# Patient Record
Sex: MALE | Race: WHITE | NOT HISPANIC OR LATINO | Employment: OTHER | URBAN - METROPOLITAN AREA
[De-identification: names, ages, dates, MRNs, and addresses within clinical notes are randomized per-mention and may not be internally consistent; named-entity substitution may affect disease eponyms.]

---

## 2017-01-04 ENCOUNTER — HOSPITAL ENCOUNTER (OUTPATIENT)
Dept: CT IMAGING | Facility: HOSPITAL | Age: 77
Discharge: HOME | End: 2017-01-04
Attending: FAMILY MEDICINE | Admitting: FAMILY MEDICINE

## 2017-05-02 ENCOUNTER — HOSPITAL ENCOUNTER (OUTPATIENT)
Dept: FAMILY MEDICINE CLINIC | Facility: CLINIC | Age: 77
Setting detail: SPECIMEN
Discharge: HOME | End: 2017-05-02
Attending: FAMILY MEDICINE | Admitting: FAMILY MEDICINE

## 2017-05-02 LAB
ALT SERPL-CCNC: 18 IU/L (ref 17–63)
ANION GAP SERPL CALC-SCNC: 13.7 MMOL/L (ref 10–20)
BUN SERPL-MCNC: 18 MG/DL (ref 8–20)
BUN/CREAT SERPL: 18 (ref 6.2–20.3)
CALCIUM SERPL-MCNC: 9.5 MG/DL (ref 8.9–10.3)
CHLORIDE SERPL-SCNC: 101 MMOL/L (ref 101–111)
CHOLEST SERPL-MCNC: 109 MG/DL
CHOLEST/HDLC SERPL: 2.3 {RATIO}
CONV CO2: 27 MMOL/L (ref 22–32)
CONV LDL CHOLESTEROL DIRECT: 52 MG/DL (ref 0–100)
CREAT UR-MCNC: 1 MG/DL (ref 0.7–1.2)
GLUCOSE SERPL-MCNC: 118 MG/DL (ref 65–99)
HDLC SERPL-MCNC: 47 MG/DL
LDLC/HDLC SERPL: 1.1 {RATIO}
LIPID INTERPRETATION: NORMAL
POTASSIUM SERPL-SCNC: 4.7 MMOL/L (ref 3.6–5.1)
PSA SERPL-MCNC: 0.67 NG/ML (ref 0–4)
SODIUM SERPL-SCNC: 137 MMOL/L (ref 136–144)
TRIGL SERPL-MCNC: 54 MG/DL
VLDLC SERPL CALC-MCNC: 10.3 MG/DL

## 2017-06-19 ENCOUNTER — HOSPITAL ENCOUNTER (OUTPATIENT)
Dept: OTHER | Facility: HOSPITAL | Age: 77
Setting detail: SPECIMEN
Discharge: HOME | End: 2017-06-19
Attending: OTOLARYNGOLOGY | Admitting: OTOLARYNGOLOGY

## 2017-08-10 ENCOUNTER — HOSPITAL ENCOUNTER (OUTPATIENT)
Dept: OTHER | Facility: HOSPITAL | Age: 77
Setting detail: SPECIMEN
Discharge: HOME | End: 2017-08-10
Attending: OTOLARYNGOLOGY | Admitting: OTOLARYNGOLOGY

## 2017-11-01 ENCOUNTER — HOSPITAL ENCOUNTER (OUTPATIENT)
Dept: FAMILY MEDICINE CLINIC | Facility: CLINIC | Age: 77
Setting detail: SPECIMEN
Discharge: HOME | End: 2017-11-01
Attending: FAMILY MEDICINE | Admitting: FAMILY MEDICINE

## 2017-11-01 LAB
ANION GAP SERPL CALC-SCNC: 12.5 MMOL/L (ref 10–20)
BUN SERPL-MCNC: 16 MG/DL (ref 8–20)
BUN/CREAT SERPL: 17.8 (ref 6.2–20.3)
CALCIUM SERPL-MCNC: 9.4 MG/DL (ref 8.9–10.3)
CHLORIDE SERPL-SCNC: 100 MMOL/L (ref 101–111)
CONV CO2: 28 MMOL/L (ref 22–32)
CREAT UR-MCNC: 0.9 MG/DL (ref 0.7–1.2)
GLUCOSE SERPL-MCNC: 121 MG/DL (ref 65–99)
POTASSIUM SERPL-SCNC: 4.5 MMOL/L (ref 3.6–5.1)
SODIUM SERPL-SCNC: 136 MMOL/L (ref 136–144)

## 2017-12-14 ENCOUNTER — HOSPITAL ENCOUNTER (OUTPATIENT)
Dept: OTHER | Facility: HOSPITAL | Age: 77
Setting detail: SPECIMEN
Discharge: HOME | End: 2017-12-14
Attending: OTOLARYNGOLOGY | Admitting: OTOLARYNGOLOGY

## 2018-05-07 ENCOUNTER — HOSPITAL ENCOUNTER (OUTPATIENT)
Dept: FAMILY MEDICINE CLINIC | Facility: CLINIC | Age: 78
Setting detail: SPECIMEN
Discharge: HOME | End: 2018-05-07
Attending: FAMILY MEDICINE | Admitting: FAMILY MEDICINE

## 2018-09-24 ENCOUNTER — HOSPITAL ENCOUNTER (OUTPATIENT)
Dept: PHYSICAL THERAPY | Facility: HOSPITAL | Age: 78
Setting detail: RECURRING SERIES
Discharge: HOME | End: 2018-10-08
Attending: FAMILY MEDICINE | Admitting: FAMILY MEDICINE

## 2018-10-15 ENCOUNTER — HOSPITAL ENCOUNTER (OUTPATIENT)
Dept: FAMILY MEDICINE CLINIC | Facility: CLINIC | Age: 78
Setting detail: SPECIMEN
Discharge: HOME | End: 2018-10-15
Attending: FAMILY MEDICINE | Admitting: FAMILY MEDICINE

## 2018-10-15 LAB
ALBUMIN SERPL-MCNC: 4.2 G/DL (ref 3.5–4.8)
ALBUMIN/GLOB SERPL: 1.6 {RATIO} (ref 1–1.7)
ALP SERPL-CCNC: 54 IU/L (ref 32–91)
ALT SERPL-CCNC: 20 IU/L (ref 17–63)
ANION GAP SERPL CALC-SCNC: 12 MMOL/L (ref 10–20)
AST SERPL-CCNC: 29 IU/L (ref 15–41)
BASOPHILS # BLD AUTO: 0 10*3/UL (ref 0–0.2)
BASOPHILS NFR BLD AUTO: 1 % (ref 0–2)
BILIRUB SERPL-MCNC: 0.8 MG/DL (ref 0.3–1.2)
BUN SERPL-MCNC: 14 MG/DL (ref 8–20)
BUN/CREAT SERPL: 14 (ref 6.2–20.3)
CALCIUM SERPL-MCNC: 9.2 MG/DL (ref 8.9–10.3)
CHLORIDE SERPL-SCNC: 100 MMOL/L (ref 101–111)
CHOLEST SERPL-MCNC: 121 MG/DL
CHOLEST/HDLC SERPL: 2.2 {RATIO}
CONV CO2: 28 MMOL/L (ref 22–32)
CONV LDL CHOLESTEROL DIRECT: 52 MG/DL (ref 0–100)
CONV TOTAL PROTEIN: 6.8 G/DL (ref 6.1–7.9)
CREAT UR-MCNC: 1 MG/DL (ref 0.7–1.2)
DIFFERENTIAL METHOD BLD: (no result)
EOSINOPHIL # BLD AUTO: 0.3 10*3/UL (ref 0–0.3)
EOSINOPHIL # BLD AUTO: 5 % (ref 0–3)
ERYTHROCYTE [DISTWIDTH] IN BLOOD BY AUTOMATED COUNT: 12.9 % (ref 11.5–14.5)
GLOBULIN UR ELPH-MCNC: 2.6 G/DL (ref 2.5–3.8)
GLUCOSE SERPL-MCNC: 99 MG/DL (ref 65–99)
HBA1C MFR BLD: 6.1 % (ref 0–5.6)
HCT VFR BLD AUTO: 42.3 % (ref 40–54)
HDLC SERPL-MCNC: 56 MG/DL
HGB BLD-MCNC: 14.3 G/DL (ref 14–18)
LDLC/HDLC SERPL: 0.9 {RATIO}
LIPID INTERPRETATION: NORMAL
LYMPHOCYTES # BLD AUTO: 1.7 10*3/UL (ref 0.8–4.8)
LYMPHOCYTES NFR BLD AUTO: 30 % (ref 18–42)
MCH RBC QN AUTO: 30.4 PG (ref 26–32)
MCHC RBC AUTO-ENTMCNC: 33.8 G/DL (ref 32–36)
MCV RBC AUTO: 90.1 FL (ref 80–94)
MONOCYTES # BLD AUTO: 0.7 10*3/UL (ref 0.1–1.3)
MONOCYTES NFR BLD AUTO: 12 % (ref 2–11)
NEUTROPHILS # BLD AUTO: 3.1 10*3/UL (ref 2.3–8.6)
NEUTROPHILS NFR BLD AUTO: 52 % (ref 50–75)
NRBC BLD AUTO-RTO: 0 /100{WBCS}
NRBC/RBC NFR BLD MANUAL: 0 10*3/UL
PLATELET # BLD AUTO: 184 10*3/UL (ref 150–450)
PMV BLD AUTO: 9.1 FL (ref 7.4–10.4)
POTASSIUM SERPL-SCNC: 4 MMOL/L (ref 3.6–5.1)
PSA SERPL-MCNC: 1.07 NG/ML (ref 0–4)
RBC # BLD AUTO: 4.69 10*6/UL (ref 4.6–6)
SODIUM SERPL-SCNC: 136 MMOL/L (ref 136–144)
TRIGL SERPL-MCNC: 84 MG/DL
VLDLC SERPL CALC-MCNC: 13.2 MG/DL
WBC # BLD AUTO: 5.8 10*3/UL (ref 4.5–11.5)

## 2019-01-14 ENCOUNTER — HOSPITAL ENCOUNTER (OUTPATIENT)
Dept: FAMILY MEDICINE CLINIC | Facility: CLINIC | Age: 79
Setting detail: SPECIMEN
Discharge: HOME | End: 2019-01-14
Attending: FAMILY MEDICINE | Admitting: FAMILY MEDICINE

## 2019-01-14 LAB
ANION GAP SERPL CALC-SCNC: 13.7 MMOL/L (ref 10–20)
BUN SERPL-MCNC: 17 MG/DL (ref 8–20)
BUN/CREAT SERPL: 18.9 (ref 6.2–20.3)
CALCIUM SERPL-MCNC: 9.2 MG/DL (ref 8.9–10.3)
CHLORIDE SERPL-SCNC: 96 MMOL/L (ref 101–111)
CONV CO2: 26 MMOL/L (ref 22–32)
CREAT UR-MCNC: 0.9 MG/DL (ref 0.7–1.2)
GLUCOSE SERPL-MCNC: 119 MG/DL (ref 65–99)
POTASSIUM SERPL-SCNC: 3.7 MMOL/L (ref 3.6–5.1)
SODIUM SERPL-SCNC: 132 MMOL/L (ref 136–144)

## 2019-05-30 ENCOUNTER — CONVERSION ENCOUNTER (OUTPATIENT)
Dept: FAMILY MEDICINE CLINIC | Facility: CLINIC | Age: 79
End: 2019-05-30

## 2019-06-04 VITALS
WEIGHT: 222.5 LBS | OXYGEN SATURATION: 99 % | DIASTOLIC BLOOD PRESSURE: 72 MMHG | HEART RATE: 66 BPM | SYSTOLIC BLOOD PRESSURE: 144 MMHG | BODY MASS INDEX: 28.18 KG/M2

## 2019-06-06 NOTE — PROGRESS NOTES
Chief Complaint Followup stent placement hypertension dyslipidemia    Since I have last seen, the patient has been without any chest discomfort ,shortness of breath, palpitations, dizziness or syncope.  Denies having any headache ,abdominal pain ,nausea, vomiting , diarrhea constipation, loss of weight or loss of appetite.    Denies having any excessive bruising ,hematuria or blood in the stool.    Review of all systems negative except as indicated  HPI  //////////////////////  Impression  ============   -status post right coronary artery stent placement (2005 ).     Cardiac catheterization 03/10/2017 revealed normal left ventricular function.  Normal left main coronary artery. 50% lad distal to diagonal branch.  Ostial diagonal branch has 80-90% disease (moderate size vessel) RCA has 30-40% plaquing.  RCA stent was   patent.  One of the PDA branches had 70% disease at its ostium.    Stress Cardiolite test is negative for myocardial ischemia 08/01/2016.    -palpitations improved.  Holter monitor 01/18/2016 showed occasional premature atrial and ventricular contractions.      -hypertension dyslipidemia      -benign prostatic hypertrophy      -psoriasis      -status post left ankle surgery  rotator cuff surgery umbilical hernia repair and surgery for basal cell carcinoma of the nose.      -allergy to darvon codeine Relafen and IVP dye  ===============   Plan  =============  Labs were reviewed.  sodium 132 blood sugar 119 potassium 3.7 BUN 17 creatinine 0.9  Patient is not having any angina pectoris or congestive heart failure on medical treatment.  EKG showed sinus bradycardia sinus arrhythmia   medications were reviewed and updated.     Continue atenolol 25 mg a day   followup in the office in 6 months .  /////////////////////////////            Vital Signs:    Patient Profile:    79 Years Old Male  CC:         CAD  Height:     74.5 inches  Weight:     222.50 pounds  (Measured Weight:  222.50 lbs.  oz.)  BMI:         28.18  Pulse rate: 66 / minute  O2 Sat:     99 %  Room Air:   room air without exertion    BP sittin / 72  (left arm)  Cuff size:  regular   Vitals Entered By: Sharlene Segura CMA (May 30, 2019 9:09 AM)    Medications:  Medications were reviewed with the patient during this visit.    Allergies:   DARVON (Critical)  CODEINE (Critical)  RELAFEN (Critical)  IODINE (Critical)    Allergies were reviewed with the patient during this visit.    Current Allergies (reviewed today):  DARVON (Critical)  CODEINE (Critical)  RELAFEN (Critical)  IODINE (Critical)    Current Medications (including medications started today):   NITROGLYCERIN 0.4 MG SUBLINGUAL TABLET SUBLINGUAL (NITROGLYCERIN) take 1 tablket under your tongue as needed for chest pain; repeat every 5 minutes for 2 more doses if needed. Go to the ER with the third dose.  ISOSORBIDE MN ER 30 MG TABLET (ISOSORBIDE MONONITRATE) TAKE ONE TABLET BY MOUTH DAILY  ADULT ASPIRIN EC LOW STRENGTH 81 MG ORAL TABLET DELAYED RELEASE (ASPIRIN) Take 1 tablet by mouth daily  FINASTERIDE 5 MG ORAL TABLET (FINASTERIDE) Take 1 tablet by mouth daily  ATORVASTATIN CALCIUM 20 MG ORAL TABLET (ATORVASTATIN CALCIUM) Take 1 tablet by mouth daily  CLONIDINE HCL 0.2MG TABLET (CLONIDINE HCL) TAKE ONE TABLET BY MOUTH TWICE A DAY  LOSARTAN POTASSIUM 100 MG ORAL TABLET (LOSARTAN POTASSIUM) Take 1 tablet by mouth daily  ATENOLOL 25 MG TABLET (ATENOLOL) TAKE ONE TABLET BY MOUTH EVERY MORNING  HYDROCHLOROTHIAZIDE 25 MG TABLET (HYDROCHLOROTHIAZIDE) TAKE ONE TABLET BY MOUTH DAILY      Past Medical History:     Reviewed history from 10/03/2017 and no changes required:        Sympathetic syndrome        Coronary Artery Disease        Hypertension        BPH        Hyperlipidemia        Psoriasis        Impaired fasting glucose        Osteopenia        Osteoarthritis    Past Surgical History:     Reviewed history from 2018 and no changes required:        Paratid mass removal        Stenting of  right coronary artery        Rotator cuff         Umbilical herniorrhaphy 2011        Left ankle surgery        Nose surgery removal of a basal cell        Left Great Toe had a broken tendon- uses an AFO when going on long walks        eye surgery     Family History Summary:      Reviewed history Last on 2019 and no changes required:2019  First Degree Blood Relative - Has No Known Family History - Entered On: 2015    General Comments - FH:  Father  at age 89  Mother  at age 92  Four healthy brothers    Social History:     Reviewed history from 2019 and no changes required:        Patient has never smoked.        Passive Smoke: N        Alcohol Use: Y        Drug Use: N        HIV/High Risk: N        Regular Exercise: Y                Risk Factors:     Smoked Tobacco Use:  Never smoker  Smokeless Tobacco Use:  Never  Passive smoke exposure:  no  Drug use:  no  HIV high-risk behavior:  no  Caffeine use:  3 drinks per day  Alcohol use:  yes     Type:  beer/whiskey sour/wine twice monthly     Drinks per day:  social  Exercise:  yes     Times per week:  3     Type of Exercise:  walks 2 miles  Seatbelt use:  100 %  Sun Exposure:  occasionally    Family History Risk Factors:     Family History of MI in females < 65 years old:  no     Family History of MI in males < 55 years old:  no    Previous Tobacco Use: Signed On - 2019  Smoked Tobacco Use:  Never smoker  Smokeless Tobacco Use:  Never  Passive smoke exposure:  no  Drug use:  no  HIV high-risk behavior:  no  Caffeine use:  2 drinks per day    Previous Alcohol Use: Signed On - 2019  Alcohol use:  yes     Type:  beer/whiskey sour/wine twice monthly  Exercise:  yes     Times per week:  5     Type of Exercise:  walks 2 miles  Seatbelt use:  100 %  Sun Exposure:  occasionally    Family History Risk Factors:     Family History of MI in females < 65 years old:  no     Family History of MI in males < 55 years old:   no    Colonoscopy History:     Date of Last Colonoscopy:  07/25/2012        Review of Systems   General: + fatigue or tiredness, No change in weight   Eyes: No redness  Cardiovascular: No chest pain, no palpitations   Respiratory: No shortness of breath   Gastrointestinal: No nausea or vomiting, bleeding   Genitourinary: no hematuria or dysuria  Musculoskeletal: No arthralgia or myalgia   Skin: No rash  Neurologic: No numbness, tingling, syncope   Hematologic/Lymphatic: No abnormal bleeding      Physical Exam    General:      The patient is alert, oriented and in no distress.    Vital signs as noted above.    Head and neck revealed no carotid bruits or jugular venous distension.  No thyromegaly or lymphadenopathy is present.    Lungs clear.  No wheezing.  Breath sounds are normal bilaterally.    Heart normal first and second heart sounds.  No murmur or pericardial rub is present.  No gallop is present.    Abdomen soft and nontender.  No organomegaly is present.    Extremities revealed good peripheral pulses without any pedal edema.    Skin warm and dry.    Musculoskeletal system is grossly normal.    CNS grossly normal.      Blood Pressure:  Today's BP: 144/72 mm Hg    Labwork:   Most Recent Lab Results:   LDL: 52 mg/dL 10/15/2018  HbA1c: : 6.1 % 10/15/2018      EKG Interpretation   Comments: Sinus bradycardia sinus arrhythmia 54 per minute normal axis normal intervals no ectopy      Impression & Recommendations:    Problem # 1:  Status post cardiac stent placement (ICD-V45.82) (VZP19-T84.5)  Assessment: Improved    His updated medication list for this problem includes:     Nitroglycerin 0.4 Mg Sublingual Tablet Sublingual (Nitroglycerin) ..... Take 1 tablket under your tongue as needed for chest pain; repeat every 5 minutes for 2 more doses if needed. go to the er with the third dose.     Isosorbide Mn Er 30 Mg Tablet (Isosorbide mononitrate) ..... Take one tablet by mouth daily     Adult Aspirin Ec Low Strength 81  Mg Oral Tablet Delayed Release (Aspirin) ..... Take 1 tablet by mouth daily     Clonidine Hcl 0.2mg Tablet (Clonidine hcl) ..... Take one tablet by mouth twice a day     Losartan Potassium 100 Mg Oral Tablet (Losartan potassium) ..... Take 1 tablet by mouth daily     Atenolol 25 Mg Tablet (Atenolol) ..... Take one tablet by mouth every morning     Hydrochlorothiazide 25 Mg Tablet (Hydrochlorothiazide) ..... Take one tablet by mouth daily    Orders:  27432-Axm Vst-Est Level III (CPT-23399)      Problem # 2:  HYPERTENSION (ICD-401.9) (RSJ93-D74)  Assessment: Improved    His updated medication list for this problem includes:     Clonidine Hcl 0.2mg Tablet (Clonidine hcl) ..... Take one tablet by mouth twice a day     Losartan Potassium 100 Mg Oral Tablet (Losartan potassium) ..... Take 1 tablet by mouth daily     Atenolol 25 Mg Tablet (Atenolol) ..... Take one tablet by mouth every morning     Hydrochlorothiazide 25 Mg Tablet (Hydrochlorothiazide) ..... Take one tablet by mouth daily    Orders:  EKG (In House) (18711)  98637-Yin Vst-Est Level III (CPT-72970)      Problem # 3:  HYPERLIPIDEMIA (ICD-272.4) (CYU69-L68.5)  Assessment: Improved    His updated medication list for this problem includes:     Atorvastatin Calcium 20 Mg Oral Tablet (Atorvastatin calcium) ..... Take 1 tablet by mouth daily    Orders:  EKG (In House) (90022)  22270-Gga Vst-Est Level III (CPT-72962)        Patient Instructions:  1)   followup in 6 months                Medication Administration    Orders Added:  1)  EKG (In House) [55084]  2)  90692-Kkb Vst-Est Level III [CPT-97895]  ]      Electronically signed by Vilma Staples MD on 05/30/2019 at 9:45 AM  ________________________________________________________________________       Disclaimer: Converted Note message may not contain all data elements that existed in the legacy source system. Please see [employer] for the original note details.

## 2019-07-15 ENCOUNTER — TELEPHONE (OUTPATIENT)
Dept: FAMILY MEDICINE CLINIC | Facility: CLINIC | Age: 79
End: 2019-07-15

## 2019-07-15 DIAGNOSIS — I10 ESSENTIAL HYPERTENSION: Primary | ICD-10-CM

## 2019-07-15 DIAGNOSIS — R73.01 IMPAIRED FASTING GLUCOSE: ICD-10-CM

## 2019-07-15 PROBLEM — I25.10 CORONARY ARTERY DISEASE: Status: ACTIVE | Noted: 2019-07-15

## 2019-07-15 RX ORDER — ISOSORBIDE MONONITRATE 30 MG/1
TABLET, EXTENDED RELEASE ORAL EVERY 24 HOURS
COMMUNITY
Start: 2017-11-02 | End: 2020-04-20

## 2019-07-15 RX ORDER — HYDROCHLOROTHIAZIDE 25 MG/1
TABLET ORAL EVERY 24 HOURS
COMMUNITY
Start: 2019-03-10 | End: 2019-09-15 | Stop reason: SDUPTHER

## 2019-07-15 RX ORDER — LOSARTAN POTASSIUM 100 MG/1
100 TABLET ORAL DAILY
COMMUNITY
Start: 2011-10-19

## 2019-07-15 RX ORDER — ATENOLOL 25 MG/1
TABLET ORAL EVERY 24 HOURS
COMMUNITY
Start: 2017-09-25 | End: 2019-12-15 | Stop reason: SDUPTHER

## 2019-07-15 RX ORDER — FINASTERIDE 5 MG/1
5 TABLET, FILM COATED ORAL EVERY EVENING
COMMUNITY
Start: 2014-02-06

## 2019-07-15 RX ORDER — NITROGLYCERIN 0.4 MG/1
TABLET SUBLINGUAL
COMMUNITY
Start: 2017-03-13 | End: 2019-12-19 | Stop reason: SDUPTHER

## 2019-07-15 RX ORDER — CLONIDINE HYDROCHLORIDE 0.2 MG/1
TABLET ORAL EVERY 12 HOURS
COMMUNITY
Start: 2018-12-09 | End: 2019-10-06 | Stop reason: SDUPTHER

## 2019-07-15 RX ORDER — ASPIRIN 81 MG/1
81 TABLET ORAL DAILY
COMMUNITY
Start: 2011-10-19

## 2019-07-15 RX ORDER — ATORVASTATIN CALCIUM 20 MG/1
20 TABLET, FILM COATED ORAL NIGHTLY
COMMUNITY
Start: 2014-02-06

## 2019-07-16 ENCOUNTER — LAB (OUTPATIENT)
Dept: LAB | Facility: HOSPITAL | Age: 79
End: 2019-07-16

## 2019-07-16 DIAGNOSIS — I10 ESSENTIAL HYPERTENSION: ICD-10-CM

## 2019-07-16 DIAGNOSIS — R73.01 IMPAIRED FASTING GLUCOSE: ICD-10-CM

## 2019-07-16 LAB
ANION GAP SERPL CALCULATED.3IONS-SCNC: 14.1 MMOL/L (ref 5–15)
BUN BLD-MCNC: 15 MG/DL (ref 8–20)
BUN/CREAT SERPL: 15 (ref 6.2–20.3)
CALCIUM SPEC-SCNC: 9.1 MG/DL (ref 8.9–10.3)
CHLORIDE SERPL-SCNC: 95 MMOL/L (ref 101–111)
CO2 SERPL-SCNC: 24 MMOL/L (ref 22–32)
CREAT BLD-MCNC: 1 MG/DL (ref 0.7–1.2)
GFR SERPL CREATININE-BSD FRML MDRD: 72 ML/MIN/1.73
GLUCOSE BLD-MCNC: 120 MG/DL (ref 65–99)
HBA1C MFR BLD: 6.2 % (ref 3.5–5.6)
POTASSIUM BLD-SCNC: 4.1 MMOL/L (ref 3.6–5.1)
SODIUM BLD-SCNC: 129 MMOL/L (ref 136–144)

## 2019-07-16 PROCEDURE — 36415 COLL VENOUS BLD VENIPUNCTURE: CPT

## 2019-07-16 PROCEDURE — 80048 BASIC METABOLIC PNL TOTAL CA: CPT

## 2019-07-16 PROCEDURE — 83036 HEMOGLOBIN GLYCOSYLATED A1C: CPT

## 2019-07-17 ENCOUNTER — OFFICE VISIT (OUTPATIENT)
Dept: FAMILY MEDICINE CLINIC | Facility: CLINIC | Age: 79
End: 2019-07-17

## 2019-07-17 VITALS
SYSTOLIC BLOOD PRESSURE: 176 MMHG | TEMPERATURE: 97.7 F | WEIGHT: 224.4 LBS | DIASTOLIC BLOOD PRESSURE: 91 MMHG | HEIGHT: 75 IN | OXYGEN SATURATION: 98 % | BODY MASS INDEX: 27.9 KG/M2 | RESPIRATION RATE: 14 BRPM | HEART RATE: 57 BPM

## 2019-07-17 DIAGNOSIS — R73.01 IMPAIRED FASTING GLUCOSE: ICD-10-CM

## 2019-07-17 DIAGNOSIS — N40.0 ENLARGED PROSTATE WITHOUT LOWER URINARY TRACT SYMPTOMS (LUTS): ICD-10-CM

## 2019-07-17 DIAGNOSIS — E78.5 HYPERLIPIDEMIA, UNSPECIFIED HYPERLIPIDEMIA TYPE: ICD-10-CM

## 2019-07-17 DIAGNOSIS — I25.10 CORONARY ARTERY DISEASE INVOLVING NATIVE CORONARY ARTERY OF NATIVE HEART WITHOUT ANGINA PECTORIS: ICD-10-CM

## 2019-07-17 DIAGNOSIS — M85.80 OSTEOPENIA, UNSPECIFIED LOCATION: ICD-10-CM

## 2019-07-17 DIAGNOSIS — M19.90 OSTEOARTHRITIS, UNSPECIFIED OSTEOARTHRITIS TYPE, UNSPECIFIED SITE: ICD-10-CM

## 2019-07-17 DIAGNOSIS — I10 ESSENTIAL HYPERTENSION: Primary | ICD-10-CM

## 2019-07-17 PROCEDURE — 99214 OFFICE O/P EST MOD 30 MIN: CPT | Performed by: FAMILY MEDICINE

## 2019-07-17 NOTE — PATIENT INSTRUCTIONS
Continue your current medications and treatment.    Follow up in the office in 6 months.    Laboratory testing at that time.

## 2019-07-17 NOTE — PROGRESS NOTES
"Subjective   Campbell Alex is a 79 y.o. male.     Chief Complaint   Patient presents with   • Hypertension     6 MTH F/U    • Hyperlipidemia   • Coronary Artery Disease       HPI chief complaint: Hypertension hyper lipidemia coronary disease arthritis osteopenia    The patient is an 79-year-old white male comes in for follow-up and maintenance of his current problems which include    #1 hypertension-stable-patient on Cozaar 100 mg daily clonidine 20 mg twice a day atenolol 25 mg daily and hydrochlorothiazide 25 mg daily.  He denies any lightheaded dizziness or chest pain.  Patient does have low-grade hyponatremia.  He is encouraged to increase his sodium intake and decrease his water intake.    #2 hyperlipidemia-stable-patient is on Lipitor 20 g daily.  No myalgias arthralgias.  No nausea or anorexia    #3 coronary artery disease-stable-patient on aspirin long-acting nitro glycerin and atenolol.  Denies chest pain shortness orthopnea PND.    #4 fasting glucose-stable-patient on a diet.  He is asymptomatic.  His A1c's are at goal    #5 prostatism-stable-patient is on Proscar 5 mg daily.  No urinary frequency intermittency hesitancy or incontinence    #6 arthritis-stable        The following portions of the patient's history were reviewed and updated as appropriate: allergies, current medications, past family history, past medical history, past social history, past surgical history and problem list.    Review of Systems    Objective     /91 (BP Location: Left arm, Patient Position: Sitting, Cuff Size: Adult)   Pulse 57   Temp 97.7 °F (36.5 °C) (Oral)   Resp 14   Ht 189.2 cm (74.5\")   Wt 102 kg (224 lb 6.4 oz)   SpO2 98%   BMI 28.43 kg/m²     Physical Exam   Constitutional: He is oriented to person, place, and time. He appears well-developed and well-nourished.   HENT:   Head: Normocephalic and atraumatic.   Right Ear: Decreased hearing is noted.   Left Ear: Decreased hearing is noted.   Eyes: Conjunctivae " and EOM are normal. Pupils are equal, round, and reactive to light.   Neck: Normal range of motion. Neck supple.   Cardiovascular: Normal rate, regular rhythm, normal heart sounds and intact distal pulses.   Pulmonary/Chest: Effort normal and breath sounds normal.   Abdominal: Soft. Bowel sounds are normal.   Musculoskeletal: Normal range of motion.   Neurological: He is alert and oriented to person, place, and time.   Skin: Skin is warm and dry.   Psychiatric: He has a normal mood and affect. His behavior is normal.   Nursing note and vitals reviewed.        Assessment/Plan   Campbell was seen today for hypertension, hyperlipidemia and coronary artery disease.    Diagnoses and all orders for this visit:    Essential hypertension    Hyperlipidemia, unspecified hyperlipidemia type    Coronary artery disease involving native coronary artery of native heart without angina pectoris    Impaired fasting glucose    Osteoarthritis, unspecified osteoarthritis type, unspecified site    Osteopenia, unspecified location    Enlarged prostate without lower urinary tract symptoms (luts)      Patient Instructions   Continue your current medications and treatment.    Follow up in the office in 6 months.    Laboratory testing at that time.      Amarjit Jurado Jr., MD    07/17/19

## 2019-09-15 RX ORDER — HYDROCHLOROTHIAZIDE 25 MG/1
TABLET ORAL
Qty: 30 TABLET | Refills: 3 | Status: SHIPPED | OUTPATIENT
Start: 2019-09-15 | End: 2020-01-06

## 2019-10-06 RX ORDER — CLONIDINE HYDROCHLORIDE 0.2 MG/1
TABLET ORAL
Qty: 60 TABLET | Refills: 0 | Status: SHIPPED | OUTPATIENT
Start: 2019-10-06 | End: 2019-11-06 | Stop reason: SDUPTHER

## 2019-11-06 RX ORDER — CLONIDINE HYDROCHLORIDE 0.2 MG/1
TABLET ORAL
Qty: 60 TABLET | Refills: 0 | Status: SHIPPED | OUTPATIENT
Start: 2019-11-06 | End: 2019-12-08 | Stop reason: SDUPTHER

## 2019-12-08 RX ORDER — CLONIDINE HYDROCHLORIDE 0.2 MG/1
TABLET ORAL
Qty: 60 TABLET | Refills: 0 | Status: SHIPPED | OUTPATIENT
Start: 2019-12-08 | End: 2020-01-06

## 2019-12-16 RX ORDER — ATENOLOL 25 MG/1
TABLET ORAL
Qty: 90 TABLET | Refills: 3 | Status: SHIPPED | OUTPATIENT
Start: 2019-12-16 | End: 2020-12-07

## 2019-12-19 ENCOUNTER — OFFICE VISIT (OUTPATIENT)
Dept: CARDIOLOGY | Facility: CLINIC | Age: 79
End: 2019-12-19

## 2019-12-19 VITALS
BODY MASS INDEX: 28.07 KG/M2 | HEIGHT: 74 IN | OXYGEN SATURATION: 99 % | DIASTOLIC BLOOD PRESSURE: 80 MMHG | HEART RATE: 66 BPM | WEIGHT: 218.75 LBS | SYSTOLIC BLOOD PRESSURE: 149 MMHG

## 2019-12-19 DIAGNOSIS — I25.10 CORONARY ARTERY DISEASE INVOLVING NATIVE CORONARY ARTERY OF NATIVE HEART WITHOUT ANGINA PECTORIS: Primary | ICD-10-CM

## 2019-12-19 DIAGNOSIS — I10 ESSENTIAL HYPERTENSION: ICD-10-CM

## 2019-12-19 DIAGNOSIS — Z95.5 STATUS POST CORONARY ARTERY STENT PLACEMENT: ICD-10-CM

## 2019-12-19 DIAGNOSIS — E78.2 MIXED HYPERLIPIDEMIA: ICD-10-CM

## 2019-12-19 PROCEDURE — 93000 ELECTROCARDIOGRAM COMPLETE: CPT | Performed by: INTERNAL MEDICINE

## 2019-12-19 PROCEDURE — 99213 OFFICE O/P EST LOW 20 MIN: CPT | Performed by: INTERNAL MEDICINE

## 2019-12-19 RX ORDER — NITROGLYCERIN 0.4 MG/1
0.4 TABLET SUBLINGUAL
Qty: 25 TABLET | Refills: 0 | Status: SHIPPED | OUTPATIENT
Start: 2019-12-19 | End: 2022-10-26

## 2019-12-19 NOTE — PROGRESS NOTES
Encounter Date:12/19/2019  Last seen 5/30/2019      Patient ID: Campbell Alex is a 79 y.o. male.    Chief Complaint:  Status post stent  Hypertension  Dyslipidemia      History of Present Illness  Since I have last seen, the patient has been without any chest discomfort ,shortness of breath, palpitations, dizziness or syncope.  Denies having any headache ,abdominal pain ,nausea, vomiting , diarrhea constipation, loss of weight or loss of appetite.  Denies having any excessive bruising ,hematuria or blood in the stool.    Review of all systems negative except as indicated    Assessment and Plan       //////////////////////  Impression  ============   -status post right coronary artery stent placement (2005 ).      Cardiac catheterization 03/10/2017 revealed normal left ventricular function.  Normal left main coronary artery. 50% lad distal to diagonal branch.  Ostial diagonal branch has 80-90% disease (moderate size vessel) RCA has 30-40% plaquing.  RCA stent was   patent.  One of the PDA branches had 70% disease at its ostium.     Stress Cardiolite test is negative for myocardial ischemia 08/01/2016.     -palpitations improved.  Holter monitor 01/18/2016 showed occasional premature atrial and ventricular contractions.       -hypertension dyslipidemia       -benign prostatic hypertrophy       -psoriasis       -status post left ankle surgery  rotator cuff surgery umbilical hernia repair and surgery for basal cell carcinoma of the nose.       -allergy to darvon codeine Relafen and IVP dye  ===============   Plan  =============  EKG showed sinus rhythm without any ischemic changes.  Patient is not having any angina pectoris or congestive heart failure on medical treatment.  EKG showed sinus bradycardia sinus arrhythmia  medications were reviewed and updated.  Continue atenolol 25 mg a day  followup in the office in 6 months .  /////////////////////////////           Diagnosis Plan   1. Coronary artery disease involving  native coronary artery of native heart without angina pectoris  ECG 12 Lead   2. Essential hypertension  ECG 12 Lead   3. Mixed hyperlipidemia     4. Status post coronary artery stent placement     LAB RESULTS (LAST 7 DAYS)    CBC        BMP        CMP         BNP        TROPONIN        CoAg        Creatinine Clearance  CrCl cannot be calculated (Patient's most recent lab result is older than the maximum 30 days allowed.).    ABG        Radiology  No radiology results for the last day                The following portions of the patient's history were reviewed and updated as appropriate: allergies, current medications, past family history, past medical history, past social history, past surgical history and problem list.    Review of Systems   Constitution: Negative for malaise/fatigue.   Cardiovascular: Negative for chest pain, leg swelling, palpitations and syncope.   Respiratory: Negative for shortness of breath.    Skin: Negative for rash.   Gastrointestinal: Negative for nausea and vomiting.   Neurological: Positive for light-headedness (at times). Negative for dizziness and numbness.         Current Outpatient Medications:   •  aspirin (ADULT ASPIRIN EC LOW STRENGTH) 81 MG EC tablet, ADULT ASPIRIN EC LOW STRENGTH 81 MG ORAL TABLET DELAYED RELEASE, Disp: , Rfl:   •  atenolol (TENORMIN) 25 MG tablet, TAKE ONE TABLET BY MOUTH EVERY MORNING, Disp: 90 tablet, Rfl: 3  •  atorvastatin (LIPITOR) 20 MG tablet, ATORVASTATIN CALCIUM 20 MG TABS, Disp: , Rfl:   •  cloNIDine (CATAPRES) 0.2 MG tablet, TAKE ONE TABLET BY MOUTH TWICE A DAY, Disp: 60 tablet, Rfl: 0  •  finasteride (PROSCAR) 5 MG tablet, FINASTERIDE 5 MG TABS, Disp: , Rfl:   •  hydrochlorothiazide (HYDRODIURIL) 25 MG tablet, TAKE ONE TABLET BY MOUTH DAILY, Disp: 30 tablet, Rfl: 3  •  isosorbide mononitrate (IMDUR) 30 MG 24 hr tablet, Daily., Disp: , Rfl:   •  losartan (COZAAR) 100 MG tablet, LOSARTAN POTASSIUM 100 MG TABS, Disp: , Rfl:   •  nitroglycerin  "(NITROSTAT) 0.4 MG SL tablet, NITROGLYCERIN 0.4 MG SUBL, Disp: , Rfl:     Allergies   Allergen Reactions   • Codeine GI Intolerance   • Iodine Hives   • Nabumetone Unknown (See Comments)   • Propoxyphene Hives       Family History   Problem Relation Age of Onset   • Hypertension Brother    • Hyperlipidemia Brother        Past Surgical History:   Procedure Laterality Date   • ANKLE SURGERY     • NOSE SURGERY     • ROTATOR CUFF REPAIR     • UMBILICAL HERNIA REPAIR         Past Medical History:   Diagnosis Date   • Arthritis    • Coronary artery disease    • Hyperlipidemia    • Hypertension    • Prostatism        Family History   Problem Relation Age of Onset   • Hypertension Brother    • Hyperlipidemia Brother        Social History     Socioeconomic History   • Marital status:      Spouse name: Not on file   • Number of children: Not on file   • Years of education: Not on file   • Highest education level: Not on file   Tobacco Use   • Smoking status: Former Smoker   • Smokeless tobacco: Never Used   Substance and Sexual Activity   • Alcohol use: Yes     Frequency: Never           ECG 12 Lead  Date/Time: 12/19/2019 10:06 AM  Performed by: Vilma Staples MD  Authorized by: Vilma Staples MD   Comparison: compared with previous ECG   Similar to previous ECG  Comments: Normal sinus rhythm nonspecific ST-T wave changes normal axis normal intervals 62/min no ectopy no significant change from 3/10/2017              Objective:       Physical Exam    /80   Pulse 66   Ht 188 cm (74\")   Wt 99.2 kg (218 lb 12 oz)   SpO2 99%   BMI 28.09 kg/m²   The patient is alert, oriented and in no distress.    Vital signs as noted above.    Head and neck revealed no carotid bruits or jugular venous distension.  No thyromegaly or lymphadenopathy is present.    Lungs clear.  No wheezing.  Breath sounds are normal bilaterally.    Heart normal first and second heart sounds.  No murmur..  No pericardial rub is present.  No " gallop is present.    Abdomen soft and nontender.  No organomegaly is present.    Extremities revealed good peripheral pulses without any pedal edema.    Skin warm and dry.    Musculoskeletal system is grossly normal.    CNS grossly normal.

## 2020-01-06 RX ORDER — HYDROCHLOROTHIAZIDE 25 MG/1
TABLET ORAL
Qty: 30 TABLET | Refills: 2 | Status: SHIPPED | OUTPATIENT
Start: 2020-01-06 | End: 2020-04-05

## 2020-01-06 RX ORDER — CLONIDINE HYDROCHLORIDE 0.2 MG/1
TABLET ORAL
Qty: 60 TABLET | Refills: 0 | Status: SHIPPED | OUTPATIENT
Start: 2020-01-06 | End: 2020-02-04

## 2020-01-17 ENCOUNTER — TELEPHONE (OUTPATIENT)
Dept: FAMILY MEDICINE CLINIC | Facility: CLINIC | Age: 80
End: 2020-01-17

## 2020-01-17 DIAGNOSIS — E78.2 MIXED HYPERLIPIDEMIA: ICD-10-CM

## 2020-01-17 DIAGNOSIS — N40.0 ENLARGED PROSTATE WITHOUT LOWER URINARY TRACT SYMPTOMS (LUTS): ICD-10-CM

## 2020-01-17 DIAGNOSIS — I10 ESSENTIAL HYPERTENSION: Primary | ICD-10-CM

## 2020-01-20 ENCOUNTER — LAB (OUTPATIENT)
Dept: LAB | Facility: HOSPITAL | Age: 80
End: 2020-01-20

## 2020-01-20 DIAGNOSIS — E78.2 MIXED HYPERLIPIDEMIA: ICD-10-CM

## 2020-01-20 DIAGNOSIS — N40.0 ENLARGED PROSTATE WITHOUT LOWER URINARY TRACT SYMPTOMS (LUTS): ICD-10-CM

## 2020-01-20 DIAGNOSIS — I10 ESSENTIAL HYPERTENSION: ICD-10-CM

## 2020-01-20 LAB
ALBUMIN SERPL-MCNC: 4.4 G/DL (ref 3.5–5.2)
ALBUMIN/GLOB SERPL: 1.6 G/DL
ALP SERPL-CCNC: 51 U/L (ref 39–117)
ALT SERPL W P-5'-P-CCNC: 16 U/L (ref 1–41)
ANION GAP SERPL CALCULATED.3IONS-SCNC: 13.2 MMOL/L (ref 5–15)
AST SERPL-CCNC: 23 U/L (ref 1–40)
BASOPHILS # BLD AUTO: 0.06 10*3/MM3 (ref 0–0.2)
BASOPHILS NFR BLD AUTO: 0.8 % (ref 0–1.5)
BILIRUB SERPL-MCNC: 0.7 MG/DL (ref 0.2–1.2)
BUN BLD-MCNC: 18 MG/DL (ref 8–23)
BUN/CREAT SERPL: 18.2 (ref 7–25)
CALCIUM SPEC-SCNC: 9.5 MG/DL (ref 8.6–10.5)
CHLORIDE SERPL-SCNC: 95 MMOL/L (ref 98–107)
CHOLEST SERPL-MCNC: 110 MG/DL (ref 0–200)
CO2 SERPL-SCNC: 25.8 MMOL/L (ref 22–29)
CREAT BLD-MCNC: 0.99 MG/DL (ref 0.76–1.27)
DEPRECATED RDW RBC AUTO: 41.3 FL (ref 37–54)
EOSINOPHIL # BLD AUTO: 0.24 10*3/MM3 (ref 0–0.4)
EOSINOPHIL NFR BLD AUTO: 3.3 % (ref 0.3–6.2)
ERYTHROCYTE [DISTWIDTH] IN BLOOD BY AUTOMATED COUNT: 12.2 % (ref 12.3–15.4)
GFR SERPL CREATININE-BSD FRML MDRD: 72 ML/MIN/1.73
GLOBULIN UR ELPH-MCNC: 2.7 GM/DL
GLUCOSE BLD-MCNC: 122 MG/DL (ref 65–99)
HCT VFR BLD AUTO: 39.9 % (ref 37.5–51)
HDLC SERPL-MCNC: 52 MG/DL (ref 40–60)
HGB BLD-MCNC: 14 G/DL (ref 13–17.7)
IMM GRANULOCYTES # BLD AUTO: 0.02 10*3/MM3 (ref 0–0.05)
IMM GRANULOCYTES NFR BLD AUTO: 0.3 % (ref 0–0.5)
LDLC SERPL CALC-MCNC: 46 MG/DL (ref 0–100)
LDLC/HDLC SERPL: 0.88 {RATIO}
LYMPHOCYTES # BLD AUTO: 2.01 10*3/MM3 (ref 0.7–3.1)
LYMPHOCYTES NFR BLD AUTO: 27.8 % (ref 19.6–45.3)
MCH RBC QN AUTO: 32.3 PG (ref 26.6–33)
MCHC RBC AUTO-ENTMCNC: 35.1 G/DL (ref 31.5–35.7)
MCV RBC AUTO: 92.1 FL (ref 79–97)
MONOCYTES # BLD AUTO: 0.66 10*3/MM3 (ref 0.1–0.9)
MONOCYTES NFR BLD AUTO: 9.1 % (ref 5–12)
NEUTROPHILS # BLD AUTO: 4.25 10*3/MM3 (ref 1.7–7)
NEUTROPHILS NFR BLD AUTO: 58.7 % (ref 42.7–76)
NRBC BLD AUTO-RTO: 0 /100 WBC (ref 0–0.2)
PLATELET # BLD AUTO: 203 10*3/MM3 (ref 140–450)
PMV BLD AUTO: 10.7 FL (ref 6–12)
POTASSIUM BLD-SCNC: 4.1 MMOL/L (ref 3.5–5.2)
PROT SERPL-MCNC: 7.1 G/DL (ref 6–8.5)
PSA SERPL-MCNC: 1.18 NG/ML (ref 0–4)
RBC # BLD AUTO: 4.33 10*6/MM3 (ref 4.14–5.8)
SODIUM BLD-SCNC: 134 MMOL/L (ref 136–145)
TRIGL SERPL-MCNC: 62 MG/DL (ref 0–150)
VLDLC SERPL-MCNC: 12.4 MG/DL (ref 5–40)
WBC NRBC COR # BLD: 7.24 10*3/MM3 (ref 3.4–10.8)

## 2020-01-20 PROCEDURE — G0103 PSA SCREENING: HCPCS

## 2020-01-20 PROCEDURE — 85025 COMPLETE CBC W/AUTO DIFF WBC: CPT

## 2020-01-20 PROCEDURE — 80053 COMPREHEN METABOLIC PANEL: CPT

## 2020-01-20 PROCEDURE — 36415 COLL VENOUS BLD VENIPUNCTURE: CPT

## 2020-01-20 PROCEDURE — 80061 LIPID PANEL: CPT

## 2020-02-04 RX ORDER — CLONIDINE HYDROCHLORIDE 0.2 MG/1
TABLET ORAL
Qty: 60 TABLET | Refills: 0 | Status: SHIPPED | OUTPATIENT
Start: 2020-02-04 | End: 2020-03-10

## 2020-02-24 ENCOUNTER — HOSPITAL ENCOUNTER (OUTPATIENT)
Dept: GENERAL RADIOLOGY | Facility: HOSPITAL | Age: 80
Discharge: HOME | End: 2020-02-24
Admitting: FAMILY MEDICINE

## 2020-02-24 ENCOUNTER — OFFICE VISIT (OUTPATIENT)
Dept: FAMILY MEDICINE CLINIC | Facility: CLINIC | Age: 80
End: 2020-02-24

## 2020-02-24 VITALS
BODY MASS INDEX: 27.77 KG/M2 | TEMPERATURE: 98.2 F | HEIGHT: 74 IN | OXYGEN SATURATION: 98 % | RESPIRATION RATE: 16 BRPM | DIASTOLIC BLOOD PRESSURE: 78 MMHG | WEIGHT: 216.4 LBS | SYSTOLIC BLOOD PRESSURE: 129 MMHG | HEART RATE: 75 BPM

## 2020-02-24 DIAGNOSIS — R22.0 MASS OF LEFT SUBMANDIBULAR REGION: ICD-10-CM

## 2020-02-24 DIAGNOSIS — E78.2 MIXED HYPERLIPIDEMIA: ICD-10-CM

## 2020-02-24 DIAGNOSIS — R73.01 IMPAIRED FASTING GLUCOSE: ICD-10-CM

## 2020-02-24 DIAGNOSIS — R07.89 CHEST WALL PAIN: ICD-10-CM

## 2020-02-24 DIAGNOSIS — N40.0 ENLARGED PROSTATE WITHOUT LOWER URINARY TRACT SYMPTOMS (LUTS): ICD-10-CM

## 2020-02-24 DIAGNOSIS — Z00.00 ENCOUNTER FOR MEDICARE ANNUAL WELLNESS EXAM: Primary | ICD-10-CM

## 2020-02-24 DIAGNOSIS — I25.10 CORONARY ARTERY DISEASE INVOLVING NATIVE CORONARY ARTERY OF NATIVE HEART WITHOUT ANGINA PECTORIS: ICD-10-CM

## 2020-02-24 DIAGNOSIS — I10 ESSENTIAL HYPERTENSION: ICD-10-CM

## 2020-02-24 PROCEDURE — 99214 OFFICE O/P EST MOD 30 MIN: CPT | Performed by: FAMILY MEDICINE

## 2020-02-24 PROCEDURE — 71101 X-RAY EXAM UNILAT RIBS/CHEST: CPT

## 2020-02-24 PROCEDURE — G0438 PPPS, INITIAL VISIT: HCPCS | Performed by: FAMILY MEDICINE

## 2020-02-24 NOTE — PROGRESS NOTES
The ABCs of the Annual Wellness Visit  Initial Medicare Wellness Visit    Chief Complaint   Patient presents with   • Medicare Wellness-Initial Visit       Subjective   History of Present Illness:  Campbell Alex is a 80 y.o. male who presents for an Initial Medicare Wellness Visit.    HEALTH RISK ASSESSMENT    Recent Hospitalizations:  No hospitalization(s) within the last year.    Current Medical Providers:  Patient Care Team:  Amarjit Jurado Jr., MD as PCP - General  Amarjit Jurado Jr., MD as PCP - Claims Attributed  Vilma Staples MD as Consulting Physician (Cardiology)    Smoking Status:  Social History     Tobacco Use   Smoking Status Former Smoker   Smokeless Tobacco Never Used       Alcohol Consumption:  Social History     Substance and Sexual Activity   Alcohol Use Yes   • Frequency: Never       Depression Screen:   PHQ-2/PHQ-9 Depression Screening 2/24/2020   Little interest or pleasure in doing things 0   Feeling down, depressed, or hopeless 0   Total Score 0       Fall Risk Screen:  GLEN Fall Risk Assessment was completed, and patient is at LOW risk for falls.Assessment completed on:2/24/2020    Health Habits and Functional and Cognitive Screening:  Functional & Cognitive Status 2/24/2020   Do you have difficulty preparing food and eating? No   Do you have difficulty bathing yourself, getting dressed or grooming yourself? No   Do you have difficulty using the toilet? No   Do you have difficulty moving around from place to place? No   Do you have trouble with steps or getting out of a bed or a chair? No   Current Diet Well Balanced Diet   Dental Exam Up to date   Eye Exam Up to date   Exercise (times per week) 3 times per week   Current Exercise Activities Include Walking   Do you need help using the phone?  No   Are you deaf or do you have serious difficulty hearing?  Yes   Do you need help with transportation? No   Do you need help shopping? No   Do you need help preparing meals?  No   Do you  need help with housework?  No   Do you need help with laundry? No   Do you need help taking your medications? No   Do you need help managing money? No   Do you ever drive or ride in a car without wearing a seat belt? No   Have you felt unusual stress, anger or loneliness in the last month? No   Who do you live with? Spouse   If you need help, do you have trouble finding someone available to you? No   Have you been bothered in the last four weeks by sexual problems? No   Do you have difficulty concentrating, remembering or making decisions? Yes         Does the patient have evidence of cognitive impairment? No    Asprin use counseling:Taking ASA appropriately as indicated    Age-appropriate Screening Schedule:  Refer to the list below for future screening recommendations based on patient's age, sex and/or medical conditions. Orders for these recommended tests are listed in the plan section. The patient has been provided with a written plan.    Health Maintenance   Topic Date Due   • DXA SCAN  02/24/2020 (Originally 7/15/2019)   • TDAP/TD VACCINES (1 - Tdap) 02/24/2020 (Originally 1/12/1951)   • LIPID PANEL  01/20/2021   • INFLUENZA VACCINE  Completed   • ZOSTER VACCINE  Discontinued          The following portions of the patient's history were reviewed and updated as appropriate: allergies, current medications, past family history, past medical history, past social history, past surgical history and problem list.    Outpatient Medications Prior to Visit   Medication Sig Dispense Refill   • aspirin (ADULT ASPIRIN EC LOW STRENGTH) 81 MG EC tablet ADULT ASPIRIN EC LOW STRENGTH 81 MG ORAL TABLET DELAYED RELEASE     • atenolol (TENORMIN) 25 MG tablet TAKE ONE TABLET BY MOUTH EVERY MORNING 90 tablet 3   • atorvastatin (LIPITOR) 20 MG tablet ATORVASTATIN CALCIUM 20 MG TABS     • cloNIDine (CATAPRES) 0.2 MG tablet TAKE ONE TABLET BY MOUTH TWICE A DAY 60 tablet 0   • finasteride (PROSCAR) 5 MG tablet FINASTERIDE 5 MG TABS    "  • hydroCHLOROthiazide (HYDRODIURIL) 25 MG tablet TAKE ONE TABLET BY MOUTH DAILY 30 tablet 2   • isosorbide mononitrate (IMDUR) 30 MG 24 hr tablet Daily.     • losartan (COZAAR) 100 MG tablet LOSARTAN POTASSIUM 100 MG TABS     • nitroglycerin (NITROSTAT) 0.4 MG SL tablet Place 1 tablet under the tongue Every 5 (Five) Minutes As Needed for Chest Pain. Take no more than 3 doses in 15 minutes. 25 tablet 0     No facility-administered medications prior to visit.        Patient Active Problem List   Diagnosis   • Coronary artery disease   • Encounter for immunization   • Enlarged prostate without lower urinary tract symptoms (luts)   • Hyperlipidemia   • Hypertension   • Impaired fasting glucose   • Osteoarthritis   • Osteopenia   • Psoriasis   • Status post coronary artery stent placement   • Mass of left submandibular region       Advanced Care Planning:  ACP discussion was held with the patient during this visit. Patient has an advance directive, copy requested.    Review of Systems    Compared to one year ago, the patient feels his physical health is the same.  Compared to one year ago, the patient feels his mental health is the same.    Reviewed chart for potential of high risk medication in the elderly: yes  Reviewed chart for potential of harmful drug interactions in the elderly:yes    Objective         Vitals:    02/24/20 1037   BP: 129/78   BP Location: Left arm   Patient Position: Sitting   Cuff Size: Large Adult   Pulse: 75   Resp: 16   Temp: 98.2 °F (36.8 °C)   TempSrc: Oral   SpO2: 98%   Weight: 98.2 kg (216 lb 6.4 oz)   Height: 188 cm (74\")       Body mass index is 27.78 kg/m².  Discussed the patient's BMI with him. The BMI is in the acceptable range.    Physical Exam   Constitutional: He is oriented to person, place, and time. He appears well-developed and well-nourished.   HENT:   Head: Normocephalic and atraumatic.   Eyes: Pupils are equal, round, and reactive to light. EOM are normal.   Neck: Neck " supple.   Freely moveable left submandibular mass; 2.0 cm diameter   Cardiovascular: Normal rate, regular rhythm, normal heart sounds and intact distal pulses.   Pulmonary/Chest: Effort normal and breath sounds normal.   Abdominal: Soft. Bowel sounds are normal.   Musculoskeletal: Normal range of motion.   Neurological: He is oriented to person, place, and time.   Skin: Skin is warm and dry.   Psychiatric: He has a normal mood and affect. His behavior is normal. Judgment and thought content normal.   Nursing note and vitals reviewed.      Lab Results   Component Value Date    TRIG 62 01/20/2020    HDL 52 01/20/2020    LDL 46 01/20/2020    VLDL 12.4 01/20/2020        Assessment/Plan   Medicare Risks and Personalized Health Plan  CMS Preventative Services Quick Reference  Advance Directive Discussion  Immunizations Discussed/Encouraged (specific immunizations; Td )    The above risks/problems have been discussed with the patient.  Pertinent information has been shared with the patient in the After Visit Summary.  Follow up plans and orders are seen below in the Assessment/Plan Section.    Diagnoses and all orders for this visit:    1. Encounter for Medicare annual wellness exam (Primary)    2. Essential hypertension    3. Mixed hyperlipidemia    4. Coronary artery disease involving native coronary artery of native heart without angina pectoris    5. Impaired fasting glucose    6. Enlarged prostate without lower urinary tract symptoms (luts)    7. Chest wall pain  -     DEXA Bone Density Axial  -     XR Ribs Right With PA Chest; Future      Follow Up:  Return in about 6 months (around 8/24/2020) for Recheck.     An After Visit Summary and PPPS were given to the patient.

## 2020-02-24 NOTE — PROGRESS NOTES
Subjective   Campbell Alex is a 80 y.o. male.     Chief Complaint   Patient presents with   • Medicare Wellness-Initial Visit       HPI  Chief complaint: Hypertension hyperlipidemia coronary artery disease impaired fasting glucose prostatism neck mass    The patient is an 80-year-old white male comes in for follow-up and maintenance of his current problems which include    1.  Hypertension-stable-patient on hydrochlorothiazide 25 mg daily atenolol 25 mg daily losartan 100 mg daily and Catapres 0.2 mg twice a day.  He denied headache lightheadedness dizziness or chest pain.    2.  Hyperlipidemia-stable-patient on Lipitor 20 mg daily.  He denies myalgias no arthralgias.  Denied nausea or anorexia.    3.  Coronary artery disease-stable-patient is currently on aspirin 81 mg daily metoprolol 25 mg daily long-acting nitroglycerin.  He denied chest pain shortness of breath orthopnea or PND.    4.  Prostatism-stable-patient on finasteride 5 mg daily.  He denies urinary frequency intermittency hesitancy or incontinence.    6.  Neck mass-new-patient states that he was found to have a neck mass [facility] about a year ago.  He was recommended at that time to have it looked at.  Patient states is not gotten larger.  He is asymptomatic.  He does not want to go through further surgery at this time.  He was advised the only way to know for sure what this is would be to remove it.  He was advised that it could potentially be malignant.    7.  Chest wall pain-new-patient complains of pain in the right posterior chest wall just medial to the scapula.  He states that he developed pain several weeks ago while he was moving a piano.  He denied fever or chills.        The following portions of the patient's history were reviewed and updated as appropriate: allergies, current medications, past family history, past medical history, past social history, past surgical history and problem list.    Review of Systems    Objective     BP  "129/78 (BP Location: Left arm, Patient Position: Sitting, Cuff Size: Large Adult)   Pulse 75   Temp 98.2 °F (36.8 °C) (Oral)   Resp 16   Ht 188 cm (74\")   Wt 98.2 kg (216 lb 6.4 oz)   SpO2 98%   BMI 27.78 kg/m²     Physical Exam   Constitutional: He is oriented to person, place, and time. He appears well-developed and well-nourished.   HENT:   Head: Normocephalic and atraumatic.   Eyes: Pupils are equal, round, and reactive to light. Conjunctivae and EOM are normal.   Neck: Normal range of motion. Neck supple.   [place] size mass left submandibular area; freely moveable   Cardiovascular: Normal rate, regular rhythm, normal heart sounds and intact distal pulses.   Pulmonary/Chest: Effort normal and breath sounds normal.   Abdominal: Soft. Bowel sounds are normal.   Musculoskeletal: Normal range of motion.   Neurological: He is alert and oriented to person, place, and time.   Skin: Skin is warm and dry.   Psychiatric: He has a normal mood and affect. His behavior is normal.   Nursing note and vitals reviewed.        Assessment/Plan   Campbell was seen today for medicare wellness-initial visit.    Diagnoses and all orders for this visit:    Encounter for Medicare annual wellness exam    Essential hypertension    Mixed hyperlipidemia    Coronary artery disease involving native coronary artery of native heart without angina pectoris    Impaired fasting glucose    Enlarged prostate without lower urinary tract symptoms (luts)    Chest wall pain  -     DEXA Bone Density Axial  -     XR Ribs Right With PA Chest; Future    Mass of left submandibular region      Patient Instructions   Continue your current medications and treatment.    Have the DEXA scan done.    Get a tetanus shot.    Have the xray's done and call for results.    Consider having the neck mass removed.    Have the skin lesion removed.      Amarjit Jurado Jr., MD    02/24/20  "

## 2020-02-24 NOTE — PATIENT INSTRUCTIONS
Continue your current medications and treatment.    Have the DEXA scan done.    Get a tetanus shot.    Have the xray's done and call for results.    Consider having the neck mass removed.    Have the skin lesion removed.

## 2020-02-26 ENCOUNTER — TELEPHONE (OUTPATIENT)
Dept: FAMILY MEDICINE CLINIC | Facility: CLINIC | Age: 80
End: 2020-02-26

## 2020-02-26 NOTE — TELEPHONE ENCOUNTER
I spok e with the patient.  His xrays do not reveal a fracture.  I offered a chest CT to further evaluate the pain; he wants to wait.

## 2020-02-27 DIAGNOSIS — M85.88 OTHER SPECIFIED DISORDERS OF BONE DENSITY AND STRUCTURE, OTHER SITE: Primary | ICD-10-CM

## 2020-03-02 ENCOUNTER — HOSPITAL ENCOUNTER (OUTPATIENT)
Dept: BONE DENSITY | Facility: HOSPITAL | Age: 80
Discharge: HOME | End: 2020-03-02
Admitting: FAMILY MEDICINE

## 2020-03-02 PROCEDURE — 77080 DXA BONE DENSITY AXIAL: CPT

## 2020-03-10 RX ORDER — CLONIDINE HYDROCHLORIDE 0.2 MG/1
TABLET ORAL
Qty: 60 TABLET | Refills: 0 | Status: SHIPPED | OUTPATIENT
Start: 2020-03-10 | End: 2020-04-05

## 2020-03-12 ENCOUNTER — TELEPHONE (OUTPATIENT)
Dept: FAMILY MEDICINE CLINIC | Facility: CLINIC | Age: 80
End: 2020-03-12

## 2020-04-05 RX ORDER — CLONIDINE HYDROCHLORIDE 0.2 MG/1
TABLET ORAL
Qty: 60 TABLET | Refills: 0 | Status: SHIPPED | OUTPATIENT
Start: 2020-04-05 | End: 2020-05-06

## 2020-04-05 RX ORDER — HYDROCHLOROTHIAZIDE 25 MG/1
TABLET ORAL
Qty: 30 TABLET | Refills: 1 | Status: SHIPPED | OUTPATIENT
Start: 2020-04-05 | End: 2020-06-08

## 2020-04-20 RX ORDER — ISOSORBIDE MONONITRATE 30 MG/1
TABLET, EXTENDED RELEASE ORAL
Qty: 90 TABLET | Refills: 2 | Status: SHIPPED | OUTPATIENT
Start: 2020-04-20 | End: 2021-01-18

## 2020-05-06 RX ORDER — CLONIDINE HYDROCHLORIDE 0.2 MG/1
TABLET ORAL
Qty: 60 TABLET | Refills: 0 | Status: SHIPPED | OUTPATIENT
Start: 2020-05-06 | End: 2020-06-08

## 2020-06-08 RX ORDER — HYDROCHLOROTHIAZIDE 25 MG/1
TABLET ORAL
Qty: 30 TABLET | Refills: 2 | Status: SHIPPED | OUTPATIENT
Start: 2020-06-08 | End: 2020-08-31

## 2020-06-08 RX ORDER — CLONIDINE HYDROCHLORIDE 0.2 MG/1
TABLET ORAL
Qty: 60 TABLET | Refills: 2 | Status: SHIPPED | OUTPATIENT
Start: 2020-06-08 | End: 2020-08-31

## 2020-07-20 ENCOUNTER — OFFICE VISIT (OUTPATIENT)
Dept: CARDIOLOGY | Facility: CLINIC | Age: 80
End: 2020-07-20

## 2020-07-20 VITALS
DIASTOLIC BLOOD PRESSURE: 72 MMHG | BODY MASS INDEX: 27.59 KG/M2 | WEIGHT: 215 LBS | SYSTOLIC BLOOD PRESSURE: 122 MMHG | OXYGEN SATURATION: 96 % | HEART RATE: 55 BPM | HEIGHT: 74 IN

## 2020-07-20 DIAGNOSIS — E78.2 MIXED HYPERLIPIDEMIA: ICD-10-CM

## 2020-07-20 DIAGNOSIS — Z95.5 STATUS POST CORONARY ARTERY STENT PLACEMENT: ICD-10-CM

## 2020-07-20 DIAGNOSIS — I10 ESSENTIAL HYPERTENSION: ICD-10-CM

## 2020-07-20 DIAGNOSIS — I25.10 CORONARY ARTERY DISEASE INVOLVING NATIVE CORONARY ARTERY OF NATIVE HEART WITHOUT ANGINA PECTORIS: Primary | ICD-10-CM

## 2020-07-20 PROCEDURE — 99213 OFFICE O/P EST LOW 20 MIN: CPT | Performed by: INTERNAL MEDICINE

## 2020-07-20 PROCEDURE — 93000 ELECTROCARDIOGRAM COMPLETE: CPT | Performed by: INTERNAL MEDICINE

## 2020-07-20 NOTE — PROGRESS NOTES
Encounter Date:07/20/2020  Last seen 12/19/2019      Patient ID: Campbell Alex is a 80 y.o. male.    Chief Complaint:  Status post stent  Hypertension  Dyslipidemia        History of Present Illness    Since I have last seen, the patient has been without any chest discomfort ,shortness of breath, palpitations, dizziness or syncope.  Denies having any headache ,abdominal pain ,nausea, vomiting , diarrhea constipation, loss of weight or loss of appetite.  Denies having any excessive bruising ,hematuria or blood in the stool.     Review of all systems negative except as indicated     Assessment and Plan         //////////////////////  Impression  ============   -status post right coronary artery stent placement (2005 ).      Cardiac catheterization 03/10/2017 revealed normal left ventricular function.  Normal left main coronary artery. 50% lad distal to diagonal branch.  Ostial diagonal branch has 80-90% disease (moderate size vessel) RCA has 30-40% plaquing.  RCA stent was   patent.  One of the PDA branches had 70% disease at its ostium.     Stress Cardiolite test is negative for myocardial ischemia 08/01/2016.     -palpitations improved.  Holter monitor 01/18/2016 showed occasional premature atrial and ventricular contractions.       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       -allergy to darvon codeine Relafen and IVP dye  ===============   Plan  =============  EKG showed sinus rhythm without any ischemic changes.  Patient is not having any angina pectoris or congestive heart failure on medical treatment.  medications were reviewed and updated.  Continue atenolol 25 mg a day  followup in the office in 6 months .  Further plan will depend on patient's progress.  /////////////////////////////                  Diagnosis Plan   1. Coronary artery  disease involving native coronary artery of native heart without angina pectoris     2. Essential hypertension     3. Mixed hyperlipidemia     4. Status post coronary artery stent placement     LAB RESULTS (LAST 7 DAYS)    CBC        BMP        CMP         BNP        TROPONIN        CoAg        Creatinine Clearance  CrCl cannot be calculated (Patient's most recent lab result is older than the maximum 30 days allowed.).    ABG        Radiology  No radiology results for the last day                The following portions of the patient's history were reviewed and updated as appropriate: allergies, current medications, past family history, past medical history, past social history, past surgical history and problem list.    Review of Systems   Constitution: Negative for malaise/fatigue.   Cardiovascular: Negative for chest pain, leg swelling, palpitations and syncope.   Respiratory: Negative for shortness of breath.    Skin: Negative for rash.   Gastrointestinal: Negative for nausea and vomiting.   Neurological: Negative for dizziness, light-headedness and numbness.         Current Outpatient Medications:   •  aspirin (ADULT ASPIRIN EC LOW STRENGTH) 81 MG EC tablet, ADULT ASPIRIN EC LOW STRENGTH 81 MG ORAL TABLET DELAYED RELEASE, Disp: , Rfl:   •  atenolol (TENORMIN) 25 MG tablet, TAKE ONE TABLET BY MOUTH EVERY MORNING, Disp: 90 tablet, Rfl: 3  •  atorvastatin (LIPITOR) 20 MG tablet, ATORVASTATIN CALCIUM 20 MG TABS, Disp: , Rfl:   •  cloNIDine (CATAPRES) 0.2 MG tablet, TAKE ONE TABLET BY MOUTH TWICE A DAY, Disp: 60 tablet, Rfl: 2  •  finasteride (PROSCAR) 5 MG tablet, FINASTERIDE 5 MG TABS, Disp: , Rfl:   •  hydroCHLOROthiazide (HYDRODIURIL) 25 MG tablet, TAKE ONE TABLET BY MOUTH DAILY, Disp: 30 tablet, Rfl: 2  •  isosorbide mononitrate (IMDUR) 30 MG 24 hr tablet, TAKE ONE TABLET BY MOUTH DAILY, Disp: 90 tablet, Rfl: 2  •  losartan (COZAAR) 100 MG tablet, LOSARTAN POTASSIUM 100 MG TABS, Disp: , Rfl:   •  nitroglycerin  "(NITROSTAT) 0.4 MG SL tablet, Place 1 tablet under the tongue Every 5 (Five) Minutes As Needed for Chest Pain. Take no more than 3 doses in 15 minutes., Disp: 25 tablet, Rfl: 0    Allergies   Allergen Reactions   • Codeine GI Intolerance   • Iodine Hives   • Nabumetone Unknown - High Severity   • Propoxyphene Hives       Family History   Problem Relation Age of Onset   • Hypertension Brother    • Hyperlipidemia Brother        Past Surgical History:   Procedure Laterality Date   • ANKLE SURGERY     • NOSE SURGERY     • ROTATOR CUFF REPAIR     • SKIN CANCER EXCISION      L shoulder   • UMBILICAL HERNIA REPAIR         Past Medical History:   Diagnosis Date   • Arthritis    • Coronary artery disease    • Hyperlipidemia    • Hypertension    • Prostatism        Family History   Problem Relation Age of Onset   • Hypertension Brother    • Hyperlipidemia Brother        Social History     Socioeconomic History   • Marital status:      Spouse name: Not on file   • Number of children: Not on file   • Years of education: Not on file   • Highest education level: Not on file   Tobacco Use   • Smoking status: Former Smoker   • Smokeless tobacco: Never Used   Substance and Sexual Activity   • Alcohol use: Yes     Frequency: Never   • Drug use: Never   • Sexual activity: Defer           ECG 12 Lead  Date/Time: 7/20/2020 11:10 AM  Performed by: Vilma Staples MD  Authorized by: Vilma Staples MD   Comparison: compared with previous ECG   Similar to previous ECG  Comparison to previous ECG: Sinus bradycardia 62/min nonspecific ST-T wave changes premature ventricular contraction nonspecific ST-T wave changes no other ectopy no change from 12/19/2019                Objective:       Physical Exam    /72   Pulse 55   Ht 188 cm (74\")   Wt 97.5 kg (215 lb)   SpO2 96%   BMI 27.60 kg/m²   The patient is alert, oriented and in no distress.    Vital signs as noted above.    Head and neck revealed no carotid bruits or " jugular venous distension.  No thyromegaly or lymphadenopathy is present.    Lungs clear.  No wheezing.  Breath sounds are normal bilaterally.    Heart normal first and second heart sounds.  No murmur..  No pericardial rub is present.  No gallop is present.    Abdomen soft and nontender.  No organomegaly is present.    Extremities revealed good peripheral pulses without any pedal edema.    Skin warm and dry.    Musculoskeletal system is grossly normal.    CNS grossly normal.

## 2020-08-26 ENCOUNTER — OFFICE VISIT (OUTPATIENT)
Dept: FAMILY MEDICINE CLINIC | Facility: CLINIC | Age: 80
End: 2020-08-26

## 2020-08-26 ENCOUNTER — LAB (OUTPATIENT)
Dept: LAB | Facility: HOSPITAL | Age: 80
End: 2020-08-26

## 2020-08-26 VITALS
RESPIRATION RATE: 16 BRPM | HEART RATE: 84 BPM | SYSTOLIC BLOOD PRESSURE: 137 MMHG | TEMPERATURE: 97.3 F | BODY MASS INDEX: 27.59 KG/M2 | OXYGEN SATURATION: 97 % | HEIGHT: 74 IN | DIASTOLIC BLOOD PRESSURE: 73 MMHG | WEIGHT: 215 LBS

## 2020-08-26 DIAGNOSIS — I10 ESSENTIAL HYPERTENSION: Primary | ICD-10-CM

## 2020-08-26 DIAGNOSIS — R42 VERTIGO: ICD-10-CM

## 2020-08-26 DIAGNOSIS — I25.10 CORONARY ARTERY DISEASE INVOLVING NATIVE CORONARY ARTERY OF NATIVE HEART WITHOUT ANGINA PECTORIS: ICD-10-CM

## 2020-08-26 DIAGNOSIS — E11.9 TYPE 2 DIABETES MELLITUS WITHOUT COMPLICATION, WITHOUT LONG-TERM CURRENT USE OF INSULIN (HCC): ICD-10-CM

## 2020-08-26 DIAGNOSIS — I10 ESSENTIAL HYPERTENSION: ICD-10-CM

## 2020-08-26 DIAGNOSIS — E78.2 MIXED HYPERLIPIDEMIA: ICD-10-CM

## 2020-08-26 DIAGNOSIS — N40.0 ENLARGED PROSTATE WITHOUT LOWER URINARY TRACT SYMPTOMS (LUTS): ICD-10-CM

## 2020-08-26 PROBLEM — R22.0 MASS OF LEFT SUBMANDIBULAR REGION: Status: RESOLVED | Noted: 2020-02-24 | Resolved: 2020-08-26

## 2020-08-26 LAB
ANION GAP SERPL CALCULATED.3IONS-SCNC: 7.9 MMOL/L (ref 5–15)
BUN SERPL-MCNC: 17 MG/DL (ref 8–23)
BUN/CREAT SERPL: 17.3 (ref 7–25)
CALCIUM SPEC-SCNC: 9.4 MG/DL (ref 8.6–10.5)
CHLORIDE SERPL-SCNC: 95 MMOL/L (ref 98–107)
CO2 SERPL-SCNC: 27.1 MMOL/L (ref 22–29)
CREAT SERPL-MCNC: 0.98 MG/DL (ref 0.76–1.27)
GFR SERPL CREATININE-BSD FRML MDRD: 73 ML/MIN/1.73
GLUCOSE SERPL-MCNC: 104 MG/DL (ref 65–99)
HBA1C MFR BLD: 6.3 % (ref 3.5–5.6)
POTASSIUM SERPL-SCNC: 4.2 MMOL/L (ref 3.5–5.2)
SODIUM SERPL-SCNC: 130 MMOL/L (ref 136–145)

## 2020-08-26 PROCEDURE — 99214 OFFICE O/P EST MOD 30 MIN: CPT | Performed by: FAMILY MEDICINE

## 2020-08-26 PROCEDURE — 83036 HEMOGLOBIN GLYCOSYLATED A1C: CPT

## 2020-08-26 PROCEDURE — 80048 BASIC METABOLIC PNL TOTAL CA: CPT

## 2020-08-26 PROCEDURE — 36415 COLL VENOUS BLD VENIPUNCTURE: CPT

## 2020-08-26 NOTE — PATIENT INSTRUCTIONS
Continue your current medications and treatment.    Have the follow up labs done and call for results.    Consider seeing an ENT regarding the Vertigo.    Follow up in the office in 6 months.

## 2020-08-26 NOTE — PROGRESS NOTES
"Subjective   Campbell Alex is a 80 y.o. male.     Chief Complaint   Patient presents with   • Hypertension     6 month followup   • Hyperlipidemia       HPI  Chief complaint: Hypertension hyperlipidemia coronary artery disease type 2 diabetes mellitus prostatism vertigo    The patient is an 80-year-old white male comes in for follow-up and maintenance of his current problems which include    1.  Hypertension-stable-patient is currently on atenolol 25 mg daily Cozaar 100 mg daily clonidine 0.2 mg twice a day and hydrochlorothiazide 25 mg daily.  He denied headache lightheadedness dizziness or chest pain.    2.  Hyperlipidemia-stable-patient on Lipitor 20 mg daily.  He denies myalgias and arthralgias.  Denies nausea or anorexia.    3.  Coronary artery disease-stable-patient on aspirin atenolol.  He denied chest pain shortness of breath orthopnea or PND.    4.  Prostatism-stable-patient on Proscar 5 mg daily.  He denied urinary frequency intermittency hesitancy or incontinence.    5. vertigo-new-patient has intermittent episodes of vertigo.  He states that they usually occur when he first awakens in the morning.  He states the last several hours at a time.  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  Denies nausea or vomiting.  He states they occur 3-4 times a year.  I recommended he be evaluated by ENT.    Patient is an 80-year-old white male comes in for follow-up and maintenance of his current problems which include          The following portions of the patient's history were reviewed and updated as appropriate: allergies, current medications, past family history, past medical history, past social history, past surgical history and problem list.    Review of Systems    Objective     /85 (BP Location: Right arm, Patient Position: Sitting, Cuff Size: Large Adult)   Pulse 65   Temp 97.3 °F (36.3 °C) (Temporal)   Resp 16   Ht 188 cm (74\")   Wt 97.5 kg (215 lb)   SpO2 97%   BMI 27.60 kg/m²     Physical Exam   Constitutional: " He is oriented to person, place, and time.   HENT:   Head: Normocephalic and atraumatic.   Right Ear: Decreased hearing is noted.   Left Ear: Decreased hearing is noted.   Eyes: Pupils are equal, round, and reactive to light. Conjunctivae and EOM are normal.   Neck: Normal range of motion. Neck supple.   Cardiovascular: Normal rate, regular rhythm, normal heart sounds and intact distal pulses.   Pulmonary/Chest: Effort normal and breath sounds normal.   Abdominal: Soft. Bowel sounds are normal.   Musculoskeletal: Normal range of motion.   Neurological: He is alert and oriented to person, place, and time.   Skin: Skin is warm and dry.   Psychiatric: He has a normal mood and affect. His behavior is normal.   Nursing note and vitals reviewed.        Assessment/Plan   Campbell was seen today for hypertension and hyperlipidemia.    Diagnoses and all orders for this visit:    Essential hypertension  -     Basic Metabolic Panel; Future    Mixed hyperlipidemia    Coronary artery disease involving native coronary artery of native heart without angina pectoris    Enlarged prostate without lower urinary tract symptoms (luts)    Type 2 diabetes mellitus without complication, without long-term current use of insulin (CMS/[facility])  -     Hemoglobin A1c; Future    Vertigo      Patient Instructions   Continue your current medications and treatment.    Have the follow up labs done and call for results.    Consider seeing an ENT regarding the Vertigo.    Follow up in the office in 6 months.      Amarjit Jurado Jr., MD    08/26/20

## 2020-08-31 RX ORDER — CLONIDINE HYDROCHLORIDE 0.2 MG/1
TABLET ORAL
Qty: 60 TABLET | Refills: 1 | Status: SHIPPED | OUTPATIENT
Start: 2020-08-31 | End: 2020-10-31

## 2020-08-31 RX ORDER — HYDROCHLOROTHIAZIDE 25 MG/1
TABLET ORAL
Qty: 30 TABLET | Refills: 1 | Status: SHIPPED | OUTPATIENT
Start: 2020-08-31 | End: 2020-10-31

## 2020-10-31 RX ORDER — CLONIDINE HYDROCHLORIDE 0.2 MG/1
TABLET ORAL
Qty: 60 TABLET | Refills: 0 | Status: SHIPPED | OUTPATIENT
Start: 2020-10-31 | End: 2020-12-07

## 2020-10-31 RX ORDER — HYDROCHLOROTHIAZIDE 25 MG/1
TABLET ORAL
Qty: 90 TABLET | Refills: 1 | Status: SHIPPED | OUTPATIENT
Start: 2020-10-31 | End: 2021-05-02

## 2020-12-07 RX ORDER — ATENOLOL 25 MG/1
TABLET ORAL
Qty: 90 TABLET | Refills: 2 | Status: SHIPPED | OUTPATIENT
Start: 2020-12-07 | End: 2021-09-07

## 2020-12-07 RX ORDER — CLONIDINE HYDROCHLORIDE 0.2 MG/1
TABLET ORAL
Qty: 60 TABLET | Refills: 0 | Status: SHIPPED | OUTPATIENT
Start: 2020-12-07 | End: 2021-01-04

## 2021-01-04 RX ORDER — CLONIDINE HYDROCHLORIDE 0.2 MG/1
TABLET ORAL
Qty: 60 TABLET | Refills: 0 | Status: SHIPPED | OUTPATIENT
Start: 2021-01-04 | End: 2021-02-01

## 2021-01-18 RX ORDER — ISOSORBIDE MONONITRATE 30 MG/1
TABLET, EXTENDED RELEASE ORAL
Qty: 90 TABLET | Refills: 0 | Status: SHIPPED | OUTPATIENT
Start: 2021-01-18 | End: 2021-04-13

## 2021-02-01 RX ORDER — CLONIDINE HYDROCHLORIDE 0.2 MG/1
TABLET ORAL
Qty: 60 TABLET | Refills: 0 | Status: SHIPPED | OUTPATIENT
Start: 2021-02-01 | End: 2021-03-08

## 2021-02-09 ENCOUNTER — OFFICE VISIT (OUTPATIENT)
Dept: CARDIOLOGY | Facility: CLINIC | Age: 81
End: 2021-02-09

## 2021-02-09 VITALS
SYSTOLIC BLOOD PRESSURE: 169 MMHG | DIASTOLIC BLOOD PRESSURE: 94 MMHG | BODY MASS INDEX: 27.72 KG/M2 | WEIGHT: 216 LBS | HEIGHT: 74 IN | OXYGEN SATURATION: 98 % | HEART RATE: 63 BPM | TEMPERATURE: 96.9 F

## 2021-02-09 DIAGNOSIS — I25.10 CORONARY ARTERY DISEASE INVOLVING NATIVE CORONARY ARTERY OF NATIVE HEART WITHOUT ANGINA PECTORIS: ICD-10-CM

## 2021-02-09 DIAGNOSIS — I10 ESSENTIAL HYPERTENSION: ICD-10-CM

## 2021-02-09 DIAGNOSIS — E78.2 MIXED HYPERLIPIDEMIA: ICD-10-CM

## 2021-02-09 DIAGNOSIS — Z95.5 STATUS POST CORONARY ARTERY STENT PLACEMENT: Primary | ICD-10-CM

## 2021-02-09 PROCEDURE — 93000 ELECTROCARDIOGRAM COMPLETE: CPT | Performed by: INTERNAL MEDICINE

## 2021-02-09 PROCEDURE — 99214 OFFICE O/P EST MOD 30 MIN: CPT | Performed by: INTERNAL MEDICINE

## 2021-02-09 NOTE — PROGRESS NOTES
Encounter Date:02/09/2021  Last seen 7/20/2020      Patient ID: Campbell Alex is a 81 y.o. male.    Chief Complaint:    Status post stent  Hypertension  Dyslipidemia        History of Present Illness     Since I have last seen, the patient has been without any chest discomfort ,shortness of breath, palpitations, dizziness or syncope.  Denies having any headache ,abdominal pain ,nausea, vomiting , diarrhea constipation, loss of weight or loss of appetite.  Denies having any excessive bruising ,hematuria or blood in the stool.    Review of all systems negative except as indicated.    Reviewed ROS.     Assessment and Plan         //////////////////////  Impression  ============   -status post right coronary artery stent placement (2005 ).      Cardiac catheterization 03/10/2017 revealed normal left ventricular function.  Normal left main coronary artery. 50% lad distal to diagonal branch.  Ostial diagonal branch has 80-90% disease (moderate size vessel) RCA has 30-40% plaquing.  RCA stent was   patent.  One of the PDA branches had 70% disease at its ostium.     Stress Cardiolite test is negative for myocardial ischemia 08/01/2016.     -palpitations improved.  Holter monitor 01/18/2016 showed occasional premature atrial and ventricular contractions.       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       -allergy to darvon codeine Relafen and IVP dye  ===============   Plan  =============  Status post stent  EKG showed sinus rhythm without any ischemic changes.  Patient is not having any angina pectoris or congestive heart failure on medical treatment.    Hypertension-160s/90s.  Continue atenolol losartan and hydrochlorothiazide.    Dyslipidemia-continue atorvastatin    medications were reviewed and updated.  followup in the office in 6 months  .  Further plan will depend on patient's progress.  /////////////////////////////                Diagnosis Plan   1. Status post coronary artery stent placement  ECG 12 Lead   2. Coronary artery disease involving native coronary artery of native heart without angina pectoris  ECG 12 Lead   3. Essential hypertension  ECG 12 Lead   4. Mixed hyperlipidemia  ECG 12 Lead   LAB RESULTS (LAST 7 DAYS)    CBC        BMP        CMP         BNP        TROPONIN        CoAg        Creatinine Clearance  CrCl cannot be calculated (Patient's most recent lab result is older than the maximum 30 days allowed.).    ABG        Radiology  No radiology results for the last day                The following portions of the patient's history were reviewed and updated as appropriate: allergies, current medications, past family history, past medical history, past social history, past surgical history and problem list.    Review of Systems   Constitution: Negative for malaise/fatigue.   Cardiovascular: Negative for chest pain, leg swelling, palpitations and syncope.   Respiratory: Negative for shortness of breath.    Skin: Negative for rash.   Gastrointestinal: Negative for nausea and vomiting.   Neurological: Negative for dizziness, light-headedness and numbness.         Current Outpatient Medications:   •  aspirin (ADULT ASPIRIN EC LOW STRENGTH) 81 MG EC tablet, ADULT ASPIRIN EC LOW STRENGTH 81 MG ORAL TABLET DELAYED RELEASE, Disp: , Rfl:   •  atenolol (TENORMIN) 25 MG tablet, TAKE ONE TABLET BY MOUTH EVERY MORNING, Disp: 90 tablet, Rfl: 2  •  atorvastatin (LIPITOR) 20 MG tablet, ATORVASTATIN CALCIUM 20 MG TABS, Disp: , Rfl:   •  cloNIDine (CATAPRES) 0.2 MG tablet, TAKE ONE TABLET BY MOUTH TWICE A DAY, Disp: 60 tablet, Rfl: 0  •  finasteride (PROSCAR) 5 MG tablet, FINASTERIDE 5 MG TABS, Disp: , Rfl:   •  hydroCHLOROthiazide (HYDRODIURIL) 25 MG tablet, TAKE ONE TABLET BY MOUTH DAILY, Disp: 90 tablet, Rfl: 1  •  isosorbide mononitrate (IMDUR)  30 MG 24 hr tablet, TAKE ONE TABLET BY MOUTH DAILY, Disp: 90 tablet, Rfl: 0  •  losartan (COZAAR) 100 MG tablet, LOSARTAN POTASSIUM 100 MG TABS, Disp: , Rfl:   •  nitroglycerin (NITROSTAT) 0.4 MG SL tablet, Place 1 tablet under the tongue Every 5 (Five) Minutes As Needed for Chest Pain. Take no more than 3 doses in 15 minutes., Disp: 25 tablet, Rfl: 0    Allergies   Allergen Reactions   • Codeine GI Intolerance   • Iodine Hives   • Nabumetone Unknown - High Severity   • Propoxyphene Hives       Family History   Problem Relation Age of Onset   • Hypertension Brother    • Hyperlipidemia Brother        Past Surgical History:   Procedure Laterality Date   • ANKLE SURGERY     • NOSE SURGERY     • ROTATOR CUFF REPAIR     • SKIN CANCER EXCISION      L shoulder   • UMBILICAL HERNIA REPAIR         Past Medical History:   Diagnosis Date   • Arthritis    • Coronary artery disease    • Hyperlipidemia    • Hypertension    • Prostatism        Family History   Problem Relation Age of Onset   • Hypertension Brother    • Hyperlipidemia Brother        Social History     Socioeconomic History   • Marital status:      Spouse name: Not on file   • Number of children: Not on file   • Years of education: Not on file   • Highest education level: Not on file   Tobacco Use   • Smoking status: Former Smoker   • Smokeless tobacco: Never Used   Substance and Sexual Activity   • Alcohol use: Yes     Frequency: Never   • Drug use: Never   • Sexual activity: Defer           ECG 12 Lead    Date/Time: 2/9/2021 9:41 AM  Performed by: Vilma Staples MD  Authorized by: Vilma Staples MD   Comparison: compared with previous ECG   Similar to previous ECG  Comparison to previous ECG: Sinus rhythm nonspecific ST-T wave changes 60/min normal axis normal intervals no ectopy no significant change from 7/20/2020                Objective:       Physical Exam    /94 (BP Location: Left arm, Patient Position: Sitting, Cuff Size: Large Adult)    "Pulse 63   Temp 96.9 °F (36.1 °C)   Ht 188 cm (74\")   Wt 98 kg (216 lb)   SpO2 98%   BMI 27.73 kg/m²   The patient is alert, oriented and in no distress.    Vital signs as noted above.    Head and neck revealed no carotid bruits or jugular venous distension.  No thyromegaly or lymphadenopathy is present.    Lungs clear.  No wheezing.  Breath sounds are normal bilaterally.    Heart normal first and second heart sounds.  No murmur..  No pericardial rub is present.  No gallop is present.    Abdomen soft and nontender.  No organomegaly is present.    Extremities revealed good peripheral pulses without any pedal edema.    Skin warm and dry.    Musculoskeletal system is grossly normal.    CNS grossly normal.    Reviewed and unchanged from last visit.        "

## 2021-02-26 ENCOUNTER — OFFICE VISIT (OUTPATIENT)
Dept: FAMILY MEDICINE CLINIC | Facility: CLINIC | Age: 81
End: 2021-02-26

## 2021-02-26 VITALS
OXYGEN SATURATION: 99 % | HEIGHT: 74 IN | RESPIRATION RATE: 16 BRPM | HEART RATE: 58 BPM | SYSTOLIC BLOOD PRESSURE: 127 MMHG | WEIGHT: 212 LBS | DIASTOLIC BLOOD PRESSURE: 79 MMHG | TEMPERATURE: 96.9 F | BODY MASS INDEX: 27.21 KG/M2

## 2021-02-26 DIAGNOSIS — M19.90 OSTEOARTHRITIS, UNSPECIFIED OSTEOARTHRITIS TYPE, UNSPECIFIED SITE: Chronic | ICD-10-CM

## 2021-02-26 DIAGNOSIS — E78.2 MIXED HYPERLIPIDEMIA: Chronic | ICD-10-CM

## 2021-02-26 DIAGNOSIS — I10 ESSENTIAL HYPERTENSION: Primary | Chronic | ICD-10-CM

## 2021-02-26 DIAGNOSIS — E11.9 TYPE 2 DIABETES MELLITUS WITHOUT COMPLICATION, WITHOUT LONG-TERM CURRENT USE OF INSULIN (HCC): Chronic | ICD-10-CM

## 2021-02-26 DIAGNOSIS — I25.10 CORONARY ARTERY DISEASE INVOLVING NATIVE CORONARY ARTERY OF NATIVE HEART WITHOUT ANGINA PECTORIS: Chronic | ICD-10-CM

## 2021-02-26 DIAGNOSIS — N40.0 ENLARGED PROSTATE WITHOUT LOWER URINARY TRACT SYMPTOMS (LUTS): Chronic | ICD-10-CM

## 2021-02-26 PROBLEM — R42 VERTIGO: Chronic | Status: ACTIVE | Noted: 2020-08-26

## 2021-02-26 PROCEDURE — 99214 OFFICE O/P EST MOD 30 MIN: CPT | Performed by: FAMILY MEDICINE

## 2021-02-26 RX ORDER — TAMSULOSIN HYDROCHLORIDE 0.4 MG/1
1 CAPSULE ORAL DAILY
Qty: 14 CAPSULE | Refills: 0 | Status: SHIPPED | OUTPATIENT
Start: 2021-02-26 | End: 2021-03-18

## 2021-02-26 NOTE — PROGRESS NOTES
"Subjective   Campbell Alex is a 81 y.o. male.     Chief Complaint   Patient presents with   • Hypertension     6 month followup   • Hyperlipidemia   • Coronary Artery Disease       HPI  Chief complaint: Hypertension hyperlipidemia coronary artery disease prostatism arthritis    Patient is an 81-year-old white male comes in for follow-up and maintenance of his current problems which include    1.  Hypertension-stable-patient is currently on atenolol 25 mg daily Cozaar 100 mg daily hydrochlorothiazide 25 mg daily and Catapres 0.2 mg twice a day.  Denied headache lightheadedness dizziness or chest pain.    2.  Hyperlipidemia-stable-patient on Lipitor 20 mg daily.  He denies myalgias and arthralgias.  Denies nausea or anorexia.    3.  Coronary artery disease-stable-on aspirin 81 mg daily long-acting nitroglycerin 30 mg daily and atenolol 25 mg daily.  Denies chest pain shortness of breath orthopnea or PND.    4.  Prostatism-deteriorated-the patient is currently on Proscar 5 mg daily.  Patient complains of urinary frequency and urgency.  Patient states that not uncommonly he is unable to control his bladder.  I recommended a trial of Flomax.    5.  Arthritis-deteriorated-patient has arthritis involving all his joints.  He currently is using Tylenol.  He states that this helps with the pain.  He states that as long as he is active it seems to help.        The following portions of the patient's history were reviewed and updated as appropriate: allergies, current medications, past family history, past medical history, past social history, past surgical history and problem list.    Review of Systems    Objective     /79 (BP Location: Right arm, Patient Position: Sitting, Cuff Size: Large Adult)   Pulse 58   Temp 96.9 °F (36.1 °C) (Infrared)   Resp 16   Ht 188 cm (74\")   Wt 96.2 kg (212 lb)   SpO2 99%   BMI 27.22 kg/m²     Physical Exam  Vitals signs and nursing note reviewed.   Constitutional:       Appearance: " He is well-developed and normal weight.   HENT:      Head: Normocephalic and atraumatic.      Nose: Nose normal.      Mouth/Throat:      Mouth: Mucous membranes are moist.      Pharynx: Oropharynx is clear.   Eyes:      Conjunctiva/sclera: Conjunctivae normal.      Pupils: Pupils are equal, round, and reactive to light.   Neck:      Musculoskeletal: Neck supple.   Cardiovascular:      Rate and Rhythm: Normal rate and regular rhythm.      Pulses: Normal pulses.      Heart sounds: Normal heart sounds.   Pulmonary:      Effort: Pulmonary effort is normal.      Breath sounds: Normal breath sounds.   Abdominal:      General: Abdomen is flat. Bowel sounds are normal.      Palpations: Abdomen is soft.   Musculoskeletal: Normal range of motion.   Skin:     General: Skin is warm and dry.   Neurological:      Mental Status: He is alert and oriented to person, place, and time.   Psychiatric:         Behavior: Behavior normal.         Thought Content: Thought content normal.         Judgment: Judgment normal.           Assessment/Plan   Diagnoses and all orders for this visit:    1. Essential hypertension (Primary)    2. Mixed hyperlipidemia    3. Coronary artery disease involving native coronary artery of native heart without angina pectoris    4. Type 2 diabetes mellitus without complication, without long-term current use of insulin (CMS/[facility])    5. Enlarged prostate without lower urinary tract symptoms (luts)    6. Osteoarthritis, unspecified osteoarthritis type, unspecified site      Patient Instructions   Continue your current medications and treatment.    Have the follow up labs done and call for results.    Follow up in the office in 6 months.      Amarjit Jurado Jr., MD    02/26/21

## 2021-03-01 ENCOUNTER — LAB (OUTPATIENT)
Dept: LAB | Facility: HOSPITAL | Age: 81
End: 2021-03-01

## 2021-03-01 DIAGNOSIS — E11.9 TYPE 2 DIABETES MELLITUS WITHOUT COMPLICATION, WITHOUT LONG-TERM CURRENT USE OF INSULIN (HCC): Chronic | ICD-10-CM

## 2021-03-01 LAB
ANION GAP SERPL CALCULATED.3IONS-SCNC: 10.5 MMOL/L (ref 5–15)
BUN SERPL-MCNC: 15 MG/DL (ref 8–23)
BUN/CREAT SERPL: 17.4 (ref 7–25)
CALCIUM SPEC-SCNC: 9 MG/DL (ref 8.6–10.5)
CHLORIDE SERPL-SCNC: 94 MMOL/L (ref 98–107)
CO2 SERPL-SCNC: 26.5 MMOL/L (ref 22–29)
CREAT SERPL-MCNC: 0.86 MG/DL (ref 0.76–1.27)
GFR SERPL CREATININE-BSD FRML MDRD: 85 ML/MIN/1.73
GLUCOSE SERPL-MCNC: 107 MG/DL (ref 65–99)
HBA1C MFR BLD: 6.4 % (ref 3.5–5.6)
POTASSIUM SERPL-SCNC: 3.9 MMOL/L (ref 3.5–5.2)
SODIUM SERPL-SCNC: 131 MMOL/L (ref 136–145)

## 2021-03-01 PROCEDURE — 36415 COLL VENOUS BLD VENIPUNCTURE: CPT

## 2021-03-01 PROCEDURE — 80048 BASIC METABOLIC PNL TOTAL CA: CPT

## 2021-03-01 PROCEDURE — 83036 HEMOGLOBIN GLYCOSYLATED A1C: CPT

## 2021-03-08 RX ORDER — CLONIDINE HYDROCHLORIDE 0.2 MG/1
TABLET ORAL
Qty: 60 TABLET | Refills: 0 | Status: SHIPPED | OUTPATIENT
Start: 2021-03-08 | End: 2021-04-05

## 2021-03-12 ENCOUNTER — DOCUMENTATION (OUTPATIENT)
Dept: FAMILY MEDICINE CLINIC | Facility: CLINIC | Age: 81
End: 2021-03-12

## 2021-03-12 NOTE — PROGRESS NOTES
I spoke to the patient.  He is having lightheadedness as dizziness since he started the Flomax.  He is going to stop taking Flomax.  He is to call if his prostatism gets significantly worse or follow-up with his urologist.

## 2021-03-17 ENCOUNTER — DOCUMENTATION (OUTPATIENT)
Dept: FAMILY MEDICINE CLINIC | Facility: CLINIC | Age: 81
End: 2021-03-17

## 2021-03-17 NOTE — PROGRESS NOTES
I spoke with the patient today.  He called stating that he has had not had a regular bowel movement for several days.  He denied fever or chills.  He denied nausea or vomiting.  He does states he has some cramps.  I recommended that he be evaluated in the office within the next 1 to 2 days.  He was advised he may need a CT scan and other laboratory testing.  He states he will call for an appointment.

## 2021-03-18 ENCOUNTER — OFFICE VISIT (OUTPATIENT)
Dept: FAMILY MEDICINE CLINIC | Facility: CLINIC | Age: 81
End: 2021-03-18

## 2021-03-18 VITALS
DIASTOLIC BLOOD PRESSURE: 75 MMHG | BODY MASS INDEX: 27.44 KG/M2 | HEIGHT: 74 IN | OXYGEN SATURATION: 98 % | HEART RATE: 56 BPM | WEIGHT: 213.8 LBS | SYSTOLIC BLOOD PRESSURE: 158 MMHG | TEMPERATURE: 96.8 F

## 2021-03-18 DIAGNOSIS — K59.00 CONSTIPATION, UNSPECIFIED CONSTIPATION TYPE: Primary | ICD-10-CM

## 2021-03-18 PROCEDURE — 99213 OFFICE O/P EST LOW 20 MIN: CPT | Performed by: NURSE PRACTITIONER

## 2021-03-18 RX ORDER — IMIQUIMOD 12.5 MG/.25G
CREAM TOPICAL
COMMUNITY
Start: 2021-03-02 | End: 2021-09-07

## 2021-03-18 NOTE — PROGRESS NOTES
Subjective        Campbell Alex is a 81 y.o. male.     Chief Complaint   Patient presents with   • Constipation     pt has not had normal BMs for several days, no relief with OTC laxatives.        History of Present Illness  Patient is here for constipation that he has had for week. He said took MOM last pm this am he took clearlax and had small bowel movement.   Has gas. Denies any nausea or vomiting. He took flomax for 2 weeks but made him nervous and dizzy so he stopped it. That is is newest medicaiton.   No belly pain he reports no fever.   He has recently been treating a melanoma in   The following portions of the patient's history were reviewed and updated as appropriate: allergies, current medications, past family history, past medical history, past social history, past surgical history and problem list.      Current Outpatient Medications:   •  aspirin (ADULT ASPIRIN EC LOW STRENGTH) 81 MG EC tablet, ADULT ASPIRIN EC LOW STRENGTH 81 MG ORAL TABLET DELAYED RELEASE, Disp: , Rfl:   •  atenolol (TENORMIN) 25 MG tablet, TAKE ONE TABLET BY MOUTH EVERY MORNING, Disp: 90 tablet, Rfl: 2  •  atorvastatin (LIPITOR) 20 MG tablet, ATORVASTATIN CALCIUM 20 MG TABS, Disp: , Rfl:   •  cloNIDine (CATAPRES) 0.2 MG tablet, TAKE ONE TABLET BY MOUTH TWICE A DAY, Disp: 60 tablet, Rfl: 0  •  finasteride (PROSCAR) 5 MG tablet, FINASTERIDE 5 MG TABS, Disp: , Rfl:   •  hydroCHLOROthiazide (HYDRODIURIL) 25 MG tablet, TAKE ONE TABLET BY MOUTH DAILY, Disp: 90 tablet, Rfl: 1  •  imiquimod (ALDARA) 5 % cream, , Disp: , Rfl:   •  losartan (COZAAR) 100 MG tablet, LOSARTAN POTASSIUM 100 MG TABS, Disp: , Rfl:   •  nitroglycerin (NITROSTAT) 0.4 MG SL tablet, Place 1 tablet under the tongue Every 5 (Five) Minutes As Needed for Chest Pain. Take no more than 3 doses in 15 minutes., Disp: 25 tablet, Rfl: 0  •  isosorbide mononitrate (IMDUR) 30 MG 24 hr tablet, TAKE ONE TABLET BY MOUTH DAILY, Disp: 90 tablet, Rfl: 0    Recent Results (from the past  "4032 hour(s))   Basic Metabolic Panel    Collection Time: 03/01/21  7:43 AM    Specimen: Blood   Result Value Ref Range    Glucose 107 (H) 65 - 99 mg/dL    BUN 15 8 - 23 mg/dL    Creatinine 0.86 0.76 - 1.27 mg/dL    Sodium 131 (L) 136 - 145 mmol/L    Potassium 3.9 3.5 - 5.2 mmol/L    Chloride 94 (L) 98 - 107 mmol/L    CO2 26.5 22.0 - 29.0 mmol/L    Calcium 9.0 8.6 - 10.5 mg/dL    eGFR Non African Amer 85 >60 mL/min/1.73    BUN/Creatinine Ratio 17.4 7.0 - 25.0    Anion Gap 10.5 5.0 - 15.0 mmol/L   Hemoglobin A1c    Collection Time: 03/01/21  7:43 AM    Specimen: Blood   Result Value Ref Range    Hemoglobin A1C 6.4 (H) 3.5 - 5.6 %         Review of Systems    Objective     /75 (BP Location: Left arm, Patient Position: Sitting, Cuff Size: Adult)   Pulse 56   Temp 96.8 °F (36 °C) (Infrared)   Ht 188 cm (74\")   Wt 97 kg (213 lb 12.8 oz)   SpO2 98%   BMI 27.45 kg/m²     Physical Exam  Vitals and nursing note reviewed.   Constitutional:       Appearance: Normal appearance.   HENT:      Head: Normocephalic.      Nose: Nose normal.   Eyes:      Pupils: Pupils are equal, round, and reactive to light.   Cardiovascular:      Rate and Rhythm: Normal rate and regular rhythm.      Pulses: Normal pulses.      Heart sounds: Normal heart sounds.   Pulmonary:      Effort: Pulmonary effort is normal.      Breath sounds: Normal breath sounds.   Abdominal:      General: Bowel sounds are normal.      Palpations: Abdomen is soft. There is no mass.      Tenderness: There is no guarding.   Skin:     General: Skin is warm.   Neurological:      Mental Status: He is alert and oriented to person, place, and time.   Psychiatric:         Mood and Affect: Mood normal.         Behavior: Behavior normal.         Thought Content: Thought content normal.         Judgment: Judgment normal.         Result Review :                Assessment/Plan    Diagnoses and all orders for this visit:    1. Constipation, unspecified constipation type " (Primary)  Comments:  KUB ordered. discussed mag citrate.   Orders:  -     XR Abdomen KUB; Future      Patient Instructions   Follow up onKUB.  Mag citrate is green bottle.   Drink 1/2 see if have bowel movement then drink the other 1/2.  Colace for stool softener.  Warm up milk of mag 2 tbls with prune juice warm and drink  Or   Warm prune juice when you drink it.   Further treatment pending results.       Follow Up   No follow-ups on file.    Patient was given instructions and counseling regarding his condition or for health maintenance advice. Please see specific information pulled into the AVS if appropriate.     Alisa Salazar, APRN    03/18/21

## 2021-03-18 NOTE — PATIENT INSTRUCTIONS
Follow up onKUB.  Mag citrate is green bottle.   Drink 1/2 see if have bowel movement then drink the other 1/2.  Colace for stool softener.  Warm up milk of mag 2 tbls with prune juice warm and drink  Or   Warm prune juice when you drink it.   Further treatment pending results.

## 2021-03-19 ENCOUNTER — TELEPHONE (OUTPATIENT)
Dept: FAMILY MEDICINE CLINIC | Facility: CLINIC | Age: 81
End: 2021-03-19

## 2021-03-19 DIAGNOSIS — K59.00 CONSTIPATION, UNSPECIFIED CONSTIPATION TYPE: Primary | ICD-10-CM

## 2021-03-19 DIAGNOSIS — N40.0 ENLARGED PROSTATE WITHOUT LOWER URINARY TRACT SYMPTOMS (LUTS): ICD-10-CM

## 2021-03-19 NOTE — TELEPHONE ENCOUNTER
I contacted the patient at home states that he is on his second bottle of mag citrate he has had small bowel movement yesterday next with the water none today still concerned because he feels like he needs to have a large bowel movement, his KUB did not show large stool he did have stool throughout the colon.  We had talked if he was continue to have the problems after he had taken the medications and was not getting any relief, I would order the CT scan he is highly allergic to the dye so we will do a CT of abdomen and pelvis without.  CBC and CMP ordered stressed to him that if he feels worse over the weekend has nausea vomiting fever abdominal pain go to the emergency room.  Told him he should hear from registration as to when he can have the CT scan.

## 2021-03-19 NOTE — TELEPHONE ENCOUNTER
Caller: Campbell Alex    Relationship to patient: Self    Best call back number: [phone]    Patient is needing:     PATIENT CALLED IN STATING HE WAS IN OFFICE YESTERDAY AND SAW ALISON RUDD      PATIENT FOLLOWED ALISON RUDD INSTRUCTIONS, AND NOTHING HAS HAPPENED OTHER THAN PASSING WATER. HE WAS GIVEN MAG CITRATE AND TRIED THE MILKAMAGNESIA AND NOTHING IS REALLY HAPPENING.     PATIENT DRANK BOTH AND IS ONLY PASSING WATER AND STILL CANNOT HAVE A REGULAR STOOL-   PATIENT STATED ALISON WAS CONSIDERING ORDERING ANOTHER TEST, AND PATIENT THINKS WE SHOULD DO THIS.  PATIENT WOULD LIKE TO SPEAK WITH ALISON RUDD BEFORE ANY FURTHER ACTION.       PLEASE CALL AND ADVISE PATIENT

## 2021-03-22 ENCOUNTER — LAB (OUTPATIENT)
Dept: LAB | Facility: HOSPITAL | Age: 81
End: 2021-03-22

## 2021-03-22 ENCOUNTER — HOSPITAL ENCOUNTER (OUTPATIENT)
Dept: CT IMAGING | Facility: HOSPITAL | Age: 81
Discharge: HOME | End: 2021-03-22

## 2021-03-22 DIAGNOSIS — N40.0 ENLARGED PROSTATE WITHOUT LOWER URINARY TRACT SYMPTOMS (LUTS): ICD-10-CM

## 2021-03-22 DIAGNOSIS — K59.00 CONSTIPATION, UNSPECIFIED CONSTIPATION TYPE: ICD-10-CM

## 2021-03-22 LAB
ALBUMIN SERPL-MCNC: 4.1 G/DL (ref 3.5–5.2)
ALBUMIN/GLOB SERPL: 1.6 G/DL
ALP SERPL-CCNC: 57 U/L (ref 39–117)
ALT SERPL W P-5'-P-CCNC: 25 U/L (ref 1–41)
ANION GAP SERPL CALCULATED.3IONS-SCNC: 7.4 MMOL/L (ref 5–15)
AST SERPL-CCNC: 34 U/L (ref 1–40)
BASOPHILS # BLD AUTO: 0.03 10*3/MM3 (ref 0–0.2)
BASOPHILS NFR BLD AUTO: 0.5 % (ref 0–1.5)
BILIRUB SERPL-MCNC: 0.5 MG/DL (ref 0–1.2)
BUN SERPL-MCNC: 16 MG/DL (ref 8–23)
BUN/CREAT SERPL: 17.4 (ref 7–25)
CALCIUM SPEC-SCNC: 9 MG/DL (ref 8.6–10.5)
CHLORIDE SERPL-SCNC: 94 MMOL/L (ref 98–107)
CO2 SERPL-SCNC: 28.6 MMOL/L (ref 22–29)
CREAT SERPL-MCNC: 0.92 MG/DL (ref 0.76–1.27)
DEPRECATED RDW RBC AUTO: 41.7 FL (ref 37–54)
EOSINOPHIL # BLD AUTO: 0.16 10*3/MM3 (ref 0–0.4)
EOSINOPHIL NFR BLD AUTO: 2.6 % (ref 0.3–6.2)
ERYTHROCYTE [DISTWIDTH] IN BLOOD BY AUTOMATED COUNT: 12.3 % (ref 12.3–15.4)
GFR SERPL CREATININE-BSD FRML MDRD: 79 ML/MIN/1.73
GLOBULIN UR ELPH-MCNC: 2.6 GM/DL
GLUCOSE SERPL-MCNC: 111 MG/DL (ref 65–99)
HCT VFR BLD AUTO: 39.8 % (ref 37.5–51)
HGB BLD-MCNC: 13.6 G/DL (ref 13–17.7)
IMM GRANULOCYTES # BLD AUTO: 0.02 10*3/MM3 (ref 0–0.05)
IMM GRANULOCYTES NFR BLD AUTO: 0.3 % (ref 0–0.5)
LYMPHOCYTES # BLD AUTO: 1.61 10*3/MM3 (ref 0.7–3.1)
LYMPHOCYTES NFR BLD AUTO: 26.4 % (ref 19.6–45.3)
MCH RBC QN AUTO: 31.7 PG (ref 26.6–33)
MCHC RBC AUTO-ENTMCNC: 34.2 G/DL (ref 31.5–35.7)
MCV RBC AUTO: 92.8 FL (ref 79–97)
MONOCYTES # BLD AUTO: 0.79 10*3/MM3 (ref 0.1–0.9)
MONOCYTES NFR BLD AUTO: 13 % (ref 5–12)
NEUTROPHILS NFR BLD AUTO: 3.49 10*3/MM3 (ref 1.7–7)
NEUTROPHILS NFR BLD AUTO: 57.2 % (ref 42.7–76)
NRBC BLD AUTO-RTO: 0 /100 WBC (ref 0–0.2)
PLATELET # BLD AUTO: 202 10*3/MM3 (ref 140–450)
PMV BLD AUTO: 11 FL (ref 6–12)
POTASSIUM SERPL-SCNC: 4.3 MMOL/L (ref 3.5–5.2)
PROT SERPL-MCNC: 6.7 G/DL (ref 6–8.5)
RBC # BLD AUTO: 4.29 10*6/MM3 (ref 4.14–5.8)
SODIUM SERPL-SCNC: 130 MMOL/L (ref 136–145)
WBC # BLD AUTO: 6.1 10*3/MM3 (ref 3.4–10.8)

## 2021-03-22 PROCEDURE — 85025 COMPLETE CBC W/AUTO DIFF WBC: CPT

## 2021-03-22 PROCEDURE — 74176 CT ABD & PELVIS W/O CONTRAST: CPT

## 2021-03-22 PROCEDURE — 80053 COMPREHEN METABOLIC PANEL: CPT

## 2021-03-22 PROCEDURE — 36415 COLL VENOUS BLD VENIPUNCTURE: CPT

## 2021-04-05 RX ORDER — CLONIDINE HYDROCHLORIDE 0.2 MG/1
TABLET ORAL
Qty: 60 TABLET | Refills: 0 | Status: SHIPPED | OUTPATIENT
Start: 2021-04-05 | End: 2021-05-02

## 2021-04-13 RX ORDER — ISOSORBIDE MONONITRATE 30 MG/1
TABLET, EXTENDED RELEASE ORAL
Qty: 90 TABLET | Refills: 0 | Status: SHIPPED | OUTPATIENT
Start: 2021-04-13 | End: 2021-07-12

## 2021-05-02 RX ORDER — CLONIDINE HYDROCHLORIDE 0.2 MG/1
TABLET ORAL
Qty: 60 TABLET | Refills: 0 | Status: SHIPPED | OUTPATIENT
Start: 2021-05-02 | End: 2021-06-03

## 2021-05-02 RX ORDER — HYDROCHLOROTHIAZIDE 25 MG/1
TABLET ORAL
Qty: 30 TABLET | Refills: 0 | Status: SHIPPED | OUTPATIENT
Start: 2021-05-02 | End: 2021-06-03

## 2021-06-03 RX ORDER — HYDROCHLOROTHIAZIDE 25 MG/1
TABLET ORAL
Qty: 30 TABLET | Refills: 0 | Status: SHIPPED | OUTPATIENT
Start: 2021-06-03 | End: 2021-07-06

## 2021-06-03 RX ORDER — CLONIDINE HYDROCHLORIDE 0.2 MG/1
TABLET ORAL
Qty: 60 TABLET | Refills: 0 | Status: SHIPPED | OUTPATIENT
Start: 2021-06-03 | End: 2021-07-06

## 2021-06-21 ENCOUNTER — TELEPHONE (OUTPATIENT)
Dept: FAMILY MEDICINE CLINIC | Facility: CLINIC | Age: 81
End: 2021-06-21

## 2021-06-21 DIAGNOSIS — N40.0 ENLARGED PROSTATE WITHOUT LOWER URINARY TRACT SYMPTOMS (LUTS): Primary | ICD-10-CM

## 2021-06-21 NOTE — TELEPHONE ENCOUNTER
I wrote the referral.  I called and left a message on the patient's voicemail if the number to call to schedule.

## 2021-07-06 RX ORDER — CLONIDINE HYDROCHLORIDE 0.2 MG/1
TABLET ORAL
Qty: 60 TABLET | Refills: 0 | Status: SHIPPED | OUTPATIENT
Start: 2021-07-06 | End: 2021-08-03

## 2021-07-06 RX ORDER — HYDROCHLOROTHIAZIDE 25 MG/1
TABLET ORAL
Qty: 30 TABLET | Refills: 0 | Status: SHIPPED | OUTPATIENT
Start: 2021-07-06 | End: 2021-08-03

## 2021-07-12 RX ORDER — ISOSORBIDE MONONITRATE 30 MG/1
TABLET, EXTENDED RELEASE ORAL
Qty: 90 TABLET | Refills: 0 | Status: SHIPPED | OUTPATIENT
Start: 2021-07-12 | End: 2021-10-10

## 2021-08-03 RX ORDER — CLONIDINE HYDROCHLORIDE 0.2 MG/1
TABLET ORAL
Qty: 60 TABLET | Refills: 0 | Status: SHIPPED | OUTPATIENT
Start: 2021-08-03 | End: 2021-09-01 | Stop reason: SDUPTHER

## 2021-08-03 RX ORDER — HYDROCHLOROTHIAZIDE 25 MG/1
TABLET ORAL
Qty: 30 TABLET | Refills: 0 | Status: SHIPPED | OUTPATIENT
Start: 2021-08-03 | End: 2021-09-01 | Stop reason: SDUPTHER

## 2021-08-17 ENCOUNTER — LAB (OUTPATIENT)
Dept: LAB | Facility: HOSPITAL | Age: 81
End: 2021-08-17

## 2021-08-17 ENCOUNTER — OFFICE VISIT (OUTPATIENT)
Dept: FAMILY MEDICINE CLINIC | Facility: CLINIC | Age: 81
End: 2021-08-17

## 2021-08-17 VITALS
BODY MASS INDEX: 27.08 KG/M2 | SYSTOLIC BLOOD PRESSURE: 124 MMHG | OXYGEN SATURATION: 98 % | RESPIRATION RATE: 16 BRPM | HEART RATE: 70 BPM | TEMPERATURE: 96.8 F | HEIGHT: 74 IN | WEIGHT: 211 LBS | DIASTOLIC BLOOD PRESSURE: 76 MMHG

## 2021-08-17 DIAGNOSIS — E11.9 TYPE 2 DIABETES MELLITUS WITHOUT COMPLICATION, WITHOUT LONG-TERM CURRENT USE OF INSULIN (HCC): ICD-10-CM

## 2021-08-17 DIAGNOSIS — I10 ESSENTIAL HYPERTENSION: ICD-10-CM

## 2021-08-17 DIAGNOSIS — E78.2 MIXED HYPERLIPIDEMIA: ICD-10-CM

## 2021-08-17 DIAGNOSIS — N40.0 ENLARGED PROSTATE WITHOUT LOWER URINARY TRACT SYMPTOMS (LUTS): Chronic | ICD-10-CM

## 2021-08-17 DIAGNOSIS — I25.10 CORONARY ARTERY DISEASE INVOLVING NATIVE CORONARY ARTERY OF NATIVE HEART WITHOUT ANGINA PECTORIS: Chronic | ICD-10-CM

## 2021-08-17 DIAGNOSIS — Z00.00 ENCOUNTER FOR ANNUAL WELLNESS EXAM IN MEDICARE PATIENT: ICD-10-CM

## 2021-08-17 DIAGNOSIS — I10 ESSENTIAL HYPERTENSION: Primary | ICD-10-CM

## 2021-08-17 LAB
ALT SERPL W P-5'-P-CCNC: 15 U/L (ref 1–41)
CHOLEST SERPL-MCNC: 110 MG/DL (ref 0–200)
HDLC SERPL-MCNC: 58 MG/DL (ref 40–60)
LDLC SERPL CALC-MCNC: 37 MG/DL (ref 0–100)
LDLC/HDLC SERPL: 0.64 {RATIO}
TRIGL SERPL-MCNC: 73 MG/DL (ref 0–150)
VLDLC SERPL-MCNC: 15 MG/DL (ref 5–40)

## 2021-08-17 PROCEDURE — 84460 ALANINE AMINO (ALT) (SGPT): CPT

## 2021-08-17 PROCEDURE — G0439 PPPS, SUBSEQ VISIT: HCPCS | Performed by: FAMILY MEDICINE

## 2021-08-17 PROCEDURE — 83036 HEMOGLOBIN GLYCOSYLATED A1C: CPT

## 2021-08-17 PROCEDURE — 99214 OFFICE O/P EST MOD 30 MIN: CPT | Performed by: FAMILY MEDICINE

## 2021-08-17 PROCEDURE — 36415 COLL VENOUS BLD VENIPUNCTURE: CPT

## 2021-08-17 PROCEDURE — 80048 BASIC METABOLIC PNL TOTAL CA: CPT

## 2021-08-17 PROCEDURE — 80061 LIPID PANEL: CPT

## 2021-08-17 RX ORDER — TAMSULOSIN HYDROCHLORIDE 0.4 MG/1
1 CAPSULE ORAL 2 TIMES DAILY
COMMUNITY
Start: 2021-08-02

## 2021-08-17 NOTE — PROGRESS NOTES
Subjective   Campbell Alex is a 81 y.o. male.     Chief Complaint   Patient presents with   • Medicare Wellness-subsequent   • Coronary Artery Disease     6 month followup   • Hypertension       HPI  Chief complaint: Hypertension hyperlipidemia coronary artery disease type 2 diabetes mellitus prostatism    Patient is 81-year-old white male comes in for follow-up and maintenance of his current problems which include    1.  Hypertension-stable-patient is currently on Cozaar 100 mg daily atenolol 25 mg daily Catapres 0.2 mg twice a day hydrochlorothiazide 25 mg daily.  He denied headache lightheadedness dizziness or chest pain.    2.  Hyperlipidemia-stable-patient is currently on Lipitor 20 mg daily.  Denies myalgias and arthralgias.  Denies nausea or anorexia.    3.  Coronary artery disease-stable-patient is on aspirin 81 mg daily long-acting nitroglycerin 30 mg daily atenolol 25 mg daily.  Denies chest pain shortness of breath orthopnea or PND.    4.  Type 2 diabetes mellitus-stable out of is on a diet.    5.  Prostatism-deteriorated-patient is now on Flomax 0.4 mg at night and Proscar 5 mg daily.  He recently saw his urologist because of increased urinary frequency.  Urology started Flomax.  He has a follow-up appoint with the states has not noted much improvement.    6.  Skin cancer-new-patient states he has a skin cancer involving the left nares.  Patient states he is being followed by facial plastic surgery.  They are treating this nonoperatively for now.  Patient is concerned he may need more aggressive treatment.        The following portions of the patient's history were reviewed and updated as appropriate: allergies, current medications, past family history, past medical history, past social history, past surgical history and problem list.    Review of Systems    Objective     /76 (BP Location: Left arm, Patient Position: Sitting, Cuff Size: Adult)   Pulse 70   Temp 96.8 °F (36 °C) (Infrared)   Resp  "16   Ht 188 cm (74\")   Wt 95.7 kg (211 lb)   SpO2 98%   BMI 27.09 kg/m²     Physical Exam  Vitals and nursing note reviewed.   Constitutional:       Appearance: He is well-developed and normal weight.   HENT:      Head: Normocephalic and atraumatic.      Nose: Nose normal.   Eyes:      Extraocular Movements: Extraocular movements intact.      Conjunctiva/sclera: Conjunctivae normal.      Pupils: Pupils are equal, round, and reactive to light.   Cardiovascular:      Rate and Rhythm: Normal rate and regular rhythm.      Heart sounds: Normal heart sounds.   Pulmonary:      Effort: Pulmonary effort is normal.      Breath sounds: Normal breath sounds.   Abdominal:      General: Abdomen is flat. Bowel sounds are normal.      Palpations: Abdomen is soft.   Musculoskeletal:         General: Normal range of motion.      Cervical back: Neck supple.   Skin:     General: Skin is warm and dry.   Neurological:      Mental Status: He is alert and oriented to person, place, and time.   Psychiatric:         Behavior: Behavior normal.         Thought Content: Thought content normal.         Judgment: Judgment normal.           Assessment/Plan   Diagnoses and all orders for this visit:    1. Essential hypertension (Primary)  -     Basic Metabolic Panel; Future    2. Encounter for annual wellness exam in Medicare patient    3. Mixed hyperlipidemia  -     ALT; Future  -     Lipid Panel; Future    4. Type 2 diabetes mellitus without complication, without long-term current use of insulin (CMS/[facility])  -     Hemoglobin A1c; Future  -     Protein / Creatinine Ratio, Urine - Urine, Clean Catch; Future    5. Enlarged prostate without lower urinary tract symptoms (luts)    6. Coronary artery disease involving native coronary artery of native heart without angina pectoris    7.  Skin cancer-new-the patient does have what appears to be a basal cell carcinoma involving the left nares.  Patient has a 2 to 3 mm ulcer from treatment.  Patient was " advised that this is a surgical site.  I recommended nonoperative management if possible.    Patient Instructions   Continue your current medications and treat,ent.    Follow up in the offcie in 6 months.    Have the follow up labs done and call for results.      Amarjit Jurado Jr., MD    08/17/21

## 2021-08-17 NOTE — PATIENT INSTRUCTIONS
Continue your current medications and treat,ent.    Follow up in the offcie in 6 months.    Have the follow up labs done and call for results.

## 2021-08-17 NOTE — PROGRESS NOTES
The ABCs of the Annual Wellness Visit  Subsequent Medicare Wellness Visit    Chief Complaint   Patient presents with   • Medicare Wellness-subsequent   • Coronary Artery Disease     6 month followup   • Hypertension       Subjective   History of Present Illness:  Campbell Alex is a 81 y.o. male who presents for a Subsequent Medicare Wellness Visit.    HEALTH RISK ASSESSMENT    Recent Hospitalizations:  No hospitalization(s) within the last year.    Current Medical Providers:  Patient Care Team:  Amarjit Jurado Jr., MD as PCP - General  Vilma Staples MD as Consulting Physician (Cardiology)    Smoking Status:  Social History     Tobacco Use   Smoking Status Former Smoker   • Packs/day: 0.00   • Years: 2.00   • Pack years: 0.00   • Types: Cigarettes, Pipe, Cigars   • Quit date: 1960   • Years since quittin.6   Smokeless Tobacco Never Used   Tobacco Comment    1-2/day       Alcohol Consumption:  Social History     Substance and Sexual Activity   Alcohol Use Never    Comment: socially       Depression Screen:   PHQ-2/PHQ-9 Depression Screening 2021   Little interest or pleasure in doing things 0   Feeling down, depressed, or hopeless 0   Total Score 0       Fall Risk Screen:  GLEN Fall Risk Assessment was completed, and patient is at LOW risk for falls.Assessment completed on:2021    Health Habits and Functional and Cognitive Screening:  Functional & Cognitive Status 2021   Do you have difficulty preparing food and eating? No   Do you have difficulty bathing yourself, getting dressed or grooming yourself? No   Do you have difficulty using the toilet? No   Do you have difficulty moving around from place to place? No   Do you have trouble with steps or getting out of a bed or a chair? No   Current Diet Well Balanced Diet   Dental Exam Up to date   Eye Exam Up to date   Exercise (times per week) 0 times per week   Current Exercises Include No Regular Exercise   Current Exercise Activities  Include -   Do you need help using the phone?  No   Are you deaf or do you have serious difficulty hearing?  Yes   Do you need help with transportation? No   Do you need help shopping? No   Do you need help preparing meals?  No   Do you need help with housework?  No   Do you need help with laundry? No   Do you need help taking your medications? No   Do you need help managing money? No   Do you ever drive or ride in a car without wearing a seat belt? No   Have you felt unusual stress, anger or loneliness in the last month? No   Who do you live with? Spouse   If you need help, do you have trouble finding someone available to you? No   Have you been bothered in the last four weeks by sexual problems? No   Do you have difficulty concentrating, remembering or making decisions? Yes         Does the patient have evidence of cognitive impairment? No    Asprin use counseling:Taking ASA appropriately as indicated    Age-appropriate Screening Schedule:  Refer to the list below for future screening recommendations based on patient's age, sex and/or medical conditions. Orders for these recommended tests are listed in the plan section. The patient has been provided with a written plan.    Health Maintenance   Topic Date Due   • LIPID PANEL  08/17/2021 (Originally 5/19/2021)   • TDAP/TD VACCINES (1 - Tdap) 08/17/2021 (Originally 1/12/1959)   • URINE MICROALBUMIN  09/04/2021 (Originally 1940)   • DIABETIC EYE EXAM  10/05/2021 (Originally 7/15/2019)   • HEMOGLOBIN A1C  09/01/2021   • INFLUENZA VACCINE  10/01/2021   • DXA SCAN  03/02/2022   • ZOSTER VACCINE  Discontinued          The following portions of the patient's history were reviewed and updated as appropriate: allergies, current medications, past family history, past medical history, past social history, past surgical history and problem list.    Outpatient Medications Prior to Visit   Medication Sig Dispense Refill   • aspirin (ADULT ASPIRIN EC LOW STRENGTH) 81 MG EC  tablet ADULT ASPIRIN EC LOW STRENGTH 81 MG ORAL TABLET DELAYED RELEASE     • atenolol (TENORMIN) 25 MG tablet TAKE ONE TABLET BY MOUTH EVERY MORNING 90 tablet 2   • atorvastatin (LIPITOR) 20 MG tablet ATORVASTATIN CALCIUM 20 MG TABS     • cloNIDine (CATAPRES) 0.2 MG tablet TAKE ONE TABLET BY MOUTH TWICE A DAY 60 tablet 0   • finasteride (PROSCAR) 5 MG tablet FINASTERIDE 5 MG TABS     • hydroCHLOROthiazide (HYDRODIURIL) 25 MG tablet TAKE ONE TABLET BY MOUTH DAILY 30 tablet 0   • imiquimod (ALDARA) 5 % cream      • isosorbide mononitrate (IMDUR) 30 MG 24 hr tablet TAKE ONE TABLET BY MOUTH DAILY 90 tablet 0   • losartan (COZAAR) 100 MG tablet LOSARTAN POTASSIUM 100 MG TABS     • nitroglycerin (NITROSTAT) 0.4 MG SL tablet Place 1 tablet under the tongue Every 5 (Five) Minutes As Needed for Chest Pain. Take no more than 3 doses in 15 minutes. 25 tablet 0   • tamsulosin (FLOMAX) 0.4 MG capsule 24 hr capsule Take 1 capsule by mouth Every Night.       No facility-administered medications prior to visit.       Patient Active Problem List   Diagnosis   • Coronary artery disease   • Enlarged prostate without lower urinary tract symptoms (luts)   • Hyperlipidemia   • Hypertension   • Type 2 diabetes mellitus without complication, without long-term current use of insulin (CMS/[facility])   • Osteoarthritis   • Osteopenia   • Psoriasis   • Status post coronary artery stent placement   • Vertigo   • Constipation       Advanced Care Planning:  ACP discussion was held with the patient during this visit. Patient has an advance directive in EMR which is still valid.     Review of Systems    Compared to one year ago, the patient feels his physical health is the same.  Compared to one year ago, the patient feels his mental health is the same.    Reviewed chart for potential of high risk medication in the elderly: yes  Reviewed chart for potential of harmful drug interactions in the elderly:yes    Objective         Vitals:    08/17/21 1155  "  BP: 124/76   BP Location: Left arm   Patient Position: Sitting   Cuff Size: Adult   Pulse: 70   Resp: 16   Temp: 96.8 °F (36 °C)   TempSrc: Infrared   SpO2: 98%   Weight: 95.7 kg (211 lb)   Height: 188 cm (74\")   PainSc: 0-No pain       Body mass index is 27.09 kg/m².  Discussed the patient's BMI with him. The BMI is in the acceptable range.    Physical Exam  Vitals and nursing note reviewed.   Constitutional:       Appearance: He is well-developed and normal weight.   HENT:      Head: Normocephalic and atraumatic.   Eyes:      Conjunctiva/sclera: Conjunctivae normal.      Pupils: Pupils are equal, round, and reactive to light.   Cardiovascular:      Rate and Rhythm: Normal rate and regular rhythm.      Pulses: Normal pulses.      Heart sounds: Normal heart sounds.   Pulmonary:      Effort: Pulmonary effort is normal.      Breath sounds: Normal breath sounds.   Abdominal:      General: Abdomen is flat. Bowel sounds are normal.      Palpations: Abdomen is soft.   Musculoskeletal:         General: Normal range of motion.      Cervical back: Neck supple.   Skin:     General: Skin is warm and dry.   Neurological:      Mental Status: He is alert and oriented to person, place, and time.   Psychiatric:         Behavior: Behavior normal.         Thought Content: Thought content normal.         Judgment: Judgment normal.               Assessment/Plan   Medicare Risks and Personalized Health Plan  CMS Preventative Services Quick Reference  Advance Directive Discussion  Immunizations Discussed/Encouraged (specific immunizations; Td, Influenza, Pneumococcal 23, Shingrix and COVID19 )    The above risks/problems have been discussed with the patient.  Pertinent information has been shared with the patient in the After Visit Summary.  Follow up plans and orders are seen below in the Assessment/Plan Section.    Diagnoses and all orders for this visit:    1. Encounter for annual wellness exam in Medicare patient " (Primary)      Follow Up:  Return in about 6 months (around 2/17/2022).     An After Visit Summary and PPPS were given to the patient.

## 2021-08-18 LAB
ANION GAP SERPL CALCULATED.3IONS-SCNC: 9 MMOL/L (ref 5–15)
BUN SERPL-MCNC: 16 MG/DL (ref 8–23)
BUN/CREAT SERPL: 17 (ref 7–25)
CALCIUM SPEC-SCNC: 9.1 MG/DL (ref 8.6–10.5)
CHLORIDE SERPL-SCNC: 92 MMOL/L (ref 98–107)
CO2 SERPL-SCNC: 27 MMOL/L (ref 22–29)
CREAT SERPL-MCNC: 0.94 MG/DL (ref 0.76–1.27)
GFR SERPL CREATININE-BSD FRML MDRD: 77 ML/MIN/1.73
GLUCOSE SERPL-MCNC: 135 MG/DL (ref 65–99)
HBA1C MFR BLD: 6.5 % (ref 3.5–5.6)
POTASSIUM SERPL-SCNC: 4.6 MMOL/L (ref 3.5–5.2)
SODIUM SERPL-SCNC: 128 MMOL/L (ref 136–145)

## 2021-08-31 ENCOUNTER — OFFICE VISIT (OUTPATIENT)
Dept: CARDIOLOGY | Facility: CLINIC | Age: 81
End: 2021-08-31

## 2021-08-31 VITALS
HEART RATE: 85 BPM | RESPIRATION RATE: 20 BRPM | HEIGHT: 74 IN | SYSTOLIC BLOOD PRESSURE: 161 MMHG | OXYGEN SATURATION: 98 % | BODY MASS INDEX: 26.89 KG/M2 | WEIGHT: 209.5 LBS | TEMPERATURE: 97 F | DIASTOLIC BLOOD PRESSURE: 85 MMHG

## 2021-08-31 DIAGNOSIS — I25.10 CORONARY ARTERY DISEASE INVOLVING NATIVE CORONARY ARTERY OF NATIVE HEART WITHOUT ANGINA PECTORIS: ICD-10-CM

## 2021-08-31 DIAGNOSIS — I10 ESSENTIAL HYPERTENSION: ICD-10-CM

## 2021-08-31 DIAGNOSIS — E78.2 MIXED HYPERLIPIDEMIA: ICD-10-CM

## 2021-08-31 DIAGNOSIS — Z95.5 STATUS POST CORONARY ARTERY STENT PLACEMENT: Primary | ICD-10-CM

## 2021-08-31 PROCEDURE — 99214 OFFICE O/P EST MOD 30 MIN: CPT | Performed by: INTERNAL MEDICINE

## 2021-08-31 PROCEDURE — 93000 ELECTROCARDIOGRAM COMPLETE: CPT | Performed by: INTERNAL MEDICINE

## 2021-08-31 NOTE — PROGRESS NOTES
Encounter Date:08/31/2021  Last seen 2/9/2021      Patient ID: Campbell Alex is a 81 y.o. male.    Chief Complaint: 6 month fup cad    Status post stent  Hypertension  Dyslipidemia        History of Present Illness     Since I have last seen, the patient has been without any chest discomfort ,shortness of breath, palpitations, dizziness or syncope.  Denies having any headache ,abdominal pain ,nausea, vomiting , diarrhea constipation, loss of weight or loss of appetite.  Denies having any excessive bruising ,hematuria or blood in the stool.    Review of all systems negative except as indicated.    Reviewed ROS.  Assessment and Plan         //////////////////////  Impression  ============   -status post right coronary artery stent placement (2005 ).      Cardiac catheterization 03/10/2017 revealed normal left ventricular function.  Normal left main coronary artery. 50% lad distal to diagonal branch.  Ostial diagonal branch has 80-90% disease (moderate size vessel) RCA has 30-40% plaquing.  RCA stent was   patent.  One of the PDA branches had 70% disease at its ostium.     Stress Cardiolite test is negative for myocardial ischemia 08/01/2016.     -palpitations improved.  Holter monitor 01/18/2016 showed occasional premature atrial and ventricular contractions.       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       -allergy to darvon codeine Relafen and IVP dye  ===============   Plan  =============  Status post stent  EKG showed sinus rhythm without any ischemic changes.  Premature atrial contractions.  Patient is not having any angina pectoris or congestive heart failure on medical treatment.     Hypertension-160/85  Continue atenolol losartan and hydrochlorothiazide.     Dyslipidemia-continue atorvastatin     medications were reviewed and  updated.  followup in the office in 6 months .  Further plan will depend on patient's progress.  /////////////////////////////                       Diagnosis Plan   1. Status post coronary artery stent placement     2. Coronary artery disease involving native coronary artery of native heart without angina pectoris     3. Essential hypertension     4. Mixed hyperlipidemia     LAB RESULTS (LAST 7 DAYS)    CBC        BMP        CMP         BNP        TROPONIN        CoAg        Creatinine Clearance  Estimated Creatinine Clearance: 80 mL/min (by C-G formula based on SCr of 0.94 mg/dL).    ABG        Radiology  No radiology results for the last day                The following portions of the patient's history were reviewed and updated as appropriate: allergies, current medications, past family history, past medical history, past social history, past surgical history and problem list.    Review of Systems   Constitutional: Positive for malaise/fatigue.   HENT: Negative.    Eyes: Negative.    Cardiovascular: Negative.    Respiratory: Negative.    Endocrine: Negative.    Hematologic/Lymphatic: Negative.    Skin: Negative.    Musculoskeletal: Positive for arthritis and joint pain.   Gastrointestinal: Negative.    Genitourinary: Negative.    Neurological: Positive for dizziness.   Psychiatric/Behavioral: Negative.    Allergic/Immunologic: Negative.          Current Outpatient Medications:   •  aspirin (ADULT ASPIRIN EC LOW STRENGTH) 81 MG EC tablet, ADULT ASPIRIN EC LOW STRENGTH 81 MG ORAL TABLET DELAYED RELEASE, Disp: , Rfl:   •  atenolol (TENORMIN) 25 MG tablet, TAKE ONE TABLET BY MOUTH EVERY MORNING, Disp: 90 tablet, Rfl: 2  •  atorvastatin (LIPITOR) 20 MG tablet, ATORVASTATIN CALCIUM 20 MG TABS, Disp: , Rfl:   •  cloNIDine (CATAPRES) 0.2 MG tablet, TAKE ONE TABLET BY MOUTH TWICE A DAY, Disp: 60 tablet, Rfl: 0  •  finasteride (PROSCAR) 5 MG tablet, FINASTERIDE 5 MG TABS, Disp: , Rfl:   •  hydroCHLOROthiazide  (HYDRODIURIL) 25 MG tablet, TAKE ONE TABLET BY MOUTH DAILY, Disp: 30 tablet, Rfl: 0  •  imiquimod (ALDARA) 5 % cream, , Disp: , Rfl:   •  isosorbide mononitrate (IMDUR) 30 MG 24 hr tablet, TAKE ONE TABLET BY MOUTH DAILY, Disp: 90 tablet, Rfl: 0  •  losartan (COZAAR) 100 MG tablet, LOSARTAN POTASSIUM 100 MG TABS, Disp: , Rfl:   •  nitroglycerin (NITROSTAT) 0.4 MG SL tablet, Place 1 tablet under the tongue Every 5 (Five) Minutes As Needed for Chest Pain. Take no more than 3 doses in 15 minutes., Disp: 25 tablet, Rfl: 0  •  tamsulosin (FLOMAX) 0.4 MG capsule 24 hr capsule, Take 1 capsule by mouth Every Night., Disp: , Rfl:     Allergies   Allergen Reactions   • Codeine GI Intolerance   • Iodine Hives   • Nabumetone Unknown - High Severity   • Propoxyphene Hives       Family History   Problem Relation Age of Onset   • Hypertension Brother    • Hyperlipidemia Brother        Past Surgical History:   Procedure Laterality Date   • ANKLE SURGERY     • CARDIAC CATHETERIZATION  2020   • EYE SURGERY  1960   • NOSE SURGERY     • ROTATOR CUFF REPAIR     • SKIN CANCER EXCISION      L shoulder   • UMBILICAL HERNIA REPAIR         Past Medical History:   Diagnosis Date   • Allergic 1970   • Arthritis    • Benign prostatic hyperplasia 1980   • Cancer (CMS/HCC)    • Cataract 1990   • Cholelithiasis 1958   • Coronary artery disease    • Erectile dysfunction 2020   • HL (hearing loss) 1995   • Hyperlipidemia    • Hypertension    • Osteopenia 1980   • Prostatism    • Renal insufficiency 1940   • Visual impairment 1948       Family History   Problem Relation Age of Onset   • Hypertension Brother    • Hyperlipidemia Brother        Social History     Socioeconomic History   • Marital status:      Spouse name: Not on file   • Number of children: Not on file   • Years of education: Not on file   • Highest education level: Not on file   Tobacco Use   • Smoking status: Former Smoker     Packs/day: 0.00     Years: 2.00     Pack years: 0.00  "    Types: Cigarettes, Pipe, Cigars     Quit date: 1960     Years since quittin.7   • Smokeless tobacco: Never Used   • Tobacco comment: 1-2/day   Vaping Use   • Vaping Use: Never used   Substance and Sexual Activity   • Alcohol use: Never     Comment: socially   • Drug use: Never   • Sexual activity: Not Currently     Partners: Female     Birth control/protection: None           ECG 12 Lead    Date/Time: 2021 9:55 AM  Performed by: Vilma Staples MD  Authorized by: Vilma Staples MD   Comparison: compared with previous ECG   Similar to previous ECG  Comparison to previous ECG: Normal sinus rhythm nonspecific ST-T wave changes premature atrial contractions 65/min no change from previous EKG.                Objective:       Physical Exam    /85 (BP Location: Left arm, Patient Position: Sitting, Cuff Size: Large Adult)   Pulse 85   Temp 97 °F (36.1 °C) (Temporal)   Resp 20   Ht 188 cm (74\")   Wt 95 kg (209 lb 8 oz)   SpO2 98%   BMI 26.90 kg/m²   The patient is alert, oriented and in no distress.    Vital signs as noted above.    Head and neck revealed no carotid bruits or jugular venous distension.  No thyromegaly or lymphadenopathy is present.    Lungs clear.  No wheezing.  Breath sounds are normal bilaterally.    Heart normal first and second heart sounds.  No murmur..  No pericardial rub is present.  No gallop is present.    Abdomen soft and nontender.  No organomegaly is present.    Extremities revealed good peripheral pulses without any pedal edema.    Skin warm and dry.    Musculoskeletal system is grossly normal.    CNS grossly normal.    Reviewed and unchanged from last visit.          "

## 2021-09-01 RX ORDER — CLONIDINE HYDROCHLORIDE 0.2 MG/1
0.2 TABLET ORAL 2 TIMES DAILY
Qty: 60 TABLET | Refills: 5 | Status: SHIPPED | OUTPATIENT
Start: 2021-09-01 | End: 2022-03-06

## 2021-09-01 RX ORDER — HYDROCHLOROTHIAZIDE 25 MG/1
25 TABLET ORAL DAILY
Qty: 30 TABLET | Refills: 5 | Status: SHIPPED | OUTPATIENT
Start: 2021-09-01 | End: 2022-02-28

## 2021-09-07 ENCOUNTER — TELEPHONE (OUTPATIENT)
Dept: FAMILY MEDICINE CLINIC | Facility: CLINIC | Age: 81
End: 2021-09-07

## 2021-09-07 PROBLEM — Z20.822 SUSPECTED COVID-19 VIRUS INFECTION: Status: ACTIVE | Noted: 2021-09-07

## 2021-09-07 PROCEDURE — U0004 COV-19 TEST NON-CDC HGH THRU: HCPCS | Performed by: FAMILY MEDICINE

## 2021-09-07 PROCEDURE — U0005 INFEC AGEN DETEC AMPLI PROBE: HCPCS | Performed by: FAMILY MEDICINE

## 2021-09-07 RX ORDER — ATENOLOL 25 MG/1
TABLET ORAL
Qty: 90 TABLET | Refills: 3 | Status: SHIPPED | OUTPATIENT
Start: 2021-09-07 | End: 2022-08-23

## 2021-09-07 NOTE — TELEPHONE ENCOUNTER
Caller: Campbell Alex    Relationship: Self    Best call back number: [phone]     What medication are you requesting: SOMETHING TO HELP WITH SINUSES     What are your current symptoms: LOW GRADE TEMPERATIRE, SINUS HEADACHE, RUNNY NOSE,     How long have you been experiencing symptoms: 3 DAYS    Have you had these symptoms before:    [x] Yes  [] No    Have you been treated for these symptoms before:   [] Yes  [x] No    If a prescription is needed, what is your preferred pharmacy and phone number: [facility] [place] - [address] - [phone] [facility] [phone] FX     Additional notes: PATIENT STATES HE HAS THESE SYMPTOMS REGULARLY AND HE THINKS IT IS JUST ALLERGIES OR A SINUS INFECTION. PATIENT UNABLE TO GET RAPID COVID TEST UNTIL Thursday 09/09 AND WANTS TO STAY OUT OF OFFICE UNTIL TEST RESULTS ARE BACK

## 2021-09-08 RX ORDER — TRIAMCINOLONE ACETONIDE 55 UG/1
2 SPRAY, METERED NASAL DAILY
Qty: 16.5 G | Refills: 11 | Status: SHIPPED | OUTPATIENT
Start: 2021-09-08 | End: 2022-01-06

## 2021-09-08 NOTE — TELEPHONE ENCOUNTER
I spoke with the patient.  He has a URI; negative for CoVid.  I recommended saline nasal spray and flonase or nasacort.  He is to rest and get plenty of fluids.

## 2021-10-04 ENCOUNTER — OFFICE VISIT (OUTPATIENT)
Dept: FAMILY MEDICINE CLINIC | Facility: CLINIC | Age: 81
End: 2021-10-04

## 2021-10-04 VITALS
OXYGEN SATURATION: 97 % | WEIGHT: 207 LBS | HEIGHT: 74 IN | TEMPERATURE: 97.1 F | DIASTOLIC BLOOD PRESSURE: 70 MMHG | HEART RATE: 76 BPM | RESPIRATION RATE: 16 BRPM | BODY MASS INDEX: 26.56 KG/M2 | SYSTOLIC BLOOD PRESSURE: 129 MMHG

## 2021-10-04 DIAGNOSIS — J22 ACUTE RESPIRATORY INFECTION: Primary | ICD-10-CM

## 2021-10-04 DIAGNOSIS — B35.1 ONYCHOMYCOSIS: ICD-10-CM

## 2021-10-04 PROCEDURE — 99213 OFFICE O/P EST LOW 20 MIN: CPT | Performed by: FAMILY MEDICINE

## 2021-10-04 RX ORDER — TERBINAFINE HYDROCHLORIDE 250 MG/1
250 TABLET ORAL DAILY
Qty: 30 TABLET | Refills: 2 | Status: SHIPPED | OUTPATIENT
Start: 2021-10-04 | End: 2022-10-26

## 2021-10-04 NOTE — PATIENT INSTRUCTIONS
Continue your current medications and treatment.    Gradually resume your regular activities.    Take the terbinafine once a day.    Follow up in the office at your next appointment or sooner.

## 2021-10-04 NOTE — PROGRESS NOTES
"Subjective   Campbell Alex is a 81 y.o. male.     Chief Complaint   Patient presents with   • Exposure To Known Illness     urgent care followup   • Cough       HPI  Chief complaint: Respiratory infection, toenail fungus.  Patient is an 81-year-old white male comes in for follow-up and maintenance of his current problems which include    1. Respiratory infection-new-patient states he has had symptoms of respiratory infection for the past 4 weeks.  He states that he has had sinus congestion drainage and cough.  Patient denied fever or chills.  He did go to urgent care center and was tested for Covid which was negative.  Patient states his symptoms have gradually improved but they are not totally gone.    2.  Toenail infection-new-patient states he developed thickening of the toenails on the right foot.  He states his great toe second toe and third toe have thickening and changes consistent with toenail fungus.        The following portions of the patient's history were reviewed and updated as appropriate: allergies, current medications, past family history, past medical history, past social history, past surgical history and problem list.    Review of Systems    Objective     /70 (BP Location: Left arm, Patient Position: Sitting, Cuff Size: Adult)   Pulse 76   Temp 97.1 °F (36.2 °C) (Infrared)   Resp 16   Ht 188 cm (74\")   Wt 93.9 kg (207 lb)   SpO2 97%   BMI 26.58 kg/m²     Physical Exam  Vitals and nursing note reviewed.   Constitutional:       Appearance: He is well-developed.   HENT:      Head: Normocephalic and atraumatic.      Nose: Nose normal.      Mouth/Throat:      Mouth: Mucous membranes are moist.      Pharynx: Oropharynx is clear.   Eyes:      Extraocular Movements: Extraocular movements intact.      Conjunctiva/sclera: Conjunctivae normal.      Pupils: Pupils are equal, round, and reactive to light.   Cardiovascular:      Rate and Rhythm: Normal rate and regular rhythm.      Heart sounds: " Normal heart sounds.   Pulmonary:      Effort: Pulmonary effort is normal.      Breath sounds: Normal breath sounds.   Abdominal:      General: Abdomen is flat. Bowel sounds are normal.      Palpations: Abdomen is soft.   Musculoskeletal:         General: Normal range of motion.      Cervical back: Neck supple.   Skin:     General: Skin is warm and dry.   Neurological:      Mental Status: He is alert and oriented to person, place, and time.   Psychiatric:         Behavior: Behavior normal.         Thought Content: Thought content normal.         Judgment: Judgment normal.           Assessment/Plan   Diagnoses and all orders for this visit:    1. Acute respiratory infection (Primary)-I gave the patient the option of having further testing including chest x-ray and further labs versus antibiotic therapy versus continued symptomatic patient.  Patient opted for symptomatic care.    2. Onychomycosis-patient wants to start Lamisil 250 mg once a day.  He was advised he needs to take this for 3 months.      Patient Instructions   Continue your current medications and treatment.    Gradually resume your regular activities.    Take the terbinafine once a day.    Follow up in the office at your next appointment or sooner.      Amarjit Jurado Jr., MD    10/04/21

## 2021-10-07 PROCEDURE — 88305 TISSUE EXAM BY PATHOLOGIST: CPT | Performed by: OTOLARYNGOLOGY

## 2021-10-08 ENCOUNTER — LAB REQUISITION (OUTPATIENT)
Dept: LAB | Facility: HOSPITAL | Age: 81
End: 2021-10-08

## 2021-10-08 DIAGNOSIS — C44.311 BASAL CELL CARCINOMA OF SKIN OF NOSE: ICD-10-CM

## 2021-10-10 RX ORDER — ISOSORBIDE MONONITRATE 30 MG/1
TABLET, EXTENDED RELEASE ORAL
Qty: 90 TABLET | Refills: 0 | Status: SHIPPED | OUTPATIENT
Start: 2021-10-10 | End: 2022-01-12 | Stop reason: SDUPTHER

## 2021-10-11 LAB
LAB AP CASE REPORT: NORMAL
PATH REPORT.FINAL DX SPEC: NORMAL
PATH REPORT.GROSS SPEC: NORMAL

## 2021-11-29 ENCOUNTER — TELEPHONE (OUTPATIENT)
Dept: FAMILY MEDICINE CLINIC | Facility: CLINIC | Age: 81
End: 2021-11-29

## 2021-11-29 NOTE — TELEPHONE ENCOUNTER
Caller: Campbell Alex    Relationship: Self    Best call back number: [phone]    What is the best time to reach you: ANYTIME    Who are you requesting to speak with (clinical staff, provider,  specific staff member): CLINICAL    What was the call regarding: PATIENT STATES HIS TOENAIL ON HIS TOE NEXT TO HIS BIG TOE ON HIS RIGHT FOOT IS BARELY HANGING ON.     PATIENT IS UNSURE ON WHAT HE SHOULD DO. PATIENT WAS SEEN IN OFFICE IN October 2021 AND DR. GARSIA PUT PATIENT ON MEDICATION TO HELP WITH HIS TOENAIL AND STATES IT HAS NOT GOTTEN ANY BETTER.    PATIENT IS WANTING TO KNOW IF HE SHOULD BE SEEN BY DR. GARSIA OR IF DR GARSIA WOULD WANT TO REFER HIM TO A PODIATRIST.     PLEASE CALL AND ADVISE.

## 2021-12-17 ENCOUNTER — OFFICE VISIT (OUTPATIENT)
Dept: FAMILY MEDICINE CLINIC | Facility: CLINIC | Age: 81
End: 2021-12-17

## 2021-12-17 ENCOUNTER — LAB (OUTPATIENT)
Dept: LAB | Facility: HOSPITAL | Age: 81
End: 2021-12-17

## 2021-12-17 ENCOUNTER — HOSPITAL ENCOUNTER (OUTPATIENT)
Dept: GENERAL RADIOLOGY | Facility: HOSPITAL | Age: 81
Discharge: HOME | End: 2021-12-17

## 2021-12-17 VITALS
OXYGEN SATURATION: 97 % | HEART RATE: 77 BPM | HEIGHT: 74 IN | WEIGHT: 204 LBS | TEMPERATURE: 96.9 F | BODY MASS INDEX: 26.18 KG/M2

## 2021-12-17 DIAGNOSIS — M54.16 LUMBAR RADICULOPATHY: Primary | ICD-10-CM

## 2021-12-17 DIAGNOSIS — M54.16 LUMBAR RADICULOPATHY: ICD-10-CM

## 2021-12-17 DIAGNOSIS — M19.90 OSTEOARTHRITIS, UNSPECIFIED OSTEOARTHRITIS TYPE, UNSPECIFIED SITE: Chronic | ICD-10-CM

## 2021-12-17 PROBLEM — Z20.822 SUSPECTED COVID-19 VIRUS INFECTION: Status: RESOLVED | Noted: 2021-09-07 | Resolved: 2021-12-17

## 2021-12-17 LAB
ALBUMIN SERPL-MCNC: 4.1 G/DL (ref 3.5–5.2)
ALBUMIN/GLOB SERPL: 1.3 G/DL
ALP SERPL-CCNC: 60 U/L (ref 39–117)
ALT SERPL W P-5'-P-CCNC: 12 U/L (ref 1–41)
ANION GAP SERPL CALCULATED.3IONS-SCNC: 9.5 MMOL/L (ref 5–15)
AST SERPL-CCNC: 20 U/L (ref 1–40)
BASOPHILS # BLD AUTO: 0.07 10*3/MM3 (ref 0–0.2)
BASOPHILS NFR BLD AUTO: 1.1 % (ref 0–1.5)
BILIRUB SERPL-MCNC: 0.2 MG/DL (ref 0–1.2)
BUN SERPL-MCNC: 24 MG/DL (ref 8–23)
BUN/CREAT SERPL: 24.5 (ref 7–25)
CALCIUM SPEC-SCNC: 9.3 MG/DL (ref 8.6–10.5)
CHLORIDE SERPL-SCNC: 95 MMOL/L (ref 98–107)
CO2 SERPL-SCNC: 27.5 MMOL/L (ref 22–29)
CREAT SERPL-MCNC: 0.98 MG/DL (ref 0.76–1.27)
DEPRECATED RDW RBC AUTO: 39.5 FL (ref 37–54)
EOSINOPHIL # BLD AUTO: 0.27 10*3/MM3 (ref 0–0.4)
EOSINOPHIL NFR BLD AUTO: 4.1 % (ref 0.3–6.2)
ERYTHROCYTE [DISTWIDTH] IN BLOOD BY AUTOMATED COUNT: 12 % (ref 12.3–15.4)
ERYTHROCYTE [SEDIMENTATION RATE] IN BLOOD: 9 MM/HR (ref 0–20)
GFR SERPL CREATININE-BSD FRML MDRD: 73 ML/MIN/1.73
GLOBULIN UR ELPH-MCNC: 3.2 GM/DL
GLUCOSE SERPL-MCNC: 94 MG/DL (ref 65–99)
HCT VFR BLD AUTO: 37.3 % (ref 37.5–51)
HGB BLD-MCNC: 12.6 G/DL (ref 13–17.7)
IMM GRANULOCYTES # BLD AUTO: 0.01 10*3/MM3 (ref 0–0.05)
IMM GRANULOCYTES NFR BLD AUTO: 0.2 % (ref 0–0.5)
LYMPHOCYTES # BLD AUTO: 1.51 10*3/MM3 (ref 0.7–3.1)
LYMPHOCYTES NFR BLD AUTO: 22.7 % (ref 19.6–45.3)
MCH RBC QN AUTO: 30.8 PG (ref 26.6–33)
MCHC RBC AUTO-ENTMCNC: 33.8 G/DL (ref 31.5–35.7)
MCV RBC AUTO: 91.2 FL (ref 79–97)
MONOCYTES # BLD AUTO: 0.79 10*3/MM3 (ref 0.1–0.9)
MONOCYTES NFR BLD AUTO: 11.9 % (ref 5–12)
NEUTROPHILS NFR BLD AUTO: 4 10*3/MM3 (ref 1.7–7)
NEUTROPHILS NFR BLD AUTO: 60 % (ref 42.7–76)
NRBC BLD AUTO-RTO: 0.2 /100 WBC (ref 0–0.2)
PLATELET # BLD AUTO: 234 10*3/MM3 (ref 140–450)
PMV BLD AUTO: 10.1 FL (ref 6–12)
POTASSIUM SERPL-SCNC: 4.3 MMOL/L (ref 3.5–5.2)
PROT SERPL-MCNC: 7.3 G/DL (ref 6–8.5)
RBC # BLD AUTO: 4.09 10*6/MM3 (ref 4.14–5.8)
SODIUM SERPL-SCNC: 132 MMOL/L (ref 136–145)
WBC NRBC COR # BLD: 6.65 10*3/MM3 (ref 3.4–10.8)

## 2021-12-17 PROCEDURE — 80053 COMPREHEN METABOLIC PANEL: CPT

## 2021-12-17 PROCEDURE — 85025 COMPLETE CBC W/AUTO DIFF WBC: CPT

## 2021-12-17 PROCEDURE — 36415 COLL VENOUS BLD VENIPUNCTURE: CPT

## 2021-12-17 PROCEDURE — 72110 X-RAY EXAM L-2 SPINE 4/>VWS: CPT

## 2021-12-17 PROCEDURE — 85652 RBC SED RATE AUTOMATED: CPT

## 2021-12-17 PROCEDURE — 99213 OFFICE O/P EST LOW 20 MIN: CPT | Performed by: FAMILY MEDICINE

## 2021-12-17 NOTE — PROGRESS NOTES
"Subjective   Campbell Alex is a 81 y.o. male.     Chief Complaint   Patient presents with   • Back Pain     low back pain x2 weeks       HPI  Chief complaint: Back pain    Patient is an 81-year-old white male comes in stating that he has had low back pain for the past several months.  He states however it is gradually getting worse.  He states that recently was on a trip with his wife that involved a lot of walking at the hotel.  He states that he developed severe pain in his lower back that radiated into his right lower extremity.  He denied fever.  Denied bowel or bladder problems.  He denied injury.  Patient states however he was not able to walk because of the severe pain.  Patient has a history of psoriasis and osteoarthritis.        The following portions of the patient's history were reviewed and updated as appropriate: allergies, current medications, past family history, past medical history, past social history, past surgical history and problem list.    Review of Systems    Objective     Pulse 77   Temp 96.9 °F (36.1 °C) (Infrared)   Ht 188 cm (74\")   Wt 92.5 kg (204 lb)   SpO2 97%   BMI 26.19 kg/m²     Physical Exam  Vitals and nursing note reviewed.   Constitutional:       Appearance: He is well-developed and normal weight.   HENT:      Head: Normocephalic and atraumatic.   Eyes:      Pupils: Pupils are equal, round, and reactive to light.   Cardiovascular:      Rate and Rhythm: Normal rate and regular rhythm.      Pulses: Normal pulses.      Heart sounds: Normal heart sounds. No murmur heard.  No friction rub. No gallop.    Pulmonary:      Effort: Pulmonary effort is normal.      Breath sounds: Normal breath sounds.   Abdominal:      General: Bowel sounds are normal.      Palpations: Abdomen is soft.   Musculoskeletal:         General: Normal range of motion.      Cervical back: Normal range of motion and neck supple.      Comments: -SLR test  Reflexes symmetrical  Tenderness of the right SI " joint    Good range of motion of the hips without pain   Skin:     General: Skin is warm and dry.   Neurological:      Mental Status: He is alert and oriented to person, place, and time.   Psychiatric:         Behavior: Behavior normal.         Thought Content: Thought content normal.         Judgment: Judgment normal.           Assessment/Plan   Diagnoses and all orders for this visit:    1. Lumbar radiculopathy (Primary)-patient was advised that he likely has arthritis in his lower back with spinal stenosis or neuroforaminal stenosis.  He was counseled on proper care of his back.  He is to have x-rays labs in for PT patient with me in 1 month to 6 weeks.  -     CBC & Differential; Future  -     Sedimentation Rate; Future  -     Comprehensive Metabolic Panel; Future  -     XR Spine Lumbar 4+ View; Future  -     Ambulatory Referral to Physical Therapy Evaluate and treat    2. Osteoarthritis, unspecified osteoarthritis type, unspecified site  -     Ambulatory Referral to Physical Therapy Evaluate and treat      Patient Instructions   Have the xrays and the labs done and call for results.    Take proper care of your back.    Go to PT.    Further care will depend on the results of the test an how you progress.    Follow up in the office in 4-6 weeks.          Amarjit Jurado Jr., MD    12/17/21

## 2021-12-17 NOTE — PATIENT INSTRUCTIONS
Have the xrays and the labs done and call for results.    Take proper care of your back.    Go to PT.    Further care will depend on the results of the test an how you progress.    Follow up in the office in 4-6 weeks.

## 2021-12-22 ENCOUNTER — TELEPHONE (OUTPATIENT)
Dept: FAMILY MEDICINE CLINIC | Facility: CLINIC | Age: 81
End: 2021-12-22

## 2021-12-22 NOTE — TELEPHONE ENCOUNTER
Caller: Campbell Alex    Relationship to patient: Self    Best call back number: [phone]    Patient is needing: PATIENT STATES HE HAS TEST RESULTS BACK FROM HOSPITAL.   ASKS DR GARSIA TO CALL TO DISCUSS              Spoke with Donna. Discussed that there was a cancellation on 2/8 and we would be able to move her surgery up to 2/8 from 2/15. Patient agreeable to change of surgery date. Patient states that she has had a chronic cough and needing to clear her throat for a couple of hours in the morning. She has been taking Mucinex as per her PCP, but unfortunately this has not helped her cough. She is concerned that this will postpone her surgery. Discussed that writer spoke with Janette BYRNE and that her symptoms will not be a reason to cancel her surgery. Patient has a call out to her PCP to discuss other possible remedies. Patient will receive a call with time of arrival on day of surgery either this afternoon of tomorrow morning.

## 2021-12-22 NOTE — TELEPHONE ENCOUNTER
I spoke with the patient.  His x-rays reveal osteoarthritis the L5-S1.  He wants to try physical therapy.  He did not want pain meds or corticosteroids at this time.  He will follow up with me after physical therapy.

## 2022-01-06 ENCOUNTER — OFFICE VISIT (OUTPATIENT)
Dept: PODIATRY | Facility: CLINIC | Age: 82
End: 2022-01-06

## 2022-01-06 VITALS
DIASTOLIC BLOOD PRESSURE: 68 MMHG | HEART RATE: 85 BPM | HEIGHT: 74 IN | SYSTOLIC BLOOD PRESSURE: 142 MMHG | BODY MASS INDEX: 26.18 KG/M2 | WEIGHT: 204 LBS

## 2022-01-06 DIAGNOSIS — L60.3 ONYCHODYSTROPHY: Primary | ICD-10-CM

## 2022-01-06 PROCEDURE — 99203 OFFICE O/P NEW LOW 30 MIN: CPT | Performed by: PODIATRIST

## 2022-01-10 ENCOUNTER — TREATMENT (OUTPATIENT)
Dept: PHYSICAL THERAPY | Facility: CLINIC | Age: 82
End: 2022-01-10

## 2022-01-10 DIAGNOSIS — R26.89 BALANCE PROBLEMS: ICD-10-CM

## 2022-01-10 DIAGNOSIS — M19.90 OSTEOARTHRITIS, UNSPECIFIED OSTEOARTHRITIS TYPE, UNSPECIFIED SITE: ICD-10-CM

## 2022-01-10 DIAGNOSIS — M54.16 RADICULOPATHY, LUMBAR REGION: Primary | ICD-10-CM

## 2022-01-10 PROCEDURE — 97161 PT EVAL LOW COMPLEX 20 MIN: CPT | Performed by: PHYSICAL THERAPIST

## 2022-01-10 PROCEDURE — 97110 THERAPEUTIC EXERCISES: CPT | Performed by: PHYSICAL THERAPIST

## 2022-01-10 PROCEDURE — 97140 MANUAL THERAPY 1/> REGIONS: CPT | Performed by: PHYSICAL THERAPIST

## 2022-01-10 NOTE — PROGRESS NOTES
"Physical Therapy Initial Evaluation and Plan of Care    Patient: Campbell Alex   : 1940  Diagnosis/ICD-10 Code:  Radiculopathy, lumbar region [M54.16]  Referring practitioner: Amarjit Jurado Jr., MD  Date of Initial Visit: 1/10/2022  Today's Date: 1/10/2022  Patient seen for 1 sessions           Subjective Questionnaire: ZAK 26%      Subjective 80 yo male with c/o LBP. Pt with a long history of LBP and gradual worsening. Pt notes episodes of falling as well. Pt notes episodes of dizziness but does not describe the type. Has episodes of his LEs \"giving out\" over the last year. Primary pain is along the central belt line. Denies LE symptoms. 3/10 pain now and 8/10 at worst. Symptoms worsen with standing, walking, lifting. Symptoms improve with massage, Tylenol. Further exacerbating and alleviating factors are unknown. Denies numbness and tingling.  Pt has difficulty standing from seated position. This is worse after prolonged sitting. Pt having difficulty walking > 1/2 mile. Primary complaint is limited balance. Pt reports missing L hallux extensor tendon causing foot drop.  MD follow up: 22  Social hx: Retired education administration/teaching, lives with wife    Objective          Active Range of Motion     Additional Active Range of Motion Details  Mildly limited in all directions. Central pain provocation with extension and L SB.    Strength/Myotome Testing     Left Hip   Planes of Motion   Flexion: 4+    Right Hip   Planes of Motion   Flexion: 3+    Left Knee   Flexion: 4+  Extension: 4+    Right Knee   Flexion: 4+  Extension: 4+    Left Ankle/Foot   Dorsiflexion: 3+  Plantar flexion: 4+    Right Ankle/Foot   Dorsiflexion: 4+  Plantar flexion: 4+    Tests     Lumbar     Left   Negative crossed SLR and passive SLR.     Right   Negative crossed SLR and passive SLR.     Additional Tests Details  Further provocative testing is negative.   Hip extension and IR PROM is 0 deg. Pain provocation with PROM IR " at 0 deg hip extension.  Flexed and guarded posture.      Assessment & Plan     Assessment  Impairments: abnormal coordination, abnormal gait, abnormal muscle tone, abnormal or restricted ROM, activity intolerance, impaired balance, impaired physical strength, lacks appropriate home exercise program and pain with function  Functional Limitations: carrying objects, lifting, sleeping, walking, pulling, pushing, uncomfortable because of pain, moving in bed, sitting, standing, stooping and unable to perform repetitive tasks  Assessment details: 80 yo male with c/o LBP and limited balance. Pt is with a good response to treatment this date and would benefit from further evaluation and treatment to address the above impairments. Recommend including evaluation and treatment of pt's balance deficits in plan of care.    Prognosis: good    Goals  Plan Goals: Short term goals, 1 week: Tolerate HEP progression.  Voice compliance with activity modification.  Report improvement in symptoms.    Long term goals, 6 weeks: Improve ZAK score to 12%.  AROM to be wfl and non provocative.  4+/5 strength or better throughout.  Show good hip mobility.  Symptoms to be centralized.  Show significant improvement in balance performance measures vs baseline.  Tolerate 20 min or more of standing activity.  Independent with HEP.    Plan  Therapy options: will be seen for skilled therapy services  Other planned modality interventions: modalities prn  Planned therapy interventions: abdominal trunk stabilization, body mechanics training, flexibility, functional ROM exercises, home exercise program, manual therapy, neuromuscular re-education, postural training, strengthening, stretching, therapeutic activities, balance/weight-bearing training and gait training  Frequency: 2x week  Duration in weeks: 6  Treatment plan discussed with: patient      Timed:         Manual Therapy:    15     mins  12797;     Therapeutic Exercise:    15     mins  52251;      Neuromuscular Agustina:        mins  57433;    Therapeutic Activity:          mins  97714;     Gait Training:           mins  40733;     Ultrasound:          mins  72625;    Ionto                                   mins   99380  Self Care                            mins   74849    Un-Timed:  Electrical Stimulation:         mins  92828 ( );  Traction          mins 23525  Low Eval     15     Mins  14540  Mod Eval          Mins  51177  High Eval                            Mins  93979  Re-Eval                               mins  20007        Timed Treatment:   30   mins   Total Treatment:     45   mins      PT SIGNATURE: Daniel Joyner PT, DPT, OCS  [place] license: [id number]  DATE TREATMENT INITIATED: 1/10/2022    Initial Certification  Certification Period: 4/10/2022  I certify that the therapy services are furnished while this patient is under my care.  The services outlined above are required for this patient and will be reviewed every 90 days.     PHYSICIAN: _____________________________    Amarjit Jurado Jr., MD      DATE:     Please sign and return via fax to [phone]. Thank you, [facility].

## 2022-01-11 ENCOUNTER — OFFICE (OUTPATIENT)
Dept: URBAN - METROPOLITAN AREA CLINIC 64 | Facility: CLINIC | Age: 82
End: 2022-01-11
Payer: COMMERCIAL

## 2022-01-11 VITALS
HEIGHT: 72 IN | HEART RATE: 63 BPM | WEIGHT: 210 LBS | DIASTOLIC BLOOD PRESSURE: 77 MMHG | SYSTOLIC BLOOD PRESSURE: 131 MMHG

## 2022-01-11 DIAGNOSIS — R19.7 DIARRHEA, UNSPECIFIED: ICD-10-CM

## 2022-01-11 PROCEDURE — 99203 OFFICE O/P NEW LOW 30 MIN: CPT | Performed by: INTERNAL MEDICINE

## 2022-01-11 RX ORDER — LUBIPROSTONE 24 UG/1
48 CAPSULE, GELATIN COATED ORAL
Qty: 60 | Refills: 11 | Status: COMPLETED
Start: 2022-01-11 | End: 2022-04-04

## 2022-01-12 RX ORDER — ISOSORBIDE MONONITRATE 30 MG/1
30 TABLET, EXTENDED RELEASE ORAL DAILY
Qty: 90 TABLET | Refills: 3 | Status: SHIPPED | OUTPATIENT
Start: 2022-01-12 | End: 2022-12-06

## 2022-01-18 ENCOUNTER — TREATMENT (OUTPATIENT)
Dept: PHYSICAL THERAPY | Facility: CLINIC | Age: 82
End: 2022-01-18

## 2022-01-18 DIAGNOSIS — M54.16 RADICULOPATHY, LUMBAR REGION: Primary | ICD-10-CM

## 2022-01-18 DIAGNOSIS — M19.90 OSTEOARTHRITIS, UNSPECIFIED OSTEOARTHRITIS TYPE, UNSPECIFIED SITE: ICD-10-CM

## 2022-01-18 DIAGNOSIS — R26.89 BALANCE PROBLEMS: ICD-10-CM

## 2022-01-18 PROCEDURE — 97110 THERAPEUTIC EXERCISES: CPT | Performed by: PHYSICAL THERAPIST

## 2022-01-18 PROCEDURE — 97112 NEUROMUSCULAR REEDUCATION: CPT | Performed by: PHYSICAL THERAPIST

## 2022-01-18 PROCEDURE — 97116 GAIT TRAINING THERAPY: CPT | Performed by: PHYSICAL THERAPIST

## 2022-01-18 NOTE — PROGRESS NOTES
Physical Therapy Daily Treatment Note  Patient: Campbell Alex   : 1940   Referring practitioner: Amarjit Jurado Jr., MD  Date of initial visit: Type: THERAPY  Noted: 1/10/2022   Today's date: 2022   Patient seen for 2 visits    Visit Diagnoses:    ICD-10-CM ICD-9-CM   1. Radiculopathy, lumbar region  M54.16 724.4   2. Osteoarthritis, unspecified osteoarthritis type, unspecified site  M19.90 715.90   3. Balance problems  R26.89 781.99       Subjective   Pt reports: Pt with episodes of falling. Using cane prn. LBP improving.      Objective   Tinetti 18  TUG 12.7 seconds  Five times sit to stand 10 seconds  Four square step 18 seconds    Unable to maintain heel to toe standing, eyes closed with narrow MASSIMO, and narrow MASSIMO on foam  See Exercise, Manual, and Modality Logs for complete treatment.     Patient Education: HEP    Assessment/Plan Pt with risk of falling per performance testing. Difficulty with direction changes and negotiating obstacles, as well as balancing on unstable surface.      Progress per Plan of Care            Timed:         Manual Therapy:         mins  17322;     Therapeutic Exercise:    10     mins  16334;     Neuromuscular Agustina:    15    mins  67949;    Therapeutic Activity:          mins  89699;     Gait Training:      15     mins  48329;     Ultrasound:          mins  74472;    Ionto                                   mins   61515  Self Care                            mins   42588    Un-Timed:  Electrical Stimulation:         mins  79290 ( );  Traction          mins 77981  Low Eval          Mins  89112  Mod Eval          Mins  73572  High Eval                            Mins  88562  Re-Eval                               mins  97912    Timed Treatment:   40   mins   Total Treatment:     40   mins      Daniel Joyner, PT, DPT, OCS  [place] license: [id number]  Physical Therapist

## 2022-01-21 ENCOUNTER — TREATMENT (OUTPATIENT)
Dept: PHYSICAL THERAPY | Facility: CLINIC | Age: 82
End: 2022-01-21

## 2022-01-21 DIAGNOSIS — M54.16 RADICULOPATHY, LUMBAR REGION: Primary | ICD-10-CM

## 2022-01-21 DIAGNOSIS — R26.89 BALANCE PROBLEMS: ICD-10-CM

## 2022-01-21 DIAGNOSIS — M19.90 OSTEOARTHRITIS, UNSPECIFIED OSTEOARTHRITIS TYPE, UNSPECIFIED SITE: ICD-10-CM

## 2022-01-21 PROCEDURE — 97110 THERAPEUTIC EXERCISES: CPT | Performed by: PHYSICAL THERAPIST

## 2022-01-21 PROCEDURE — 97112 NEUROMUSCULAR REEDUCATION: CPT | Performed by: PHYSICAL THERAPIST

## 2022-01-21 PROCEDURE — 97530 THERAPEUTIC ACTIVITIES: CPT | Performed by: PHYSICAL THERAPIST

## 2022-01-21 NOTE — PROGRESS NOTES
Physical Therapy Daily Progress Note    VISIT#: 3    Subjective   Campbell Alex reports that he has some soreness from doing the mini squats.   Pain Ratin    Objective     See Exercise, Manual, and Modality Logs for complete treatment.     Patient Education: HEP    Assessment/Plan   Progressed balance and strengthening exercises today. Pt tolerated well and completed all with SBA. Pt had mild difficulty with perturbations on foam but was able to correct with CGA and UE support on counter.     Progress per Plan of Care and Progress strengthening /stabilization /functional activity          Timed:         Manual Therapy:         mins  58807;     Therapeutic Exercise:    12     mins  45033;     Neuromuscular Agustina:    15    mins  65329;    Therapeutic Activity:     15     mins  59098;     Gait Training:           mins  63505;     Ultrasound:          mins  06950;    Ionto                                   mins   33900  Self Care                            mins   48477  Canalith Repos                   mins  90083    Un-Timed:  Electrical Stimulation:         mins  34573 ( );  Dry Needling          mins self-pay  Traction          mins 62825  Low Eval          Mins  13126  Mod Eval          Mins  75440  High Eval                            Mins  04253  Re-Eval                               mins  79948    Timed Treatment:   42   mins   Total Treatment:     42   mins          Kay Sandoval  Physical Therapist Assistant  [place] License # [id number]

## 2022-01-25 ENCOUNTER — TREATMENT (OUTPATIENT)
Dept: PHYSICAL THERAPY | Facility: CLINIC | Age: 82
End: 2022-01-25

## 2022-01-25 DIAGNOSIS — R26.89 BALANCE PROBLEMS: ICD-10-CM

## 2022-01-25 DIAGNOSIS — M54.16 RADICULOPATHY, LUMBAR REGION: Primary | ICD-10-CM

## 2022-01-25 DIAGNOSIS — M19.90 OSTEOARTHRITIS, UNSPECIFIED OSTEOARTHRITIS TYPE, UNSPECIFIED SITE: ICD-10-CM

## 2022-01-25 PROCEDURE — 97110 THERAPEUTIC EXERCISES: CPT | Performed by: PHYSICAL THERAPIST

## 2022-01-25 PROCEDURE — 97140 MANUAL THERAPY 1/> REGIONS: CPT | Performed by: PHYSICAL THERAPIST

## 2022-01-25 PROCEDURE — 97112 NEUROMUSCULAR REEDUCATION: CPT | Performed by: PHYSICAL THERAPIST

## 2022-01-25 NOTE — PROGRESS NOTES
Physical Therapy Daily Progress Note    VISIT#: 4    Subjective   Campbell Alex reports that his low back is bothering him more today.   Pain Ratin    Objective     See Exercise, Manual, and Modality Logs for complete treatment.     Patient Education: self trigger point release at home    Assessment/Plan   Progressed strengthening and balance activities today, pt tolerated well. Pt presented with a slight increase in LBP, pt had improvement of symptoms with STM. Educated pt on self trigger point release at home with tennis ball.     Progress per Plan of Care and Progress strengthening /stabilization /functional activity          Timed:         Manual Therapy:    8     mins  17175;     Therapeutic Exercise:    16     mins  80146;     Neuromuscular Agustina:    15    mins  03964;    Therapeutic Activity:          mins  60578;     Gait Training:           mins  64937;     Ultrasound:          mins  74629;    Ionto                                   mins   74851  Self Care                            mins   36011  Canalith Repos                   mins  48476    Un-Timed:  Electrical Stimulation:         mins  62010 ( );  Dry Needling          mins self-pay  Traction          mins 47098  Low Eval          Mins  22768  Mod Eval          Mins  11950  High Eval                            Mins  15011  Re-Eval                               mins  51012    Timed Treatment:   39   mins   Total Treatment:     39   mins          Kay Sandoval  Physical Therapist Assistant  [place] License # [id number]

## 2022-01-27 ENCOUNTER — TREATMENT (OUTPATIENT)
Dept: PHYSICAL THERAPY | Facility: CLINIC | Age: 82
End: 2022-01-27

## 2022-01-27 DIAGNOSIS — M54.16 RADICULOPATHY, LUMBAR REGION: Primary | ICD-10-CM

## 2022-01-27 DIAGNOSIS — R26.89 BALANCE PROBLEMS: ICD-10-CM

## 2022-01-27 DIAGNOSIS — M19.90 OSTEOARTHRITIS, UNSPECIFIED OSTEOARTHRITIS TYPE, UNSPECIFIED SITE: ICD-10-CM

## 2022-01-27 PROCEDURE — 97140 MANUAL THERAPY 1/> REGIONS: CPT | Performed by: PHYSICAL THERAPIST

## 2022-01-27 PROCEDURE — 97112 NEUROMUSCULAR REEDUCATION: CPT | Performed by: PHYSICAL THERAPIST

## 2022-01-27 PROCEDURE — 97110 THERAPEUTIC EXERCISES: CPT | Performed by: PHYSICAL THERAPIST

## 2022-01-27 NOTE — PROGRESS NOTES
Physical Therapy Daily Progress Note    VISIT#: 5    Subjective   Campbell Alex reports that his LBP is doing better. He tried to self trigger point release with a tennis ball at home, it went well but he has not needed it lately due to decreased pain.   Pain Ratin    Objective     See Exercise, Manual, and Modality Logs for complete treatment.     Patient Education: HEP    Assessment/Plan  Pt exhibits poor foot clearance with sidestepping, initiated cone step overs for improved safety. Pt presented with decreased LBP since STM last visit, continued manual therapy today. Plan to progress activities next visit to pt tolerance.     Progress per Plan of Care and Progress strengthening /stabilization /functional activity          Timed:         Manual Therapy:    10     mins  55068;     Therapeutic Exercise:    15     mins  61644;     Neuromuscular Agustina:    12    mins  39108;    Therapeutic Activity:     5     mins  22193;     Gait Training:           mins  38735;     Ultrasound:          mins  41908;    Ionto                                   mins   58520  Self Care                            mins   09563  Canalith Repos                   mins  15277    Un-Timed:  Electrical Stimulation:         mins  97191 ( );  Dry Needling          mins self-pay  Traction          mins 28523  Low Eval          Mins  44748  Mod Eval          Mins  89302  High Eval                            Mins  59016  Re-Eval                               mins  81766    Timed Treatment:   42   mins   Total Treatment:     42   mins          Kay Sandoval  Physical Therapist Assistant  [place] License # [id number]

## 2022-02-01 ENCOUNTER — OFFICE VISIT (OUTPATIENT)
Dept: FAMILY MEDICINE CLINIC | Facility: CLINIC | Age: 82
End: 2022-02-01

## 2022-02-01 VITALS
SYSTOLIC BLOOD PRESSURE: 178 MMHG | OXYGEN SATURATION: 98 % | TEMPERATURE: 96.9 F | RESPIRATION RATE: 16 BRPM | HEART RATE: 70 BPM | BODY MASS INDEX: 26.44 KG/M2 | DIASTOLIC BLOOD PRESSURE: 83 MMHG | HEIGHT: 74 IN | WEIGHT: 206 LBS

## 2022-02-01 DIAGNOSIS — M54.16 LUMBAR RADICULOPATHY: Primary | Chronic | ICD-10-CM

## 2022-02-01 DIAGNOSIS — M19.90 OSTEOARTHRITIS, UNSPECIFIED OSTEOARTHRITIS TYPE, UNSPECIFIED SITE: Chronic | ICD-10-CM

## 2022-02-01 PROCEDURE — 99213 OFFICE O/P EST LOW 20 MIN: CPT | Performed by: FAMILY MEDICINE

## 2022-02-01 NOTE — PROGRESS NOTES
"Subjective   Campbell Alex is a 82 y.o. male.     Chief Complaint   Patient presents with   • Back Pain     low back, 6 week followup   • Diabetes     6 month followup   • Coronary Artery Disease   • Hyperlipidemia   • Hypertension       HPI  Chief complaint: Low back pain with radiculopathy    Patient is an 82-year-old white male comes in for follow-up and maintenance of his current problems which include    1.  Low back pain with radiculopathy-deteriorated-patient was seen by me a few weeks ago because of pain in the lower back with radiation in lower extremities.  Patient that time denied fever.  Patient denied bowel or bladder problems.  Patient had x-rays of the labs done.  X-rays and the labs revealed arthritis.  Patient states he continues to have pain in the lower back with episodes of severe pain with radiation in the lower extremities.  Patient also states he has had more difficult time emptying his bladder and more problems with constipation since he has been dealing with this.  Patient has been physical therapy.  He has not noticed improvement with physical therapy.        The following portions of the patient's history were reviewed and updated as appropriate: allergies, current medications, past family history, past medical history, past social history, past surgical history and problem list.    Review of Systems    Objective     /83 (BP Location: Left arm, Patient Position: Sitting, Cuff Size: Adult)   Pulse 70   Temp 96.9 °F (36.1 °C) (Infrared)   Resp 16   Ht 188 cm (74\")   Wt 93.4 kg (206 lb)   SpO2 98%   BMI 26.45 kg/m²     Physical Exam  Vitals and nursing note reviewed.   Constitutional:       Appearance: He is well-developed.   HENT:      Head: Normocephalic and atraumatic.   Eyes:      Pupils: Pupils are equal, round, and reactive to light.   Cardiovascular:      Rate and Rhythm: Normal rate and regular rhythm.      Heart sounds: Normal heart sounds.   Pulmonary:      Effort: " Pulmonary effort is normal.      Breath sounds: Normal breath sounds.   Abdominal:      General: Bowel sounds are normal.      Palpations: Abdomen is soft.   Musculoskeletal:         General: Normal range of motion.      Cervical back: Neck supple.   Skin:     General: Skin is warm and dry.   Neurological:      Mental Status: He is oriented to person, place, and time.      Cranial Nerves: No cranial nerve deficit.      Sensory: No sensory deficit.      Motor: No weakness.      Coordination: Coordination normal.      Gait: Gait abnormal.      Deep Tendon Reflexes: Reflexes normal.      Comments: Patient is very unsteady with walking.   Psychiatric:         Behavior: Behavior normal.         Thought Content: Thought content normal.         Judgment: Judgment normal.       XR Spine Lumbar Complete 4+VW (12/17/2021 12:18)  Comprehensive Metabolic Panel (12/17/2021 11:32)  Sedimentation Rate (12/17/2021 11:32)  CBC & Differential (12/17/2021 11:32)    Assessment/Plan   Diagnoses and all orders for this visit:    1. Lumbar radiculopathy (Primary)-I recommended MRI of the lumbosacral spine as the next step.  -     MRI Lumbar Spine Without Contrast; Future    2. Osteoarthritis, unspecified osteoarthritis type, unspecified site  -     MRI Lumbar Spine Without Contrast; Future      Patient Instructions   Continue your current medications and treatment.    Have an MRI of the LS spine.    Follow up in the office in 2 weeks.      Amarjit Jurado Jr., MD    02/01/22

## 2022-02-01 NOTE — PATIENT INSTRUCTIONS
Continue your current medications and treatment.    Have an MRI of the LS spine.    Follow up in the office in 2 weeks.

## 2022-02-07 DIAGNOSIS — M54.16 LUMBAR RADICULOPATHY: Primary | ICD-10-CM

## 2022-02-08 ENCOUNTER — TELEPHONE (OUTPATIENT)
Dept: FAMILY MEDICINE CLINIC | Facility: CLINIC | Age: 82
End: 2022-02-08

## 2022-02-08 NOTE — TELEPHONE ENCOUNTER
Caller: Campbell Alex    Relationship: Self    Best call back number: [phone] (M)    PATIENT NEEDS TO KNOW IF HE NEEDS TO CONTINUE THE EXERCISES THAT HE DOES FOR HIS BACK PAIN(AT [facility] IS WHERE HE GETS HIS THERAPY SESSIONS FOR THE PAIN)- THESE EXERCISES ARE PAINFUL AND HE NEEDS TO KNOW WHETHER ITS NECESSARY TO CONTINUE WITH THOSE EXERCISES OR CAN HE WAIT TIL HE SEES THE SURGEON.    PATIENT COULDN'T ATTEND LAST WEEKS SESSIONS DUE TO HIS PAIN LEVEL AND NEEDS DR GARSIA'S RECOMMENDATION. ANY OTHER WORDS OF ADVISE DR GARSIA HAS HE APPRECIATES.     PLEASE CALL AND ADVISE

## 2022-02-10 ENCOUNTER — TREATMENT (OUTPATIENT)
Dept: PHYSICAL THERAPY | Facility: CLINIC | Age: 82
End: 2022-02-10

## 2022-02-10 DIAGNOSIS — M19.90 OSTEOARTHRITIS, UNSPECIFIED OSTEOARTHRITIS TYPE, UNSPECIFIED SITE: ICD-10-CM

## 2022-02-10 DIAGNOSIS — R26.89 BALANCE PROBLEMS: ICD-10-CM

## 2022-02-10 DIAGNOSIS — M54.16 RADICULOPATHY, LUMBAR REGION: Primary | ICD-10-CM

## 2022-02-10 PROCEDURE — 97112 NEUROMUSCULAR REEDUCATION: CPT | Performed by: PHYSICAL THERAPIST

## 2022-02-10 PROCEDURE — 97110 THERAPEUTIC EXERCISES: CPT | Performed by: PHYSICAL THERAPIST

## 2022-02-10 PROCEDURE — 97530 THERAPEUTIC ACTIVITIES: CPT | Performed by: PHYSICAL THERAPIST

## 2022-02-10 NOTE — PROGRESS NOTES
Physical Therapy Daily Progress Note      Patient: Campbell Alex   : 1940  Diagnosis/ICD-10 Code:  Radiculopathy, lumbar region [M54.16]  Referring practitioner: Amarjit Jurado Jr., MD  Date of Initial Visit: Type: THERAPY  Noted: 1/10/2022  Today's Date: 2/10/2022  Patient seen for 6 sessions             Subjective Pt reports DKTC causes increased nerve pain, but SKTC seems to be tolerated better.    Objective   See Exercise, Manual, and Modality Logs for complete treatment.       Assessment/Plan  Pt seems to have difficulty standing erect, having a slight flexed position.  Balance deficits noted.  He has tendency to get in a hurry with his standing exercises which causes balance issues.    Progress per Plan of Care           Timed:             Therapeutic Exercise:    15     mins  39311;     Neuromuscular Agustina:    12    mins  77192;    Therapeutic Activity:     11     mins  00032;         Timed Treatment:   38   mins   Total Treatment:     38   mins        Sergey Lakhani PTA  Physical Therapist Assistant

## 2022-02-14 ENCOUNTER — TREATMENT (OUTPATIENT)
Dept: PHYSICAL THERAPY | Facility: CLINIC | Age: 82
End: 2022-02-14

## 2022-02-14 DIAGNOSIS — R26.89 BALANCE PROBLEMS: ICD-10-CM

## 2022-02-14 DIAGNOSIS — M54.16 RADICULOPATHY, LUMBAR REGION: Primary | ICD-10-CM

## 2022-02-14 DIAGNOSIS — M19.90 OSTEOARTHRITIS, UNSPECIFIED OSTEOARTHRITIS TYPE, UNSPECIFIED SITE: ICD-10-CM

## 2022-02-14 PROCEDURE — 97530 THERAPEUTIC ACTIVITIES: CPT | Performed by: PHYSICAL THERAPIST

## 2022-02-14 PROCEDURE — 97112 NEUROMUSCULAR REEDUCATION: CPT | Performed by: PHYSICAL THERAPIST

## 2022-02-14 PROCEDURE — 97110 THERAPEUTIC EXERCISES: CPT | Performed by: PHYSICAL THERAPIST

## 2022-02-14 NOTE — PROGRESS NOTES
Physical Therapy Daily Progress Note    VISIT#: 7    Subjective   Campbell Alex reports that he was having severe lumbar pain with DKTC and lateral cone step overs at home. He has an MRI and is schedule to see Dr. Gu on Thursday for a surgical consult.  Pain Rating: 3    Objective     See Exercise, Manual, and Modality Logs for complete treatment.     Patient Education: Neurosurgery consult    Assessment/Plan  Pt exhibits unsteadiness with standing activities and requires SBA/CGA for safety. Pt also exhibits forward flex posture, but can correct with cueing. Pt has surgical consult later this week is waiting to schedule further PT until after MD appt.     Progress per Plan of Care and Progress strengthening /stabilization /functional activity          Timed:         Manual Therapy:         mins  32121;     Therapeutic Exercise:    16     mins  66634;     Neuromuscular Agustina:    14    mins  08292;    Therapeutic Activity:     12     mins  34435;     Gait Training:           mins  78014;     Ultrasound:          mins  68849;    Ionto                                   mins   47221  Self Care                            mins   78324  Canalith Repos                   mins  40386    Un-Timed:  Electrical Stimulation:         mins  00963 ( );  Dry Needling          mins self-pay  Traction          mins 40166  Low Eval          Mins  52381  Mod Eval          Mins  73483  High Eval                            Mins  51586  Re-Eval                               mins  57164    Timed Treatment:   42   mins   Total Treatment:     42   mins          Kay Sandoval  Physical Therapist Assistant  [place] License # [id number]

## 2022-02-23 ENCOUNTER — TELEPHONE (OUTPATIENT)
Dept: FAMILY MEDICINE CLINIC | Facility: CLINIC | Age: 82
End: 2022-02-23

## 2022-02-23 ENCOUNTER — TREATMENT (OUTPATIENT)
Dept: PHYSICAL THERAPY | Facility: CLINIC | Age: 82
End: 2022-02-23

## 2022-02-23 DIAGNOSIS — M19.90 OSTEOARTHRITIS, UNSPECIFIED OSTEOARTHRITIS TYPE, UNSPECIFIED SITE: ICD-10-CM

## 2022-02-23 DIAGNOSIS — R26.89 BALANCE PROBLEMS: ICD-10-CM

## 2022-02-23 DIAGNOSIS — M54.16 RADICULOPATHY, LUMBAR REGION: Primary | ICD-10-CM

## 2022-02-23 PROCEDURE — 97110 THERAPEUTIC EXERCISES: CPT | Performed by: PHYSICAL THERAPIST

## 2022-02-23 PROCEDURE — 97112 NEUROMUSCULAR REEDUCATION: CPT | Performed by: PHYSICAL THERAPIST

## 2022-02-23 PROCEDURE — 97116 GAIT TRAINING THERAPY: CPT | Performed by: PHYSICAL THERAPIST

## 2022-02-23 NOTE — PROGRESS NOTES
Re-Assessment / Re-Certification        Patient: Campbell Alex   : 1940  Diagnosis/ICD-10 Code:  Radiculopathy, lumbar region [M54.16]  Referring practitioner: Amarjit Jurado Jr., MD  Date of Initial Visit: Type: THERAPY  Noted: 1/10/2022  Today's Date: 2022  Patient seen for 8 sessions      Subjective:     Subjective Questionnaire: Oswestry: 26%  Clinical Progress: improved  Home Program Compliance: Yes  Treatment has included: therapeutic exercise, neuromuscular re-education, manual therapy and gait training    Subjective 5/10 pain at worst per pt. Reports no episodes of falling since starting therapy but his LEs still feel weak.  Objective   Strength/Myotome Testing      Left Hip   Planes of Motion   Flexion: 4+     Right Hip   Planes of Motion   Flexion: 4-     Left Knee   Flexion: 4+  Extension: 4+     Right Knee   Flexion: 4+  Extension: 4+     Left Ankle/Foot   Dorsiflexion: 4-  Plantar flexion: 4+     Right Ankle/Foot   Dorsiflexion: 4+  Plantar flexion: 4+    Tinetti 18  TUG 12.5 seconds  Five times sit to stand 10 seconds  Four square step 17 seconds  Assessment/Plan   Pt has made progress re: LE strength, pain symptoms, and performance measures. However, he is still limited in these areas.    Short term goals, 1 week: Tolerate HEP progression. met  Voice compliance with activity modification. met  Report improvement in symptoms. met    Long term goals, 6 weeks: Improve ZAK score to 12%. Not met  AROM to be wfl and non provocative. Not met  4+/5 strength or better throughout. progressing  Show good hip mobility. Not met  Symptoms to be centralized. notmet  Show significant improvement in balance performance measures vs baseline. progressing  Tolerate 20 min or more of standing activity. progressing  Independent with HEP. progressing  Recommendations: Continue as planned  Continue BIW for 5 weeks    Daniel Joyner, PT, DPT, OCS  [place] license: [id number]  Physical Therapist      Based upon review  of the patient's progress and continued therapy plan, it is my medical opinion that Campbell Alex should continue physical therapy treatment at [facility]  [address] [place] [address] [address] [place] [address] [phone].    Provider: _____________________________    Amarjit Jurado Jr., MD     Timed:         Manual Therapy:         mins  67228;     Therapeutic Exercise:    15     mins  05426;     Neuromuscular Ag[place]:    15    mins  81190;    Therapeutic Activity:          mins  98758;     Gait Training:      15     mins  11991;     Ultrasound:          mins  76659;    Ionto                                   mins   73655  Self Care                            mins   11408    Un-Timed:  Electrical Stimulation:         mins  41640 ( );  Traction          mins 61696  Low Eval          Mins  50832  Mod Eval          Mins  30425  High Eval                            Mins  46855  Re-Eval                               mins  81559      Timed Treatment:   45   mins   Total Treatment:     45   mins

## 2022-02-23 NOTE — TELEPHONE ENCOUNTER
Caller: Campbell Alex    Relationship: Self    Best call back number: [phone]    What is the best time to reach you: ANY     Who are you requesting to speak with (clinical staff, provider,  specific staff member): CLINICAL STAFF     Do you know the name of the person who called: PATIENT     What was the call regarding: PATIENT IS GETTING A SHOT AT [place] [facility]  10:40 AM FOR HIS  LOWER BACK  ON 2/24/2022     Do you require a callback: NO

## 2022-02-28 RX ORDER — HYDROCHLOROTHIAZIDE 25 MG/1
TABLET ORAL
Qty: 30 TABLET | Refills: 5 | Status: SHIPPED | OUTPATIENT
Start: 2022-02-28 | End: 2022-08-23

## 2022-03-02 ENCOUNTER — TREATMENT (OUTPATIENT)
Dept: PHYSICAL THERAPY | Facility: CLINIC | Age: 82
End: 2022-03-02

## 2022-03-02 DIAGNOSIS — R26.89 BALANCE PROBLEMS: ICD-10-CM

## 2022-03-02 DIAGNOSIS — M54.16 RADICULOPATHY, LUMBAR REGION: Primary | ICD-10-CM

## 2022-03-02 DIAGNOSIS — M19.90 OSTEOARTHRITIS, UNSPECIFIED OSTEOARTHRITIS TYPE, UNSPECIFIED SITE: ICD-10-CM

## 2022-03-02 PROCEDURE — 97112 NEUROMUSCULAR REEDUCATION: CPT | Performed by: PHYSICAL THERAPIST

## 2022-03-02 PROCEDURE — 97110 THERAPEUTIC EXERCISES: CPT | Performed by: PHYSICAL THERAPIST

## 2022-03-02 NOTE — PROGRESS NOTES
Physical Therapy Daily Treatment Note  Patient: Campbell Alex   : 1940   Referring practitioner: Amarjit Jurado Jr., MD  Date of initial visit: Type: THERAPY  Noted: 1/10/2022   Today's date: 3/2/2022   Patient seen for 9 visits    Visit Diagnoses:    ICD-10-CM ICD-9-CM   1. Radiculopathy, lumbar region  M54.16 724.4   2. Osteoarthritis, unspecified osteoarthritis type, unspecified site  M19.90 715.90   3. Balance problems  R26.89 781.99       Subjective   Pt reports: Less LBP after epidural this week. HEP going well.      Objective     See Exercise, Manual, and Modality Logs for complete treatment.     Patient Education: HEP    Assessment/Plan Stability is still limited.       Progress per Plan of Care            Timed:         Manual Therapy:         mins  33893;     Therapeutic Exercise:    30     mins  72132;     Neuromuscular Agustina:    15    mins  08719;    Therapeutic Activity:          mins  18749;     Gait Training:           mins  33064;     Ultrasound:          mins  58868;    Ionto                                   mins   15019  Self Care                            mins   70607    Un-Timed:  Electrical Stimulation:         mins  14514 ( );  Traction          mins 39064  Low Eval          Mins  28681  Mod Eval          Mins  56660  High Eval                            Mins  67870  Re-Eval                               mins  20212    Timed Treatment:   45   mins   Total Treatment:     45   mins      Daniel Joyner PT, DPT, OCS  [place] license: [id number]  Physical Therapist

## 2022-03-04 ENCOUNTER — TREATMENT (OUTPATIENT)
Dept: PHYSICAL THERAPY | Facility: CLINIC | Age: 82
End: 2022-03-04

## 2022-03-04 DIAGNOSIS — R26.89 BALANCE PROBLEMS: ICD-10-CM

## 2022-03-04 DIAGNOSIS — M19.90 OSTEOARTHRITIS, UNSPECIFIED OSTEOARTHRITIS TYPE, UNSPECIFIED SITE: ICD-10-CM

## 2022-03-04 DIAGNOSIS — M54.16 RADICULOPATHY, LUMBAR REGION: Primary | ICD-10-CM

## 2022-03-04 PROCEDURE — 97530 THERAPEUTIC ACTIVITIES: CPT | Performed by: PHYSICAL THERAPIST

## 2022-03-04 PROCEDURE — 97110 THERAPEUTIC EXERCISES: CPT | Performed by: PHYSICAL THERAPIST

## 2022-03-04 PROCEDURE — 97112 NEUROMUSCULAR REEDUCATION: CPT | Performed by: PHYSICAL THERAPIST

## 2022-03-04 NOTE — PROGRESS NOTES
Physical Therapy Daily Progress Note    VISIT#: 10    Subjective   Campbell Alex reports that he was sore after doing the hip stretches last session. He also reports his stomach is feeling uneasy today.     Objective     See Exercise, Manual, and Modality Logs for complete treatment.     Patient Education: soreness after last visit    Assessment/Plan  Pt still exhibits unsteadiness on his feet and requires SBA/CGA for standing activities. Pt has had improvement in back pain since the epidural. Treatment focused on improved LE strength and balance activities for improved safety with ADLs and community settings.     Progress per Plan of Care and Progress strengthening /stabilization /functional activity          Timed:         Manual Therapy:         mins  67245;     Therapeutic Exercise:    10     mins  44832;     Neuromuscular Agustina:    15    mins  45609;    Therapeutic Activity:     15     mins  11202;     Gait Training:           mins  05581;     Ultrasound:          mins  73149;    Ionto                                   mins   04236  Self Care                            mins   53353  Canalith Repos                   mins  69766    Un-Timed:  Electrical Stimulation:         mins  51001 ( );  Dry Needling          mins self-pay  Traction          mins 44552  Low Eval          Mins  95771  Mod Eval          Mins  69687  High Eval                            Mins  55804  Re-Eval                               mins  82330    Timed Treatment:   40   mins   Total Treatment:     40   mins          Kay Sandoval  Physical Therapist Assistant  [place] License # [id number]

## 2022-03-06 RX ORDER — CLONIDINE HYDROCHLORIDE 0.2 MG/1
TABLET ORAL
Qty: 60 TABLET | Refills: 5 | Status: SHIPPED | OUTPATIENT
Start: 2022-03-06 | End: 2022-09-06 | Stop reason: SDUPTHER

## 2022-03-14 ENCOUNTER — TREATMENT (OUTPATIENT)
Dept: PHYSICAL THERAPY | Facility: CLINIC | Age: 82
End: 2022-03-14

## 2022-03-14 DIAGNOSIS — R26.89 BALANCE PROBLEMS: ICD-10-CM

## 2022-03-14 DIAGNOSIS — M19.90 OSTEOARTHRITIS, UNSPECIFIED OSTEOARTHRITIS TYPE, UNSPECIFIED SITE: ICD-10-CM

## 2022-03-14 DIAGNOSIS — M54.16 RADICULOPATHY, LUMBAR REGION: Primary | ICD-10-CM

## 2022-03-14 PROCEDURE — 97112 NEUROMUSCULAR REEDUCATION: CPT | Performed by: PHYSICAL THERAPIST

## 2022-03-14 PROCEDURE — 97116 GAIT TRAINING THERAPY: CPT | Performed by: PHYSICAL THERAPIST

## 2022-03-14 PROCEDURE — 97110 THERAPEUTIC EXERCISES: CPT | Performed by: PHYSICAL THERAPIST

## 2022-03-14 NOTE — PROGRESS NOTES
Physical Therapy Daily Treatment Note  Patient: Campbell Alex   : 1940   Referring practitioner: Amarjit Jurado Jr., MD  Date of initial visit: Type: THERAPY  Noted: 1/10/2022   Today's date: 3/14/2022   Patient seen for 11 visits    Visit Diagnoses:    ICD-10-CM ICD-9-CM   1. Radiculopathy, lumbar region  M54.16 724.4   2. Balance problems  R26.89 781.99   3. Osteoarthritis, unspecified osteoarthritis type, unspecified site  M19.90 715.90       Subjective   Pt reports: A little fatigued today pre rx due to running several errands. HEP going well.      Objective     See Exercise, Manual, and Modality Logs for complete treatment.     Patient Education: HEP    Assessment/Plan Fatigued post visit.      Progress per Plan of Care            Timed:         Manual Therapy:         mins  82875;     Therapeutic Exercise:    15     mins  32412;     Neuromuscular Agustina:    15    mins  31480;    Therapeutic Activity:          mins  79783;     Gait Trainin     mins  47369;     Ultrasound:          mins  16917;    Ionto                                   mins   36711  Self Care                            mins   50400    Un-Timed:  Electrical Stimulation:         mins  19854 ( );  Traction          mins 76425  Low Eval          Mins  88797  Mod Eval          Mins  37380  High Eval                            Mins  81979  Re-Eval                               mins  62454    Timed Treatment:   38   mins   Total Treatment:     38   mins      Daniel Joyner, PT, DPT, OCS  [place] license: [id number]  Physical Therapist

## 2022-03-15 ENCOUNTER — OFFICE VISIT (OUTPATIENT)
Dept: CARDIOLOGY | Facility: CLINIC | Age: 82
End: 2022-03-15

## 2022-03-15 VITALS
DIASTOLIC BLOOD PRESSURE: 78 MMHG | WEIGHT: 204 LBS | HEART RATE: 64 BPM | SYSTOLIC BLOOD PRESSURE: 147 MMHG | HEIGHT: 74 IN | OXYGEN SATURATION: 100 % | BODY MASS INDEX: 26.18 KG/M2

## 2022-03-15 DIAGNOSIS — I10 ESSENTIAL HYPERTENSION: ICD-10-CM

## 2022-03-15 DIAGNOSIS — E78.2 MIXED HYPERLIPIDEMIA: ICD-10-CM

## 2022-03-15 DIAGNOSIS — Z95.5 STATUS POST CORONARY ARTERY STENT PLACEMENT: Primary | ICD-10-CM

## 2022-03-15 DIAGNOSIS — I25.10 CORONARY ARTERY DISEASE INVOLVING NATIVE CORONARY ARTERY OF NATIVE HEART WITHOUT ANGINA PECTORIS: ICD-10-CM

## 2022-03-15 PROCEDURE — 99214 OFFICE O/P EST MOD 30 MIN: CPT | Performed by: INTERNAL MEDICINE

## 2022-03-15 PROCEDURE — 93000 ELECTROCARDIOGRAM COMPLETE: CPT | Performed by: INTERNAL MEDICINE

## 2022-03-15 NOTE — PROGRESS NOTES
Encounter Date:03/15/2022      Patient ID: Campbell Alex is a 82 y.o. male.    Chief Complaint:  Status post stent  Hypertension  Dyslipidemia        History of Present Illness  Patient had back discomfort and received epidural injections.  Since I have last seen, the patient has been without any chest discomfort ,shortness of breath, palpitations, dizziness or syncope.  Denies having any headache ,abdominal pain ,nausea, vomiting , diarrhea constipation, loss of weight or loss of appetite.  Denies having any excessive bruising ,hematuria or blood in the stool.     Review of all systems negative except as indicated.     Reviewed ROS.  Assessment and Plan         //////////////////////  Impression  ============   -status post right coronary artery stent placement (2005 ).      Cardiac catheterization 03/10/2017 revealed normal left ventricular function.  Normal left main coronary artery. 50% lad distal to diagonal branch.  Ostial diagonal branch has 80-90% disease (moderate size vessel) RCA has 30-40% plaquing.  RCA stent was   patent.  One of the PDA branches had 70% disease at its ostium.     Stress Cardiolite test is negative for myocardial ischemia 08/01/2016.     -palpitations improved.  Holter monitor 01/18/2016 showed occasional premature atrial and ventricular contractions.       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       -allergy to darvon codeine Relafen and IVP dye  ===============   Plan  =============  Status post stent  EKG showed sinus rhythm without any ischemic changes.  Premature atrial contractions.-3/15/2022   Patient is not having any angina pectoris or congestive heart failure on medical treatment.     Hypertension-147/78  Continue atenolol losartan and hydrochlorothiazide.     Dyslipidemia-continue  atorvastatin     medications were reviewed and updated.  followup in the office in 6 months .  Further plan will depend on patient's progress.  /////////////////////////////                        Diagnosis Plan   1. Status post coronary artery stent placement  ECG 12 Lead   2. Essential hypertension     3. Mixed hyperlipidemia     4. Coronary artery disease involving native coronary artery of native heart without angina pectoris     LAB RESULTS (LAST 7 DAYS)    CBC        BMP        CMP         BNP        TROPONIN        CoAg        Creatinine Clearance  CrCl cannot be calculated (Patient's most recent lab result is older than the maximum 30 days allowed.).    ABG        Radiology  No radiology results for the last day                The following portions of the patient's history were reviewed and updated as appropriate: allergies, current medications, past family history, past medical history, past social history, past surgical history and problem list.    Review of Systems   Constitutional: Negative for malaise/fatigue.   Cardiovascular: Negative for chest pain, leg swelling, palpitations and syncope.   Respiratory: Negative for shortness of breath.    Skin: Negative for rash.   Gastrointestinal: Negative for nausea and vomiting.   Neurological: Negative for dizziness, light-headedness and numbness.   All other systems reviewed and are negative.        Current Outpatient Medications:   •  aspirin (aspirin) 81 MG EC tablet, ADULT ASPIRIN EC LOW STRENGTH 81 MG ORAL TABLET DELAYED RELEASE, Disp: , Rfl:   •  atenolol (TENORMIN) 25 MG tablet, TAKE ONE TABLET BY MOUTH EVERY MORNING, Disp: 90 tablet, Rfl: 3  •  atorvastatin (LIPITOR) 20 MG tablet, ATORVASTATIN CALCIUM 20 MG TABS, Disp: , Rfl:   •  cloNIDine (CATAPRES) 0.2 MG tablet, TAKE ONE TABLET BY MOUTH TWICE A DAY, Disp: 60 tablet, Rfl: 5  •  finasteride (PROSCAR) 5 MG tablet, FINASTERIDE 5 MG TABS, Disp: , Rfl:   •  hydroCHLOROthiazide (HYDRODIURIL) 25 MG tablet,  TAKE ONE TABLET BY MOUTH DAILY, Disp: 30 tablet, Rfl: 5  •  isosorbide mononitrate (IMDUR) 30 MG 24 hr tablet, Take 1 tablet by mouth Daily., Disp: 90 tablet, Rfl: 3  •  losartan (COZAAR) 100 MG tablet, LOSARTAN POTASSIUM 100 MG TABS, Disp: , Rfl:   •  nitroglycerin (NITROSTAT) 0.4 MG SL tablet, Place 1 tablet under the tongue Every 5 (Five) Minutes As Needed for Chest Pain. Take no more than 3 doses in 15 minutes., Disp: 25 tablet, Rfl: 0  •  tamsulosin (FLOMAX) 0.4 MG capsule 24 hr capsule, Take 1 capsule by mouth Every Night., Disp: , Rfl:   •  terbinafine (LamISIL) 250 MG tablet, Take 1 tablet by mouth Daily., Disp: 30 tablet, Rfl: 2    Allergies   Allergen Reactions   • Codeine GI Intolerance   • Iodine Hives   • Nabumetone Unknown - High Severity   • Propoxyphene Hives       Family History   Problem Relation Age of Onset   • Hypertension Brother    • Hyperlipidemia Brother        Past Surgical History:   Procedure Laterality Date   • ANKLE SURGERY     • CARDIAC CATHETERIZATION  2020   • EYE SURGERY  1960   • NOSE SURGERY     • ROTATOR CUFF REPAIR     • SKIN CANCER EXCISION      L shoulder   • UMBILICAL HERNIA REPAIR         Past Medical History:   Diagnosis Date   • Allergic 1970   • Arthritis    • Benign prostatic hyperplasia 1980   • Cancer (HCC)    • Cataract 1990   • Cholelithiasis 1958   • Coronary artery disease    • Erectile dysfunction 2020   • HL (hearing loss) 1995   • Hyperlipidemia    • Hypertension    • Osteopenia 1980   • Prostatism    • Renal insufficiency 1940   • Suspected COVID-19 virus infection 09/07/2021   • Visual impairment 1948       Family History   Problem Relation Age of Onset   • Hypertension Brother    • Hyperlipidemia Brother        Social History     Socioeconomic History   • Marital status:    Tobacco Use   • Smoking status: Former Smoker     Packs/day: 0.00     Years: 2.00     Pack years: 0.00     Types: Cigarettes, Pipe, Cigars     Quit date: 1/1/1960     Years since  "quittin.2   • Smokeless tobacco: Never Used   • Tobacco comment: 1-2/day   Vaping Use   • Vaping Use: Never used   Substance and Sexual Activity   • Alcohol use: Never     Comment: socially   • Drug use: Never   • Sexual activity: Not Currently     Partners: Female     Birth control/protection: None           ECG 12 Lead    Date/Time: 3/15/2022 9:39 AM  Performed by: Vilma Staples MD  Authorized by: Vilma Staples MD   Comparison: compared with previous ECG   Similar to previous ECG  Comparison to previous ECG: Sinus bradycardia sinus arrhythmia 58/min nonspecific ST-T wave changes normal axis no significant change from previous EKG 2021                Objective:       Physical Exam    /78 (BP Location: Left arm, Patient Position: Sitting, Cuff Size: Large Adult)   Pulse 64   Ht 188 cm (74\")   Wt 92.5 kg (204 lb)   SpO2 100%   BMI 26.19 kg/m²   The patient is alert, oriented and in no distress.    Vital signs as noted above.    Head and neck revealed no carotid bruits or jugular venous distension.  No thyromegaly or lymphadenopathy is present.    Lungs clear.  No wheezing.  Breath sounds are normal bilaterally.    Heart normal first and second heart sounds.  No murmur..  No pericardial rub is present.  No gallop is present.    Abdomen soft and nontender.  No organomegaly is present.    Extremities revealed good peripheral pulses without any pedal edema.    Skin warm and dry.    Musculoskeletal system is grossly normal.    CNS grossly normal.    Reviewed and unchanged from last visit.        "

## 2022-03-17 ENCOUNTER — TREATMENT (OUTPATIENT)
Dept: PHYSICAL THERAPY | Facility: CLINIC | Age: 82
End: 2022-03-17

## 2022-03-17 DIAGNOSIS — R26.89 BALANCE PROBLEMS: ICD-10-CM

## 2022-03-17 DIAGNOSIS — M54.16 RADICULOPATHY, LUMBAR REGION: Primary | ICD-10-CM

## 2022-03-17 PROCEDURE — 97112 NEUROMUSCULAR REEDUCATION: CPT | Performed by: PHYSICAL THERAPIST

## 2022-03-17 PROCEDURE — 97110 THERAPEUTIC EXERCISES: CPT | Performed by: PHYSICAL THERAPIST

## 2022-03-17 PROCEDURE — 97530 THERAPEUTIC ACTIVITIES: CPT | Performed by: PHYSICAL THERAPIST

## 2022-03-17 NOTE — PROGRESS NOTES
Physical Therapy Daily Progress Note    VISIT#: 12    Subjective   Campbell Alex reports that he is doing well overall. He has had some pain in his toes today.   Pain Ratin    Objective     See Exercise, Manual, and Modality Logs for complete treatment.     Patient Education: foot pain    Assessment/Plan   Pt still exhibits balance deficits and requires CGA for balance activities. Pt was able to complete forward and lateral destini step overs with minimal UE support. Plan to progress next visit to tolerance.     Progress per Plan of Care and Progress strengthening /stabilization /functional activity          Timed:         Manual Therapy:         mins  46169;     Therapeutic Exercise:    10     mins  00459;     Neuromuscular Agustina:    15    mins  89329;    Therapeutic Activity:     15     mins  86971;     Gait Training:           mins  78053;     Ultrasound:          mins  70182;    Ionto                                   mins   43726  Self Care                            mins   99837  Canalith Repos                   mins  35871    Un-Timed:  Electrical Stimulation:         mins  89160 ( );  Dry Needling          mins self-pay  Traction          mins 60082  Low Eval          Mins  37902  Mod Eval          Mins  88540  High Eval                            Mins  88833  Re-Eval                               mins  11566    Timed Treatment:   40   mins   Total Treatment:     40   mins          Kay Sandoval  Physical Therapist Assistant  [place] License # [id number]

## 2022-03-21 ENCOUNTER — TREATMENT (OUTPATIENT)
Dept: PHYSICAL THERAPY | Facility: CLINIC | Age: 82
End: 2022-03-21

## 2022-03-21 DIAGNOSIS — R26.89 BALANCE PROBLEMS: ICD-10-CM

## 2022-03-21 DIAGNOSIS — M54.16 RADICULOPATHY, LUMBAR REGION: Primary | ICD-10-CM

## 2022-03-21 PROCEDURE — 97112 NEUROMUSCULAR REEDUCATION: CPT | Performed by: PHYSICAL THERAPIST

## 2022-03-21 PROCEDURE — 97116 GAIT TRAINING THERAPY: CPT | Performed by: PHYSICAL THERAPIST

## 2022-03-21 PROCEDURE — 97110 THERAPEUTIC EXERCISES: CPT | Performed by: PHYSICAL THERAPIST

## 2022-03-21 NOTE — PROGRESS NOTES
Physical Therapy Daily Treatment Note  Patient: Campbell Alex   : 1940   Referring practitioner: Amarjit Jurado Jr., MD  Date of initial visit: Type: THERAPY  Noted: 1/10/2022   Today's date: 3/21/2022   Patient seen for 13 visits    Visit Diagnoses:    ICD-10-CM ICD-9-CM   1. Radiculopathy, lumbar region  M54.16 724.4   2. Balance problems  R26.89 781.99       Subjective   Pt reports: Still having episodes of significant LE fatigue. Using cane prn. HEP going well.      Objective     See Exercise, Manual, and Modality Logs for complete treatment.     Patient Education: HEP    Assessment/Plan Needs rest breaks. Unsteady with dynamic activity.      Progress per Plan of Care            Timed:         Manual Therapy:         mins  48866;     Therapeutic Exercise:    15     mins  82395;     Neuromuscular Agustina:    15    mins  97001;    Therapeutic Activity:          mins  41569;     Gait Training:      15     mins  61346;     Ultrasound:          mins  41403;    Ionto                                   mins   34472  Self Care                            mins   02444    Un-Timed:  Electrical Stimulation:         mins  70404 ( );  Traction          mins 67050  Low Eval          Mins  66736  Mod Eval          Mins  27040  High Eval                            Mins  77269  Re-Eval                               mins  17023    Timed Treatment:   45   mins   Total Treatment:     45   mins      Daniel Joyner PT, DPT, OCS  [place] license: [id number]  Physical Therapist

## 2022-03-25 ENCOUNTER — TREATMENT (OUTPATIENT)
Dept: PHYSICAL THERAPY | Facility: CLINIC | Age: 82
End: 2022-03-25

## 2022-03-25 DIAGNOSIS — R26.89 BALANCE PROBLEMS: ICD-10-CM

## 2022-03-25 DIAGNOSIS — M54.16 RADICULOPATHY, LUMBAR REGION: Primary | ICD-10-CM

## 2022-03-25 DIAGNOSIS — M19.90 OSTEOARTHRITIS, UNSPECIFIED OSTEOARTHRITIS TYPE, UNSPECIFIED SITE: ICD-10-CM

## 2022-03-25 PROCEDURE — 97112 NEUROMUSCULAR REEDUCATION: CPT | Performed by: PHYSICAL THERAPIST

## 2022-03-25 PROCEDURE — 97110 THERAPEUTIC EXERCISES: CPT | Performed by: PHYSICAL THERAPIST

## 2022-03-25 PROCEDURE — 97116 GAIT TRAINING THERAPY: CPT | Performed by: PHYSICAL THERAPIST

## 2022-03-25 NOTE — PROGRESS NOTES
Physical Therapy Daily Treatment Note  Patient: Campbell Alex   : 1940   Referring practitioner: Amarjit Jurado Jr., MD  Date of initial visit: Type: THERAPY  Noted: 1/10/2022   Today's date: 3/25/2022   Patient seen for 14 visits    Visit Diagnoses:    ICD-10-CM ICD-9-CM   1. Radiculopathy, lumbar region  M54.16 724.4   2. Balance problems  R26.89 781.99   3. Osteoarthritis, unspecified osteoarthritis type, unspecified site  M19.90 715.90       Subjective   Pt reports: Somewhat fatigued this AM due to waking up very early. HEP going well.      Objective     See Exercise, Manual, and Modality Logs for complete treatment.     Patient Education: HEP    Assessment/Plan Needs breaks with rx. Wide MASSIMO with ambulation.      Progress per Plan of Care            Timed:         Manual Therapy:         mins  15481;     Therapeutic Exercise:    15     mins  95693;     Neuromuscular Agustina:    15    mins  67520;    Therapeutic Activity:          mins  12749;     Gait Training:      15     mins  35335;     Ultrasound:          mins  39007;    Ionto                                   mins   15969  Self Care                            mins   87520    Un-Timed:  Electrical Stimulation:         mins  07374 ( );  Traction          mins 77398  Low Eval          Mins  72164  Mod Eval          Mins  36809  High Eval                            Mins  22077  Re-Eval                               mins  19112    Timed Treatment:   45   mins   Total Treatment:     45   mins      Daniel Joyner, PT, DPT, OCS  [place] license: [id number]  Physical Therapist

## 2022-04-04 ENCOUNTER — OFFICE (OUTPATIENT)
Dept: URBAN - METROPOLITAN AREA CLINIC 64 | Facility: CLINIC | Age: 82
End: 2022-04-04
Payer: COMMERCIAL

## 2022-04-04 VITALS
HEART RATE: 58 BPM | HEIGHT: 72 IN | WEIGHT: 208 LBS | DIASTOLIC BLOOD PRESSURE: 88 MMHG | SYSTOLIC BLOOD PRESSURE: 145 MMHG

## 2022-04-04 DIAGNOSIS — K59.00 CONSTIPATION, UNSPECIFIED: ICD-10-CM

## 2022-04-04 PROCEDURE — 99212 OFFICE O/P EST SF 10 MIN: CPT | Performed by: NURSE PRACTITIONER

## 2022-04-12 ENCOUNTER — TREATMENT (OUTPATIENT)
Dept: PHYSICAL THERAPY | Facility: CLINIC | Age: 82
End: 2022-04-12

## 2022-04-12 DIAGNOSIS — M54.16 RADICULOPATHY, LUMBAR REGION: Primary | ICD-10-CM

## 2022-04-12 DIAGNOSIS — R26.89 BALANCE PROBLEMS: ICD-10-CM

## 2022-04-12 PROCEDURE — 97140 MANUAL THERAPY 1/> REGIONS: CPT | Performed by: PHYSICAL THERAPIST

## 2022-04-12 PROCEDURE — 97112 NEUROMUSCULAR REEDUCATION: CPT | Performed by: PHYSICAL THERAPIST

## 2022-04-12 PROCEDURE — 97110 THERAPEUTIC EXERCISES: CPT | Performed by: PHYSICAL THERAPIST

## 2022-04-12 NOTE — PROGRESS NOTES
Physical Therapy Daily Treatment Note  Patient: Campbell Alex   : 1940   Referring practitioner: Amarjit Jurado Jr., MD  Date of initial visit: Type: THERAPY  Noted: 1/10/2022   Today's date: 2022   Patient seen for 15 visits    Visit Diagnoses:    ICD-10-CM ICD-9-CM   1. Radiculopathy, lumbar region  M54.16 724.4   2. Balance problems  R26.89 781.99       Subjective   Pt reports: Feeling stronger. Was able to go on a walk earlier today. HEP going well.      Objective     See Exercise, Manual, and Modality Logs for complete treatment.     Patient Education: HEP    Assessment/Plan Fatigued post visit. No increase in LBP.      Progress per Plan of Care            Timed:         Manual Therapy:    15     mins  56044;     Therapeutic Exercise:    15     mins  05095;     Neuromuscular Agustina:    15    mins  68996;    Therapeutic Activity:          mins  75365;     Gait Training:           mins  16334;     Ultrasound:          mins  42226;    Ionto                                   mins   76869  Self Care                            mins   26293    Un-Timed:  Electrical Stimulation:         mins  22414 ( );  Traction          mins 28477  Low Eval          Mins  55338  Mod Eval          Mins  06530  High Eval                            Mins  07937  Re-Eval                               mins  18799    Timed Treatment:   45   mins   Total Treatment:     45   mins      Daniel Joyner PT, DPT, OCS  [place] license: [id number]  Physical Therapist

## 2022-04-14 ENCOUNTER — TELEHEALTH PROVIDED IN PATIENT'S HOME (OUTPATIENT)
Dept: URBAN - METROPOLITAN AREA TELEHEALTH 4 | Facility: TELEHEALTH | Age: 82
End: 2022-04-14
Payer: COMMERCIAL

## 2022-04-14 ENCOUNTER — TREATMENT (OUTPATIENT)
Dept: PHYSICAL THERAPY | Facility: CLINIC | Age: 82
End: 2022-04-14

## 2022-04-14 VITALS — HEIGHT: 72 IN | WEIGHT: 208 LBS

## 2022-04-14 DIAGNOSIS — R19.7 DIARRHEA, UNSPECIFIED: ICD-10-CM

## 2022-04-14 DIAGNOSIS — R26.89 BALANCE PROBLEMS: ICD-10-CM

## 2022-04-14 DIAGNOSIS — M54.16 RADICULOPATHY, LUMBAR REGION: Primary | ICD-10-CM

## 2022-04-14 PROCEDURE — 99212 OFFICE O/P EST SF 10 MIN: CPT | Mod: 95 | Performed by: NURSE PRACTITIONER

## 2022-04-14 PROCEDURE — 97110 THERAPEUTIC EXERCISES: CPT | Performed by: PHYSICAL THERAPIST

## 2022-04-14 PROCEDURE — 97530 THERAPEUTIC ACTIVITIES: CPT | Performed by: PHYSICAL THERAPIST

## 2022-04-14 PROCEDURE — 97112 NEUROMUSCULAR REEDUCATION: CPT | Performed by: PHYSICAL THERAPIST

## 2022-04-14 RX ORDER — SIMETHICONE 180 MG
720 CAPSULE ORAL
Qty: 120 | Refills: 5 | Status: COMPLETED
Start: 2022-04-14 | End: 2022-10-05

## 2022-04-14 NOTE — PROGRESS NOTES
Physical Therapy Daily Progress Note    VISIT#: 16    Subjective   Campbell Alex reports that he is doing well overall and feels like he is getting stronger.   Pain Ratin    Objective     See Exercise, Manual, and Modality Logs for complete treatment.     Patient Education: endurance improvement, progressions    Assessment/Plan   Pt exhibits improved activity tolerance, requiring less frequent rest breaks. Pt was able to progress balance and strengthening activities without complaint. All exercises were completed with SBA/CGA with no LOB this session.    Progress per Plan of Care and Progress strengthening /stabilization /functional activity          Timed:         Manual Therapy:         mins  55947;     Therapeutic Exercise:    10     mins  45340;     Neuromuscular Agustina:    16    mins  83838;    Therapeutic Activity:     14     mins  94896;     Gait Training:           mins  85090;     Ultrasound:          mins  11896;    Ionto                                   mins   03401  Self Care                            mins   84647  Canalith Repos                   mins  52959    Un-Timed:  Electrical Stimulation:         mins  06397 ( );  Dry Needling          mins self-pay  Traction          mins 86403  Low Eval          Mins  37000  Mod Eval          Mins  52358  High Eval                            Mins  85033  Re-Eval                               mins  33187    Timed Treatment:   40   mins   Total Treatment:     40   mins          aKy Sandoval  Physical Therapist Assistant  [place] License # [id number]

## 2022-04-18 PROCEDURE — 88305 TISSUE EXAM BY PATHOLOGIST: CPT | Performed by: OTOLARYNGOLOGY

## 2022-04-19 ENCOUNTER — LAB REQUISITION (OUTPATIENT)
Dept: LAB | Facility: HOSPITAL | Age: 82
End: 2022-04-19

## 2022-04-19 ENCOUNTER — TREATMENT (OUTPATIENT)
Dept: PHYSICAL THERAPY | Facility: CLINIC | Age: 82
End: 2022-04-19

## 2022-04-19 DIAGNOSIS — R26.89 BALANCE PROBLEMS: ICD-10-CM

## 2022-04-19 DIAGNOSIS — M54.16 RADICULOPATHY, LUMBAR REGION: Primary | ICD-10-CM

## 2022-04-19 DIAGNOSIS — Z00.00 ENCOUNTER FOR GENERAL ADULT MEDICAL EXAMINATION WITHOUT ABNORMAL FINDINGS: ICD-10-CM

## 2022-04-19 PROCEDURE — 97110 THERAPEUTIC EXERCISES: CPT | Performed by: PHYSICAL THERAPIST

## 2022-04-19 PROCEDURE — 97530 THERAPEUTIC ACTIVITIES: CPT | Performed by: PHYSICAL THERAPIST

## 2022-04-19 PROCEDURE — 97112 NEUROMUSCULAR REEDUCATION: CPT | Performed by: PHYSICAL THERAPIST

## 2022-04-19 NOTE — PROGRESS NOTES
Physical Therapy Daily Progress Note    VISIT#: [id number]    Subjective   Campbell Alex reports that he is doing well.   Pain Ratin    Objective     See Exercise, Manual, and Modality Logs for complete treatment.     Patient Education: activity tolerance, progressions    Assessment/Plan  Progressed pt's exercises today, adding ankle weights and reps to several exercises. Pt showed good tolerance but did require occasional rest breaks between activities.     Progress per Plan of Care and Progress strengthening /stabilization /functional activity          Timed:         Manual Therapy:         mins  28760;     Therapeutic Exercise:    15     mins  40289;     Neuromuscular Agustina:    10    mins  85518;    Therapeutic Activity:     15     mins  20710;     Gait Training:           mins  31890;     Ultrasound:          mins  33722;    Ionto                                   mins   45059  Self Care                            mins   86093  Canalith Repos                   mins  44624    Un-Timed:  Electrical Stimulation:         mins  47847 ( );  Dry Needling          mins self-pay  Traction          mins 46545  Low Eval          Mins  06328  Mod Eval          Mins  64364  High Eval                            Mins  69086  Re-Eval                               mins  06206    Timed Treatment:   40   mins   Total Treatment:     40   mins          Kay Sandoval  Physical Therapist Assistant  [place] License # [id number]

## 2022-04-20 LAB
LAB AP CASE REPORT: NORMAL
LAB AP DIAGNOSIS COMMENT: NORMAL
PATH REPORT.FINAL DX SPEC: NORMAL
PATH REPORT.GROSS SPEC: NORMAL

## 2022-04-25 ENCOUNTER — TREATMENT (OUTPATIENT)
Dept: PHYSICAL THERAPY | Facility: CLINIC | Age: 82
End: 2022-04-25

## 2022-04-25 DIAGNOSIS — M54.16 RADICULOPATHY, LUMBAR REGION: Primary | ICD-10-CM

## 2022-04-25 DIAGNOSIS — M19.90 OSTEOARTHRITIS, UNSPECIFIED OSTEOARTHRITIS TYPE, UNSPECIFIED SITE: ICD-10-CM

## 2022-04-25 DIAGNOSIS — R26.89 BALANCE PROBLEMS: ICD-10-CM

## 2022-04-25 PROCEDURE — 97110 THERAPEUTIC EXERCISES: CPT | Performed by: PHYSICAL THERAPIST

## 2022-04-25 PROCEDURE — 97112 NEUROMUSCULAR REEDUCATION: CPT | Performed by: PHYSICAL THERAPIST

## 2022-04-25 NOTE — PROGRESS NOTES
Discharge Report    Patient Name: Campbell Alex       Patient MRN: [id number]  : 1940  Physician: Amarjit Jurado Jr., MD  Date: 2022    Pt is feeling near his prior level of function and independent with his HEP. Pt would like to DC therapy and continue with independent HEP.    ZAK 28%  Strength/Myotome Testing      Left Hip   Planes of Motion   Flexion: 4+     Right Hip   Planes of Motion   Flexion: 4-     Left Knee   Flexion: 4+  Extension: 4+     Right Knee   Flexion: 4+  Extension: 4+     Left Ankle/Foot   Dorsiflexion: 4-  Plantar flexion: 4+     Right Ankle/Foot   Dorsiflexion: 4+  Plantar flexion: 4+     Tinetti 18  TUG 12.5 seconds  Five times sit to stand 10 seconds  Four square step 17 seconds    Assessment:   Progress towards goals: Partially Met    Discharge Reason: Plateau in patient's progress      Plan of Care: Continue with current home exercise program as instructed    Prognosis: good    Understanding at Discharge: good     Timed:                                                                           Manual Therapy:                 mins  85550;                      Therapeutic Exercise:    25     mins  05192;     Neuromuscular Agustina:    15    mins  24022;    Therapeutic Activity:           mins  45832;     Gait Training:                     mins  55086;     Ultrasound:                          mins  83920;    Ionto                                   mins   37161  Self Care                            mins   48812     Un-Timed:  Electrical Stimulation:         mins  40237 ( );  Traction                               mins 26067  Low Eval                             Mins  99848  Mod Eval                             Mins  05823  High Eval                            Mins  87814  Re-Eval                               mins  67544        Timed Treatment:   40   mins   Total Treatment:     40   mins    Daniel Joyner PT, DPT, OCS  [place] license: [id number]  Physical Therapist

## 2022-07-06 ENCOUNTER — OFFICE (OUTPATIENT)
Dept: URBAN - METROPOLITAN AREA CLINIC 64 | Facility: CLINIC | Age: 82
End: 2022-07-06
Payer: COMMERCIAL

## 2022-07-06 VITALS
HEART RATE: 62 BPM | SYSTOLIC BLOOD PRESSURE: 142 MMHG | HEIGHT: 72 IN | DIASTOLIC BLOOD PRESSURE: 96 MMHG | WEIGHT: 208 LBS

## 2022-07-06 DIAGNOSIS — K59.00 CONSTIPATION, UNSPECIFIED: ICD-10-CM

## 2022-07-06 PROCEDURE — 99213 OFFICE O/P EST LOW 20 MIN: CPT | Performed by: NURSE PRACTITIONER

## 2022-07-06 RX ORDER — SACCHAROMYCES BOULARDII 50 MG
CAPSULE ORAL
Qty: 60 | Refills: 11 | Status: COMPLETED
End: 2024-04-04

## 2022-08-23 RX ORDER — HYDROCHLOROTHIAZIDE 25 MG/1
TABLET ORAL
Qty: 30 TABLET | Refills: 5 | Status: SHIPPED | OUTPATIENT
Start: 2022-08-23 | End: 2022-10-26

## 2022-08-23 RX ORDER — ATENOLOL 25 MG/1
TABLET ORAL
Qty: 90 TABLET | Refills: 3 | Status: SHIPPED | OUTPATIENT
Start: 2022-08-23

## 2022-08-23 NOTE — TELEPHONE ENCOUNTER
Rx Refill Note  Requested Prescriptions     Pending Prescriptions Disp Refills   • atenolol (TENORMIN) 25 MG tablet [Pharmacy Med Name: ATENOLOL 25 MG TABLET] 90 tablet 3     Sig: TAKE ONE TABLET BY MOUTH EVERY MORNING      Last office visit with prescribing clinician: 3/15/2022      Next office visit with prescribing clinician: 9/26/2022            MANPREET COELHO MA  08/23/22, 14:40 EDT

## 2022-09-06 ENCOUNTER — TELEPHONE (OUTPATIENT)
Dept: FAMILY MEDICINE CLINIC | Facility: CLINIC | Age: 82
End: 2022-09-06

## 2022-09-06 ENCOUNTER — OFFICE VISIT (OUTPATIENT)
Dept: FAMILY MEDICINE CLINIC | Facility: CLINIC | Age: 82
End: 2022-09-06

## 2022-09-06 ENCOUNTER — LAB (OUTPATIENT)
Dept: LAB | Facility: HOSPITAL | Age: 82
End: 2022-09-06

## 2022-09-06 VITALS
HEIGHT: 74 IN | OXYGEN SATURATION: 98 % | RESPIRATION RATE: 16 BRPM | TEMPERATURE: 96.9 F | WEIGHT: 207 LBS | HEART RATE: 61 BPM | BODY MASS INDEX: 26.56 KG/M2 | SYSTOLIC BLOOD PRESSURE: 145 MMHG | DIASTOLIC BLOOD PRESSURE: 87 MMHG

## 2022-09-06 DIAGNOSIS — Z00.00 ENCOUNTER FOR ANNUAL WELLNESS EXAM IN MEDICARE PATIENT: Primary | ICD-10-CM

## 2022-09-06 DIAGNOSIS — E78.2 MIXED HYPERLIPIDEMIA: Chronic | ICD-10-CM

## 2022-09-06 DIAGNOSIS — E11.9 TYPE 2 DIABETES MELLITUS WITHOUT COMPLICATION, WITHOUT LONG-TERM CURRENT USE OF INSULIN: Chronic | ICD-10-CM

## 2022-09-06 DIAGNOSIS — I25.10 CORONARY ARTERY DISEASE INVOLVING NATIVE CORONARY ARTERY OF NATIVE HEART WITHOUT ANGINA PECTORIS: Chronic | ICD-10-CM

## 2022-09-06 DIAGNOSIS — M19.90 OSTEOARTHRITIS, UNSPECIFIED OSTEOARTHRITIS TYPE, UNSPECIFIED SITE: Chronic | ICD-10-CM

## 2022-09-06 DIAGNOSIS — N40.0 ENLARGED PROSTATE WITHOUT LOWER URINARY TRACT SYMPTOMS (LUTS): Chronic | ICD-10-CM

## 2022-09-06 DIAGNOSIS — I10 PRIMARY HYPERTENSION: Chronic | ICD-10-CM

## 2022-09-06 PROBLEM — J22 ACUTE RESPIRATORY INFECTION: Status: RESOLVED | Noted: 2021-10-04 | Resolved: 2022-09-06

## 2022-09-06 LAB
ALT SERPL W P-5'-P-CCNC: 12 U/L (ref 1–41)
ANION GAP SERPL CALCULATED.3IONS-SCNC: 7.5 MMOL/L (ref 5–15)
BUN SERPL-MCNC: 17 MG/DL (ref 8–23)
BUN/CREAT SERPL: 17 (ref 7–25)
CALCIUM SPEC-SCNC: 9.3 MG/DL (ref 8.6–10.5)
CHLORIDE SERPL-SCNC: 95 MMOL/L (ref 98–107)
CHOLEST SERPL-MCNC: 112 MG/DL (ref 0–200)
CO2 SERPL-SCNC: 27.5 MMOL/L (ref 22–29)
CREAT SERPL-MCNC: 1 MG/DL (ref 0.76–1.27)
CREAT UR-MCNC: 64.1 MG/DL
EGFRCR SERPLBLD CKD-EPI 2021: 74.2 ML/MIN/1.73
GLUCOSE SERPL-MCNC: 113 MG/DL (ref 65–99)
HBA1C MFR BLD: 6.3 % (ref 3.5–5.6)
HDLC SERPL-MCNC: 59 MG/DL (ref 40–60)
LDLC SERPL CALC-MCNC: 37 MG/DL (ref 0–100)
LDLC/HDLC SERPL: 0.63 {RATIO}
POTASSIUM SERPL-SCNC: 4.3 MMOL/L (ref 3.5–5.2)
PROT ?TM UR-MCNC: 5.8 MG/DL
PROT/CREAT UR: 90.5 MG/G CREA (ref 0–200)
SODIUM SERPL-SCNC: 130 MMOL/L (ref 136–145)
TRIGL SERPL-MCNC: 80 MG/DL (ref 0–150)
VLDLC SERPL-MCNC: 16 MG/DL (ref 5–40)

## 2022-09-06 PROCEDURE — 1170F FXNL STATUS ASSESSED: CPT | Performed by: FAMILY MEDICINE

## 2022-09-06 PROCEDURE — 80048 BASIC METABOLIC PNL TOTAL CA: CPT

## 2022-09-06 PROCEDURE — 83036 HEMOGLOBIN GLYCOSYLATED A1C: CPT

## 2022-09-06 PROCEDURE — 84156 ASSAY OF PROTEIN URINE: CPT

## 2022-09-06 PROCEDURE — 1160F RVW MEDS BY RX/DR IN RCRD: CPT | Performed by: FAMILY MEDICINE

## 2022-09-06 PROCEDURE — G0439 PPPS, SUBSEQ VISIT: HCPCS | Performed by: FAMILY MEDICINE

## 2022-09-06 PROCEDURE — 36415 COLL VENOUS BLD VENIPUNCTURE: CPT

## 2022-09-06 PROCEDURE — 1126F AMNT PAIN NOTED NONE PRSNT: CPT | Performed by: FAMILY MEDICINE

## 2022-09-06 PROCEDURE — 84460 ALANINE AMINO (ALT) (SGPT): CPT

## 2022-09-06 PROCEDURE — 82570 ASSAY OF URINE CREATININE: CPT

## 2022-09-06 PROCEDURE — 80061 LIPID PANEL: CPT

## 2022-09-06 RX ORDER — CLONIDINE HYDROCHLORIDE 0.2 MG/1
0.2 TABLET ORAL 2 TIMES DAILY
Qty: 60 TABLET | Refills: 5 | Status: SHIPPED | OUTPATIENT
Start: 2022-09-06 | End: 2023-02-06 | Stop reason: SDUPTHER

## 2022-09-06 NOTE — PROGRESS NOTES
The ABCs of the Annual Wellness Visit  Subsequent Medicare Wellness Visit    Chief Complaint   Patient presents with   • Medicare Wellness-subsequent      Subjective    History of Present Illness:  Campbell Alex is a 82 y.o. male who presents for a Subsequent Medicare Wellness Visit.    He has history of hypertension hyperlipidemia coronary artery disease type 2 diabetes mellitus prostatism arthritis.  Patient has lumbar radiculopathy and recently had an ablation to take care of the radicular type pain.  He states that help with the pain but he still left with pain in his back from his arthritis.    The following portions of the patient's history were reviewed and   updated as appropriate: allergies, current medications, past family history, past medical history, past social history, past surgical history and problem list.    Compared to one year ago, the patient feels his physical   health is the same.    Compared to one year ago, the patient feels his mental   health is the same.    Recent Hospitalizations:  He was not admitted to the hospital during the last year.       Current Medical Providers:  Patient Care Team:  Amarjit Jurado Jr., MD as PCP - Vilma Hernandez MD as Consulting Physician (Cardiology)    Outpatient Medications Prior to Visit   Medication Sig Dispense Refill   • aspirin (aspirin) 81 MG EC tablet ADULT ASPIRIN EC LOW STRENGTH 81 MG ORAL TABLET DELAYED RELEASE     • atenolol (TENORMIN) 25 MG tablet TAKE ONE TABLET BY MOUTH EVERY MORNING 90 tablet 3   • atorvastatin (LIPITOR) 20 MG tablet ATORVASTATIN CALCIUM 20 MG TABS     • cloNIDine (CATAPRES) 0.2 MG tablet TAKE ONE TABLET BY MOUTH TWICE A DAY 60 tablet 5   • finasteride (PROSCAR) 5 MG tablet FINASTERIDE 5 MG TABS     • hydroCHLOROthiazide (HYDRODIURIL) 25 MG tablet TAKE ONE TABLET BY MOUTH DAILY 30 tablet 5   • isosorbide mononitrate (IMDUR) 30 MG 24 hr tablet Take 1 tablet by mouth Daily. 90 tablet 3   • losartan (COZAAR) 100 MG  "tablet LOSARTAN POTASSIUM 100 MG TABS     • nitroglycerin (NITROSTAT) 0.4 MG SL tablet Place 1 tablet under the tongue Every 5 (Five) Minutes As Needed for Chest Pain. Take no more than 3 doses in 15 minutes. 25 tablet 0   • tamsulosin (FLOMAX) 0.4 MG capsule 24 hr capsule Take 1 capsule by mouth Every Night.     • terbinafine (LamISIL) 250 MG tablet Take 1 tablet by mouth Daily. 30 tablet 2     No facility-administered medications prior to visit.       No opioid medication identified on active medication list. I have reviewed chart for other potential  high risk medication/s and harmful drug interactions in the elderly.          Aspirin is on active medication list. Aspirin use is indicated based on review of current medical condition/s. Pros and cons of this therapy have been discussed today. Benefits of this medication outweigh potential harm.  Patient has been encouraged to continue taking this medication.  .      Patient Active Problem List   Diagnosis   • Coronary artery disease   • Enlarged prostate without lower urinary tract symptoms (luts)   • Hyperlipidemia   • Hypertension   • Type 2 diabetes mellitus without complication, without long-term current use of insulin (HCC)   • Osteoarthritis   • Osteopenia   • Psoriasis   • Status post coronary artery stent placement   • Vertigo   • Constipation   • Onychomycosis   • Lumbar radiculopathy     Advance Care Planning  Advance Directive is on file.  ACP discussion was held with the patient during this visit. Patient has an advance directive in EMR which is still valid.           Objective    Vitals:    09/06/22 0954   BP: 145/87   BP Location: Right arm   Patient Position: Sitting   Cuff Size: Adult   Pulse: 61   Resp: 16   Temp: 96.9 °F (36.1 °C)   TempSrc: Infrared   SpO2: 98%   Weight: 93.9 kg (207 lb)   Height: 188 cm (74\")   PainSc: 0-No pain     Estimated body mass index is 26.58 kg/m² as calculated from the following:    Height as of this encounter: 188 cm " "(74\").    Weight as of this encounter: 93.9 kg (207 lb).    BMI is >= 25 and <30. (Overweight) The following options were offered after discussion;: exercise counseling/recommendations      Does the patient have evidence of cognitive impairment? No    Physical Exam  Vitals and nursing note reviewed.   Constitutional:       Appearance: He is well-developed.   HENT:      Head: Normocephalic and atraumatic.   Eyes:      Pupils: Pupils are equal, round, and reactive to light.   Cardiovascular:      Rate and Rhythm: Normal rate and regular rhythm.      Pulses: Normal pulses.      Heart sounds: Normal heart sounds. No murmur heard.    No friction rub. No gallop.   Pulmonary:      Effort: Pulmonary effort is normal.      Breath sounds: Normal breath sounds.   Abdominal:      General: Bowel sounds are normal.      Palpations: Abdomen is soft.   Musculoskeletal:         General: Normal range of motion.      Cervical back: Neck supple.   Skin:     General: Skin is warm and dry.   Neurological:      Mental Status: He is alert and oriented to person, place, and time.   Psychiatric:         Behavior: Behavior normal.         Thought Content: Thought content normal.         Judgment: Judgment normal.                 HEALTH RISK ASSESSMENT    Smoking Status:  Social History     Tobacco Use   Smoking Status Former Smoker   • Packs/day: 0.50   • Years: 2.00   • Pack years: 1.00   • Types: Cigarettes, Pipe, Cigars   • Quit date: 1960   • Years since quittin.7   Smokeless Tobacco Never Used   Tobacco Comment    1-2/day     Alcohol Consumption:  Social History     Substance and Sexual Activity   Alcohol Use Never    Comment: socially     Fall Risk Screen:    STEADI Fall Risk Assessment was completed, and patient is at LOW risk for falls.Assessment completed on:2022    Depression Screening:  PHQ-2/PHQ-9 Depression Screening 2022   Retired PHQ-9 Total Score -   Retired Total Score -   Little Interest or Pleasure in Doing " Things 0-->not at all   Feeling Down, Depressed or Hopeless 0-->not at all   PHQ-9: Brief Depression Severity Measure Score 0       Health Habits and Functional and Cognitive Screening:  Functional & Cognitive Status 9/6/2022   Do you have difficulty preparing food and eating? No   Do you have difficulty bathing yourself, getting dressed or grooming yourself? No   Do you have difficulty using the toilet? No   Do you have difficulty moving around from place to place? No   Do you have trouble with steps or getting out of a bed or a chair? No   Current Diet Well Balanced Diet   Dental Exam Up to date   Eye Exam Up to date   Exercise (times per week) 2 times per week   Current Exercises Include Walking   Current Exercise Activities Include -   Do you need help using the phone?  No   Are you deaf or do you have serious difficulty hearing?  Yes   Do you need help with transportation? No   Do you need help shopping? No   Do you need help preparing meals?  No   Do you need help with housework?  No   Do you need help with laundry? No   Do you need help taking your medications? No   Do you need help managing money? No   Do you ever drive or ride in a car without wearing a seat belt? No   Have you felt unusual stress, anger or loneliness in the last month? No   Who do you live with? Spouse   If you need help, do you have trouble finding someone available to you? No   Have you been bothered in the last four weeks by sexual problems? No   Do you have difficulty concentrating, remembering or making decisions? No       Age-appropriate Screening Schedule:  Refer to the list below for future screening recommendations based on patient's age, sex and/or medical conditions. Orders for these recommended tests are listed in the plan section. The patient has been provided with a written plan.    Health Maintenance   Topic Date Due   • DXA SCAN  03/02/2022   • LIPID PANEL  09/06/2022 (Originally 8/17/2022)   • URINE MICROALBUMIN   09/24/2022 (Originally 1940)   • HEMOGLOBIN A1C  09/28/2022 (Originally 2/17/2022)   • TDAP/TD VACCINES (1 - Tdap) 02/01/2023 (Originally 1/12/1959)   • INFLUENZA VACCINE  10/01/2022   • DIABETIC EYE EXAM  12/14/2022   • ZOSTER VACCINE  Discontinued              Assessment & Plan   CMS Preventative Services Quick Reference  Risk Factors Identified During Encounter  Immunizations Discussed/Encouraged (specific Immunizations; Td, Influenza, Pneumococcal 23, Prevnar 20 (Pneumococcal 20-valent conjugate), Vaxneuvance (Pneumococcal 15-valent conjugate), Shingrix and COVID19  The above risks/problems have been discussed with the patient.  Follow up actions/plans if indicated are seen below in the Assessment/Plan Section.  Pertinent information has been shared with the patient in the After Visit Summary.    Diagnoses and all orders for this visit:    1. Encounter for annual wellness exam in Medicare patient (Primary)    2. Primary hypertension  -     Basic Metabolic Panel; Future    3. Mixed hyperlipidemia  -     ALT; Future  -     Lipid Panel; Future    4. Coronary artery disease involving native coronary artery of native heart without angina pectoris    5. Type 2 diabetes mellitus without complication, without long-term current use of insulin (HCC)  -     Hemoglobin A1c; Future  -     Protein / Creatinine Ratio, Urine - Urine, Clean Catch; Future    6. Enlarged prostate without lower urinary tract symptoms (luts)    7. Osteoarthritis, unspecified osteoarthritis type, unspecified site        Follow Up:   Return in about 6 months (around 3/6/2023).     An After Visit Summary and PPPS were made available to the patient.

## 2022-09-06 NOTE — PATIENT INSTRUCTIONS
Continue your current medications and treatment.    Have the follow up labs done and call for results.    Follow up in 6 months..

## 2022-09-13 ENCOUNTER — TELEPHONE (OUTPATIENT)
Dept: FAMILY MEDICINE CLINIC | Facility: CLINIC | Age: 82
End: 2022-09-13

## 2022-09-13 NOTE — TELEPHONE ENCOUNTER
Caller: Megan Alex     Relationship: WIFE     Best call back number: [phone]     What is the best time to reach you: ANY     Who are you requesting to speak with (clinical staff, provider,  specific staff member): CLINICAL      What was the call regarding: LABS     Do you require a callback: YES

## 2022-09-20 NOTE — PROGRESS NOTES
Encounter Date:09/26/2022    Last seen-3/15/2022      Patient ID: Campbell Alex is a 82 y.o. male.    Chief Complaint:  Status post stent  Hypertension  Dyslipidemia        History of Present Illness  Since I have last seen, the patient has been without any chest discomfort ,shortness of breath, palpitations, dizziness or syncope.  Denies having any headache ,abdominal pain ,nausea, vomiting , diarrhea constipation, loss of weight or loss of appetite.  Denies having any excessive bruising ,hematuria or blood in the stool.    Review of all systems negative except as indicated.    Reviewed ROS.  Assessment and Plan         //////////////////////  Impression  ============   -status post right coronary artery stent placement (2005 ).      Cardiac catheterization 03/10/2017 revealed normal left ventricular function.  Normal left main coronary artery. 50% lad distal to diagonal branch.  Ostial diagonal branch has 80-90% disease (moderate size vessel) RCA has 30-40% plaquing.  RCA stent was   patent.  One of the PDA branches had 70% disease at its ostium.     Stress Cardiolite test is negative for myocardial ischemia 08/01/2016.     -palpitations improved.  Holter monitor 01/18/2016 showed occasional premature atrial and ventricular contractions.       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       -allergy to darvon codeine Relafen and IVP dye  ===============   Plan  =============  Status post stent  EKG showed sinus rhythm without ischemic changes.  Patient is not having any angina pectoris or congestive heart failure on medical treatment.     Hypertension- 150/80  Continue atenolol losartan and hydrochlorothiazide.     Dyslipidemia-continue atorvastatin     medications were reviewed and updated.    followup in the office in 6 months .  Further plan  will depend on patient's progress.  /////////////////////////////                     Diagnosis Plan   1. Status post coronary artery stent placement  ECG 12 Lead   2. Essential hypertension  ECG 12 Lead   3. Mixed hyperlipidemia  ECG 12 Lead   4. Coronary artery disease involving native coronary artery of native heart without angina pectoris  ECG 12 Lead   LAB RESULTS (LAST 7 DAYS)    CBC        BMP        CMP         BNP        TROPONIN        CoAg        Creatinine Clearance  Estimated Creatinine Clearance: 74.1 mL/min (by C-G formula based on SCr of 1 mg/dL).    ABG        Radiology  No radiology results for the last day                The following portions of the patient's history were reviewed and updated as appropriate: allergies, current medications, past family history, past medical history, past social history, past surgical history and problem list.    Review of Systems   Constitutional: Negative for fever and malaise/fatigue.   Cardiovascular: Negative for chest pain, dyspnea on exertion and palpitations.   Respiratory: Negative for cough and shortness of breath.    Skin: Negative for rash.   Gastrointestinal: Negative for abdominal pain, nausea and vomiting.   Neurological: Negative for focal weakness and headaches.   All other systems reviewed and are negative.        Current Outpatient Medications:   •  aspirin (aspirin) 81 MG EC tablet, ADULT ASPIRIN EC LOW STRENGTH 81 MG ORAL TABLET DELAYED RELEASE, Disp: , Rfl:   •  atenolol (TENORMIN) 25 MG tablet, TAKE ONE TABLET BY MOUTH EVERY MORNING, Disp: 90 tablet, Rfl: 3  •  atorvastatin (LIPITOR) 20 MG tablet, ATORVASTATIN CALCIUM 20 MG TABS, Disp: , Rfl:   •  cloNIDine (CATAPRES) 0.2 MG tablet, Take 1 tablet by mouth 2 (Two) Times a Day., Disp: 60 tablet, Rfl: 5  •  finasteride (PROSCAR) 5 MG tablet, FINASTERIDE 5 MG TABS, Disp: , Rfl:   •  hydroCHLOROthiazide (HYDRODIURIL) 25 MG tablet, TAKE ONE TABLET BY MOUTH DAILY, Disp: 30 tablet, Rfl: 5  •   isosorbide mononitrate (IMDUR) 30 MG 24 hr tablet, Take 1 tablet by mouth Daily., Disp: 90 tablet, Rfl: 3  •  losartan (COZAAR) 100 MG tablet, LOSARTAN POTASSIUM 100 MG TABS, Disp: , Rfl:   •  nitroglycerin (NITROSTAT) 0.4 MG SL tablet, Place 1 tablet under the tongue Every 5 (Five) Minutes As Needed for Chest Pain. Take no more than 3 doses in 15 minutes., Disp: 25 tablet, Rfl: 0  •  tamsulosin (FLOMAX) 0.4 MG capsule 24 hr capsule, Take 1 capsule by mouth Every Night., Disp: , Rfl:   •  terbinafine (LamISIL) 250 MG tablet, Take 1 tablet by mouth Daily., Disp: 30 tablet, Rfl: 2    Allergies   Allergen Reactions   • Codeine GI Intolerance   • Iodine Hives   • Nabumetone Unknown - High Severity   • Propoxyphene Hives       Family History   Problem Relation Age of Onset   • Hypertension Brother    • Hyperlipidemia Brother        Past Surgical History:   Procedure Laterality Date   • ANKLE SURGERY     • CARDIAC CATHETERIZATION     • EYE SURGERY     • NOSE SURGERY     • ROTATOR CUFF REPAIR     • SKIN CANCER EXCISION      L shoulder   • UMBILICAL HERNIA REPAIR         Past Medical History:   Diagnosis Date   • Allergic    • Arthritis    • Benign prostatic hyperplasia    • Cancer (HCC)    • Cataract    • Cholelithiasis    • Coronary artery disease    • Erectile dysfunction    • HL (hearing loss)    • Hyperlipidemia    • Hypertension    • Osteopenia    • Prostatism    • Renal insufficiency    • Suspected COVID-19 virus infection 2021   • Visual impairment        Family History   Problem Relation Age of Onset   • Hypertension Brother    • Hyperlipidemia Brother        Social History     Socioeconomic History   • Marital status:    Tobacco Use   • Smoking status: Former Smoker     Packs/day: 0.50     Years: 2.00     Pack years: 1.00     Types: Cigarettes, Pipe, Cigars     Quit date: 1960     Years since quittin.7   • Smokeless tobacco: Never Used   • Tobacco  "comment: 1-2/day   Vaping Use   • Vaping Use: Never used   Substance and Sexual Activity   • Alcohol use: Never     Comment: socially   • Drug use: Never   • Sexual activity: Not Currently     Partners: Female     Birth control/protection: None           ECG 12 Lead    Date/Time: 9/26/2022 10:23 AM  Performed by: Vilma Staples MD  Authorized by: Vilma Staples MD   Comparison: compared with previous ECG   Similar to previous ECG  Comparison to previous ECG: Sinus bradycardia sinus arrhythmia 57/min normal axis normal intervals no ectopy no significant change from 3/15/2022                Objective:       Physical Exam    /82 (BP Location: Left arm, Patient Position: Sitting, Cuff Size: Adult)   Pulse 54   Ht 188 cm (74\")   Wt 95.3 kg (210 lb 3.2 oz)   SpO2 98%   BMI 26.99 kg/m²   The patient is alert, oriented and in no distress.    Vital signs as noted above.    Head and neck revealed no carotid bruits or jugular venous distension.  No thyromegaly or lymphadenopathy is present.    Lungs clear.  No wheezing.  Breath sounds are normal bilaterally.    Heart normal first and second heart sounds.  No murmur..  No pericardial rub is present.  No gallop is present.    Abdomen soft and nontender.  No organomegaly is present.    Extremities revealed good peripheral pulses without any pedal edema.    Skin warm and dry.    Musculoskeletal system is grossly normal.    CNS grossly normal.    Reviewed and updated.        "

## 2022-09-26 ENCOUNTER — OFFICE VISIT (OUTPATIENT)
Dept: CARDIOLOGY | Facility: CLINIC | Age: 82
End: 2022-09-26

## 2022-09-26 VITALS
WEIGHT: 210.2 LBS | DIASTOLIC BLOOD PRESSURE: 82 MMHG | HEART RATE: 54 BPM | SYSTOLIC BLOOD PRESSURE: 152 MMHG | HEIGHT: 74 IN | OXYGEN SATURATION: 98 % | BODY MASS INDEX: 26.98 KG/M2

## 2022-09-26 DIAGNOSIS — I25.10 CORONARY ARTERY DISEASE INVOLVING NATIVE CORONARY ARTERY OF NATIVE HEART WITHOUT ANGINA PECTORIS: ICD-10-CM

## 2022-09-26 DIAGNOSIS — E78.2 MIXED HYPERLIPIDEMIA: ICD-10-CM

## 2022-09-26 DIAGNOSIS — I10 ESSENTIAL HYPERTENSION: ICD-10-CM

## 2022-09-26 DIAGNOSIS — Z95.5 STATUS POST CORONARY ARTERY STENT PLACEMENT: Primary | ICD-10-CM

## 2022-09-26 PROCEDURE — 99214 OFFICE O/P EST MOD 30 MIN: CPT | Performed by: INTERNAL MEDICINE

## 2022-09-26 PROCEDURE — 93000 ELECTROCARDIOGRAM COMPLETE: CPT | Performed by: INTERNAL MEDICINE

## 2022-10-05 ENCOUNTER — OFFICE (OUTPATIENT)
Dept: URBAN - METROPOLITAN AREA CLINIC 64 | Facility: CLINIC | Age: 82
End: 2022-10-05
Payer: COMMERCIAL

## 2022-10-05 VITALS
HEIGHT: 72 IN | SYSTOLIC BLOOD PRESSURE: 116 MMHG | HEART RATE: 70 BPM | DIASTOLIC BLOOD PRESSURE: 63 MMHG | WEIGHT: 213 LBS

## 2022-10-05 DIAGNOSIS — R19.4 CHANGE IN BOWEL HABIT: ICD-10-CM

## 2022-10-05 DIAGNOSIS — R15.9 FULL INCONTINENCE OF FECES: ICD-10-CM

## 2022-10-05 PROCEDURE — 99213 OFFICE O/P EST LOW 20 MIN: CPT

## 2022-10-05 RX ORDER — DOCUSATE SODIUM 100 MG/1
200 CAPSULE ORAL
Qty: 60 | Refills: 3 | Status: COMPLETED
Start: 2022-10-05 | End: 2024-04-04

## 2022-10-26 ENCOUNTER — APPOINTMENT (OUTPATIENT)
Dept: GENERAL RADIOLOGY | Facility: HOSPITAL | Age: 82
End: 2022-10-26

## 2022-10-26 ENCOUNTER — TELEPHONE (OUTPATIENT)
Dept: FAMILY MEDICINE CLINIC | Facility: CLINIC | Age: 82
End: 2022-10-26

## 2022-10-26 ENCOUNTER — HOSPITAL ENCOUNTER (INPATIENT)
Facility: HOSPITAL | Age: 82
LOS: 1 days | Discharge: HOME | End: 2022-10-27
Attending: EMERGENCY MEDICINE | Admitting: INTERNAL MEDICINE

## 2022-10-26 DIAGNOSIS — U07.1 PNEUMONIA DUE TO COVID-19 VIRUS: Primary | ICD-10-CM

## 2022-10-26 DIAGNOSIS — R09.02 HYPOXEMIA: ICD-10-CM

## 2022-10-26 DIAGNOSIS — R50.9 FEVER, UNSPECIFIED FEVER CAUSE: ICD-10-CM

## 2022-10-26 DIAGNOSIS — Z86.79 HISTORY OF CORONARY ARTERY DISEASE: ICD-10-CM

## 2022-10-26 DIAGNOSIS — U07.1 COVID-19: ICD-10-CM

## 2022-10-26 DIAGNOSIS — J12.82 PNEUMONIA DUE TO COVID-19 VIRUS: Primary | ICD-10-CM

## 2022-10-26 DIAGNOSIS — R05.9 COUGH, UNSPECIFIED TYPE: ICD-10-CM

## 2022-10-26 DIAGNOSIS — R53.83 FATIGUE, UNSPECIFIED TYPE: Primary | ICD-10-CM

## 2022-10-26 DIAGNOSIS — M54.16 LUMBAR RADICULOPATHY: Chronic | ICD-10-CM

## 2022-10-26 LAB
ALBUMIN SERPL-MCNC: 4 G/DL (ref 3.5–5.2)
ALBUMIN SERPL-MCNC: 4.3 G/DL (ref 3.5–5.2)
ALBUMIN/GLOB SERPL: 1.8 G/DL
ALP SERPL-CCNC: 55 U/L (ref 39–117)
ALP SERPL-CCNC: 59 U/L (ref 39–117)
ALT SERPL W P-5'-P-CCNC: 11 U/L (ref 1–41)
ALT SERPL W P-5'-P-CCNC: 12 U/L (ref 1–41)
ANION GAP SERPL CALCULATED.3IONS-SCNC: 11 MMOL/L (ref 5–15)
AST SERPL-CCNC: 25 U/L (ref 1–40)
AST SERPL-CCNC: 38 U/L (ref 1–40)
B PARAPERT DNA SPEC QL NAA+PROBE: NOT DETECTED
B PERT DNA SPEC QL NAA+PROBE: NOT DETECTED
BACTERIA UR QL AUTO: NORMAL /HPF
BASOPHILS # BLD AUTO: 0 10*3/MM3 (ref 0–0.2)
BASOPHILS NFR BLD AUTO: 0.5 % (ref 0–1.5)
BILIRUB CONJ SERPL-MCNC: <0.2 MG/DL (ref 0–0.3)
BILIRUB INDIRECT SERPL-MCNC: NORMAL MG/DL
BILIRUB SERPL-MCNC: 0.4 MG/DL (ref 0–1.2)
BILIRUB SERPL-MCNC: 0.4 MG/DL (ref 0–1.2)
BILIRUB UR QL STRIP: NEGATIVE
BUN SERPL-MCNC: 16 MG/DL (ref 8–23)
BUN/CREAT SERPL: 17.2 (ref 7–25)
C PNEUM DNA NPH QL NAA+NON-PROBE: NOT DETECTED
CALCIUM SPEC-SCNC: 8.6 MG/DL (ref 8.6–10.5)
CHLORIDE SERPL-SCNC: 91 MMOL/L (ref 98–107)
CLARITY UR: CLEAR
CO2 SERPL-SCNC: 25 MMOL/L (ref 22–29)
COLOR UR: YELLOW
CREAT SERPL-MCNC: 0.88 MG/DL (ref 0.76–1.27)
CREAT SERPL-MCNC: 0.93 MG/DL (ref 0.76–1.27)
D-LACTATE SERPL-SCNC: 0.6 MMOL/L (ref 0.5–2)
DEPRECATED RDW RBC AUTO: 42.4 FL (ref 37–54)
EGFRCR SERPLBLD CKD-EPI 2021: 81 ML/MIN/1.73
EGFRCR SERPLBLD CKD-EPI 2021: 84.8 ML/MIN/1.73
EOSINOPHIL # BLD AUTO: 0 10*3/MM3 (ref 0–0.4)
EOSINOPHIL NFR BLD AUTO: 0.2 % (ref 0.3–6.2)
ERYTHROCYTE [DISTWIDTH] IN BLOOD BY AUTOMATED COUNT: 13.5 % (ref 12.3–15.4)
FLUAV SUBTYP SPEC NAA+PROBE: NOT DETECTED
FLUBV RNA ISLT QL NAA+PROBE: NOT DETECTED
GLOBULIN UR ELPH-MCNC: 2.4 GM/DL
GLUCOSE SERPL-MCNC: 126 MG/DL (ref 65–99)
GLUCOSE UR STRIP-MCNC: NEGATIVE MG/DL
HADV DNA SPEC NAA+PROBE: NOT DETECTED
HCOV 229E RNA SPEC QL NAA+PROBE: NOT DETECTED
HCOV HKU1 RNA SPEC QL NAA+PROBE: NOT DETECTED
HCOV NL63 RNA SPEC QL NAA+PROBE: NOT DETECTED
HCOV OC43 RNA SPEC QL NAA+PROBE: NOT DETECTED
HCT VFR BLD AUTO: 37.4 % (ref 37.5–51)
HGB BLD-MCNC: 12.9 G/DL (ref 13–17.7)
HGB UR QL STRIP.AUTO: ABNORMAL
HMPV RNA NPH QL NAA+NON-PROBE: NOT DETECTED
HOLD SPECIMEN: NORMAL
HPIV1 RNA ISLT QL NAA+PROBE: NOT DETECTED
HPIV2 RNA SPEC QL NAA+PROBE: NOT DETECTED
HPIV3 RNA NPH QL NAA+PROBE: NOT DETECTED
HPIV4 P GENE NPH QL NAA+PROBE: NOT DETECTED
HYALINE CASTS UR QL AUTO: NORMAL /LPF
KETONES UR QL STRIP: ABNORMAL
LEUKOCYTE ESTERASE UR QL STRIP.AUTO: NEGATIVE
LYMPHOCYTES # BLD AUTO: 0.3 10*3/MM3 (ref 0.7–3.1)
LYMPHOCYTES NFR BLD AUTO: 6.2 % (ref 19.6–45.3)
M PNEUMO IGG SER IA-ACNC: NOT DETECTED
MCH RBC QN AUTO: 31.8 PG (ref 26.6–33)
MCHC RBC AUTO-ENTMCNC: 34.5 G/DL (ref 31.5–35.7)
MCV RBC AUTO: 92.2 FL (ref 79–97)
MONOCYTES # BLD AUTO: 1.1 10*3/MM3 (ref 0.1–0.9)
MONOCYTES NFR BLD AUTO: 21.8 % (ref 5–12)
NEUTROPHILS NFR BLD AUTO: 3.5 10*3/MM3 (ref 1.7–7)
NEUTROPHILS NFR BLD AUTO: 71.3 % (ref 42.7–76)
NITRITE UR QL STRIP: NEGATIVE
NRBC BLD AUTO-RTO: 0 /100 WBC (ref 0–0.2)
NT-PROBNP SERPL-MCNC: 1298 PG/ML (ref 0–1800)
PH UR STRIP.AUTO: 5.5 [PH] (ref 5–8)
PLATELET # BLD AUTO: 174 10*3/MM3 (ref 140–450)
PMV BLD AUTO: 7.4 FL (ref 6–12)
POTASSIUM SERPL-SCNC: 3.5 MMOL/L (ref 3.5–5.2)
PROT SERPL-MCNC: 6.7 G/DL (ref 6–8.5)
PROT SERPL-MCNC: 6.7 G/DL (ref 6–8.5)
PROT UR QL STRIP: ABNORMAL
RBC # BLD AUTO: 4.05 10*6/MM3 (ref 4.14–5.8)
RBC # UR STRIP: NORMAL /HPF
REF LAB TEST METHOD: NORMAL
RHINOVIRUS RNA SPEC NAA+PROBE: NOT DETECTED
RSV RNA NPH QL NAA+NON-PROBE: NOT DETECTED
SARS-COV-2 RNA NPH QL NAA+NON-PROBE: DETECTED
SODIUM SERPL-SCNC: 127 MMOL/L (ref 136–145)
SP GR UR STRIP: 1.02 (ref 1–1.03)
SQUAMOUS #/AREA URNS HPF: NORMAL /HPF
TROPONIN T SERPL-MCNC: <0.01 NG/ML (ref 0–0.03)
UROBILINOGEN UR QL STRIP: ABNORMAL
WBC # UR STRIP: NORMAL /HPF
WBC NRBC COR # BLD: 5 10*3/MM3 (ref 3.4–10.8)
WHOLE BLOOD HOLD COAG: NORMAL

## 2022-10-26 PROCEDURE — 0202U NFCT DS 22 TRGT SARS-COV-2: CPT | Performed by: EMERGENCY MEDICINE

## 2022-10-26 PROCEDURE — 25010000002 DEXAMETHASONE PER 1 MG: Performed by: EMERGENCY MEDICINE

## 2022-10-26 PROCEDURE — 81001 URINALYSIS AUTO W/SCOPE: CPT | Performed by: EMERGENCY MEDICINE

## 2022-10-26 PROCEDURE — 71045 X-RAY EXAM CHEST 1 VIEW: CPT

## 2022-10-26 PROCEDURE — 94799 UNLISTED PULMONARY SVC/PX: CPT

## 2022-10-26 PROCEDURE — 94640 AIRWAY INHALATION TREATMENT: CPT

## 2022-10-26 PROCEDURE — P9612 CATHETERIZE FOR URINE SPEC: HCPCS

## 2022-10-26 PROCEDURE — 84484 ASSAY OF TROPONIN QUANT: CPT | Performed by: EMERGENCY MEDICINE

## 2022-10-26 PROCEDURE — 85025 COMPLETE CBC W/AUTO DIFF WBC: CPT | Performed by: EMERGENCY MEDICINE

## 2022-10-26 PROCEDURE — 82248 BILIRUBIN DIRECT: CPT | Performed by: EMERGENCY MEDICINE

## 2022-10-26 PROCEDURE — 80053 COMPREHEN METABOLIC PANEL: CPT | Performed by: EMERGENCY MEDICINE

## 2022-10-26 PROCEDURE — 83605 ASSAY OF LACTIC ACID: CPT

## 2022-10-26 PROCEDURE — 25010000002 HEPARIN (PORCINE) PER 1000 UNITS: Performed by: INTERNAL MEDICINE

## 2022-10-26 PROCEDURE — 36415 COLL VENOUS BLD VENIPUNCTURE: CPT | Performed by: EMERGENCY MEDICINE

## 2022-10-26 PROCEDURE — 82565 ASSAY OF CREATININE: CPT | Performed by: INTERNAL MEDICINE

## 2022-10-26 PROCEDURE — 99285 EMERGENCY DEPT VISIT HI MDM: CPT

## 2022-10-26 PROCEDURE — 87040 BLOOD CULTURE FOR BACTERIA: CPT | Performed by: EMERGENCY MEDICINE

## 2022-10-26 PROCEDURE — 83880 ASSAY OF NATRIURETIC PEPTIDE: CPT | Performed by: EMERGENCY MEDICINE

## 2022-10-26 RX ORDER — HYDRALAZINE HYDROCHLORIDE 20 MG/ML
10 INJECTION INTRAMUSCULAR; INTRAVENOUS EVERY 6 HOURS PRN
Status: DISCONTINUED | OUTPATIENT
Start: 2022-10-26 | End: 2022-10-27 | Stop reason: HOSPADM

## 2022-10-26 RX ORDER — ACETAMINOPHEN 500 MG
1000 TABLET ORAL ONCE
Status: COMPLETED | OUTPATIENT
Start: 2022-10-26 | End: 2022-10-26

## 2022-10-26 RX ORDER — ASPIRIN 81 MG/1
81 TABLET ORAL DAILY
Status: DISCONTINUED | OUTPATIENT
Start: 2022-10-27 | End: 2022-10-27 | Stop reason: HOSPADM

## 2022-10-26 RX ORDER — ISOSORBIDE MONONITRATE 30 MG/1
30 TABLET, EXTENDED RELEASE ORAL DAILY
Status: DISCONTINUED | OUTPATIENT
Start: 2022-10-27 | End: 2022-10-27 | Stop reason: HOSPADM

## 2022-10-26 RX ORDER — ONDANSETRON 4 MG/1
4 TABLET, FILM COATED ORAL EVERY 6 HOURS PRN
Status: DISCONTINUED | OUTPATIENT
Start: 2022-10-26 | End: 2022-10-27 | Stop reason: HOSPADM

## 2022-10-26 RX ORDER — SODIUM CHLORIDE 0.9 % (FLUSH) 0.9 %
10 SYRINGE (ML) INJECTION EVERY 12 HOURS SCHEDULED
Status: DISCONTINUED | OUTPATIENT
Start: 2022-10-26 | End: 2022-10-27 | Stop reason: HOSPADM

## 2022-10-26 RX ORDER — ALBUTEROL SULFATE 90 UG/1
2 AEROSOL, METERED RESPIRATORY (INHALATION) ONCE
Status: COMPLETED | OUTPATIENT
Start: 2022-10-26 | End: 2022-10-26

## 2022-10-26 RX ORDER — HEPARIN SODIUM 5000 [USP'U]/ML
5000 INJECTION, SOLUTION INTRAVENOUS; SUBCUTANEOUS EVERY 8 HOURS SCHEDULED
Status: DISCONTINUED | OUTPATIENT
Start: 2022-10-26 | End: 2022-10-27 | Stop reason: HOSPADM

## 2022-10-26 RX ORDER — FINASTERIDE 5 MG/1
5 TABLET, FILM COATED ORAL DAILY
Status: DISCONTINUED | OUTPATIENT
Start: 2022-10-27 | End: 2022-10-27 | Stop reason: HOSPADM

## 2022-10-26 RX ORDER — DOCUSATE SODIUM 100 MG/1
100 CAPSULE, LIQUID FILLED ORAL 2 TIMES DAILY
COMMUNITY

## 2022-10-26 RX ORDER — DEXAMETHASONE SODIUM PHOSPHATE 4 MG/ML
6 INJECTION, SOLUTION INTRA-ARTICULAR; INTRALESIONAL; INTRAMUSCULAR; INTRAVENOUS; SOFT TISSUE DAILY
Status: DISCONTINUED | OUTPATIENT
Start: 2022-10-27 | End: 2022-10-27 | Stop reason: HOSPADM

## 2022-10-26 RX ORDER — ACETAMINOPHEN 325 MG/1
650 TABLET ORAL EVERY 4 HOURS PRN
Status: DISCONTINUED | OUTPATIENT
Start: 2022-10-26 | End: 2022-10-27 | Stop reason: HOSPADM

## 2022-10-26 RX ORDER — ACETAMINOPHEN 160 MG/5ML
650 SOLUTION ORAL EVERY 4 HOURS PRN
Status: DISCONTINUED | OUTPATIENT
Start: 2022-10-26 | End: 2022-10-27 | Stop reason: HOSPADM

## 2022-10-26 RX ORDER — ATENOLOL 25 MG/1
25 TABLET ORAL EVERY MORNING
Status: DISCONTINUED | OUTPATIENT
Start: 2022-10-27 | End: 2022-10-27 | Stop reason: HOSPADM

## 2022-10-26 RX ORDER — TAMSULOSIN HYDROCHLORIDE 0.4 MG/1
0.4 CAPSULE ORAL NIGHTLY
Status: DISCONTINUED | OUTPATIENT
Start: 2022-10-26 | End: 2022-10-27 | Stop reason: HOSPADM

## 2022-10-26 RX ORDER — ONDANSETRON 2 MG/ML
4 INJECTION INTRAMUSCULAR; INTRAVENOUS EVERY 6 HOURS PRN
Status: DISCONTINUED | OUTPATIENT
Start: 2022-10-26 | End: 2022-10-27 | Stop reason: HOSPADM

## 2022-10-26 RX ORDER — DEXAMETHASONE SODIUM PHOSPHATE 4 MG/ML
4 INJECTION, SOLUTION INTRA-ARTICULAR; INTRALESIONAL; INTRAMUSCULAR; INTRAVENOUS; SOFT TISSUE ONCE
Status: COMPLETED | OUTPATIENT
Start: 2022-10-26 | End: 2022-10-26

## 2022-10-26 RX ORDER — SODIUM CHLORIDE 0.9 % (FLUSH) 0.9 %
10 SYRINGE (ML) INJECTION AS NEEDED
Status: DISCONTINUED | OUTPATIENT
Start: 2022-10-26 | End: 2022-10-27 | Stop reason: HOSPADM

## 2022-10-26 RX ORDER — SODIUM CHLORIDE 9 MG/ML
75 INJECTION, SOLUTION INTRAVENOUS CONTINUOUS
Status: DISCONTINUED | OUTPATIENT
Start: 2022-10-26 | End: 2022-10-27 | Stop reason: HOSPADM

## 2022-10-26 RX ORDER — ACETAMINOPHEN 650 MG/1
650 SUPPOSITORY RECTAL EVERY 4 HOURS PRN
Status: DISCONTINUED | OUTPATIENT
Start: 2022-10-26 | End: 2022-10-27 | Stop reason: HOSPADM

## 2022-10-26 RX ORDER — HYDROCHLOROTHIAZIDE 25 MG/1
25 TABLET ORAL 2 TIMES DAILY
COMMUNITY
End: 2023-02-21

## 2022-10-26 RX ORDER — ATORVASTATIN CALCIUM 20 MG/1
20 TABLET, FILM COATED ORAL DAILY
Status: DISCONTINUED | OUTPATIENT
Start: 2022-10-27 | End: 2022-10-27 | Stop reason: HOSPADM

## 2022-10-26 RX ORDER — NITROGLYCERIN 0.4 MG/1
0.4 TABLET SUBLINGUAL
Status: DISCONTINUED | OUTPATIENT
Start: 2022-10-26 | End: 2022-10-27 | Stop reason: HOSPADM

## 2022-10-26 RX ADMIN — SODIUM CHLORIDE 75 ML/HR: 9 INJECTION, SOLUTION INTRAVENOUS at 21:10

## 2022-10-26 RX ADMIN — HEPARIN SODIUM 5000 UNITS: 5000 INJECTION INTRAVENOUS; SUBCUTANEOUS at 21:11

## 2022-10-26 RX ADMIN — ALBUTEROL SULFATE 2 PUFF: 108 INHALANT RESPIRATORY (INHALATION) at 14:46

## 2022-10-26 RX ADMIN — Medication 10 ML: at 21:13

## 2022-10-26 RX ADMIN — SODIUM CHLORIDE, POTASSIUM CHLORIDE, SODIUM LACTATE AND CALCIUM CHLORIDE 500 ML: 600; 310; 30; 20 INJECTION, SOLUTION INTRAVENOUS at 14:38

## 2022-10-26 RX ADMIN — ACETAMINOPHEN 1000 MG: 500 TABLET ORAL at 16:16

## 2022-10-26 RX ADMIN — DEXAMETHASONE SODIUM PHOSPHATE 4 MG: 4 INJECTION, SOLUTION INTRAMUSCULAR; INTRAVENOUS at 17:56

## 2022-10-26 NOTE — TELEPHONE ENCOUNTER
A user error has taken place: encounter opened in error, closed for administrative reasons.  
Alert-The patient is alert, awake and responds to voice. The patient is oriented to time, place, and person. The triage nurse is able to obtain subjective information.

## 2022-10-27 ENCOUNTER — READMISSION MANAGEMENT (OUTPATIENT)
Dept: CALL CENTER | Facility: HOSPITAL | Age: 82
End: 2022-10-27

## 2022-10-27 VITALS
HEART RATE: 75 BPM | WEIGHT: 206.35 LBS | DIASTOLIC BLOOD PRESSURE: 86 MMHG | OXYGEN SATURATION: 95 % | SYSTOLIC BLOOD PRESSURE: 169 MMHG | RESPIRATION RATE: 18 BRPM | BODY MASS INDEX: 27.35 KG/M2 | HEIGHT: 73 IN | TEMPERATURE: 98.4 F

## 2022-10-27 LAB
ALBUMIN SERPL-MCNC: 3.8 G/DL (ref 3.5–5.2)
ALP SERPL-CCNC: 52 U/L (ref 39–117)
ALT SERPL W P-5'-P-CCNC: 13 U/L (ref 1–41)
AST SERPL-CCNC: 37 U/L (ref 1–40)
BILIRUB CONJ SERPL-MCNC: <0.2 MG/DL (ref 0–0.3)
BILIRUB INDIRECT SERPL-MCNC: NORMAL MG/DL
BILIRUB SERPL-MCNC: 0.3 MG/DL (ref 0–1.2)
CREAT SERPL-MCNC: 0.84 MG/DL (ref 0.76–1.27)
EGFRCR SERPLBLD CKD-EPI 2021: 86 ML/MIN/1.73
PROT SERPL-MCNC: 6.3 G/DL (ref 6–8.5)

## 2022-10-27 PROCEDURE — 80076 HEPATIC FUNCTION PANEL: CPT | Performed by: INTERNAL MEDICINE

## 2022-10-27 PROCEDURE — 94618 PULMONARY STRESS TESTING: CPT

## 2022-10-27 PROCEDURE — 25010000002 DEXAMETHASONE PER 1 MG: Performed by: INTERNAL MEDICINE

## 2022-10-27 PROCEDURE — XW033E5 INTRODUCTION OF REMDESIVIR ANTI-INFECTIVE INTO PERIPHERAL VEIN, PERCUTANEOUS APPROACH, NEW TECHNOLOGY GROUP 5: ICD-10-PCS | Performed by: INTERNAL MEDICINE

## 2022-10-27 PROCEDURE — 97162 PT EVAL MOD COMPLEX 30 MIN: CPT

## 2022-10-27 PROCEDURE — 82565 ASSAY OF CREATININE: CPT | Performed by: INTERNAL MEDICINE

## 2022-10-27 PROCEDURE — 25010000002 HEPARIN (PORCINE) PER 1000 UNITS: Performed by: INTERNAL MEDICINE

## 2022-10-27 PROCEDURE — 25010000002 REMDESIVIR 100 MG RECONSTITUTED SOLUTION: Performed by: INTERNAL MEDICINE

## 2022-10-27 PROCEDURE — 97166 OT EVAL MOD COMPLEX 45 MIN: CPT

## 2022-10-27 RX ADMIN — HEPARIN SODIUM 5000 UNITS: 5000 INJECTION INTRAVENOUS; SUBCUTANEOUS at 06:14

## 2022-10-27 RX ADMIN — ATENOLOL 25 MG: 25 TABLET ORAL at 06:14

## 2022-10-27 RX ADMIN — FINASTERIDE 5 MG: 5 TABLET, FILM COATED ORAL at 00:05

## 2022-10-27 RX ADMIN — ASPIRIN 81 MG: 81 TABLET, COATED ORAL at 08:39

## 2022-10-27 RX ADMIN — REMDESIVIR 200 MG: 100 INJECTION, POWDER, LYOPHILIZED, FOR SOLUTION INTRAVENOUS at 00:05

## 2022-10-27 RX ADMIN — DEXAMETHASONE SODIUM PHOSPHATE 6 MG: 4 INJECTION, SOLUTION INTRAMUSCULAR; INTRAVENOUS at 08:39

## 2022-10-27 RX ADMIN — Medication 10 ML: at 08:41

## 2022-10-27 RX ADMIN — ATORVASTATIN CALCIUM 20 MG: 20 TABLET, FILM COATED ORAL at 08:39

## 2022-10-27 RX ADMIN — TAMSULOSIN HYDROCHLORIDE 0.4 MG: 0.4 CAPSULE ORAL at 00:05

## 2022-10-27 RX ADMIN — ISOSORBIDE MONONITRATE 30 MG: 30 TABLET, EXTENDED RELEASE ORAL at 00:05

## 2022-10-27 NOTE — OUTREACH NOTE
Prep Survey    Flowsheet Row Responses   Congregational facility patient discharged from? [facility]   Is LACE score < 7 ? Yes   Emergency Room discharge w/ pulse ox? No   Eligibility [facility]   Date of Admission 10/26/22   Date of Discharge 10/27/22   Discharge Disposition Home or Self Care   Discharge diagnosis Pneumonia due to COVID-19 virus   Does the patient have one of the following disease processes/diagnoses(primary or secondary)? COVID-19   Does the patient have Home health ordered? No   Is there a DME ordered? No   Prep survey completed? Yes          PABLO TOMLINSON - Registered Nurse

## 2022-10-28 ENCOUNTER — TRANSITIONAL CARE MANAGEMENT TELEPHONE ENCOUNTER (OUTPATIENT)
Dept: CALL CENTER | Facility: HOSPITAL | Age: 82
End: 2022-10-28

## 2022-10-28 NOTE — OUTREACH NOTE
Call Center TCM Note    Flowsheet Row Responses   [facility] patient discharged from? [facility]   Does the patient have one of the following disease processes/diagnoses(primary or secondary)? COVID-19   COVID-19 underlying condition? None   TCM attempt successful? Yes   Call start time 1435   Call end time 1439   Discharge diagnosis Pneumonia due to COVID-19 virus   Meds reviewed with patient/caregiver? Yes   Is the patient having any side effects they believe may be caused by any medication additions or changes? No   Does the patient have all medications ordered at discharge? N/A   Is the patient taking all medications as directed (includes completed medication regime)? Yes   Comments Hospital d/c f/u appt is on 11/1/22 at 1:30 pm   Does the patient have an appointment with their PCP within 7 days of discharge? Yes   Psychosocial issues? No   Did the patient receive a copy of their discharge instructions? Yes   Did the patient receive a copy of COVID-19 specific instructions? Yes   Nursing interventions Reviewed instructions with patient   What is the patient's perception of their health status since discharge? Improving   Does the patient have any of the following symptoms? None   Pulse Ox monitoring Intermittent   O2 Sat comments Can check but has not   Is the patient/caregiver able to teach back steps to recovery at home? Rest and rebuild strength, gradually increase activity, Eat a well-balance diet   If the patient is a current smoker, are they able to teach back resources for cessation? Not a smoker   Is the patient/caregiver able to teach back the hierarchy of who to call/visit for symptoms/problems? PCP, Specialist, Home health nurse, Urgent Care, ED, 911 Yes   TCM call completed? Yes   Call end time 1439   Would this patient benefit from a Referral to Amb Social Work? No   Is the patient interested in additional calls from an ambulatory ?  NOTE:  applies to high risk patients requiring  additional follow-up. Yumiko Mai RN    10/28/2022, 14:40 EDT

## 2022-10-31 LAB
BACTERIA SPEC AEROBE CULT: NORMAL
BACTERIA SPEC AEROBE CULT: NORMAL

## 2022-11-01 ENCOUNTER — OFFICE VISIT (OUTPATIENT)
Dept: FAMILY MEDICINE CLINIC | Facility: CLINIC | Age: 82
End: 2022-11-01

## 2022-11-01 VITALS
DIASTOLIC BLOOD PRESSURE: 77 MMHG | RESPIRATION RATE: 15 BRPM | TEMPERATURE: 96.9 F | SYSTOLIC BLOOD PRESSURE: 137 MMHG | HEIGHT: 73 IN | BODY MASS INDEX: 26.9 KG/M2 | OXYGEN SATURATION: 95 % | WEIGHT: 203 LBS | HEART RATE: 67 BPM

## 2022-11-01 DIAGNOSIS — U07.1 COVID-19: ICD-10-CM

## 2022-11-01 DIAGNOSIS — J12.82 PNEUMONIA DUE TO COVID-19 VIRUS: Primary | ICD-10-CM

## 2022-11-01 DIAGNOSIS — U07.1 PNEUMONIA DUE TO COVID-19 VIRUS: Primary | ICD-10-CM

## 2022-11-01 PROCEDURE — 99495 TRANSJ CARE MGMT MOD F2F 14D: CPT | Performed by: FAMILY MEDICINE

## 2022-11-01 PROCEDURE — 1111F DSCHRG MED/CURRENT MED MERGE: CPT | Performed by: FAMILY MEDICINE

## 2022-11-01 NOTE — PATIENT INSTRUCTIONS
Continue your current medications and treatment.    Gradually resume your regular activities.    Follow up in the office in 3 months.

## 2022-11-01 NOTE — PROGRESS NOTES
"Subjective   Campbell Alex is a 82 y.o. male.     Chief Complaint   Patient presents with   • Exposure To Known Illness     Hospital follow up       HPI  Chief complaint: COVID infection    The patient is an 82-year-old white male comes in for hospital follow-up.    Patient was recently hospitalized with COVID infection.  Patient was treated with corticosteroids and antivirals bronchodilators and oxygen.  Patient was also given IV fluids.  With treatment the patient is improved.  He still has some sinus congestion and cough.    Patient's current problems include hypertension hyperlipidemia coronary artery disease type 2 diabetes mellitus and psoriasis.      The following portions of the patient's history were reviewed and updated as appropriate: allergies, current medications, past family history, past medical history, past social history, past surgical history and problem list.    Review of Systems    Objective     /77 (BP Location: Right arm, Patient Position: Sitting, Cuff Size: Large Adult)   Pulse 67   Temp 96.9 °F (36.1 °C) (Infrared)   Resp 15   Ht 185.4 cm (73\")   Wt 92.1 kg (203 lb)   SpO2 95%   BMI 26.78 kg/m²     Physical Exam  Vitals and nursing note reviewed.   Constitutional:       Appearance: He is well-developed.   HENT:      Head: Normocephalic and atraumatic.      Right Ear: Tympanic membrane normal.      Left Ear: Tympanic membrane normal.      Nose: Nose normal.      Mouth/Throat:      Mouth: Mucous membranes are moist.      Pharynx: Oropharynx is clear.   Eyes:      Pupils: Pupils are equal, round, and reactive to light.   Cardiovascular:      Rate and Rhythm: Normal rate and regular rhythm.      Pulses: Normal pulses.      Heart sounds: Normal heart sounds. No murmur heard.    No friction rub. No gallop.   Pulmonary:      Effort: Pulmonary effort is normal.      Breath sounds: Normal breath sounds.   Abdominal:      General: Bowel sounds are normal.      Palpations: Abdomen is " soft.   Musculoskeletal:         General: Normal range of motion.      Cervical back: Neck supple.   Skin:     General: Skin is warm and dry.   Neurological:      Mental Status: He is oriented to person, place, and time.   Psychiatric:         Behavior: Behavior normal.         Thought Content: Thought content normal.         Judgment: Judgment normal.         XR Chest 1 View (10/26/2022 14:51)  Respiratory Panel PCR w/COVID-19(SARS-CoV-2) TOBY/BELGICA/MERNA/PAD/COR/MAD/JOANN In-House, NP Swab in UTM/VTM, 3-4 HR TAT - Swab, Nasopharynx (10/26/2022 14:35)  Extra Tubes (10/26/2022 14:31)  BNP (10/26/2022 14:31)  Troponin (10/26/2022 14:31)  Blood Culture - Blood, Arm, Left (10/26/2022 14:31)  Comprehensive Metabolic Panel (10/26/2022 14:31)  CBC & Differential (10/26/2022 14:31)    Assessment & Plan   Diagnoses and all orders for this visit:    1. Pneumonia due to COVID-19 virus (Primary)    2. COVID-19      Patient Instructions   Continue your current medications and treatment.    Gradually resume your regular activities.    Follow up in the office in 3 months.      Amarjit Jurado Jr., MD    11/01/22

## 2022-11-22 ENCOUNTER — CLINICAL SUPPORT (OUTPATIENT)
Dept: FAMILY MEDICINE CLINIC | Facility: CLINIC | Age: 82
End: 2022-11-22

## 2022-11-22 DIAGNOSIS — Z23 NEED FOR PROPHYLACTIC VACCINATION AGAINST STREPTOCOCCUS PNEUMONIAE (PNEUMOCOCCUS): Primary | ICD-10-CM

## 2022-11-22 PROCEDURE — G0009 ADMIN PNEUMOCOCCAL VACCINE: HCPCS | Performed by: NURSE PRACTITIONER

## 2022-11-22 PROCEDURE — 90670 PCV13 VACCINE IM: CPT | Performed by: NURSE PRACTITIONER

## 2022-12-02 ENCOUNTER — TELEPHONE (OUTPATIENT)
Dept: FAMILY MEDICINE CLINIC | Facility: CLINIC | Age: 82
End: 2022-12-02

## 2022-12-02 NOTE — TELEPHONE ENCOUNTER
Caller: Campbell Alex    Relationship: Self    Best call back number: [phone]    What medication are you requesting: WHATEVER HIS PCP RECOMMENDS    What are your current symptoms: CLOGGED OR CONGESTED RIGHT EAR    How long have you been experiencing symptoms: A DEW DAYS NOW     Have you had these symptoms before:    [] Yes  [x] No    Have you been treated for these symptoms before:   [] Yes  [x] No    If a prescription is needed, what is your preferred pharmacy and phone number: [facility] [id number] - [place], [place] - [address] [place] [address] - [phone] [facility] [phone] FX

## 2022-12-06 RX ORDER — ISOSORBIDE MONONITRATE 30 MG/1
TABLET, EXTENDED RELEASE ORAL
Qty: 90 TABLET | Refills: 3 | Status: SHIPPED | OUTPATIENT
Start: 2022-12-06

## 2022-12-07 ENCOUNTER — OFFICE VISIT (OUTPATIENT)
Dept: FAMILY MEDICINE CLINIC | Facility: CLINIC | Age: 82
End: 2022-12-07

## 2022-12-07 VITALS
TEMPERATURE: 98.4 F | WEIGHT: 205 LBS | HEIGHT: 73 IN | HEART RATE: 73 BPM | OXYGEN SATURATION: 97 % | BODY MASS INDEX: 27.17 KG/M2

## 2022-12-07 DIAGNOSIS — J01.00 ACUTE NON-RECURRENT MAXILLARY SINUSITIS: Primary | ICD-10-CM

## 2022-12-07 DIAGNOSIS — H65.113 ACUTE MUCOID OTITIS MEDIA OF BOTH EARS: ICD-10-CM

## 2022-12-07 PROCEDURE — 99213 OFFICE O/P EST LOW 20 MIN: CPT | Performed by: FAMILY MEDICINE

## 2022-12-07 RX ORDER — DOXYCYCLINE HYCLATE 100 MG/1
100 CAPSULE ORAL 2 TIMES DAILY
Qty: 14 CAPSULE | Refills: 0 | Status: SHIPPED | OUTPATIENT
Start: 2022-12-07 | End: 2023-02-07

## 2022-12-07 NOTE — PROGRESS NOTES
"Subjective   Campbell Alex is a 82 y.o. male.     Chief Complaint   Patient presents with   • Hearing Problem     Difficulty hearing       HPI  Chief Complaint: Decreased hearing    The patient is an 82-year-old white male states that he has had sinus congestion and drainage and cough since he had COVID in November.  Patient denied fever or chills.  He states however over the past several days he has developed more difficulty hearing.  He wonders whether he has earwax in his ears.  Denies ear pain.  He denied dizziness or vertigo.      The following portions of the patient's history were reviewed and updated as appropriate: allergies, current medications, past family history, past medical history, past social history, past surgical history and problem list.    Review of Systems    Objective     Pulse 73   Temp 98.4 °F (36.9 °C) (Infrared)   Ht 185.4 cm (73\")   Wt 93 kg (205 lb)   SpO2 97%   BMI 27.05 kg/m²     Physical Exam  Vitals and nursing note reviewed.   Constitutional:       Appearance: He is well-developed.   HENT:      Head: Normocephalic and atraumatic.      Right Ear: A middle ear effusion is present.      Left Ear: A middle ear effusion is present.   Eyes:      Pupils: Pupils are equal, round, and reactive to light.   Cardiovascular:      Rate and Rhythm: Normal rate and regular rhythm.      Heart sounds: Normal heart sounds.   Pulmonary:      Effort: Pulmonary effort is normal.      Breath sounds: Normal breath sounds.   Abdominal:      General: Bowel sounds are normal.      Palpations: Abdomen is soft.   Musculoskeletal:         General: Normal range of motion.      Cervical back: Neck supple.   Skin:     General: Skin is warm and dry.   Neurological:      Mental Status: He is alert and oriented to person, place, and time.   Psychiatric:         Behavior: Behavior normal.         Thought Content: Thought content normal.         Judgment: Judgment normal.           Assessment & Plan   Diagnoses and " all orders for this visit:    1. Acute non-recurrent maxillary sinusitis (Primary)-the patient's been to be treated with doxycycline.  He is to continue with his decongestants and antihistamines as needed.    2. Acute mucoid otitis media of both ears-the patient did not have earwax.  Patient does have middle ear effusion.  Patient is good to be treated with doxycycline.      Patient Instructions   Continue your current medications and treatment.    Take the anti-biotics as prescribed.    Follow up in the office in 7-10 days.      Amarjit Jurado Jr., MD    12/07/22

## 2022-12-07 NOTE — PATIENT INSTRUCTIONS
Continue your current medications and treatment.    Take the anti-biotics as prescribed.    Follow up in the office in 7-10 days.

## 2022-12-09 ENCOUNTER — TELEPHONE (OUTPATIENT)
Dept: FAMILY MEDICINE CLINIC | Facility: CLINIC | Age: 82
End: 2022-12-09

## 2022-12-09 NOTE — TELEPHONE ENCOUNTER
Caller: Campbell Alex    Relationship: Self    Best call back number: [phone]    What is the best time to reach you: ANYTIME    Who are you requesting to speak with (clinical staff, provider,  specific staff member): CLINICAL    What was the call regarding: PATIENT STATES THAT HE WAS PRESCRIBED DOXYCYCLINE FOR HIS SINUS ISSUES AND TO REPORT ANY SIDE EFFECTS AND PATIENT STATES HE HAS AHD DIARRHEA FOR THREE DAYS AS OF 12/9/22.

## 2022-12-12 NOTE — TELEPHONE ENCOUNTER
PT CALLED BACK, IS STILL HAVING ISSUES AND WANTS TO KNOW WHAT HE SHOULD DO SINCE HIS F/U IS NOT UNTIL 12/30/22. PT STATED THE DIARRHEA HAS STOPPED, BUT HE IS STILL HAVING A LOT OF HEARING ISSUES. PLEASE ADVISE.     PT CALL BACK: [phone]

## 2022-12-30 ENCOUNTER — OFFICE VISIT (OUTPATIENT)
Dept: FAMILY MEDICINE CLINIC | Facility: CLINIC | Age: 82
End: 2022-12-30

## 2022-12-30 VITALS
HEART RATE: 67 BPM | BODY MASS INDEX: 27.04 KG/M2 | HEIGHT: 73 IN | OXYGEN SATURATION: 98 % | TEMPERATURE: 97.8 F | DIASTOLIC BLOOD PRESSURE: 77 MMHG | SYSTOLIC BLOOD PRESSURE: 119 MMHG | WEIGHT: 204 LBS

## 2022-12-30 DIAGNOSIS — H65.23 BILATERAL CHRONIC SEROUS OTITIS MEDIA: ICD-10-CM

## 2022-12-30 DIAGNOSIS — J01.00 SUBACUTE MAXILLARY SINUSITIS: Primary | ICD-10-CM

## 2022-12-30 PROCEDURE — 99213 OFFICE O/P EST LOW 20 MIN: CPT | Performed by: FAMILY MEDICINE

## 2022-12-30 RX ORDER — BENZONATATE 200 MG/1
200 CAPSULE ORAL 3 TIMES DAILY PRN
Qty: 21 CAPSULE | Refills: 1 | Status: SHIPPED | OUTPATIENT
Start: 2022-12-30 | End: 2023-02-07

## 2022-12-30 NOTE — PATIENT INSTRUCTIONS
Continue your current medications and treatment.    Follow up in the office at your next appointment.    Follow up in with the ENT fir possible PE tubes.

## 2022-12-30 NOTE — PROGRESS NOTES
"Subjective   Campbell Alex is a 82 y.o. male.     Chief Complaint   Patient presents with   • Hearing Problem     1 week f/u       HPI  Chief complaint: Sinusitis/otitis media    The patient is an 82-year-old white male comes in for follow-up of his otitis media and sinusitis.  Patient was seen by me 2 to 3 weeks ago for persistent sinus congestion drainage and decreased hearing.  Patient had bilateral otitis media and maxillary sinusitis.  Patient was treated with doxycycline and saline nasal spray in addition to his Flonase.  Patient states that it helped some.  He went to see ear ear nose and throat doctor last week who prescribed steroids.  He states that the steroids also helped some.  He still however has difficulty hearing.  The patient is having some episodes of cough at night.        The following portions of the patient's history were reviewed and updated as appropriate: allergies, current medications, past family history, past medical history, past social history, past surgical history and problem list.    Review of Systems    Objective     /77 (BP Location: Left arm, Patient Position: Sitting, Cuff Size: Adult)   Pulse 67   Temp 97.8 °F (36.6 °C) (Infrared)   Ht 185.4 cm (73\")   Wt 92.5 kg (204 lb)   SpO2 98%   BMI 26.91 kg/m²     Physical Exam  Vitals and nursing note reviewed.   Constitutional:       Appearance: He is well-developed.   HENT:      Head: Normocephalic and atraumatic.      Right Ear: External ear normal. Decreased hearing noted. A middle ear effusion is present.      Left Ear: External ear normal. Decreased hearing noted. A middle ear effusion is present.   Eyes:      Pupils: Pupils are equal, round, and reactive to light.   Cardiovascular:      Rate and Rhythm: Normal rate and regular rhythm.      Heart sounds: Normal heart sounds.   Pulmonary:      Effort: Pulmonary effort is normal.      Breath sounds: Normal breath sounds.   Abdominal:      General: Bowel sounds are " normal.      Palpations: Abdomen is soft.   Musculoskeletal:         General: Normal range of motion.      Cervical back: Neck supple.   Skin:     General: Skin is warm and dry.   Neurological:      Mental Status: He is oriented to person, place, and time.   Psychiatric:         Behavior: Behavior normal.         Thought Content: Thought content normal.         Judgment: Judgment normal.           Assessment & Plan   Diagnoses and all orders for this visit:    1. Subacute maxillary sinusitis (Primary) sinusitis has resolved.  I recommended no further antibiotic therapy.    2. Bilateral chronic serous otitis media-the patient still has serous otitis media.  Patient is to follow-up with ENT.  He may need PE tubes to help with the middle ear effusion.    Other orders  -     benzonatate (TESSALON) 200 MG capsule; Take 1 capsule by mouth 3 (Three) Times a Day As Needed for Cough.  Dispense: 21 capsule; Refill: 1      Patient Instructions   Continue your current medications and treatment.    Follow up in the office at your next appointment.    Follow up in with the ENT fir possible PE tubes.      Amarjit Jurado Jr., MD    12/30/22

## 2023-01-06 ENCOUNTER — OFFICE (OUTPATIENT)
Dept: URBAN - METROPOLITAN AREA CLINIC 64 | Facility: CLINIC | Age: 83
End: 2023-01-06
Payer: COMMERCIAL

## 2023-01-06 VITALS
WEIGHT: 207 LBS | SYSTOLIC BLOOD PRESSURE: 116 MMHG | DIASTOLIC BLOOD PRESSURE: 68 MMHG | HEART RATE: 64 BPM | HEIGHT: 72 IN

## 2023-01-06 DIAGNOSIS — R15.9 FULL INCONTINENCE OF FECES: ICD-10-CM

## 2023-01-06 DIAGNOSIS — K59.00 CONSTIPATION, UNSPECIFIED: ICD-10-CM

## 2023-01-06 PROCEDURE — 99212 OFFICE O/P EST SF 10 MIN: CPT | Performed by: NURSE PRACTITIONER

## 2023-02-06 ENCOUNTER — TELEPHONE (OUTPATIENT)
Dept: FAMILY MEDICINE CLINIC | Facility: CLINIC | Age: 83
End: 2023-02-06
Payer: MEDICARE

## 2023-02-06 RX ORDER — CLONIDINE HYDROCHLORIDE 0.2 MG/1
0.2 TABLET ORAL 2 TIMES DAILY
Qty: 60 TABLET | Refills: 5 | Status: SHIPPED | OUTPATIENT
Start: 2023-02-06

## 2023-02-07 ENCOUNTER — OFFICE VISIT (OUTPATIENT)
Dept: FAMILY MEDICINE CLINIC | Facility: CLINIC | Age: 83
End: 2023-02-07
Payer: MEDICARE

## 2023-02-07 VITALS
DIASTOLIC BLOOD PRESSURE: 79 MMHG | SYSTOLIC BLOOD PRESSURE: 136 MMHG | BODY MASS INDEX: 27.43 KG/M2 | TEMPERATURE: 98.2 F | HEART RATE: 68 BPM | WEIGHT: 207 LBS | HEIGHT: 73 IN | RESPIRATION RATE: 16 BRPM | OXYGEN SATURATION: 98 %

## 2023-02-07 DIAGNOSIS — I10 PRIMARY HYPERTENSION: Primary | ICD-10-CM

## 2023-02-07 DIAGNOSIS — E78.2 MIXED HYPERLIPIDEMIA: Chronic | ICD-10-CM

## 2023-02-07 DIAGNOSIS — I25.10 CORONARY ARTERY DISEASE INVOLVING NATIVE CORONARY ARTERY OF NATIVE HEART WITHOUT ANGINA PECTORIS: Chronic | ICD-10-CM

## 2023-02-07 DIAGNOSIS — N40.0 ENLARGED PROSTATE WITHOUT LOWER URINARY TRACT SYMPTOMS (LUTS): Chronic | ICD-10-CM

## 2023-02-07 DIAGNOSIS — E11.9 TYPE 2 DIABETES MELLITUS WITHOUT COMPLICATION, WITHOUT LONG-TERM CURRENT USE OF INSULIN: Chronic | ICD-10-CM

## 2023-02-07 DIAGNOSIS — J30.89 NON-SEASONAL ALLERGIC RHINITIS DUE TO OTHER ALLERGIC TRIGGER: ICD-10-CM

## 2023-02-07 DIAGNOSIS — M19.90 OSTEOARTHRITIS, UNSPECIFIED OSTEOARTHRITIS TYPE, UNSPECIFIED SITE: Chronic | ICD-10-CM

## 2023-02-07 PROBLEM — K64.9 HEMORRHOIDS: Status: RESOLVED | Noted: 2023-02-07 | Resolved: 2023-02-07

## 2023-02-07 PROBLEM — N20.0 KIDNEY STONES: Status: ACTIVE | Noted: 2023-02-07

## 2023-02-07 PROBLEM — K58.9 IRRITABLE BOWEL SYNDROME: Status: ACTIVE | Noted: 2023-02-07

## 2023-02-07 PROBLEM — M53.3 SACROILIAC JOINT PAIN: Status: ACTIVE | Noted: 2022-06-30

## 2023-02-07 PROBLEM — R15.9 FECAL INCONTINENCE: Status: ACTIVE | Noted: 2021-03-18

## 2023-02-07 PROBLEM — K63.5 COLON POLYPS: Status: ACTIVE | Noted: 2023-02-07

## 2023-02-07 PROBLEM — R14.3 FLATULENCE: Status: RESOLVED | Noted: 2023-02-07 | Resolved: 2023-02-07

## 2023-02-07 PROBLEM — K64.9 HEMORRHOIDS: Status: ACTIVE | Noted: 2023-02-07

## 2023-02-07 PROBLEM — M48.061 SPINAL STENOSIS OF LUMBAR REGION: Status: ACTIVE | Noted: 2021-12-17

## 2023-02-07 PROBLEM — U07.1 PNEUMONIA DUE TO COVID-19 VIRUS: Status: RESOLVED | Noted: 2022-10-26 | Resolved: 2023-02-07

## 2023-02-07 PROBLEM — U07.1 COVID-19: Status: RESOLVED | Noted: 2022-10-26 | Resolved: 2023-02-07

## 2023-02-07 PROBLEM — J12.82 PNEUMONIA DUE TO COVID-19 VIRUS: Status: RESOLVED | Noted: 2022-10-26 | Resolved: 2023-02-07

## 2023-02-07 PROBLEM — R15.9 FECAL INCONTINENCE: Status: RESOLVED | Noted: 2021-03-18 | Resolved: 2023-02-07

## 2023-02-07 PROBLEM — D64.9 ANEMIA: Status: ACTIVE | Noted: 2023-02-07

## 2023-02-07 PROBLEM — K44.9 HIATAL HERNIA: Status: ACTIVE | Noted: 2023-02-07

## 2023-02-07 PROBLEM — M54.9 BACK PAIN: Status: ACTIVE | Noted: 2023-02-07

## 2023-02-07 PROBLEM — R14.3 FLATULENCE: Status: ACTIVE | Noted: 2023-02-07

## 2023-02-07 PROBLEM — J30.9 ALLERGIC RHINITIS DUE TO ALLERGEN: Status: ACTIVE | Noted: 2023-02-07

## 2023-02-07 PROCEDURE — 99214 OFFICE O/P EST MOD 30 MIN: CPT | Performed by: FAMILY MEDICINE

## 2023-02-07 RX ORDER — AZELASTINE 1 MG/ML
2 SPRAY, METERED NASAL 2 TIMES DAILY
Qty: 30 ML | Refills: 3 | Status: SHIPPED | OUTPATIENT
Start: 2023-02-07

## 2023-02-07 RX ORDER — FLUTICASONE PROPIONATE 50 MCG
2 SPRAY, SUSPENSION (ML) NASAL DAILY
Qty: 16 G | Refills: 3 | Status: SHIPPED | OUTPATIENT
Start: 2023-02-07

## 2023-02-07 NOTE — PATIENT INSTRUCTIONS
Continue your current medications and treatment.    Try the Flonase and Astelin for the allergies.    Have the follow up labs done and call for results.    Follow up in the office in 3 months.

## 2023-02-07 NOTE — PROGRESS NOTES
"Subjective   Campbell Alex is a 83 y.o. male.     Chief Complaint   Patient presents with   • Diabetes     3 mth f/u   • Hypertension   • Hyperlipidemia       HPI chief complaint: Hypertension hyperlipidemia type 2 diabetes mellitus coronary artery disease allergic rhinitis prostatism    The patient is an 83-year-old white male comes in for follow-up and maintenance of his current problems which include    1.  Hypertension-stable-patient currently taking clonidine 0.2 mg twice a day and hydrochlorothiazide 25 mg twice a day and atenolol 25 mg daily and losartan 100 mg daily.  Patient's blood pressure stable.    2.  Hyperlipidemia-stable have the patient on Lipitor 20 mg daily.  He denies myalgias and arthralgias.    3.  Coronary artery disease-stable-patient is current on aspirin 81 mg daily long-acting nitroglycerin 30 mg daily and atenolol.  Denies chest pain shortness of breath orthopnea or PND.      4.  type 2 diabetes mellitus-stable-the patient is currently on a diet.  His A1c has been at goal.    5.  Prostatism-stable-patient is currently on Flomax 0.4 mg daily and Proscar 5 mg nightly.  He denies urinary symptoms.    6.  Allergic rhinitis-deteriorated-patient complains of constant sinus congestion and drainage.  He recently saw ENT who treated him with corticosteroid.  He states that that helped.  He is not on Flonase or Astelin nasal spray.        The following portions of the patient's history were reviewed and updated as appropriate: allergies, current medications, past family history, past medical history, past social history, past surgical history and problem list.    Review of Systems    Objective     /79 (BP Location: Right arm, Patient Position: Sitting, Cuff Size: Large Adult)   Pulse 68   Temp 98.2 °F (36.8 °C) (Oral)   Resp 16   Ht 185.4 cm (73\")   Wt 93.9 kg (207 lb)   SpO2 98%   BMI 27.31 kg/m²     Physical Exam  Vitals and nursing note reviewed.   Constitutional:       Appearance: " He is well-developed.   HENT:      Head: Normocephalic and atraumatic.   Eyes:      Pupils: Pupils are equal, round, and reactive to light.   Cardiovascular:      Rate and Rhythm: Normal rate and regular rhythm.      Pulses: Normal pulses.      Heart sounds: Normal heart sounds. No murmur heard.    No friction rub. No gallop.   Pulmonary:      Effort: Pulmonary effort is normal.      Breath sounds: Normal breath sounds.   Abdominal:      General: Bowel sounds are normal.      Palpations: Abdomen is soft.   Musculoskeletal:         General: Normal range of motion.      Cervical back: Neck supple.   Skin:     General: Skin is warm and dry.   Neurological:      Mental Status: He is alert and oriented to person, place, and time.   Psychiatric:         Behavior: Behavior normal.         Thought Content: Thought content normal.         Judgment: Judgment normal.           Assessment & Plan   Diagnoses and all orders for this visit:    1. Primary hypertension (Primary)    2. Mixed hyperlipidemia    3. Coronary artery disease involving native coronary artery of native heart without angina pectoris    4. Type 2 diabetes mellitus without complication, without long-term current use of insulin (HCC)    5. Enlarged prostate without lower urinary tract symptoms (luts)    6. Osteoarthritis, unspecified osteoarthritis type, unspecified site    7. Non-seasonal allergic rhinitis due to other allergic trigger    Other orders  -     fluticasone (FLONASE) 50 MCG/ACT nasal spray; 2 sprays into the nostril(s) as directed by provider Daily.  Dispense: 16 g; Refill: 3  -     azelastine (ASTELIN) 0.1 % nasal spray; 2 sprays into the nostril(s) as directed by provider 2 (Two) Times a Day. Use in each nostril as directed  Dispense: 30 mL; Refill: 3      Patient Instructions   Continue your current medications and treatment.    Try the Flonase and Astelin for the allergies.    Have the follow up labs done and call for results.    Follow up in  the office in 3 months.       Amarjit Jurado Jr., MD    02/07/23

## 2023-02-21 RX ORDER — HYDROCHLOROTHIAZIDE 25 MG/1
TABLET ORAL
Qty: 90 TABLET | Refills: 1 | Status: SHIPPED | OUTPATIENT
Start: 2023-02-21

## 2023-03-21 ENCOUNTER — OFFICE (OUTPATIENT)
Dept: URBAN - METROPOLITAN AREA CLINIC 64 | Facility: CLINIC | Age: 83
End: 2023-03-21
Payer: COMMERCIAL

## 2023-03-21 VITALS
DIASTOLIC BLOOD PRESSURE: 72 MMHG | HEART RATE: 57 BPM | HEIGHT: 72 IN | WEIGHT: 206 LBS | SYSTOLIC BLOOD PRESSURE: 139 MMHG

## 2023-03-21 DIAGNOSIS — R19.4 CHANGE IN BOWEL HABIT: ICD-10-CM

## 2023-03-21 DIAGNOSIS — R15.9 FULL INCONTINENCE OF FECES: ICD-10-CM

## 2023-03-21 PROCEDURE — 99214 OFFICE O/P EST MOD 30 MIN: CPT | Performed by: NURSE PRACTITIONER

## 2023-03-21 RX ORDER — DICYCLOMINE HYDROCHLORIDE 10 MG/1
20 CAPSULE ORAL
Qty: 60 | Refills: 6 | Status: COMPLETED
Start: 2023-03-21 | End: 2024-04-04

## 2023-04-13 PROCEDURE — 88305 TISSUE EXAM BY PATHOLOGIST: CPT | Performed by: OTOLARYNGOLOGY

## 2023-04-14 ENCOUNTER — LAB REQUISITION (OUTPATIENT)
Dept: LAB | Facility: HOSPITAL | Age: 83
End: 2023-04-14
Payer: MEDICARE

## 2023-04-14 DIAGNOSIS — C44.310 BASAL CELL CARCINOMA OF SKIN OF UNSPECIFIED PARTS OF FACE: ICD-10-CM

## 2023-05-09 ENCOUNTER — OFFICE (OUTPATIENT)
Dept: URBAN - METROPOLITAN AREA CLINIC 64 | Facility: CLINIC | Age: 83
End: 2023-05-09
Payer: COMMERCIAL

## 2023-05-09 VITALS
DIASTOLIC BLOOD PRESSURE: 82 MMHG | WEIGHT: 200.2 LBS | HEART RATE: 56 BPM | HEIGHT: 72 IN | SYSTOLIC BLOOD PRESSURE: 144 MMHG

## 2023-05-09 DIAGNOSIS — Z79.82 LONG TERM (CURRENT) USE OF ASPIRIN: ICD-10-CM

## 2023-05-09 DIAGNOSIS — F17.200 NICOTINE DEPENDENCE, UNSPECIFIED, UNCOMPLICATED: ICD-10-CM

## 2023-05-09 DIAGNOSIS — R12 HEARTBURN: ICD-10-CM

## 2023-05-09 DIAGNOSIS — R19.7 DIARRHEA, UNSPECIFIED: ICD-10-CM

## 2023-05-09 DIAGNOSIS — F10.10 ALCOHOL ABUSE, UNCOMPLICATED: ICD-10-CM

## 2023-05-09 DIAGNOSIS — R15.9 FULL INCONTINENCE OF FECES: ICD-10-CM

## 2023-05-09 PROCEDURE — 99212 OFFICE O/P EST SF 10 MIN: CPT | Performed by: NURSE PRACTITIONER

## 2023-05-11 NOTE — PROGRESS NOTES
Encounter Date:05/24/2023  Last seen 9/26/2022      Patient ID: Campbell Alex is a 83 y.o. male.    Chief Complaint:    Status post stent  Hypertension  Dyslipidemia        History of Present Illness  Patient is having right leg discomfort.    Since I have last seen, the patient has been without any chest discomfort ,shortness of breath, palpitations, dizziness or syncope.  Denies having any headache ,abdominal pain ,nausea, vomiting , diarrhea constipation, loss of weight or loss of appetite.  Denies having any excessive bruising ,hematuria or blood in the stool.    Review of all systems negative except as indicated.    Reviewed ROS.    Assessment and Plan         //////////////////////  History  ============   -status post right coronary artery stent placement (2005 ).      Cardiac catheterization 03/10/2017 revealed normal left ventricular function.  Normal left main coronary artery. 50% lad distal to diagonal branch.  Ostial diagonal branch has 80-90% disease (moderate size vessel) RCA has 30-40% plaquing.  RCA stent was   patent.  One of the PDA branches had 70% disease at its ostium.     Stress Cardiolite test is negative for myocardial ischemia 08/01/2016.     -palpitations improved.  Holter monitor 01/18/2016 showed occasional premature atrial and ventricular contractions.       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       -allergy to darvon codeine Relafen and IVP dye  ===============   Plan  =============  Status post stent  EKG showed sinus rhythm without ischemic changes.-5/24/2023  Patient is not having any angina pectoris or congestive heart failure on medical treatment.     Hypertension-  well-controlled.  136/81  Continue atenolol losartan and hydrochlorothiazide.     Dyslipidemia-continue atorvastatin     medications were  reviewed and updated.     followup in the office in 6 months .    Further plan will depend on patient's progress.  /////////////////////////////                           Diagnosis Plan   1. Status post coronary artery stent placement        2. Essential hypertension        3. Mixed hyperlipidemia        4. Coronary artery disease involving native coronary artery of native heart without angina pectoris        LAB RESULTS (LAST 7 DAYS)    CBC        BMP        CMP         BNP        TROPONIN        CoAg        Creatinine Clearance  CrCl cannot be calculated (Patient's most recent lab result is older than the maximum 30 days allowed.).    ABG        Radiology  No radiology results for the last day                The following portions of the patient's history were reviewed and updated as appropriate: allergies, current medications, past family history, past medical history, past social history, past surgical history and problem list.    Review of Systems   Constitutional: Negative for malaise/fatigue.   Cardiovascular: Negative for chest pain, leg swelling and palpitations.   Respiratory: Negative for shortness of breath.    Skin: Negative for rash.   Neurological: Positive for dizziness. Negative for light-headedness and numbness.         Current Outpatient Medications:   •  aspirin (aspirin) 81 MG EC tablet, Take 1 tablet by mouth Daily., Disp: , Rfl:   •  atenolol (TENORMIN) 25 MG tablet, TAKE ONE TABLET BY MOUTH EVERY MORNING, Disp: 90 tablet, Rfl: 3  •  atorvastatin (LIPITOR) 20 MG tablet, Take 1 tablet by mouth Every Night., Disp: , Rfl:   •  azelastine (ASTELIN) 0.1 % nasal spray, 2 sprays into the nostril(s) as directed by provider 2 (Two) Times a Day. Use in each nostril as directed, Disp: 30 mL, Rfl: 3  •  Calcium Carbonate (Caltrate 600) 1500 (600 Ca) MG tablet, Take 1 tablet by mouth Daily., Disp: , Rfl:   •  cloNIDine (CATAPRES) 0.2 MG tablet, Take 1 tablet by mouth 2 (Two) Times a Day., Disp: 60 tablet,  Rfl: 5  •  docusate sodium (COLACE) 100 MG capsule, Take 1 capsule by mouth 2 (Two) Times a Day., Disp: , Rfl:   •  finasteride (PROSCAR) 5 MG tablet, Take 1 tablet by mouth Every Evening., Disp: , Rfl:   •  fluticasone (FLONASE) 50 MCG/ACT nasal spray, 2 sprays into the nostril(s) as directed by provider Daily., Disp: 16 g, Rfl: 3  •  hydroCHLOROthiazide (HYDRODIURIL) 25 MG tablet, TAKE ONE TABLET BY MOUTH DAILY, Disp: 90 tablet, Rfl: 1  •  isosorbide mononitrate (IMDUR) 30 MG 24 hr tablet, TAKE ONE TABLET BY MOUTH DAILY, Disp: 90 tablet, Rfl: 3  •  losartan (COZAAR) 100 MG tablet, Take 1 tablet by mouth Daily., Disp: , Rfl:   •  Probiotic Product (PROBIOTIC DAILY PO), Take 1 capsule by mouth Daily., Disp: , Rfl:   •  tamsulosin (FLOMAX) 0.4 MG capsule 24 hr capsule, Take 1 capsule by mouth 2 (Two) Times a Day., Disp: , Rfl:     Allergies   Allergen Reactions   • Codeine GI Intolerance   • Iodine Hives   • Nabumetone Unknown - High Severity   • Propoxyphene Hives       Family History   Problem Relation Age of Onset   • Hypertension Brother    • Hyperlipidemia Brother        Past Surgical History:   Procedure Laterality Date   • ANKLE SURGERY     • CARDIAC CATHETERIZATION  2020   • EYE SURGERY  1960   • NOSE SURGERY     • ROTATOR CUFF REPAIR     • SKIN CANCER EXCISION      L shoulder   • UMBILICAL HERNIA REPAIR         Past Medical History:   Diagnosis Date   • Allergic 1970   • Arthritis    • Benign prostatic hyperplasia 1980   • Cancer    • Cataract 1990   • Cholelithiasis 1958   • Coronary artery disease    • Erectile dysfunction 2020   • HL (hearing loss) 1995   • Hyperlipidemia    • Hypertension    • Osteopenia 1980   • Prostatism    • Renal insufficiency 1940   • Suspected COVID-19 virus infection 09/07/2021   • Visual impairment 1948       Family History   Problem Relation Age of Onset   • Hypertension Brother    • Hyperlipidemia Brother        Social History     Socioeconomic History   • Marital status:     Tobacco Use   • Smoking status: Former     Packs/day: 0.50     Years: 2.00     Pack years: 1.00     Types: Cigarettes, Pipe, Cigars     Quit date: 1960     Years since quittin.4   • Smokeless tobacco: Never   • Tobacco comments:     1-2/day   Vaping Use   • Vaping Use: Never used   Substance and Sexual Activity   • Alcohol use: Yes     Comment: socially   • Drug use: Never   • Sexual activity: Not Currently     Partners: Female     Birth control/protection: None           ECG 12 Lead    Date/Time: 2023 2:20 PM  Performed by: Vilma Staples MD  Authorized by: Vilma Staples MD   Comparison: compared with previous ECG   Similar to previous ECG  Comparison to previous ECG: Sinus rhythm first-degree AV block 60/min nonspecific ST-T wave changes normal axis normal intervals no ectopy no significant change from 2022                Objective:       Physical Exam    There were no vitals taken for this visit.  The patient is alert, oriented and in no distress.    Vital signs as noted above.    Head and neck revealed no carotid bruits or jugular venous distension.  No thyromegaly or lymphadenopathy is present.    Lungs clear.  No wheezing.  Breath sounds are normal bilaterally.    Heart normal first and second heart sounds.  No murmur..  No pericardial rub is present.  No gallop is present.    Abdomen soft and nontender.  No organomegaly is present.    Extremities revealed good peripheral pulses without any pedal edema.    Skin warm and dry.    Musculoskeletal system is grossly normal.    CNS grossly normal.    Reviewed and updated.

## 2023-05-24 ENCOUNTER — OFFICE VISIT (OUTPATIENT)
Dept: CARDIOLOGY | Facility: CLINIC | Age: 83
End: 2023-05-24
Payer: MEDICARE

## 2023-05-24 VITALS
HEIGHT: 73 IN | HEART RATE: 53 BPM | DIASTOLIC BLOOD PRESSURE: 81 MMHG | BODY MASS INDEX: 26.64 KG/M2 | WEIGHT: 201 LBS | SYSTOLIC BLOOD PRESSURE: 136 MMHG

## 2023-05-24 DIAGNOSIS — E78.2 MIXED HYPERLIPIDEMIA: ICD-10-CM

## 2023-05-24 DIAGNOSIS — I25.10 CORONARY ARTERY DISEASE INVOLVING NATIVE CORONARY ARTERY OF NATIVE HEART WITHOUT ANGINA PECTORIS: ICD-10-CM

## 2023-05-24 DIAGNOSIS — Z95.5 STATUS POST CORONARY ARTERY STENT PLACEMENT: Primary | ICD-10-CM

## 2023-05-24 DIAGNOSIS — I10 ESSENTIAL HYPERTENSION: ICD-10-CM

## 2023-06-07 ENCOUNTER — OFFICE VISIT (OUTPATIENT)
Dept: FAMILY MEDICINE CLINIC | Facility: CLINIC | Age: 83
End: 2023-06-07
Payer: MEDICARE

## 2023-06-07 VITALS
OXYGEN SATURATION: 97 % | SYSTOLIC BLOOD PRESSURE: 135 MMHG | HEART RATE: 58 BPM | RESPIRATION RATE: 16 BRPM | WEIGHT: 198 LBS | DIASTOLIC BLOOD PRESSURE: 73 MMHG | BODY MASS INDEX: 26.24 KG/M2 | TEMPERATURE: 98.4 F | HEIGHT: 73 IN

## 2023-06-07 DIAGNOSIS — E78.2 MIXED HYPERLIPIDEMIA: Chronic | ICD-10-CM

## 2023-06-07 DIAGNOSIS — E11.9 TYPE 2 DIABETES MELLITUS WITHOUT COMPLICATION, WITHOUT LONG-TERM CURRENT USE OF INSULIN: Chronic | ICD-10-CM

## 2023-06-07 DIAGNOSIS — I25.10 CORONARY ARTERY DISEASE INVOLVING NATIVE CORONARY ARTERY OF NATIVE HEART WITHOUT ANGINA PECTORIS: Chronic | ICD-10-CM

## 2023-06-07 DIAGNOSIS — I10 PRIMARY HYPERTENSION: Primary | ICD-10-CM

## 2023-06-07 DIAGNOSIS — M19.90 OSTEOARTHRITIS, UNSPECIFIED OSTEOARTHRITIS TYPE, UNSPECIFIED SITE: Chronic | ICD-10-CM

## 2023-06-07 DIAGNOSIS — N40.0 PROSTATISM: ICD-10-CM

## 2023-06-07 NOTE — PATIENT INSTRUCTIONS
Continue your current medications and treatment...    Have the follow up labs done and call for results...    Follow up in the office in 6 months...

## 2023-06-07 NOTE — PROGRESS NOTES
Subjective   Campbell Alex is a 83 y.o. male.     Chief Complaint   Patient presents with    Hypertension    Hyperlipidemia    Coronary Artery Disease     3 mth f/u       HPI  Chief complaint: Hypertension hyperlipidemia coronary artery disease type 2 diabetes mellitus prostatism arthritis    Patient is a 83-year-old white male comes in for follow-up and maintenance of his current problems which include    1.  Hypertension-stable after patient on clonidine 0.2 mg twice a day Cozaar 100 mg daily atenolol 25 mg daily hydrochlorothiazide 25 mg daily.  Patient's blood pressure stable.    2.  Hyperlipidemia-stable-the patient on Lipitor 20 mg daily.    3.  Coronary artery disease-stable-patient on atenolol 25 mg daily aspirin 81 mg daily long-acting nitroglycerin 30 mg daily.  He denied chest pain shortness of breath orthopnea or PND.    4.  Prostatism-unchanged-the patient is on Proscar 5 mg daily and Flomax 0.4 mg twice a day.  Patient states he has episodes where he is feels like he is not able to empty his bladder.  He also gets up frequently at night to empty his bladder.  Patient lab work does not have urinary frequency during the daytime.  He has been advised to have prostate surgery.  He is concerned about having the procedure done.    5.  Osteoarthritis/low back pain-unchanged-the patient has been evaluated by orthopedic surgery and neurosurgery because of severe arthritis in the back.  The patient at this time is treating himself with epidural blocks and anti-inflammatories.  He denied fever bowel or bladder problems.    6 type 2 diabetes mellitus-stable-the patient is currently on no medications..        The following portions of the patient's history were reviewed and updated as appropriate: allergies, current medications, past family history, past medical history, past social history, past surgical history and problem list.    Review of Systems    Objective     /73 (BP Location: Right arm, Patient  "Position: Sitting, Cuff Size: Adult)   Pulse 58   Temp 98.4 °F (36.9 °C) (Temporal)   Resp 16   Ht 185.4 cm (73\")   Wt 89.8 kg (198 lb)   SpO2 97%   BMI 26.12 kg/m²     Physical Exam  Vitals and nursing note reviewed.   Constitutional:       Appearance: He is well-developed.   HENT:      Head: Normocephalic and atraumatic.   Eyes:      Pupils: Pupils are equal, round, and reactive to light.   Cardiovascular:      Rate and Rhythm: Normal rate and regular rhythm.      Pulses: Normal pulses.      Heart sounds: Normal heart sounds. No murmur heard.    No friction rub. No gallop.   Pulmonary:      Effort: Pulmonary effort is normal.      Breath sounds: Normal breath sounds.   Abdominal:      General: Bowel sounds are normal.      Palpations: Abdomen is soft.   Musculoskeletal:         General: Normal range of motion.      Cervical back: Neck supple.   Skin:     General: Skin is warm and dry.   Neurological:      Mental Status: He is alert and oriented to person, place, and time.   Psychiatric:         Behavior: Behavior normal.         Thought Content: Thought content normal.         Judgment: Judgment normal.         Assessment & Plan   Diagnoses and all orders for this visit:    1. Primary hypertension (Primary)    2. Mixed hyperlipidemia    3. Coronary artery disease involving native coronary artery of native heart without angina pectoris    4. Type 2 diabetes mellitus without complication, without long-term current use of insulin  -     Basic Metabolic Panel; Future  -     Hemoglobin A1c; Future    5. Prostatism-the patient is considering having prostate surgery.  Patient had questions about it.  I recommended he obtain more information from his urologist.    6. Osteoarthritis, unspecified osteoarthritis type, unspecified site-I recommended continuing non surgical management of his back pain.      Patient Instructions   Continue your current medications and treatment...    Have the follow up labs done and call " for results...    Follow up in the office in 6 months...    Amarjit Jurado Jr., MD    06/07/23

## 2023-06-14 ENCOUNTER — LAB (OUTPATIENT)
Dept: LAB | Facility: HOSPITAL | Age: 83
End: 2023-06-14
Payer: MEDICARE

## 2023-06-14 DIAGNOSIS — E11.9 TYPE 2 DIABETES MELLITUS WITHOUT COMPLICATION, WITHOUT LONG-TERM CURRENT USE OF INSULIN: Chronic | ICD-10-CM

## 2023-06-14 LAB
ANION GAP SERPL CALCULATED.3IONS-SCNC: 11.5 MMOL/L (ref 5–15)
BUN SERPL-MCNC: 17 MG/DL (ref 8–23)
BUN/CREAT SERPL: 21.8 (ref 7–25)
CALCIUM SPEC-SCNC: 9.3 MG/DL (ref 8.6–10.5)
CHLORIDE SERPL-SCNC: 92 MMOL/L (ref 98–107)
CO2 SERPL-SCNC: 26.5 MMOL/L (ref 22–29)
CREAT SERPL-MCNC: 0.78 MG/DL (ref 0.76–1.27)
EGFRCR SERPLBLD CKD-EPI 2021: 87.4 ML/MIN/1.73
GLUCOSE SERPL-MCNC: 98 MG/DL (ref 65–99)
HBA1C MFR BLD: 6.2 % (ref 4.8–5.6)
POTASSIUM SERPL-SCNC: 4 MMOL/L (ref 3.5–5.2)
SODIUM SERPL-SCNC: 130 MMOL/L (ref 136–145)

## 2023-06-14 PROCEDURE — 36415 COLL VENOUS BLD VENIPUNCTURE: CPT

## 2023-06-14 PROCEDURE — 83036 HEMOGLOBIN GLYCOSYLATED A1C: CPT

## 2023-06-14 PROCEDURE — 80048 BASIC METABOLIC PNL TOTAL CA: CPT

## 2023-08-09 ENCOUNTER — OFFICE (OUTPATIENT)
Dept: URBAN - METROPOLITAN AREA CLINIC 64 | Facility: CLINIC | Age: 83
End: 2023-08-09
Payer: COMMERCIAL

## 2023-08-09 VITALS
DIASTOLIC BLOOD PRESSURE: 79 MMHG | HEART RATE: 64 BPM | WEIGHT: 191 LBS | SYSTOLIC BLOOD PRESSURE: 142 MMHG | HEIGHT: 72 IN

## 2023-08-09 DIAGNOSIS — R19.7 DIARRHEA, UNSPECIFIED: ICD-10-CM

## 2023-08-09 DIAGNOSIS — R15.9 FULL INCONTINENCE OF FECES: ICD-10-CM

## 2023-08-09 DIAGNOSIS — R14.3 FLATULENCE: ICD-10-CM

## 2023-08-09 PROCEDURE — 99213 OFFICE O/P EST LOW 20 MIN: CPT | Performed by: NURSE PRACTITIONER

## 2023-08-11 RX ORDER — HYDROCHLOROTHIAZIDE 25 MG/1
TABLET ORAL
Qty: 90 TABLET | Refills: 1 | Status: SHIPPED | OUTPATIENT
Start: 2023-08-11

## 2023-08-11 RX ORDER — ATENOLOL 25 MG/1
TABLET ORAL
Qty: 90 TABLET | Refills: 3 | Status: SHIPPED | OUTPATIENT
Start: 2023-08-11

## 2023-08-11 NOTE — TELEPHONE ENCOUNTER
Rx Refill Note  Requested Prescriptions     Pending Prescriptions Disp Refills    atenolol (TENORMIN) 25 MG tablet [Pharmacy Med Name: ATENOLOL 25 MG TABLET] 90 tablet 3     Sig: TAKE ONE TABLET BY MOUTH EVERY MORNING      Last office visit with prescribing clinician: 5/24/2023   Last telemedicine visit with prescribing clinician: Visit date not found   Next office visit with prescribing clinician: 12/11/2023                         Would you like a call back once the refill request has been completed: [] Yes [] No    If the office needs to give you a call back, can they leave a voicemail: [] Yes [] No    Eloise Steel MA  08/11/23, 11:03 EDT

## 2023-08-23 ENCOUNTER — HOSPITAL ENCOUNTER (INPATIENT)
Facility: HOSPITAL | Age: 83
LOS: 4 days | Discharge: HOME | DRG: 177 | End: 2023-08-27
Attending: EMERGENCY MEDICINE | Admitting: STUDENT IN AN ORGANIZED HEALTH CARE EDUCATION/TRAINING PROGRAM
Payer: MEDICARE

## 2023-08-23 ENCOUNTER — APPOINTMENT (OUTPATIENT)
Dept: GENERAL RADIOLOGY | Facility: HOSPITAL | Age: 83
DRG: 177 | End: 2023-08-23
Payer: MEDICARE

## 2023-08-23 DIAGNOSIS — U07.1 COVID-19: ICD-10-CM

## 2023-08-23 DIAGNOSIS — R50.9 ACUTE FEBRILE ILLNESS: Primary | ICD-10-CM

## 2023-08-23 DIAGNOSIS — R53.1 WEAKNESS: ICD-10-CM

## 2023-08-23 LAB
ALBUMIN SERPL-MCNC: 3.9 G/DL (ref 3.5–5.2)
ALBUMIN/GLOB SERPL: 1.4 G/DL
ALP SERPL-CCNC: 69 U/L (ref 39–117)
ALT SERPL W P-5'-P-CCNC: 13 U/L (ref 1–41)
ANION GAP SERPL CALCULATED.3IONS-SCNC: 10 MMOL/L (ref 5–15)
AST SERPL-CCNC: 30 U/L (ref 1–40)
B PARAPERT DNA SPEC QL NAA+PROBE: NOT DETECTED
B PERT DNA SPEC QL NAA+PROBE: NOT DETECTED
BASOPHILS # BLD AUTO: 0 10*3/MM3 (ref 0–0.2)
BASOPHILS NFR BLD AUTO: 0.3 % (ref 0–1.5)
BILIRUB SERPL-MCNC: 0.6 MG/DL (ref 0–1.2)
BILIRUB UR QL STRIP: NEGATIVE
BUN SERPL-MCNC: 20 MG/DL (ref 8–23)
BUN/CREAT SERPL: 22.7 (ref 7–25)
C PNEUM DNA NPH QL NAA+NON-PROBE: NOT DETECTED
CALCIUM SPEC-SCNC: 9.2 MG/DL (ref 8.6–10.5)
CHLORIDE SERPL-SCNC: 93 MMOL/L (ref 98–107)
CK SERPL-CCNC: 263 U/L (ref 20–200)
CLARITY UR: CLEAR
CO2 SERPL-SCNC: 26 MMOL/L (ref 22–29)
COLOR UR: YELLOW
CREAT SERPL-MCNC: 0.88 MG/DL (ref 0.76–1.27)
D-LACTATE SERPL-SCNC: 0.8 MMOL/L (ref 0.3–2)
DEPRECATED RDW RBC AUTO: 45.9 FL (ref 37–54)
EGFRCR SERPLBLD CKD-EPI 2021: 84.3 ML/MIN/1.73
EOSINOPHIL # BLD AUTO: 0 10*3/MM3 (ref 0–0.4)
EOSINOPHIL NFR BLD AUTO: 0.2 % (ref 0.3–6.2)
ERYTHROCYTE [DISTWIDTH] IN BLOOD BY AUTOMATED COUNT: 13.5 % (ref 12.3–15.4)
FLUAV SUBTYP SPEC NAA+PROBE: NOT DETECTED
FLUBV RNA ISLT QL NAA+PROBE: NOT DETECTED
GLOBULIN UR ELPH-MCNC: 2.7 GM/DL
GLUCOSE SERPL-MCNC: 141 MG/DL (ref 65–99)
GLUCOSE UR STRIP-MCNC: NEGATIVE MG/DL
HADV DNA SPEC NAA+PROBE: NOT DETECTED
HCOV 229E RNA SPEC QL NAA+PROBE: NOT DETECTED
HCOV HKU1 RNA SPEC QL NAA+PROBE: NOT DETECTED
HCOV NL63 RNA SPEC QL NAA+PROBE: NOT DETECTED
HCOV OC43 RNA SPEC QL NAA+PROBE: NOT DETECTED
HCT VFR BLD AUTO: 36.6 % (ref 37.5–51)
HGB BLD-MCNC: 12.5 G/DL (ref 13–17.7)
HGB UR QL STRIP.AUTO: NEGATIVE
HMPV RNA NPH QL NAA+NON-PROBE: NOT DETECTED
HOLD SPECIMEN: NORMAL
HOLD SPECIMEN: NORMAL
HPIV1 RNA ISLT QL NAA+PROBE: NOT DETECTED
HPIV2 RNA SPEC QL NAA+PROBE: NOT DETECTED
HPIV3 RNA NPH QL NAA+PROBE: NOT DETECTED
HPIV4 P GENE NPH QL NAA+PROBE: NOT DETECTED
KETONES UR QL STRIP: NEGATIVE
LEUKOCYTE ESTERASE UR QL STRIP.AUTO: NEGATIVE
LYMPHOCYTES # BLD AUTO: 0.3 10*3/MM3 (ref 0.7–3.1)
LYMPHOCYTES NFR BLD AUTO: 3.8 % (ref 19.6–45.3)
M PNEUMO IGG SER IA-ACNC: NOT DETECTED
MCH RBC QN AUTO: 32.5 PG (ref 26.6–33)
MCHC RBC AUTO-ENTMCNC: 34.3 G/DL (ref 31.5–35.7)
MCV RBC AUTO: 94.9 FL (ref 79–97)
MONOCYTES # BLD AUTO: 0.9 10*3/MM3 (ref 0.1–0.9)
MONOCYTES NFR BLD AUTO: 10.7 % (ref 5–12)
NEUTROPHILS NFR BLD AUTO: 7.4 10*3/MM3 (ref 1.7–7)
NEUTROPHILS NFR BLD AUTO: 85 % (ref 42.7–76)
NITRITE UR QL STRIP: NEGATIVE
NRBC BLD AUTO-RTO: 0 /100 WBC (ref 0–0.2)
PH UR STRIP.AUTO: 7.5 [PH] (ref 5–8)
PLATELET # BLD AUTO: 184 10*3/MM3 (ref 140–450)
PMV BLD AUTO: 7.8 FL (ref 6–12)
POTASSIUM SERPL-SCNC: 3.6 MMOL/L (ref 3.5–5.2)
PROT SERPL-MCNC: 6.6 G/DL (ref 6–8.5)
PROT UR QL STRIP: NEGATIVE
RBC # BLD AUTO: 3.85 10*6/MM3 (ref 4.14–5.8)
RHINOVIRUS RNA SPEC NAA+PROBE: NOT DETECTED
RSV RNA NPH QL NAA+NON-PROBE: NOT DETECTED
SARS-COV-2 RNA NPH QL NAA+NON-PROBE: DETECTED
SODIUM SERPL-SCNC: 129 MMOL/L (ref 136–145)
SP GR UR STRIP: 1.02 (ref 1–1.03)
UROBILINOGEN UR QL STRIP: NORMAL
WBC NRBC COR # BLD: 8.7 10*3/MM3 (ref 3.4–10.8)
WHOLE BLOOD HOLD COAG: NORMAL
WHOLE BLOOD HOLD SPECIMEN: NORMAL

## 2023-08-23 PROCEDURE — 99285 EMERGENCY DEPT VISIT HI MDM: CPT

## 2023-08-23 PROCEDURE — 85025 COMPLETE CBC W/AUTO DIFF WBC: CPT | Performed by: EMERGENCY MEDICINE

## 2023-08-23 PROCEDURE — 81003 URINALYSIS AUTO W/O SCOPE: CPT | Performed by: EMERGENCY MEDICINE

## 2023-08-23 PROCEDURE — 87040 BLOOD CULTURE FOR BACTERIA: CPT | Performed by: EMERGENCY MEDICINE

## 2023-08-23 PROCEDURE — 80053 COMPREHEN METABOLIC PANEL: CPT | Performed by: EMERGENCY MEDICINE

## 2023-08-23 PROCEDURE — 82550 ASSAY OF CK (CPK): CPT | Performed by: STUDENT IN AN ORGANIZED HEALTH CARE EDUCATION/TRAINING PROGRAM

## 2023-08-23 PROCEDURE — 36415 COLL VENOUS BLD VENIPUNCTURE: CPT

## 2023-08-23 PROCEDURE — 83605 ASSAY OF LACTIC ACID: CPT

## 2023-08-23 PROCEDURE — 0202U NFCT DS 22 TRGT SARS-COV-2: CPT | Performed by: EMERGENCY MEDICINE

## 2023-08-23 PROCEDURE — 93005 ELECTROCARDIOGRAM TRACING: CPT

## 2023-08-23 PROCEDURE — 25010000002 ENOXAPARIN PER 10 MG: Performed by: STUDENT IN AN ORGANIZED HEALTH CARE EDUCATION/TRAINING PROGRAM

## 2023-08-23 PROCEDURE — 71045 X-RAY EXAM CHEST 1 VIEW: CPT

## 2023-08-23 RX ORDER — ONDANSETRON 2 MG/ML
4 INJECTION INTRAMUSCULAR; INTRAVENOUS EVERY 6 HOURS PRN
Status: DISCONTINUED | OUTPATIENT
Start: 2023-08-23 | End: 2023-08-27 | Stop reason: HOSPADM

## 2023-08-23 RX ORDER — SODIUM CHLORIDE 0.9 % (FLUSH) 0.9 %
10 SYRINGE (ML) INJECTION AS NEEDED
Status: DISCONTINUED | OUTPATIENT
Start: 2023-08-23 | End: 2023-08-27 | Stop reason: HOSPADM

## 2023-08-23 RX ORDER — ISOSORBIDE MONONITRATE 30 MG/1
30 TABLET, EXTENDED RELEASE ORAL DAILY
Status: DISCONTINUED | OUTPATIENT
Start: 2023-08-24 | End: 2023-08-27 | Stop reason: HOSPADM

## 2023-08-23 RX ORDER — SODIUM CHLORIDE 9 MG/ML
40 INJECTION, SOLUTION INTRAVENOUS AS NEEDED
Status: DISCONTINUED | OUTPATIENT
Start: 2023-08-23 | End: 2023-08-27 | Stop reason: HOSPADM

## 2023-08-23 RX ORDER — TAMSULOSIN HYDROCHLORIDE 0.4 MG/1
0.4 CAPSULE ORAL 2 TIMES DAILY
Status: DISCONTINUED | OUTPATIENT
Start: 2023-08-24 | End: 2023-08-27 | Stop reason: HOSPADM

## 2023-08-23 RX ORDER — ACETAMINOPHEN 325 MG/1
650 TABLET ORAL EVERY 6 HOURS PRN
Status: DISCONTINUED | OUTPATIENT
Start: 2023-08-23 | End: 2023-08-27 | Stop reason: HOSPADM

## 2023-08-23 RX ORDER — ONDANSETRON 4 MG/1
4 TABLET, FILM COATED ORAL EVERY 6 HOURS PRN
Status: DISCONTINUED | OUTPATIENT
Start: 2023-08-23 | End: 2023-08-27 | Stop reason: HOSPADM

## 2023-08-23 RX ORDER — SODIUM CHLORIDE 9 MG/ML
100 INJECTION, SOLUTION INTRAVENOUS CONTINUOUS
Status: DISCONTINUED | OUTPATIENT
Start: 2023-08-23 | End: 2023-08-26

## 2023-08-23 RX ORDER — SODIUM CHLORIDE 0.9 % (FLUSH) 0.9 %
10 SYRINGE (ML) INJECTION EVERY 12 HOURS SCHEDULED
Status: DISCONTINUED | OUTPATIENT
Start: 2023-08-23 | End: 2023-08-27 | Stop reason: HOSPADM

## 2023-08-23 RX ORDER — FINASTERIDE 5 MG/1
5 TABLET, FILM COATED ORAL EVERY EVENING
Status: DISCONTINUED | OUTPATIENT
Start: 2023-08-24 | End: 2023-08-25

## 2023-08-23 RX ORDER — AZELASTINE 1 MG/ML
2 SPRAY, METERED NASAL 2 TIMES DAILY
Status: DISCONTINUED | OUTPATIENT
Start: 2023-08-24 | End: 2023-08-27 | Stop reason: HOSPADM

## 2023-08-23 RX ORDER — ASPIRIN 81 MG/1
81 TABLET ORAL DAILY
Status: DISCONTINUED | OUTPATIENT
Start: 2023-08-24 | End: 2023-08-27 | Stop reason: HOSPADM

## 2023-08-23 RX ORDER — ACETAMINOPHEN 500 MG
1000 TABLET ORAL ONCE
Status: COMPLETED | OUTPATIENT
Start: 2023-08-23 | End: 2023-08-23

## 2023-08-23 RX ORDER — DOCUSATE SODIUM 100 MG/1
100 CAPSULE, LIQUID FILLED ORAL 2 TIMES DAILY
Status: DISCONTINUED | OUTPATIENT
Start: 2023-08-23 | End: 2023-08-24

## 2023-08-23 RX ORDER — ATORVASTATIN CALCIUM 20 MG/1
20 TABLET, FILM COATED ORAL NIGHTLY
Status: DISCONTINUED | OUTPATIENT
Start: 2023-08-23 | End: 2023-08-27 | Stop reason: HOSPADM

## 2023-08-23 RX ORDER — ENOXAPARIN SODIUM 100 MG/ML
40 INJECTION SUBCUTANEOUS DAILY
Status: DISCONTINUED | OUTPATIENT
Start: 2023-08-23 | End: 2023-08-25

## 2023-08-23 RX ORDER — NITROGLYCERIN 0.4 MG/1
0.4 TABLET SUBLINGUAL
Status: DISCONTINUED | OUTPATIENT
Start: 2023-08-23 | End: 2023-08-27 | Stop reason: HOSPADM

## 2023-08-23 RX ADMIN — SODIUM CHLORIDE 100 ML/HR: 9 INJECTION, SOLUTION INTRAVENOUS at 20:50

## 2023-08-23 RX ADMIN — ACETAMINOPHEN 1000 MG: 500 TABLET, FILM COATED ORAL at 18:18

## 2023-08-23 RX ADMIN — ENOXAPARIN SODIUM 40 MG: 100 INJECTION SUBCUTANEOUS at 20:50

## 2023-08-23 RX ADMIN — Medication 10 ML: at 20:55

## 2023-08-23 RX ADMIN — SODIUM CHLORIDE, POTASSIUM CHLORIDE, SODIUM LACTATE AND CALCIUM CHLORIDE 1000 ML: 600; 310; 30; 20 INJECTION, SOLUTION INTRAVENOUS at 18:19

## 2023-08-23 NOTE — Clinical Note
Level of Care: Med/Surg [1]   Admitting Physician: PATRICIA KUMAR [[id number]]   Attending Physician: PATRICIA KUMAR [[id number]]

## 2023-08-24 LAB
ANION GAP SERPL CALCULATED.3IONS-SCNC: 11 MMOL/L (ref 5–15)
BUN SERPL-MCNC: 20 MG/DL (ref 8–23)
BUN/CREAT SERPL: 29.9 (ref 7–25)
CALCIUM SPEC-SCNC: 8.3 MG/DL (ref 8.6–10.5)
CHLORIDE SERPL-SCNC: 93 MMOL/L (ref 98–107)
CO2 SERPL-SCNC: 24 MMOL/L (ref 22–29)
CREAT SERPL-MCNC: 0.67 MG/DL (ref 0.76–1.27)
DEPRECATED RDW RBC AUTO: 44.2 FL (ref 37–54)
EGFRCR SERPLBLD CKD-EPI 2021: 91.5 ML/MIN/1.73
ERYTHROCYTE [DISTWIDTH] IN BLOOD BY AUTOMATED COUNT: 13.4 % (ref 12.3–15.4)
GLUCOSE SERPL-MCNC: 126 MG/DL (ref 65–99)
HCT VFR BLD AUTO: 35.4 % (ref 37.5–51)
HGB BLD-MCNC: 12.2 G/DL (ref 13–17.7)
MCH RBC QN AUTO: 33.4 PG (ref 26.6–33)
MCHC RBC AUTO-ENTMCNC: 34.3 G/DL (ref 31.5–35.7)
MCV RBC AUTO: 97.3 FL (ref 79–97)
PLATELET # BLD AUTO: 169 10*3/MM3 (ref 140–450)
PMV BLD AUTO: 7.9 FL (ref 6–12)
POTASSIUM SERPL-SCNC: 3 MMOL/L (ref 3.5–5.2)
QT INTERVAL: 395 MS
RBC # BLD AUTO: 3.64 10*6/MM3 (ref 4.14–5.8)
SODIUM SERPL-SCNC: 128 MMOL/L (ref 136–145)
WBC NRBC COR # BLD: 6.1 10*3/MM3 (ref 3.4–10.8)

## 2023-08-24 PROCEDURE — 97535 SELF CARE MNGMENT TRAINING: CPT

## 2023-08-24 PROCEDURE — 97166 OT EVAL MOD COMPLEX 45 MIN: CPT

## 2023-08-24 PROCEDURE — 92610 EVALUATE SWALLOWING FUNCTION: CPT

## 2023-08-24 PROCEDURE — 97162 PT EVAL MOD COMPLEX 30 MIN: CPT

## 2023-08-24 PROCEDURE — 36415 COLL VENOUS BLD VENIPUNCTURE: CPT | Performed by: STUDENT IN AN ORGANIZED HEALTH CARE EDUCATION/TRAINING PROGRAM

## 2023-08-24 PROCEDURE — 25010000002 ENOXAPARIN PER 10 MG: Performed by: STUDENT IN AN ORGANIZED HEALTH CARE EDUCATION/TRAINING PROGRAM

## 2023-08-24 PROCEDURE — 85027 COMPLETE CBC AUTOMATED: CPT | Performed by: STUDENT IN AN ORGANIZED HEALTH CARE EDUCATION/TRAINING PROGRAM

## 2023-08-24 PROCEDURE — 80048 BASIC METABOLIC PNL TOTAL CA: CPT | Performed by: STUDENT IN AN ORGANIZED HEALTH CARE EDUCATION/TRAINING PROGRAM

## 2023-08-24 RX ORDER — CLONIDINE HYDROCHLORIDE 0.2 MG/1
0.2 TABLET ORAL 2 TIMES DAILY
COMMUNITY

## 2023-08-24 RX ORDER — MONTELUKAST SODIUM 4 MG/1
1 TABLET, CHEWABLE ORAL DAILY
COMMUNITY

## 2023-08-24 RX ORDER — HYDROCHLOROTHIAZIDE 25 MG/1
25 TABLET ORAL DAILY
COMMUNITY
End: 2023-08-27 | Stop reason: HOSPADM

## 2023-08-24 RX ORDER — ATENOLOL 25 MG/1
25 TABLET ORAL DAILY
COMMUNITY
End: 2023-08-27 | Stop reason: HOSPADM

## 2023-08-24 RX ORDER — POTASSIUM CHLORIDE 20 MEQ/1
40 TABLET, EXTENDED RELEASE ORAL EVERY 4 HOURS
Status: COMPLETED | OUTPATIENT
Start: 2023-08-24 | End: 2023-08-24

## 2023-08-24 RX ORDER — ISOSORBIDE MONONITRATE 30 MG/1
30 TABLET, EXTENDED RELEASE ORAL DAILY
COMMUNITY

## 2023-08-24 RX ADMIN — FINASTERIDE 5 MG: 5 TABLET, FILM COATED ORAL at 19:24

## 2023-08-24 RX ADMIN — AZELASTINE HYDROCHLORIDE 2 SPRAY: 137 SPRAY, METERED NASAL at 20:49

## 2023-08-24 RX ADMIN — TAMSULOSIN HYDROCHLORIDE 0.4 MG: 0.4 CAPSULE ORAL at 19:24

## 2023-08-24 RX ADMIN — ATORVASTATIN CALCIUM 20 MG: 20 TABLET, FILM COATED ORAL at 19:24

## 2023-08-24 RX ADMIN — ISOSORBIDE MONONITRATE 30 MG: 30 TABLET, EXTENDED RELEASE ORAL at 11:42

## 2023-08-24 RX ADMIN — SODIUM CHLORIDE 100 ML/HR: 9 INJECTION, SOLUTION INTRAVENOUS at 19:47

## 2023-08-24 RX ADMIN — Medication 10 ML: at 19:23

## 2023-08-24 RX ADMIN — ASPIRIN 81 MG: 81 TABLET, COATED ORAL at 09:34

## 2023-08-24 RX ADMIN — ENOXAPARIN SODIUM 40 MG: 100 INJECTION SUBCUTANEOUS at 15:37

## 2023-08-24 RX ADMIN — POTASSIUM CHLORIDE 40 MEQ: 1500 TABLET, EXTENDED RELEASE ORAL at 15:35

## 2023-08-24 RX ADMIN — POTASSIUM CHLORIDE 40 MEQ: 1500 TABLET, EXTENDED RELEASE ORAL at 23:30

## 2023-08-24 RX ADMIN — TAMSULOSIN HYDROCHLORIDE 0.4 MG: 0.4 CAPSULE ORAL at 11:42

## 2023-08-24 RX ADMIN — POTASSIUM CHLORIDE 40 MEQ: 1500 TABLET, EXTENDED RELEASE ORAL at 19:45

## 2023-08-24 RX ADMIN — Medication 10 ML: at 09:34

## 2023-08-24 NOTE — PLAN OF CARE
Problem: Adult Inpatient Plan of Care  Goal: Readiness for Transition of Care  Intervention: Mutually Develop Transition Plan  Recent Flowsheet Documentation  Taken 8/24/2023 0223 by Frances Foster RN  Transportation Anticipated: family or friend will provide  Patient/Family Anticipated Services at Transition: ([facility] Therapist OP at [facility]) outpatient care  Patient/Family Anticipates Transition to: home with family  Taken 8/24/2023 0217 by Frances Fotser, RN  Equipment Currently Used at Home: walker, rolling   Goal Outcome Evaluation:

## 2023-08-24 NOTE — PROGRESS NOTES
[facility] Medicine Services   Daily Progress Note    Patient Name: Campbell Alex  : 1940  MRN: [id number]  Primary Care Physician:  Amarjit Jurado Jr., MD  Date of admission: 2023  Date and Time of Service: 23   at 10:57 EDT        Subjective      Chief Complaint: weakness    HPI:   Campbell Alex is a 83 y.o. male with PMHx of HTN, HLD, CAD, BPH who presented to [facility] on 2023 complaining of weakness and fever. In the ER, vitals 102.4F, HR 75, RR 26, /50, 94% 10L HFNC.  Labs notable for sodium 129, Cl 93, glucose 141, hgb 12.5.  CXR without acute cardiopulmonary process.  RVP positive for covid. Patient admitted for COVID infection.     23  Patient was seen at bedside this morning. He still feels very weak and has dry cough. Denies any shortness of breath, chest pain, nausea, vomiting, diarrhea.           Objective      Vitals:   Temp:  [98.3 °F (36.8 °C)-102.4 °F (39.1 °C)] 99.7 °F (37.6 °C)  Heart Rate:  [66-82] 80  Resp:  [16-26] 18  BP: (104-165)/(50-84) 149/80  Flow (L/min):  [10] 10    Physical Exam  Constitutional:       Appearance: Normal appearance.   HENT:      Head: Normocephalic and atraumatic.      Nose: Nose normal.      Mouth/Throat:      Mouth: Mucous membranes are moist.   Eyes:      Pupils: Pupils are equal, round, and reactive to light.   Cardiovascular:      Rate and Rhythm: Normal rate and regular rhythm.      Pulses: Normal pulses.      Heart sounds: Normal heart sounds.   Pulmonary:      Effort: Pulmonary effort is normal.      Breath sounds: Normal breath sounds.   Abdominal:      General: Bowel sounds are normal.      Palpations: Abdomen is soft.   Musculoskeletal:         General: Normal range of motion.      Cervical back: Neck supple.   Skin:     General: Skin is warm.      Capillary Refill: Capillary refill takes less than 2 seconds.   Neurological:      General: No focal deficit present.      Mental Status: He is alert  and oriented to person, place, and time.   Psychiatric:         Mood and Affect: Mood normal.           Result Review    Result Review:  I have personally reviewed the results from the time of this admission to 8/24/2023 10:56 EDT and agree with these findings:  [x]  Laboratory  [x]  Microbiology  [x]  Radiology  [x]  EKG/Telemetry   []  Cardiology/Vascular   []  Pathology  [x]  Old records  []  Other:  Most notable findings include: Hb 12.5, Na 129, Cr 0.88, COVID19 PCR +, CXR (08/23): 1. No acute cardiopulmonary disease         Assessment & Plan      Brief Patient Summary:  Campbell Alex is a 83 y.o. male who is admitted for weakness due to covid 19 infection.       aspirin, 81 mg, Oral, Daily  atorvastatin, 20 mg, Oral, Nightly  azelastine, 2 spray, Each Nare, BID  enoxaparin, 40 mg, Subcutaneous, Daily  finasteride, 5 mg, Oral, Q PM  isosorbide mononitrate, 30 mg, Oral, Daily  sodium chloride, 10 mL, Intravenous, Q12H  tamsulosin, 0.4 mg, Oral, BID       Pharmacy to Dose enoxaparin (LOVENOX),   sodium chloride, 100 mL/hr, Last Rate: 100 mL/hr (08/23/23 2050)         Active Hospital Problems:  Active Hospital Problems    Diagnosis     COVID-19      Plan:   # Covid 19 infection  # Weakness    - weakness, fever, fatigue 2/2 covid    - Patient unable to get out of bed, fell to floor when wife tried, down for 30 mins    - CK - 263    - RVP positive for covid    - temp max 102.4F    - Tylenol PRN fever    - cooling blanket    - Lovenox for DVT prophylaxis    - contact precautions    - pt/ot/slp/cm    - currently saturating well on room air - no need for steroids or remdesivir.     # Hyponatremia    - sodium 129 with Cl 93 on admission    - suspect volume depletion 2/2 covid    - NS @ 100/hr, monitor for fluid overload    - hold home HCTZ    - monitor BMP    # CAD    - resume home aspirin, statin, imdur     # BPH    - Resume home Flomax and Proscar     # Anemia    - Hgb 12.5, appears near base    - no overt bleeding,  follow labs     # HTN    - BP stable, hold home Clonidine, Lisinopril, and HCTZ espcially in setting of volume depletion      # HLD    - continue home statin    DVT prophylaxis:  Medical DVT prophylaxis orders are present.    CODE STATUS:    Code Status (Patient has no pulse and is not breathing): CPR (Attempt to Resuscitate)  Medical Interventions (Patient has pulse or is breathing): Full Support      Disposition:  I expect patient to be discharged to home with HC vs SNF, pending PT/OT evaluation.    Discussed the plan of care with the patient and the son at bedside.  08/24/23      Electronically signed by Cassius Hargrove MD, 08/24/23, 10:56 EDT.  [facility]

## 2023-08-24 NOTE — CASE MANAGEMENT/SOCIAL WORK
Discharge Planning Assessment  [facility]     Patient Name: Campbell Alex  MRN: [id number]  Today's Date: 8/24/2023    Admit Date: 8/23/2023    Plan: Return Home, Current with [facility] OP therapy   Discharge Needs Assessment       Row Name 08/24/23 0817       Living Environment    People in Home spouse    Name(s) of People in Home Spouse-Megan    Current Living Arrangements home    Potentially Unsafe Housing Conditions none    Primary Care Provided by self;spouse/significant other;child(lukas)  son-Tyson    Provides Primary Care For no one    Family Caregiver if Needed child(lukas), adult;spouse    Family Caregiver Names Megan-spouse , Tyson-son    Quality of Family Relationships helpful;involved;supportive    Able to Return to Prior Arrangements yes       Resource/Environmental Concerns    Resource/Environmental Concerns none    Transportation Concerns none       Transition Planning    Patient/Family Anticipates Transition to home with family;home    Patient/Family Anticipated Services at Transition home health care       Discharge Needs Assessment    Readmission Within the Last 30 Days no previous admission in last 30 days    Equipment Currently Used at Home walker, rolling    Concerns to be Addressed discharge planning    Outpatient/Agency/Support Group Needs outpatient therapy  Current with [facility] OP therpy                   Discharge Plan       Row Name 08/24/23 0825       Plan    Plan Return Home, Current with [facility] OP therapy    Plan Comments Spoke with patient and son at bedside, Currently patient lives in own home with spouse. States IADL's, currently receiving OP therapy ([facility] in [place]) Confirmed PCP and Pharmacy. Denies transportation or medication  issues. DC Barrier: PT/OT eval. pending, IVF's               Expected Discharge Date and Time       Expected Discharge Date Expected Discharge Time    Aug 25, 2023            Demographic Summary       Row Name 08/24/23 0817       General Information    Admission  Type inpatient    Arrived From home    Required Notices Provided Important Message from Medicare    Referral Source patient;family    Reason for Consult discharge planning    Preferred Language English                   Functional Status       Row Name 08/24/23 0817       Functional Status    Usual Activity Tolerance moderate    Current Activity Tolerance moderate       Functional Status, IADL    Medications independent    Meal Preparation assistive person    Housekeeping assistive person    Laundry assistive person    Shopping assistive person       Mental Status    General Appearance WDL WDL                Patient Forms       Row Name 08/24/23 0815       Patient Forms    Important Message from Medicare (IMM) Delivered  8/23/23 Registration           Met with patient in room wearing PPE: mask, face shield/goggles, gloves, gown.      Maintained distance greater than six feet and spent less than 15 minutes in the room.       Tasha Blue RN

## 2023-08-24 NOTE — PLAN OF CARE
Problem: Adult Inpatient Plan of Care  Goal: Plan of Care Review  Outcome: Ongoing, Not Progressing  Flowsheets (Taken 8/24/2023 0228)  Progress: no change  Plan of Care Reviewed With: patient  Goal: Patient-Specific Goal (Individualized)  Outcome: Ongoing, Not Progressing  Goal: Absence of Hospital-Acquired Illness or Injury  Outcome: Ongoing, Not Progressing  Goal: Optimal Comfort and Wellbeing  Outcome: Ongoing, Not Progressing  Goal: Readiness for Transition of Care  Outcome: Ongoing, Not Progressing  Intervention: Mutually Develop Transition Plan  Recent Flowsheet Documentation  Taken 8/24/2023 0223 by Frances Foster, RN  Transportation Anticipated: family or friend will provide  Patient/Family Anticipated Services at Transition: ([facility] Therapist OP at [facility]) outpatient care  Patient/Family Anticipates Transition to: home with family  Taken 8/24/2023 0217 by Frances Foster, RN  Equipment Currently Used at Home: walker, rolling   Goal Outcome Evaluation:  Plan of Care Reviewed With: patient        Progress: no change

## 2023-08-24 NOTE — CASE MANAGEMENT/SOCIAL WORK
Case Management/Social Work    Patient Name:  Campbell Alex  YOB: 1940  MRN: [id number]  Admit Date:  8/23/2023    Spoke with son Tyson, discussed PT suggestion for skilled rehab.He was hopeful his Dad could return home with home health, but said will discuss with his mother.    Phone communication or documentation only - no physical contact with patient or family.       Electronically signed by:  Tahsa Blue RN  08/24/23 17:29 EDT

## 2023-08-24 NOTE — THERAPY EVALUATION
"Acute Care - Speech Language Pathology   Swallow Initial Evaluation [facility]     Patient Name: Campbell Alex  : 1940  MRN: [id number]  Today's Date: 2023               Admit Date: 2023    Visit Dx: Functional Oral and Pharyngeal Phase of Swallow    ICD-10-CM ICD-9-CM   1. Acute febrile illness  R50.9 780.60   2. Weakness  R53.1 780.79   3. COVID-19  U07.1 079.89     SLP Recommendation and Plan  SLP Swallowing Diagnosis: swallow WFL/no suspected pharyngeal impairment, functional oral phase, functional pharyngeal phase (23)  SLP Diet Recommendation: regular textures, thin liquids (23)  Recommended Precautions and Strategies: upright posture during/after eating (23)  SLP Rec. for Method of Medication Administration: meds whole, meds crushed, with thin liquids, as tolerated (23)     Monitor for Signs of Aspiration: yes, notify SLP if any concerns (23)  Recommended Diagnostics: No further SLP services recommended (23)  Swallow Criteria for Skilled Therapeutic Interventions Met: demonstrates skilled criteria (23)  Anticipated Discharge Disposition (SLP): home (23)  Rehab Potential/Prognosis, Swallowing: good, to achieve stated therapy goals (23)  Therapy Frequency (Swallow): evaluation only (23)     Oral Care Recommendations: Oral Care BID/PRN (23)     SWALLOW EVALUATION (last 72 hours)       SLP Adult Swallow Evaluation       Row Name 23       Rehab Evaluation    Document Type evaluation  -PF    Subjective Information no complaints  -PF    Patient Observations alert;cooperative  -PF    Patient Effort excellent  -PF    Symptoms Noted During/After Treatment none  -PF       General Information    Patient Profile Reviewed yes  -PF    Pertinent History Of Current Problem Campbell Alex \"Zuleyma\" is an 83 y.o. male with COVID-19 +, PMHx of CAD, HTN, benign prostatic hyperplasia, and " hiatal hernia. He presented to [facility] ED on 8/23/23 with chief complaints of weakness and inability to get out of bed yesterday morning. He had a fall when his wife attempted to help him get out of bed. Received speech consult and administered bedside swallow evaluation to evaluate swallow physiology, function, and safety of PO. Chest XR completed 8/23/23 showed no acute cardiopulmonary disease. Pt is currently on regular texture and thin liquid diet. He reports no diet restrictions, denies swallowing difficulty nor issues with current diet.  -PF      Current Method of Nutrition regular textures;thin liquids  -PF    Plans/Goals Discussed with patient  -PF    Barriers to Rehab none identified  -PF    Patient's Goals for Discharge return home  -PF       Oral Motor Structure and Function    Dentition Assessment natural, present and adequate  -PF    Secretion Management WNL/WFL  -PF    Mucosal Quality moist, healthy  -PF    Volitional Swallow WFL  -PF       Oral Musculature and Cranial Nerve Assessment    Oral Motor General Assessment WFL  -PF       General Eating/Swallowing Observations    Respiratory Support Currently in Use room air  -PF    Eating/Swallowing Skills self-fed  -PF    Positioning During Eating upright 90 degree;upright in bed  -PF    Utensils Used spoon;cup;straw  -PF    Consistencies Trialed regular textures;soft to chew textures;thin liquids  -PF       Respiratory    Respiratory Status room air  -PF       Clinical Swallow Eval    Oral Prep Phase WFL  -PF    Oral Transit WFL  -PF    Oral Residue WFL  -PF    Pharyngeal Phase no overt signs/symptoms of pharyngeal impairment  -PF       Initiation of Pharyngeal Swallow    Initiation of Pharyngeal Swallow WFL  -PF    Pharyngeal Phase functional pharyngeal phase of swallow  -PF       SLP Evaluation Clinical Impression    SLP Swallowing Diagnosis swallow WFL/no suspected pharyngeal impairment;functional oral phase;functional pharyngeal phase  -PF    Rehab  Potential/Prognosis, Swallowing good, to achieve stated therapy goals  -PF    Swallow Criteria for Skilled Therapeutic Interventions Met demonstrates skilled criteria  -PF    Comment Bedside swallow evaluation completed. Pt alert, oriented, cooperative. Accepted trials of STC and regular, and thin liquid via cup straw. OME WFL. Pt has plates upper and lower; adequate dentition for mastication, pt denies difficulty. Oral phase of the swallow c/b by complete labial seal resulting in no anterior loss of bolus, adequate mastication time and bolus organization, timely swallow initiation as visually and clinically judged, no signs of buccal pocketing, but mild lingual residue on cracker and 1-2 spontaneous attempts to clear w/ liquid wash. Pharyngeal phase of the swallow marked by palpable hyolaryngeal excursion, timely initiation of laryngeal excursion, multiple swallow x2 on cracker, clear vocal quality, pt denies report of residue localized to neck area, no overt s/s of aspiration or respiratory distress upon PO trials.     Upon results of bedside swallow evaluation completed, pt demonstrates WFL oral and pharyngeal phases of swallow, as well as safe and efficient swallow of PO intake. No diet modification needed at this time as pt is able to maintain adequate nutrition and hydration with current diet of regular texture and thin liquid as pt's appetite improves. It is recommended that pt remains on regular texture and thin liquid diet at this time. He reports no diet restrictions, denies swallowing difficulty nor issues with current diet. Chest XR completed 8/23/23 showed no acute cardiopulmonary disease. ST to sign off from caseload. Notify SLP if any concerns arise, or changes in upper respiratory status, fever, or possible s/s of aspiration during all PO.    Recommendations:     - regular texture and thin liquid diet - no diet modification needed at this time  - meds - as tolerated  - Notify SLP if any concerns  arise, or changes in upper respiratory status, fever, or possible s/s of aspiration during all PO.         Recommendations    Therapy Frequency (Swallow) evaluation only  -PF    SLP Diet Recommendation regular textures;thin liquids  -PF    Recommended Diagnostics No further SLP services recommended  -PF    Recommended Precautions and Strategies upright posture during/after eating  -PF    Oral Care Recommendations Oral Care BID/PRN  -PF    SLP Rec. for Method of Medication Administration meds whole;meds crushed;with thin liquids;as tolerated  -PF    Monitor for Signs of Aspiration yes;notify SLP if any concerns  -PF    Anticipated Discharge Disposition (SLP) home  -PF         User Key  (r) = Recorded By, (t) = Taken By, (c) = Cosigned By      Initials Name Effective Dates    PF Abigail Paez SLP 05/08/23 -         EDUCATION  The patient has been educated in the following areas:   Oral Care/Hydration.     FUNMI Ferreira  8/24/2023

## 2023-08-24 NOTE — PLAN OF CARE
Problem: Adult Inpatient Plan of Care  Goal: Plan of Care Review  Outcome: Ongoing, Progressing  Flowsheets (Taken 8/24/2023 1717)  Progress: improving  Plan of Care Reviewed With:   patient   spouse  Goal: Patient-Specific Goal (Individualized)  Outcome: Ongoing, Progressing  Goal: Absence of Hospital-Acquired Illness or Injury  Outcome: Ongoing, Progressing  Intervention: Identify and Manage Fall Risk  Recent Flowsheet Documentation  Taken 8/24/2023 1600 by Nadine Pruett RN  Safety Promotion/Fall Prevention:   assistive device/personal items within reach   clutter free environment maintained   safety round/check completed   nonskid shoes/slippers when out of bed   fall prevention program maintained  Taken 8/24/2023 1200 by Nadine Pruett RN  Safety Promotion/Fall Prevention: safety round/check completed  Taken 8/24/2023 1000 by Nadine Pruett RN  Safety Promotion/Fall Prevention:   assistive device/personal items within reach   clutter free environment maintained   safety round/check completed   nonskid shoes/slippers when out of bed   fall prevention program maintained  Taken 8/24/2023 0820 by Nadine Pruett RN  Safety Promotion/Fall Prevention:   assistive device/personal items within reach   clutter free environment maintained   safety round/check completed   nonskid shoes/slippers when out of bed  Intervention: Prevent Skin Injury  Recent Flowsheet Documentation  Taken 8/24/2023 0820 by Nadine Pruett RN  Body Position: supine  Intervention: Prevent and Manage VTE (Venous Thromboembolism) Risk  Recent Flowsheet Documentation  Taken 8/24/2023 0820 by Nadine Pruett RN  Activity Management: up to bedside commode  Goal: Optimal Comfort and Wellbeing  Outcome: Ongoing, Progressing  Intervention: Provide Person-Centered Care  Recent Flowsheet Documentation  Taken 8/24/2023 0820 by Nadine Pruett RN  Trust Relationship/Rapport:   care explained   questions answered   questions  encouraged  Goal: Readiness for Transition of Care  Outcome: Ongoing, Progressing     Problem: Fall Injury Risk  Goal: Absence of Fall and Fall-Related Injury  Outcome: Ongoing, Progressing  Intervention: Promote Injury-Free Environment  Recent Flowsheet Documentation  Taken 8/24/2023 1600 by Nadine Pruett RN  Safety Promotion/Fall Prevention:   assistive device/personal items within reach   clutter free environment maintained   safety round/check completed   nonskid shoes/slippers when out of bed   fall prevention program maintained  Taken 8/24/2023 1200 by Nadine Pruett RN  Safety Promotion/Fall Prevention: safety round/check completed  Taken 8/24/2023 1000 by Nadine Pruett RN  Safety Promotion/Fall Prevention:   assistive device/personal items within reach   clutter free environment maintained   safety round/check completed   nonskid shoes/slippers when out of bed   fall prevention program maintained  Taken 8/24/2023 0820 by Nadine Pruett RN  Safety Promotion/Fall Prevention:   assistive device/personal items within reach   clutter free environment maintained   safety round/check completed   nonskid shoes/slippers when out of bed   Goal Outcome Evaluation:  Plan of Care Reviewed With: patient, spouse        Progress: improving

## 2023-08-24 NOTE — PLAN OF CARE
Goal Outcome Evaluation:     Received speech consult; bedside swallow evaluation completed. Pt alert, oriented, cooperative. No signs of respiratory distress; on room air. WFL oral motor and cranial nerves function upon assessment. Upon results of bedside swallow evaluation completed (please refer to initial swallow evaluation for full details and report), pt demonstrates WFL oral and pharyngeal phases of swallow, as well as safe and efficient swallow of PO intake. No diet modification needed at this time as pt is able to maintain adequate nutrition and hydration with current diet of regular texture and thin liquid as pt's appetite improves. It is recommended that pt remains on regular texture and thin liquid diet at this time. He reports no diet restrictions, denies swallowing difficulty nor issues with current diet. Chest XR completed 8/23/23 showed no acute cardiopulmonary disease. ST to sign off from caseload. Notify SLP if any concerns arise, changes in upper respiratory status, fever, or possible s/s of aspiration during all PO.     Recommendations:      - regular texture and thin liquid diet - no diet modification needed at this time  - meds - as tolerated  - Notify SLP if any concerns arise, changes in upper respiratory status, fever, or possible s/s of aspiration during all PO.

## 2023-08-24 NOTE — PLAN OF CARE
Goal Outcome Evaluation:  Plan of Care Reviewed With: patient         82 y/o M who presented with weakness and fever. Diagnosed with COVID-19. Patient lives with spouse in multilevel home with 3 [address] with rail. Pt does not use AD at baseline in home but uses RW in community. Denies recent falls. Son present. Patient alert and oriented x 4. Patient however has poor awareness of situation per observation. Patient had incontinence in bed. Mod A for bed mobility. Mod A for transfer with RW. Patient with urinary incontinence in standing, able to assist with urinal however pt then has bowel incontinence in standing on floor as well. Increased time for clean up. Obtained BSC and required mod A of 2 for transfer. Poor safety, max verbal cues for hand placement and sequencing. Forward flexed and maintains significant knee flexion despite cues to improve posture. Significant falls risk with current incontinence and awareness. Patient currently needs SNF at d/c.          Anticipated Discharge Disposition (PT): skilled nursing facility

## 2023-08-24 NOTE — ED PROVIDER NOTES
Subjective   History of Present Illness  Chief complaint General weakness cough fever    History of present illness 83-year-old male who has been sick for couple days with fatigue fevers weakness not eating or drinking cough shortness of breath.  No vomiting or diarrhea no ill exposures or foreign travels antibiotic use or recent hospitalization.  Symptoms severe nothing really makes it better or worse ongoing for last couple days.  Tick bites or rashes.  No urinary complaints.    Review of Systems   Constitutional:  Negative for chills and fever.   Eyes:  Negative for photophobia and visual disturbance.   Respiratory:  Positive for cough and shortness of breath. Negative for chest tightness.    Cardiovascular:  Negative for chest pain and leg swelling.   Gastrointestinal:  Negative for abdominal pain and vomiting.   Genitourinary:  Negative for difficulty urinating and dysuria.   Musculoskeletal:  Positive for arthralgias and myalgias.   Neurological:  Positive for weakness and headaches. Negative for facial asymmetry and speech difficulty.   Psychiatric/Behavioral:  Negative for confusion.      Past Medical History:   Diagnosis Date    Allergic 1970    Arthritis     Benign prostatic hyperplasia 1980    Cancer     Cataract 1990    Cholelithiasis 1958    Coronary artery disease     Erectile dysfunction 2020    HL (hearing loss) 1995    Hyperlipidemia     Hypertension     Osteopenia 1980    Prostatism     Renal insufficiency 1940    Suspected COVID-19 virus infection 09/07/2021    Visual impairment 1948       Allergies   Allergen Reactions    Codeine GI Intolerance    Iodine Hives    Nabumetone Unknown - High Severity    Propoxyphene Hives       Past Surgical History:   Procedure Laterality Date    ANKLE SURGERY      CARDIAC CATHETERIZATION  2020    EYE SURGERY  1960    NOSE SURGERY      ROTATOR CUFF REPAIR      SKIN CANCER EXCISION      L shoulder    UMBILICAL HERNIA REPAIR         Family History   Problem Relation  Age of Onset    Hypertension Brother     Hyperlipidemia Brother        Social History     Socioeconomic History    Marital status:    Tobacco Use    Smoking status: Former     Packs/day: 0.50     Years: 2.00     Pack years: 1.00     Types: Cigarettes, Pipe, Cigars     Quit date: 1960     Years since quittin.6    Smokeless tobacco: Never    Tobacco comments:     1-2/day   Vaping Use    Vaping Use: Never used   Substance and Sexual Activity    Alcohol use: Yes     Comment: socially    Drug use: Never    Sexual activity: Not Currently     Partners: Female     Birth control/protection: None     Prior to Admission medications    Medication Sig Start Date End Date Taking? Authorizing Provider   aspirin (aspirin) 81 MG EC tablet Take 1 tablet by mouth Daily. 10/19/11   Kirby Blank MD   atenolol (TENORMIN) 25 MG tablet TAKE ONE TABLET BY MOUTH EVERY MORNING 23   Vilma Staples MD   atorvastatin (LIPITOR) 20 MG tablet Take 1 tablet by mouth Every Night. 14   Kirby Blank MD   azelastine (ASTELIN) 0.1 % nasal spray 2 sprays into the nostril(s) as directed by provider 2 (Two) Times a Day. Use in each nostril as directed 23   Amarjit Jurado Jr., MD   Calcium Carbonate 1500 (600 Ca) MG tablet Take 1 tablet by mouth Daily.    Kirby Blank MD   cloNIDine (CATAPRES) 0.2 MG tablet TAKE ONE TABLET BY MOUTH TWICE A DAY 23   Amarjit Jurado Jr., MD   docusate sodium (COLACE) 100 MG capsule Take 1 capsule by mouth 2 (Two) Times a Day.    Kirby Blank MD   finasteride (PROSCAR) 5 MG tablet Take 1 tablet by mouth Every Evening. 14   Kirby Blank MD   fluticasone (FLONASE) 50 MCG/ACT nasal spray 2 sprays into the nostril(s) as directed by provider Daily. 23   Amarjit Jurado Jr., MD   hydroCHLOROthiazide (HYDRODIURIL) 25 MG tablet TAKE ONE TABLET BY MOUTH DAILY 23   Amarjit Jurado Jr., MD   isosorbide mononitrate (IMDUR) 30 MG 24 hr  tablet TAKE ONE TABLET BY MOUTH DAILY 12/6/22   Alisa Salazar APRN   losartan (COZAAR) 100 MG tablet Take 1 tablet by mouth Daily. 10/19/11   ProviderKirby MD   Probiotic Product (PROBIOTIC DAILY PO) Take 1 capsule by mouth Daily.    Provider, MD Kirby   tamsulosin (FLOMAX) 0.4 MG capsule 24 hr capsule Take 1 capsule by mouth 2 (Two) Times a Day. 8/2/21   Provider, MD Kirby            Objective   Physical Exam  Constitutional is an 83-year-old awake alert temperature was 103 triage vital signs reviewed sats are good in the upper 90s.  HEENT extraocular muscles are intact pupils equal round reactive no photophobia mouth is clear but dry neck supple no adenopathy no JV no bruits lungs clear no retraction no use of accessories heart regular without murmur abdomen soft without tenderness good bowel sounds no peritoneal findings extremities no edema cords or Homans' sign or evidence of DVT skin warm and dry without rashes or cellulitic changes neurologic awake alert and follows command no face asymmetry speech normal no focal weakness  Procedures           ED Course      Results for orders placed or performed during the hospital encounter of 08/23/23   Respiratory Panel PCR w/COVID-19(SARS-CoV-2) TOBY/BELGICA/MERNA/PAD/COR/MAD/JOANN In-House, NP Swab in UTM/VTM, 3-4 HR TAT - Swab, Nasopharynx    Specimen: Nasopharynx; Swab   Result Value Ref Range    ADENOVIRUS, PCR Not Detected Not Detected    Coronavirus 229E Not Detected Not Detected    Coronavirus HKU1 Not Detected Not Detected    Coronavirus NL63 Not Detected Not Detected    Coronavirus OC43 Not Detected Not Detected    COVID19 Detected (C) Not Detected - Ref. Range    Human Metapneumovirus Not Detected Not Detected    Human Rhinovirus/Enterovirus Not Detected Not Detected    Influenza A PCR Not Detected Not Detected    Influenza B PCR Not Detected Not Detected    Parainfluenza Virus 1 Not Detected Not Detected    Parainfluenza Virus 2 Not Detected Not  Detected    Parainfluenza Virus 3 Not Detected Not Detected    Parainfluenza Virus 4 Not Detected Not Detected    RSV, PCR Not Detected Not Detected    Bordetella pertussis pcr Not Detected Not Detected    Bordetella parapertussis PCR Not Detected Not Detected    Chlamydophila pneumoniae PCR Not Detected Not Detected    Mycoplasma pneumo by PCR Not Detected Not Detected   Comprehensive Metabolic Panel    Specimen: Blood   Result Value Ref Range    Glucose 141 (H) 65 - 99 mg/dL    BUN 20 8 - 23 mg/dL    Creatinine 0.88 0.76 - 1.27 mg/dL    Sodium 129 (L) 136 - 145 mmol/L    Potassium 3.6 3.5 - 5.2 mmol/L    Chloride 93 (L) 98 - 107 mmol/L    CO2 26.0 22.0 - 29.0 mmol/L    Calcium 9.2 8.6 - 10.5 mg/dL    Total Protein 6.6 6.0 - 8.5 g/dL    Albumin 3.9 3.5 - 5.2 g/dL    ALT (SGPT) 13 1 - 41 U/L    AST (SGOT) 30 1 - 40 U/L    Alkaline Phosphatase 69 39 - 117 U/L    Total Bilirubin 0.6 0.0 - 1.2 mg/dL    Globulin 2.7 gm/dL    A/G Ratio 1.4 g/dL    BUN/Creatinine Ratio 22.7 7.0 - 25.0    Anion Gap 10.0 5.0 - 15.0 mmol/L    eGFR 84.3 >60.0 mL/min/1.73   CBC Auto Differential    Specimen: Blood   Result Value Ref Range    WBC 8.70 3.40 - 10.80 10*3/mm3    RBC 3.85 (L) 4.14 - 5.80 10*6/mm3    Hemoglobin 12.5 (L) 13.0 - 17.7 g/dL    Hematocrit 36.6 (L) 37.5 - 51.0 %    MCV 94.9 79.0 - 97.0 fL    MCH 32.5 26.6 - 33.0 pg    MCHC 34.3 31.5 - 35.7 g/dL    RDW 13.5 12.3 - 15.4 %    RDW-SD 45.9 37.0 - 54.0 fl    MPV 7.8 6.0 - 12.0 fL    Platelets 184 140 - 450 10*3/mm3    Neutrophil % 85.0 (H) 42.7 - 76.0 %    Lymphocyte % 3.8 (L) 19.6 - 45.3 %    Monocyte % 10.7 5.0 - 12.0 %    Eosinophil % 0.2 (L) 0.3 - 6.2 %    Basophil % 0.3 0.0 - 1.5 %    Neutrophils, Absolute 7.40 (H) 1.70 - 7.00 10*3/mm3    Lymphocytes, Absolute 0.30 (L) 0.70 - 3.10 10*3/mm3    Monocytes, Absolute 0.90 0.10 - 0.90 10*3/mm3    Eosinophils, Absolute 0.00 0.00 - 0.40 10*3/mm3    Basophils, Absolute 0.00 0.00 - 0.20 10*3/mm3    nRBC 0.0 0.0 - 0.2 /100 WBC    Urinalysis With Microscopic If Indicated (No Culture) - Urine, Clean Catch    Specimen: Urine, Clean Catch   Result Value Ref Range    Color, UA Yellow Yellow, Straw    Appearance, UA Clear Clear    pH, UA 7.5 5.0 - 8.0    Specific Gravity, UA 1.017 1.005 - 1.030    Glucose, UA Negative Negative    Ketones, UA Negative Negative    Bilirubin, UA Negative Negative    Blood, UA Negative Negative    Protein, UA Negative Negative    Leuk Esterase, UA Negative Negative    Nitrite, UA Negative Negative    Urobilinogen, UA 1.0 E.U./dL 0.2 - 1.0 E.U./dL   CK    Specimen: Blood   Result Value Ref Range    Creatine Kinase 263 (H) 20 - 200 U/L   POC Lactate    Specimen: Blood   Result Value Ref Range    Lactate 0.8 0.3 - 2.0 mmol/L   ECG 12 Lead Chest Pain   Result Value Ref Range    QT Interval 395 ms   Green Top (Gel)   Result Value Ref Range    Extra Tube HOLD    Lavender Top   Result Value Ref Range    Extra Tube hold for add-on    Gold Top - SST   Result Value Ref Range    Extra Tube Hold for add-ons.    Light Blue Top   Result Value Ref Range    Extra Tube Hold for add-ons.      XR Chest 1 View    Result Date: 8/23/2023  Impression: 1. No acute cardiopulmonary disease Electronically Signed: Louis Montelongo MD  8/23/2023 7:05 PM EDT  Workstation ID: [id number]     Medications   sodium chloride 0.9 % flush 10 mL (has no administration in time range)   sodium chloride 0.9 % infusion (100 mL/hr Intravenous New Bag 8/23/23 2050)   acetaminophen (TYLENOL) tablet 650 mg (has no administration in time range)   aspirin EC tablet 81 mg (has no administration in time range)   atorvastatin (LIPITOR) tablet 20 mg (has no administration in time range)   azelastine (ASTELIN) nasal spray 2 spray (has no administration in time range)   docusate sodium (COLACE) capsule 100 mg (has no administration in time range)   finasteride (PROSCAR) tablet 5 mg (has no administration in time range)   isosorbide mononitrate (IMDUR) 24 hr tablet 30 mg  (has no administration in time range)   tamsulosin (FLOMAX) 24 hr capsule 0.4 mg (has no administration in time range)   sodium chloride 0.9 % flush 10 mL (10 mL Intravenous Given 8/23/23 2055)   sodium chloride 0.9 % flush 10 mL (has no administration in time range)   sodium chloride 0.9 % infusion 40 mL (has no administration in time range)   ondansetron (ZOFRAN) tablet 4 mg (has no administration in time range)     Or   ondansetron (ZOFRAN) injection 4 mg (has no administration in time range)   Pharmacy to Dose enoxaparin (LOVENOX) (has no administration in time range)   nitroglycerin (NITROSTAT) SL tablet 0.4 mg (has no administration in time range)   Potassium Replacement - Follow Nurse / BPA Driven Protocol (has no administration in time range)   Magnesium Standard Dose Replacement - Follow Nurse / BPA Driven Protocol (has no administration in time range)   Phosphorus Replacement - Follow Nurse / BPA Driven Protocol (has no administration in time range)   Calcium Replacement - Follow Nurse / BPA Driven Protocol (has no administration in time range)   Enoxaparin Sodium (LOVENOX) syringe 40 mg (40 mg Subcutaneous Given 8/23/23 2050)   lactated ringers bolus 1,000 mL (0 mL Intravenous Stopped 8/23/23 1900)   acetaminophen (TYLENOL) tablet 1,000 mg (1,000 mg Oral Given 8/23/23 1818)                                            Medical Decision Making  Medical decision making.  IV established placed on the monitor showed a sinus rhythm on my review a gram of Tylenol p.o. and a liter of lactated Ringer's IV bolus.  Labs obtained independent reviewed by me COVID-19 was positive.  Comprehensive metabolic profile revealed a sodium of 129 CBC unremarkable hemoglobin 12.5 urine negative lactate was 0.8 CK was 263.  Blood cultures pending chest x-ray obtained independent reviewed by me I do not see evidence of pneumonia pneumothorax or any other acute findings.  Radiology was unremarkable.  Patient's temperature was coming  down was down to 102 on repeat exam he was feeling better sats are good.  I do not see evidence of meningitis on I will see evidence of bacteremia or sepsis currently no evidence of pneumonia or intra-abdominal process.  No cellulitic changes complete list of all possibilities but most likely related to COVID.  Patient is extremely weak he is 83 years old he will be mated to the hospital for supportive care I talked to the hospitalist we discussed the case patient agreeable to talk to the son as well stable otherwise unremarkable ER course.    Problems Addressed:  Acute febrile illness: complicated acute illness or injury  COVID-19: complicated acute illness or injury  Weakness: complicated acute illness or injury    Amount and/or Complexity of Data Reviewed  Labs: ordered. Decision-making details documented in ED Course.  Radiology: ordered and independent interpretation performed.    Risk  Decision regarding hospitalization.        Final diagnoses:   Acute febrile illness   Weakness   COVID-19       ED Disposition  ED Disposition       ED Disposition   Decision to Admit    Condition   --    Comment   Level of Care: Telemetry [5]   Diagnosis: COVID-19 [[id number]]   Admitting Physician: PATRICIA KUMAR [[id number]]   Attending Physician: PATRICIA KUMAR [[id number]]   Certification: I Certify That Inpatient Hospital Services Are Medically Necessary For Greater Than 2 Midnights                 No follow-up provider specified.       Medication List      No changes were made to your prescriptions during this visit.            Marcell Murcia MD  08/23/23 1751

## 2023-08-24 NOTE — PLAN OF CARE
Goal Outcome Evaluation:  Plan of Care Reviewed With: patient, son           Outcome Evaluation: Pt is an 82 y/o M admitted c/o of weakness and fall, pt was unable to get up.  Pt tested (+) for COVID 19.  PMHx: CAD, BPH, anemia, HTN, HLD.  Pt reports he lives in a multilevel home with wife but can reside on main level if needed.  Pt reports 3 steps with HR to get into house.  Pt reports being independent with ADLs, IADLs, mobility, and driving and using RW 'sometimes'.  Pt t/f from supine to sitting on EOB with MOD A.  Pt completed sit to stand transfer with MOD A using RW, upon standing pt reported he needed urinal.  Pt then began having bowel incontinence in standing on floor requiring increased time for clean up.  Obtained BSC, pt required MOD A x 2 for  transfer due to impaired safety awareness and sequencing.  Pt required MAX A with amelia hygiene.  Pt required MAX A for donning/doffing socks.  Pt required MIN A for UBD tasks.  Pt presents with deficits in self care, transfers, activity tolerance, increased falls risk, poor safety awareness, and impaired sequencing indicating need for skilled OT services.  OT recommending SNF due to pt unsafe to return home at this time.      Anticipated Discharge Disposition (OT): skilled nursing facility

## 2023-08-24 NOTE — THERAPY EVALUATION
Patient Name: Campbell Alex  : 1940    MRN: [id number]                              Today's Date: 2023       Admit Date: 2023    Visit Dx:     ICD-10-CM ICD-9-CM   1. Acute febrile illness  R50.9 780.60   2. Weakness  R53.1 780.79   3. COVID-19  U07.1 079.89     Patient Active Problem List   Diagnosis    Coronary artery disease    Prostatism    Hyperlipidemia    Primary hypertension    Type 2 diabetes mellitus without complication, without long-term current use of insulin    Osteoarthritis    Osteopenia    Psoriasis    Status post coronary artery stent placement    Vertigo    Onychomycosis    Spinal stenosis of lumbar region    Anemia    Back pain    Colon polyps    Hiatal hernia    Irritable bowel syndrome    Kidney stones    Sacroiliac joint pain    Allergic rhinitis due to allergen    COVID-19     Past Medical History:   Diagnosis Date    Allergic 1970    Arthritis     Benign prostatic hyperplasia     Cancer     Cataract     Cholelithiasis     Coronary artery disease     Erectile dysfunction     HL (hearing loss)     Hyperlipidemia     Hypertension     Osteopenia     Prostatism     Renal insufficiency     Suspected COVID-19 virus infection 2021    Visual impairment 194     Past Surgical History:   Procedure Laterality Date    ANKLE SURGERY      CARDIAC CATHETERIZATION      EYE SURGERY      NOSE SURGERY      ROTATOR CUFF REPAIR      SKIN CANCER EXCISION      L shoulder    UMBILICAL HERNIA REPAIR        General Information       Row Name 23 1213          Physical Therapy Time and Intention    Document Type evaluation  -SS     Mode of Treatment physical therapy  -SS       Row Name 23 1213          General Information    Patient Profile Reviewed yes  -SS     Prior Level of Function independent:;ADL's;driving;community mobility  uses walker in community, not in home  -SS     Existing Precautions/Restrictions fall  -SS       Row Name 23 1217           Living Environment    People in Home spouse  -       Row Name 08/24/23 1213          Home Main Entrance    Number of Stairs, Main Entrance three  -     Stair Railings, Main Entrance railings safe and in good condition  -       Row Name 08/24/23 1213          Stairs Within Home, Primary    Stairs, Within Home, Primary multilevel but can stay on first floor if needed  -       Row Name 08/24/23 1213          Cognition    Orientation Status (Cognition) oriented x 4  -       Row Name 08/24/23 1213          Safety Issues, Functional Mobility    Impairments Affecting Function (Mobility) balance;cognition;endurance/activity tolerance;strength;motor planning  -               User Key  (r) = Recorded By, (t) = Taken By, (c) = Cosigned By      Initials Name Provider Type     Pamela Wei PT Physical Therapist                   Mobility       Row Name 08/24/23 1217          Bed Mobility    Bed Mobility bed mobility (all) activities  -     All Activities, [place] (Bed Mobility) moderate assist (50% patient effort)  -       Row Name 08/24/23 1217          Bed-Chair Transfer    Bed-Chair [place] (Transfers) moderate assist (50% patient effort)  -     Assistive Device (Bed-Chair Transfers) walker, front-wheeled  -       Row Name 08/24/23 1217          Sit-Stand Transfer    Sit-Stand [place] (Transfers) moderate assist (50% patient effort)  -     Assistive Device (Sit-Stand Transfers) walker, front-wheeled  -SS     Comment, (Sit-Stand Transfer) poor safety awareness, poor hand placement  -       Row Name 08/24/23 1217          Gait/Stairs (Locomotion)    Comment, (Gait/Stairs) did not progress to ambulation due to poor standing tolerance, forward flexed posture  -               User Key  (r) = Recorded By, (t) = Taken By, (c) = Cosigned By      Initials Name Provider Type    Pamela Abreu PT Physical Therapist                   Obj/Interventions       Row Name  08/24/23 1219          Range of Motion Comprehensive    Comment, General Range of Motion unable to don socks independently  -       Row Name 08/24/23 1219          Strength Comprehensive (MMT)    Comment, General Manual Muscle Testing (MMT) Assessment B LE functional weakness noted  -       Row Name 08/24/23 1219          Balance    Balance Assessment sitting static balance;standing static balance  -     Static Sitting Balance verbal cues;supervision  -     Static Standing Balance moderate assist  -     Comment, Balance constant cues to prevent progressing mobility before PT/OT ready  -       Row Name 08/24/23 1219          Sensory Assessment (Somatosensory)    Sensory Assessment (Somatosensory) not tested  -               User Key  (r) = Recorded By, (t) = Taken By, (c) = Cosigned By      Initials Name Provider Type     Pamela Wei, PT Physical Therapist                   Goals/Plan       Row Name 08/24/23 1222          Bed Mobility Goal 1 (PT)    Activity/Assistive Device (Bed Mobility Goal 1, PT) bed mobility activities, all  -SS     [place] Level/Cues Needed (Bed Mobility Goal 1, PT) modified independence  -SS     Time Frame (Bed Mobility Goal 1, PT) long term goal (LTG);2 weeks  -       Row Name 08/24/23 1222          Transfer Goal 1 (PT)    Activity/Assistive Device (Transfer Goal 1, PT) transfers, all;walker, rolling  -SS     [place] Level/Cues Needed (Transfer Goal 1, PT) modified independence  -SS     Time Frame (Transfer Goal 1, PT) long term goal (LTG);2 weeks  -       Row Name 08/24/23 1222          Gait Training Goal 1 (PT)    Activity/Assistive Device (Gait Training Goal 1, PT) gait (walking locomotion);walker, rolling  -SS     [place] Level (Gait Training Goal 1, PT) modified independence  -SS     Distance (Gait Training Goal 1, PT) 75'  -SS     Time Frame (Gait Training Goal 1, PT) long term goal (LTG);2 weeks  -       Row Name 08/24/23 1222           Therapy Assessment/Plan (PT)    Planned Therapy Interventions (PT) balance training;bed mobility training;gait training;home exercise program;strengthening;patient/family education;transfer training  -               User Key  (r) = Recorded By, (t) = Taken By, (c) = Cosigned By      Initials Name Provider Type    SS Pamela Wei, PT Physical Therapist                   Clinical Impression       Row Name 08/24/23 1221          Pain    Additional Documentation Pain Scale: FACES Pre/Post-Treatment (Group)  -       Row Name 08/24/23 1221          Pain Scale: FACES Pre/Post-Treatment    Pain: FACES Scale, Pretreatment 2-->hurts little bit  -SS     Posttreatment Pain Rating 2-->hurts little bit  -       Row Name 08/24/23 1234 08/24/23 1221       Plan of Care Review    Plan of Care Reviewed With -- patient  -SS    Outcome Evaluation 82 y/o M who presented with weakness and fever. Diagnosed with COVID-19. Patient lives with spouse in multilevel home with 3 ROVERTO with rail. Pt does not use AD at baseline in home but uses RW in community. Denies recent falls. Son present. Patient alert and oriented x 4. Patient however has poor awareness of situation per observation. Patient had incontinence in bed. Mod A for bed mobility. Mod A for transfer with RW. Patient with urinary incontinence in standing, able to assist with urinal however pt then has bowel incontinence in standing on floor as well. Increased time for clean up. Obtained BSC and required mod A of 2 for transfer. Poor safety, max verbal cues for hand placement and sequencing. Forward flexed and maintains significant knee flexion despite cues to improve posture. Significant falls risk with current incontinence and awareness. Patient currently needs SNF at d/c.  -SS --      Row Name 08/24/23 1221          Therapy Assessment/Plan (PT)    Rehab Potential (PT) good, to achieve stated therapy goals  -     Criteria for Skilled Interventions Met (PT) yes;meets  criteria  -SS     Therapy Frequency (PT) 5 times/wk  -     Predicted Duration of Therapy Intervention (PT) until d/c  -SS       Row Name 08/24/23 1221          Positioning and Restraints    Pre-Treatment Position in bed  -SS     Post Treatment Position bed  -SS               User Key  (r) = Recorded By, (t) = Taken By, (c) = Cosigned By      Initials Name Provider Type     Pamela Wei PT Physical Therapist                   Outcome Measures       Row Name 08/24/23 1223          How much help from another person do you currently need...    Turning from your back to your side while in flat bed without using bedrails? 2  -SS     Moving from lying on back to sitting on the side of a flat bed without bedrails? 2  -SS     Moving to and from a bed to a chair (including a wheelchair)? 2  -SS     Standing up from a chair using your arms (e.g., wheelchair, bedside chair)? 2  -SS     Climbing 3-5 steps with a railing? 1  -SS     To walk in hospital room? 1  -SS     AM-PAC 6 Clicks Score (PT) 10  -SS     Highest level of mobility 4 --> Transferred to chair/commode  -       Row Name 08/24/23 1223          Functional Assessment    Outcome Measure Options AM-PAC 6 Clicks Basic Mobility (PT)  -               User Key  (r) = Recorded By, (t) = Taken By, (c) = Cosigned By      Initials Name Provider Type     Pamela Wei PT Physical Therapist                                 Physical Therapy Education       Title: PT OT SLP Therapies (In Progress)       Topic: Physical Therapy (In Progress)       Point: Mobility training (Done)       Learning Progress Summary             Patient Acceptance, E, VU by  at 8/24/2023 1224                         Point: Precautions (Not Started)       Learner Progress:  Not documented in this visit.                              User Key       Initials Effective Dates Name Provider Type [facility] 06/16/21 -  Pamela Wei PT Physical Therapist PT                   PT Recommendation and Plan  Planned Therapy Interventions (PT): balance training, bed mobility training, gait training, home exercise program, strengthening, patient/family education, transfer training  Plan of Care Reviewed With: patient  Outcome Evaluation: 84 y/o M who presented with weakness and fever. Diagnosed with COVID-19. Patient lives with spouse in multilevel home with 3 ROVERTO with rail. Pt does not use AD at baseline in home but uses RW in community. Denies recent falls. Son present. Patient alert and oriented x 4. Patient however has poor awareness of situation per observation. Patient had incontinence in bed. Mod A for bed mobility. Mod A for transfer with RW. Patient with urinary incontinence in standing, able to assist with urinal however pt then has bowel incontinence in standing on floor as well. Increased time for clean up. Obtained BSC and required mod A of 2 for transfer. Poor safety, max verbal cues for hand placement and sequencing. Forward flexed and maintains significant knee flexion despite cues to improve posture. Significant falls risk with current incontinence and awareness. Patient currently needs SNF at d/c.     Time Calculation:   PT Evaluation Complexity  History, PT Evaluation Complexity: 3 or more personal factors and/or comorbidities  Examination of Body Systems (PT Eval Complexity): total of 4 or more elements  Clinical Presentation (PT Evaluation Complexity): evolving  Clinical Decision Making (PT Evaluation Complexity): moderate complexity  Overall Complexity (PT Evaluation Complexity): moderate complexity     PT Charges       Row Name 08/24/23 1224             Time Calculation    Start Time 0900  -SS      Stop Time 0948  -      Time Calculation (min) 48 min  -SS      PT Received On 08/24/23  -      PT - Next Appointment 08/25/23  -      PT Goal Re-Cert Due Date 09/07/23  -         Time Calculation- PT    Total Timed Code Minutes- PT 0 minute(s)  -                 User Key  (r) = Recorded By, (t) = Taken By, (c) = Cosigned By      Initials Name Provider Type    SS Pamela Wei, PT Physical Therapist                  Therapy Charges for Today       Code Description Service Date Service Provider Modifiers Qty    92172038781 HC PT EVAL MOD COMPLEXITY 4 8/24/2023 Pamela Wei, PT GP 1            PT G-Codes  Outcome Measure Options: AM-PAC 6 Clicks Basic Mobility (PT)  AM-PAC 6 Clicks Score (PT): 10  PT Discharge Summary  Anticipated Discharge Disposition (PT): skilled nursing facility    Pamela Wei PT  8/24/2023

## 2023-08-24 NOTE — H&P
[facility]      Patient Name: Campbell Alex  : 1940  MRN: [id number]  Primary Care Physician:  Amarjit Jurado Jr., MD  Date of admission: 2023      Subjective      Chief Complaint: weakness    History of Present Illness: Campbell Alex is a 83 y.o. male with PMHx of HTN, HLD, CAD, BPH who presented to [facility] on 2023 complaining of weakness.  HPI supplemented with information from wife and son at bedside.  Due to weakness patient was unable to get out of bed this morning, and when wife tried to help him he fell to ground and could not get up due to weakness.  Symptoms complicated by fever, patient down for 30 mins.  He uses a walker to get around at baseline.  Due to weakness he was brought to [facility] for evaluation.    In the ER, vitals 102.4F, HR 75, RR 26, /50, 94% 10L HFNC.  Labs notable for sodium 129, Cl 93, glucose 141, hgb 12.5.  CXR without acute cardiopulmonary process.  RVP positive for covid.        ROS   12 point ROS reviewed and negative except as mentioned above      Personal History     Past Medical History:   Diagnosis Date    Allergic 1970    Arthritis     Benign prostatic hyperplasia     Cancer     Cataract     Cholelithiasis     Coronary artery disease     Erectile dysfunction     HL (hearing loss)     Hyperlipidemia     Hypertension     Osteopenia     Prostatism     Renal insufficiency     Suspected COVID-19 virus infection 2021    Visual impairment        Past Surgical History:   Procedure Laterality Date    ANKLE SURGERY      CARDIAC CATHETERIZATION      EYE SURGERY      NOSE SURGERY      ROTATOR CUFF REPAIR      SKIN CANCER EXCISION      L shoulder    UMBILICAL HERNIA REPAIR         Family History: family history includes Hyperlipidemia in his brother; Hypertension in his brother. Otherwise pertinent FHx was reviewed and not pertinent to current issue.    Social History:   reports that he quit smoking about 63 years ago. His smoking use included cigarettes, pipe, and cigars. He has a 1.00 pack-year smoking history. He has never used smokeless tobacco. He reports current alcohol use. He reports that he does not use drugs.    Home Medications:  Prior to Admission Medications       Prescriptions Last Dose Informant Patient Reported? Taking?    aspirin (aspirin) 81 MG EC tablet   Yes No    Take 1 tablet by mouth Daily.    atenolol (TENORMIN) 25 MG tablet   No No    TAKE ONE TABLET BY MOUTH EVERY MORNING    atorvastatin (LIPITOR) 20 MG tablet   Yes No    Take 1 tablet by mouth Every Night.    azelastine (ASTELIN) 0.1 % nasal spray   No No    2 sprays into the nostril(s) as directed by provider 2 (Two) Times a Day. Use in each nostril as directed    Calcium Carbonate 1500 (600 Ca) MG tablet   Yes No    Take 1 tablet by mouth Daily.    cloNIDine (CATAPRES) 0.2 MG tablet   No No    TAKE ONE TABLET BY MOUTH TWICE A DAY    docusate sodium (COLACE) 100 MG capsule   Yes No    Take 1 capsule by mouth 2 (Two) Times a Day.    finasteride (PROSCAR) 5 MG tablet   Yes No    Take 1 tablet by mouth Every Evening.    fluticasone (FLONASE) 50 MCG/ACT nasal spray   No No    2 sprays into the nostril(s) as directed by provider Daily.    hydroCHLOROthiazide (HYDRODIURIL) 25 MG tablet   No No    TAKE ONE TABLET BY MOUTH DAILY    isosorbide mononitrate (IMDUR) 30 MG 24 hr tablet   No No    TAKE ONE TABLET BY MOUTH DAILY    losartan (COZAAR) 100 MG tablet   Yes No    Take 1 tablet by mouth Daily.    Probiotic Product (PROBIOTIC DAILY PO)   Yes No    Take 1 capsule by mouth Daily.    tamsulosin (FLOMAX) 0.4 MG capsule 24 hr capsule   Yes No    Take 1 capsule by mouth 2 (Two) Times a Day.              Allergies:  Allergies   Allergen Reactions    Codeine GI Intolerance    Iodine Hives    Nabumetone Unknown - High Severity    Propoxyphene Hives       Objective      Vitals:   Temp:  [98.6 °F (37 °C)-102.4 °F (39.1  °C)] 102.4 °F (39.1 °C)  Heart Rate:  [75-82] 75  Resp:  [26] 26  BP: (115-165)/(50-73) 115/50  Flow (L/min):  [10] 10    Physical Exam  Constitutional:       General: He is not in acute distress.     Appearance: He is not toxic-appearing.      Comments: AAOx3   HENT:      Head: Normocephalic and atraumatic.      Nose: Nose normal. No congestion.      Mouth/Throat:      Mouth: Mucous membranes are dry.      Pharynx: No oropharyngeal exudate.   Eyes:      General: No scleral icterus.  Cardiovascular:      Rate and Rhythm: Normal rate and regular rhythm.      Heart sounds: No murmur heard.    No friction rub. No gallop.   Pulmonary:      Effort: No respiratory distress.      Breath sounds: No wheezing or rales.   Abdominal:      General: There is no distension.      Tenderness: There is no abdominal tenderness. There is no guarding.   Musculoskeletal:         General: No swelling or deformity.      Cervical back: Normal range of motion. No rigidity.      Right lower leg: No edema.      Left lower leg: No edema.   Skin:     Coloration: Skin is not jaundiced.      Findings: No bruising or lesion.   Neurological:      General: No focal deficit present.      Mental Status: He is oriented to person, place, and time.      Motor: Weakness present.        Result Review    Result Review:  I have personally reviewed the results from the time of this admission to 8/23/2023 20:06 EDT and agree with these findings:  [x]  Laboratory  []  Microbiology  [x]  Radiology  []  EKG/Telemetry   []  Cardiology/Vascular   []  Pathology  []  Old records  []  Other:        Assessment & Plan        Active Hospital Problems:  There are no active hospital problems to display for this patient.    Plan:     #Covid 19   #Weakness    - weakness, fever, fatigue 2/2 covid    - Patient unable to get out of bed, fell to floor when wife tried, down for 30 mins    - check CK    - RVP positive for covid    - temp 102.4F    - Tylenol PRN fever    - cooling  blanket    - Lovenox for DVT prophylaxis    - contact precautions    - pt/ot/slp/cm    #Hyponatremia    - sodium 129 with Cl 93 on admission    - suspect volume depletion 2/2 covid    -  NS @ 100/hr, monitor for fluid overload    - hold home HCTZ    #CAD    - resume home aspirin, statin, imdur    #BPH    - Resume home Flomax and Proscar    #Anemia    - Hgb 12.5, appears near base    - no overt bleeding, follow labs    #HTN    - BP stable, hold home Clonidine, Lisinopril, and HCTZ espcially in setting of volume depletion     #HLD    - continue home statin    DVT prophylaxis: Lovenox      CODE STATUS:       Admission Status:  I believe this patient meets inpatient status.    I discussed the patient's findings and my recommendations with patient.    This patient has been examined wearing appropriate Personal Protective Equipment and discussed with  Patinet and family . 08/23/23      Signature: Electronically signed by Cee Downing DO, 08/23/23, 8:35 PM EDT.

## 2023-08-24 NOTE — THERAPY EVALUATION
Patient Name: Campbell Alex  : 1940    MRN: [id number]                              Today's Date: 2023       Admit Date: 2023    Visit Dx:     ICD-10-CM ICD-9-CM   1. Acute febrile illness  R50.9 780.60   2. Weakness  R53.1 780.79   3. COVID-19  U07.1 079.89     Patient Active Problem List   Diagnosis    Coronary artery disease    Prostatism    Hyperlipidemia    Primary hypertension    Type 2 diabetes mellitus without complication, without long-term current use of insulin    Osteoarthritis    Osteopenia    Psoriasis    Status post coronary artery stent placement    Vertigo    Onychomycosis    Spinal stenosis of lumbar region    Anemia    Back pain    Colon polyps    Hiatal hernia    Irritable bowel syndrome    Kidney stones    Sacroiliac joint pain    Allergic rhinitis due to allergen    COVID-19     Past Medical History:   Diagnosis Date    Allergic 1970    Arthritis     Benign prostatic hyperplasia     Cancer     Cataract     Cholelithiasis     Coronary artery disease     Erectile dysfunction     HL (hearing loss)     Hyperlipidemia     Hypertension     Osteopenia     Prostatism     Renal insufficiency     Suspected COVID-19 virus infection 2021    Visual impairment 194     Past Surgical History:   Procedure Laterality Date    ANKLE SURGERY      CARDIAC CATHETERIZATION      EYE SURGERY      NOSE SURGERY      ROTATOR CUFF REPAIR      SKIN CANCER EXCISION      L shoulder    UMBILICAL HERNIA REPAIR        General Information       Row Name 23 1328          OT Time and Intention    Document Type evaluation  -     Mode of Treatment occupational therapy  -       Row Name 23 1328          General Information    Prior Level of Function independent:;ADL's;driving;all household mobility;community mobility;home management  -     Existing Precautions/Restrictions fall  -       Row Name 23 1328          Living Environment    People in Home  spouse  -       Row Name 08/24/23 1328          Home Main Entrance    Number of Stairs, Main Entrance three  -     Stair Railings, Main Entrance railings safe and in good condition  -       Row Name 08/24/23 1328          Stairs Within Home, Primary    Stairs, Within Home, Primary multilevel but can stay on first floor if needed  -       Row Name 08/24/23 1328          Cognition    Orientation Status (Cognition) oriented x 4  -[facility] Name 08/24/23 1328          Safety Issues, Functional Mobility    Impairments Affecting Function (Mobility) balance;cognition;endurance/activity tolerance;strength;motor planning  -               User Key  (r) = Recorded By, (t) = Taken By, (c) = Cosigned By      Initials Name Provider Type     Alexi Maza, OT Occupational Therapist                     Mobility/ADL's       Row Name 08/24/23 1329          Bed Mobility    Bed Mobility bed mobility (all) activities  -     All Activities, [place] (Bed Mobility) moderate assist (50% patient effort)  -[facility] Name 08/24/23 1329          Transfers    Transfers toilet transfer;sit-stand transfer;bed-chair transfer  -[facility] Name 08/24/23 1329          Bed-Chair Transfer    Bed-Chair [place] (Transfers) moderate assist (50% patient effort)  -     Assistive Device (Bed-Chair Transfers) walker, front-wheeled  -[facility] Name 08/24/23 1329          Sit-Stand Transfer    Sit-Stand [place] (Transfers) moderate assist (50% patient effort)  -     Assistive Device (Sit-Stand Transfers) walker, front-wheeled  -[facility] Name 08/24/23 1329          Toilet Transfer    Type (Toilet Transfer) stand pivot/stand step  -     [place] Level (Toilet Transfer) moderate assist (50% patient effort);2 person assist  -     Assistive Device (Toilet Transfer) walker, front-wheeled  -       Row Name 08/24/23 1329          Activities of Daily Living    BADL Assessment/Intervention upper body  dressing;lower body dressing;toileting  -[facility] Name 08/24/23 1329          Upper Body Dressing Assessment/Training    [place] Level (Upper Body Dressing) minimum assist (75% patient effort)  -[facility] Name 08/24/23 1329          Lower Body Dressing Assessment/Training    [place] Level (Lower Body Dressing) lower body dressing skills;maximum assist (25% patient effort)  -[facility] Name 08/24/23 1329          Toileting Assessment/Training    [place] Level (Toileting) toileting skills;maximum assist (25% patient effort)  -               User Key  (r) = Recorded By, (t) = Taken By, (c) = Cosigned By      Initials Name Provider Type    Alexi Guardado OT Occupational Therapist                   Obj/Interventions       [facility] Name 08/24/23 1330          Range of Motion Comprehensive    General Range of Motion bilateral upper extremity ROM WFL  -[facility] Name 08/24/23 1330          Strength Comprehensive (MMT)    General Manual Muscle Testing (MMT) Assessment upper extremity strength deficits identified  -     Comment, General Manual Muscle Testing (MMT) Assessment BUE strength grossly WFL  -               User Key  (r) = Recorded By, (t) = Taken By, (c) = Cosigned By      Initials Name Provider Type    Alexi Guardado, OT Occupational Therapist                   Goals/Plan       Row Name 08/24/23 1346          Bathing Goal 1 (OT)    Activity/Device (Bathing Goal 1, OT) bathing skills, all  -     [place] Level/Cues Needed (Bathing Goal 1, OT) contact guard required  -     Time Frame (Bathing Goal 1, OT) 2 weeks  -[facility] Name 08/24/23 1346          Dressing Goal 1 (OT)    Activity/Device (Dressing Goal 1, OT) dressing skills, all  -     [place]/Cues Needed (Dressing Goal 1, OT) contact guard required  -     Time Frame (Dressing Goal 1, OT) 2 weeks  -[facility] Name 08/24/23 1346          Toileting Goal 1 (OT)    Activity/Device (Toileting Goal 1, OT)  toileting skills, all  -     [place] Level/Cues Needed (Toileting Goal 1, OT) contact guard required  -     Time Frame (Toileting Goal 1, OT) 2 weeks  -       Row Name 08/24/23 0022          Therapy Assessment/Plan (OT)    Planned Therapy Interventions (OT) activity tolerance training;functional balance retraining;BADL retraining;neuromuscular control/coordination retraining;patient/caregiver education/training;transfer/mobility retraining;strengthening exercise;ROM/therapeutic exercise  -               User Key  (r) = Recorded By, (t) = Taken By, (c) = Cosigned By      Initials Name Provider Type     Alexi Maza, ZULMA Occupational Therapist                   Clinical Impression       Row Name 08/24/23 1332          Pain Scale: FACES Pre/Post-Treatment    Pain: FACES Scale, Pretreatment 2-->hurts little bit  -     Posttreatment Pain Rating 2-->hurts little bit  -       Row Name 08/24/23 0688          Plan of Care Review    Plan of Care Reviewed With patient;son  -     Outcome Evaluation Pt is an 82 y/o M admitted c/o of weakness and fall, pt was unable to get up.  Pt tested (+) for COVID 19.  PMHx: CAD, BPH, anemia, HTN, HLD.  Pt reports he lives in a multilevel home with wife but can reside on main level if needed.  Pt reports 3 steps with HR to get into house.  Pt reports being independent with ADLs, IADLs, mobility, and driving and using RW 'sometimes'.  Pt t/f from supine to sitting on EOB with MOD A.  Pt completed sit to stand transfer with MOD A using RW, upon standing pt reported he needed urinal.  Pt then began having bowel incontinence in standing on floor requiring increased time for clean up.  Obtained BSC, pt required MOD A x 2 for  transfer due to impaired safety awareness and sequencing.  Pt required MAX A with amelia hygiene.  Pt required MAX A for donning/doffing socks.  Pt required MIN A for UBD tasks.  Pt presents with deficits in self care, transfers, activity tolerance,  increased falls risk, poor safety awareness, and impaired sequencing indicating need for skilled OT services.  OT recommending SNF due to pt unsafe to return home at this time.  -       Row Name 08/24/23 1332          Therapy Assessment/Plan (OT)    Criteria for Skilled Therapeutic Interventions Met (OT) yes;meets criteria  -     Therapy Frequency (OT) 5 times/wk  -     Predicted Duration of Therapy Intervention (OT) until dc  -       Row Name 08/24/23 1332          Therapy Plan Review/Discharge Plan (OT)    Anticipated Discharge Disposition (OT) skilled nursing facility  -       Row Name 08/24/23 1332          Positioning and Restraints    Pre-Treatment Position in bed  -     Post Treatment Position bed  -MK     In Bed notified nsg;supine;with nsg;call light within reach;encouraged to call for assist;with family/caregiver  -               User Key  (r) = Recorded By, (t) = Taken By, (c) = Cosigned By      Initials Name Provider Type     Alexi Maza, OT Occupational Therapist                   Outcome Measures       Row Name 08/24/23 1352          How much help from another is currently needed...    Putting on and taking off regular lower body clothing? 1  -MK     Bathing (including washing, rinsing, and drying) 2  -MK     Toileting (which includes using toilet bed pan or urinal) 2  -MK     Putting on and taking off regular upper body clothing 3  -MK     Taking care of personal grooming (such as brushing teeth) 3  -MK     Eating meals 4  -MK     AM-PAC 6 Clicks Score (OT) 15  -       Row Name 08/24/23 1223          How much help from another person do you currently need...    Turning from your back to your side while in flat bed without using bedrails? 2  -SS     Moving from lying on back to sitting on the side of a flat bed without bedrails? 2  -SS     Moving to and from a bed to a chair (including a wheelchair)? 2  -SS     Standing up from a chair using your arms (e.g., wheelchair, bedside  chair)? 2  -SS     Climbing 3-5 steps with a railing? 1  -SS     To walk in hospital room? 1  -SS     AM-PAC 6 Clicks Score (PT) 10  -SS     Highest level of mobility 4 --> Transferred to chair/commode  -       Row Name 08/24/23 1352 08/24/23 1223       Functional Assessment    Outcome Measure Options AM-PAC 6 Clicks Daily Activity (OT)  - AM-PAC 6 Clicks Basic Mobility (PT)  -              User Key  (r) = Recorded By, (t) = Taken By, (c) = Cosigned By      Initials Name Provider Type    SS Pamela Wei, PT Physical Therapist    Alexi Guardado, OT Occupational Therapist                    Occupational Therapy Education       Title: PT OT SLP Therapies (In Progress)       Topic: Occupational Therapy (In Progress)       Point: ADL training (Done)       Description:   Instruct learner(s) on proper safety adaptation and remediation techniques during self care or transfers.   Instruct in proper use of assistive devices.                  Learning Progress Summary             Patient Acceptance, E, VU by  at 8/24/2023 1353   Family Acceptance, E, VU by  at 8/24/2023 1353                         Point: Home exercise program (Not Started)       Description:   Instruct learner(s) on appropriate technique for monitoring, assisting and/or progressing therapeutic exercises/activities.                  Learner Progress:  Not documented in this visit.              Point: Precautions (Not Started)       Description:   Instruct learner(s) on prescribed precautions during self-care and functional transfers.                  Learner Progress:  Not documented in this visit.              Point: Body mechanics (Not Started)       Description:   Instruct learner(s) on proper positioning and spine alignment during self-care, functional mobility activities and/or exercises.                  Learner Progress:  Not documented in this visit.                              User Key       Initials Effective Dates Name  Provider Type Discipline     02/17/22 -  Alexi Maza OT Occupational Therapist OT                  OT Recommendation and Plan  Planned Therapy Interventions (OT): activity tolerance training, functional balance retraining, BADL retraining, neuromuscular control/coordination retraining, patient/caregiver education/training, transfer/mobility retraining, strengthening exercise, ROM/therapeutic exercise  Therapy Frequency (OT): 5 times/wk  Plan of Care Review  Plan of Care Reviewed With: patient, son  Outcome Evaluation: Pt is an 82 y/o M admitted c/o of weakness and fall, pt was unable to get up.  Pt tested (+) for COVID 19.  PMHx: CAD, BPH, anemia, HTN, HLD.  Pt reports he lives in a multilevel home with wife but can reside on main level if needed.  Pt reports 3 steps with HR to get into house.  Pt reports being independent with ADLs, IADLs, mobility, and driving and using RW 'sometimes'.  Pt t/f from supine to sitting on EOB with MOD A.  Pt completed sit to stand transfer with MOD A using RW, upon standing pt reported he needed urinal.  Pt then began having bowel incontinence in standing on floor requiring increased time for clean up.  Obtained BSC, pt required MOD A x 2 for  transfer due to impaired safety awareness and sequencing.  Pt required MAX A with amelia hygiene.  Pt required MAX A for donning/doffing socks.  Pt required MIN A for UBD tasks.  Pt presents with deficits in self care, transfers, activity tolerance, increased falls risk, poor safety awareness, and impaired sequencing indicating need for skilled OT services.  OT recommending SNF due to pt unsafe to return home at this time.     Time Calculation:         Time Calculation- OT       Row Name 08/24/23 1353             Time Calculation- OT    OT Start Time 0854  -      OT Stop Time 0945  -      OT Time Calculation (min) 51 min  -      Total Timed Code Minutes- OT 20 minute(s)  -      OT Received On 08/24/23  -      OT - Next  Appointment 08/25/23  -MONTRELL      OT Goal Re-Cert Due Date 09/07/23  -MONTRELL                User Key  (r) = Recorded By, (t) = Taken By, (c) = Cosigned By      Initials Name Provider Type    Alexi Guardado OT Occupational Therapist                  Therapy Charges for Today       Code Description Service Date Service Provider Modifiers Qty    [id number]  OT EVAL MOD COMPLEXITY 4 8/24/2023 Alexi Maza OT GO 1    [id number]  OT SELF CARE/MGMT/TRAIN EA 15 MIN 8/24/2023 Alexi Maza OT GO 1                 Alexi Maza OT  8/24/2023

## 2023-08-25 ENCOUNTER — APPOINTMENT (OUTPATIENT)
Dept: GENERAL RADIOLOGY | Facility: HOSPITAL | Age: 83
DRG: 177 | End: 2023-08-25
Payer: MEDICARE

## 2023-08-25 PROBLEM — J96.01 ACUTE RESPIRATORY FAILURE WITH HYPOXIA: Status: ACTIVE | Noted: 2023-08-25

## 2023-08-25 PROBLEM — E87.1 HYPONATREMIA: Status: ACTIVE | Noted: 2023-08-25

## 2023-08-25 PROBLEM — I48.91 ATRIAL FIBRILLATION WITH RVR: Status: ACTIVE | Noted: 2023-08-25

## 2023-08-25 LAB
ANION GAP SERPL CALCULATED.3IONS-SCNC: 9 MMOL/L (ref 5–15)
BUN SERPL-MCNC: 16 MG/DL (ref 8–23)
BUN/CREAT SERPL: 24.2 (ref 7–25)
CALCIUM SPEC-SCNC: 7.9 MG/DL (ref 8.6–10.5)
CHLORIDE SERPL-SCNC: 98 MMOL/L (ref 98–107)
CO2 SERPL-SCNC: 24 MMOL/L (ref 22–29)
CREAT SERPL-MCNC: 0.66 MG/DL (ref 0.76–1.27)
DEPRECATED RDW RBC AUTO: 44.2 FL (ref 37–54)
EGFRCR SERPLBLD CKD-EPI 2021: 91.9 ML/MIN/1.73
ERYTHROCYTE [DISTWIDTH] IN BLOOD BY AUTOMATED COUNT: 13.4 % (ref 12.3–15.4)
GEN 5 2HR TROPONIN T REFLEX: 20 NG/L
GLUCOSE SERPL-MCNC: 114 MG/DL (ref 65–99)
HCT VFR BLD AUTO: 34.3 % (ref 37.5–51)
HGB BLD-MCNC: 11.7 G/DL (ref 13–17.7)
MCH RBC QN AUTO: 32.8 PG (ref 26.6–33)
MCHC RBC AUTO-ENTMCNC: 34.1 G/DL (ref 31.5–35.7)
MCV RBC AUTO: 95.9 FL (ref 79–97)
PLATELET # BLD AUTO: 159 10*3/MM3 (ref 140–450)
PMV BLD AUTO: 7.4 FL (ref 6–12)
POTASSIUM SERPL-SCNC: 3.7 MMOL/L (ref 3.5–5.2)
RBC # BLD AUTO: 3.57 10*6/MM3 (ref 4.14–5.8)
SODIUM SERPL-SCNC: 131 MMOL/L (ref 136–145)
TROPONIN T DELTA: 0 NG/L
TROPONIN T SERPL HS-MCNC: 20 NG/L
WBC NRBC COR # BLD: 4.4 10*3/MM3 (ref 3.4–10.8)

## 2023-08-25 PROCEDURE — 80048 BASIC METABOLIC PNL TOTAL CA: CPT | Performed by: STUDENT IN AN ORGANIZED HEALTH CARE EDUCATION/TRAINING PROGRAM

## 2023-08-25 PROCEDURE — 93005 ELECTROCARDIOGRAM TRACING: CPT | Performed by: STUDENT IN AN ORGANIZED HEALTH CARE EDUCATION/TRAINING PROGRAM

## 2023-08-25 PROCEDURE — 94640 AIRWAY INHALATION TREATMENT: CPT

## 2023-08-25 PROCEDURE — 85027 COMPLETE CBC AUTOMATED: CPT | Performed by: STUDENT IN AN ORGANIZED HEALTH CARE EDUCATION/TRAINING PROGRAM

## 2023-08-25 PROCEDURE — 94664 DEMO&/EVAL PT USE INHALER: CPT

## 2023-08-25 PROCEDURE — 94761 N-INVAS EAR/PLS OXIMETRY MLT: CPT

## 2023-08-25 PROCEDURE — 25010000002 ENOXAPARIN PER 10 MG: Performed by: STUDENT IN AN ORGANIZED HEALTH CARE EDUCATION/TRAINING PROGRAM

## 2023-08-25 PROCEDURE — 36415 COLL VENOUS BLD VENIPUNCTURE: CPT | Performed by: STUDENT IN AN ORGANIZED HEALTH CARE EDUCATION/TRAINING PROGRAM

## 2023-08-25 PROCEDURE — 94799 UNLISTED PULMONARY SVC/PX: CPT

## 2023-08-25 PROCEDURE — 84484 ASSAY OF TROPONIN QUANT: CPT | Performed by: NURSE PRACTITIONER

## 2023-08-25 PROCEDURE — 93005 ELECTROCARDIOGRAM TRACING: CPT | Performed by: INTERNAL MEDICINE

## 2023-08-25 PROCEDURE — 71045 X-RAY EXAM CHEST 1 VIEW: CPT

## 2023-08-25 PROCEDURE — 99222 1ST HOSP IP/OBS MODERATE 55: CPT | Performed by: INTERNAL MEDICINE

## 2023-08-25 RX ORDER — FINASTERIDE 5 MG/1
5 TABLET, FILM COATED ORAL NIGHTLY
Status: DISCONTINUED | OUTPATIENT
Start: 2023-08-25 | End: 2023-08-27 | Stop reason: HOSPADM

## 2023-08-25 RX ORDER — CLONIDINE HYDROCHLORIDE 0.1 MG/1
0.1 TABLET ORAL 2 TIMES DAILY
Status: DISCONTINUED | OUTPATIENT
Start: 2023-08-25 | End: 2023-08-27 | Stop reason: HOSPADM

## 2023-08-25 RX ORDER — METOPROLOL TARTRATE 5 MG/5ML
5 INJECTION INTRAVENOUS ONCE
Status: COMPLETED | OUTPATIENT
Start: 2023-08-25 | End: 2023-08-25

## 2023-08-25 RX ORDER — IBUPROFEN 400 MG/1
400 TABLET ORAL EVERY 6 HOURS PRN
Status: CANCELLED | OUTPATIENT
Start: 2023-08-25

## 2023-08-25 RX ORDER — ALBUTEROL SULFATE 90 UG/1
2 AEROSOL, METERED RESPIRATORY (INHALATION) EVERY 4 HOURS PRN
Status: DISCONTINUED | OUTPATIENT
Start: 2023-08-25 | End: 2023-08-27 | Stop reason: HOSPADM

## 2023-08-25 RX ORDER — ATENOLOL 25 MG/1
25 TABLET ORAL
Status: DISCONTINUED | OUTPATIENT
Start: 2023-08-25 | End: 2023-08-25

## 2023-08-25 RX ORDER — ENOXAPARIN SODIUM 100 MG/ML
1 INJECTION SUBCUTANEOUS EVERY 12 HOURS
Status: DISCONTINUED | OUTPATIENT
Start: 2023-08-25 | End: 2023-08-26

## 2023-08-25 RX ORDER — DEXAMETHASONE 6 MG/1
6 TABLET ORAL
Status: DISCONTINUED | OUTPATIENT
Start: 2023-08-25 | End: 2023-08-27 | Stop reason: HOSPADM

## 2023-08-25 RX ADMIN — IPRATROPIUM BROMIDE AND ALBUTEROL 1 PUFF: 20; 100 SPRAY, METERED RESPIRATORY (INHALATION) at 23:35

## 2023-08-25 RX ADMIN — TAMSULOSIN HYDROCHLORIDE 0.4 MG: 0.4 CAPSULE ORAL at 07:02

## 2023-08-25 RX ADMIN — METOPROLOL TARTRATE 25 MG: 25 TABLET, FILM COATED ORAL at 21:26

## 2023-08-25 RX ADMIN — ATENOLOL 25 MG: 25 TABLET ORAL at 07:03

## 2023-08-25 RX ADMIN — CLONIDINE HYDROCHLORIDE 0.1 MG: 0.1 TABLET ORAL at 05:43

## 2023-08-25 RX ADMIN — SODIUM CHLORIDE 100 ML/HR: 9 INJECTION, SOLUTION INTRAVENOUS at 02:01

## 2023-08-25 RX ADMIN — DEXAMETHASONE 6 MG: 6 TABLET ORAL at 11:53

## 2023-08-25 RX ADMIN — IPRATROPIUM BROMIDE AND ALBUTEROL 1 PUFF: 20; 100 SPRAY, METERED RESPIRATORY (INHALATION) at 19:24

## 2023-08-25 RX ADMIN — IPRATROPIUM BROMIDE AND ALBUTEROL 1 PUFF: 20; 100 SPRAY, METERED RESPIRATORY (INHALATION) at 14:25

## 2023-08-25 RX ADMIN — METOPROLOL TARTRATE 5 MG: 1 INJECTION, SOLUTION INTRAVENOUS at 11:43

## 2023-08-25 RX ADMIN — ATORVASTATIN CALCIUM 20 MG: 20 TABLET, FILM COATED ORAL at 21:26

## 2023-08-25 RX ADMIN — AZELASTINE HYDROCHLORIDE 2 SPRAY: 137 SPRAY, METERED NASAL at 21:28

## 2023-08-25 RX ADMIN — ENOXAPARIN SODIUM 90 MG: 100 INJECTION SUBCUTANEOUS at 11:34

## 2023-08-25 RX ADMIN — FINASTERIDE 5 MG: 5 TABLET, FILM COATED ORAL at 21:26

## 2023-08-25 RX ADMIN — Medication 10 ML: at 21:26

## 2023-08-25 RX ADMIN — TAMSULOSIN HYDROCHLORIDE 0.4 MG: 0.4 CAPSULE ORAL at 21:26

## 2023-08-25 RX ADMIN — ISOSORBIDE MONONITRATE 30 MG: 30 TABLET, EXTENDED RELEASE ORAL at 07:02

## 2023-08-25 RX ADMIN — ASPIRIN 81 MG: 81 TABLET, COATED ORAL at 07:03

## 2023-08-25 RX ADMIN — ENOXAPARIN SODIUM 90 MG: 100 INJECTION SUBCUTANEOUS at 21:28

## 2023-08-25 RX ADMIN — SODIUM CHLORIDE 100 ML/HR: 9 INJECTION, SOLUTION INTRAVENOUS at 21:36

## 2023-08-25 RX ADMIN — CLONIDINE HYDROCHLORIDE 0.1 MG: 0.1 TABLET ORAL at 21:26

## 2023-08-25 NOTE — PROGRESS NOTES
[facility]     Progress Note    Patient Name: Campbell Alex  : 1940  MRN: [id number]  Primary Care Physician:  Amarjit Jurado Jr., MD  Date of admission: 2023  Service date and time: 23 12:32 EDT  Subjective   Subjective     Chief Complaint: Weakness    HPI:  Campbell Alex is a 83 y.o. male with PMHx of HTN, HLD, CAD, BPH who presented to [facility] on 2023 complaining of weakness.  HPI supplemented with information from wife and son at bedside.  Due to weakness patient was unable to get out of bed this morning, and when wife tried to help him he fell to ground and could not get up due to weakness.  Symptoms complicated by fever, patient down for 30 mins.  He uses a walker to get around at baseline.  Due to weakness he was brought to [facility] for evaluation.     In the ER, vitals 102.4F, HR 75, RR 26, /50, 94% 10L HFNC.  Labs notable for sodium 129, Cl 93, glucose 141, hgb 12.5.  CXR without acute cardiopulmonary process.  RVP positive for covid.           Objective     Seen and examined this morning.  Patient sitting on the chair comfortably.  Wife at bedside.    Patient stated he is feeling better today, continue required supplemental oxygen to keep oxygen saturation greater than 92%.  Patient also complaining of diarrhea as well.  Patient went to A-fib with RVR this morning, EKG shows A-fib with RVR heart rate 116    Order half a liter bolus, give 1 dose of Lopressor 5 mg IV once.  We will start patient on metoprolol 12.5 mg twice daily.  Discontinue home dose atenolol.    Repeat EKG after Lopressor 5 showed A-fib with heart rate is 100.    Started patient on full dose Lovenox.    Echo is pending.  Consult cardiology appreciate their help.    Patient is qualified for remdesivir, discussed with the patient and he would like to discuss with Dr. Staples before he agrees.    We will start the patient on Decadron 6 mg p.o. daily.    Denies chest pain, nausea and  vomiting.    Disposition: Continue to follow          Objective     Vitals:   Temp:  [96.7 °F (35.9 °C)-99.7 °F (37.6 °C)] 97.8 °F (36.6 °C)  Heart Rate:  [] 117  Resp:  [14-22] 20  BP: (136-180)/() 156/96  Flow (L/min):  [4] 4  Physical Exam    Constitutional: Awake, alert   Respiratory: Clear to auscultation bilaterally, nonlabored respirations    Cardiovascular: IRIR, , no murmurs, rubs, or gallops, palpable pedal pulses bilaterally   Gastrointestinal: Positive bowel sounds, soft, nontender, nondistended   Musculoskeletal: No bilateral ankle edema, no clubbing or cyanosis to extremities   Psychiatric: Appropriate affect, cooperative   Neurologic: Oriented x 3, strength symmetric in all extremities, Cranial Nerves grossly intact to confrontation, speech clear   Skin: No rashes     Result Review    Result Review:  I have personally reviewed the results from the time of this admission to 8/25/2023 12:32 EDT and agree with these findings:  [x]  Laboratory list / accordion  [x]  Microbiology  []  Radiology  [x]  EKG/Telemetry   []  Cardiology/Vascular   []  Pathology  []  Old records  []  Other:  Most notable findings include:     EKG A-fib with RVR, heart rate 117      Assessment & Plan   Assessment / Plan       Active Hospital Problems:  Active Hospital Problems    Diagnosis     **COVID-19     Acute respiratory failure with hypoxia     Atrial fibrillation with RVR     Hyponatremia     Irritable bowel syndrome     Coronary artery disease     Primary hypertension     Psoriasis     Type 2 diabetes mellitus without complication, without long-term current use of insulin      Plan:      Acute respiratory failure with hypoxia secondary to COVID-19.  -Start patient on Combivent, Decadron 6 mg p.o. daily.  -Recommended remdesivir, patient would like to discuss with Dr. Zhang lanier.  -Albuterol as needed.    A-fib with RVR :  - EKG shows A-fib with RVR heart rate 116  - 1/2 L Bolus   - 1 dose of Lopressor 5  mg IV once.  - Start patient on metoprolol 12.5 mg twice daily.    -Discontinue home dose atenolol.  -Repeat EKG after Lopressor 5 showed A-fib with heart rate is 100.  - Started patient on full dose Lovenox. 1mg/kg BID   -Echo is pending.   - Consult cardiology appreciate their help.    Hyponatremia : Improving, continue IV fluid    DM type II : SSI     DVT prophylaxis:  Medical DVT prophylaxis orders are present.    CODE STATUS:   Code Status (Patient has no pulse and is not breathing): CPR (Attempt to Resuscitate)  Medical Interventions (Patient has pulse or is breathing): Full Support    Disposition:  I expect patient to be discharged 2 to 3 days..    Varsha Burnett MD

## 2023-08-25 NOTE — CASE MANAGEMENT/SOCIAL WORK
Continued Stay Note  [facility]     Patient Name: Campbell Alex  MRN: [id number]  Today's Date: 8/25/2023    Admit Date: 8/23/2023    Plan: Anticipate routine home with spouse and OP PT [facility] ([place] Declines home health and SNF at this time. Watch for O2 needs.   Discharge Plan       Row Name 08/25/23 1153       Plan    Plan COVID: Anticipate routine home with spouse and OP PT [facility] Declines home health and SNF at this time. Watch for O2 needs.    Patient/Family in Agreement with Plan yes    Plan Comments CM spoke to son, Tyson, by phone. He provided patient's spouse's number to contact ([phone]). CM contacted spouse, Megan, who declines home health and SNF at this time with goal for patient to return to current OP PT at [facility]. Spouse denies any d/c needs at this time. Barrier to D/C: 2L O2, cardiology consult.                      Expected Discharge Date and Time       Expected Discharge Date Expected Discharge Time    Aug 27, 2023           Phone communication or documentation only - no physical contact with patient or family.     Rosalinda Mcfadden, JASBIRN, [place] [address]    Office: [phone]  Fax: [phone]

## 2023-08-25 NOTE — PLAN OF CARE
Problem: Adult Inpatient Plan of Care  Goal: Plan of Care Review  Outcome: Ongoing, Progressing  Flowsheets (Taken 8/25/2023 1530)  Outcome Evaluation: Patient has been in afib this shift and cardiologist consulted. Patients has not been in RVR and asymptomatic at this time. VSS and patient on room air. IVF infusing with no new complaints per patient.  Goal: Patient-Specific Goal (Individualized)  Outcome: Ongoing, Progressing  Goal: Absence of Hospital-Acquired Illness or Injury  Outcome: Ongoing, Progressing  Intervention: Identify and Manage Fall Risk  Recent Flowsheet Documentation  Taken 8/25/2023 0716 by Deborah Marr, RN  Safety Promotion/Fall Prevention:   room organization consistent   safety round/check completed  Intervention: Prevent Infection  Recent Flowsheet Documentation  Taken 8/25/2023 0716 by Deborah Marr, RN  Infection Prevention: single patient room provided  Goal: Optimal Comfort and Wellbeing  Outcome: Ongoing, Progressing  Intervention: Provide Person-Centered Care  Recent Flowsheet Documentation  Taken 8/25/2023 0716 by Deborah Marr, RN  Trust Relationship/Rapport:   care explained   choices provided   emotional support provided  Goal: Readiness for Transition of Care  Outcome: Ongoing, Progressing   Goal Outcome Evaluation:              Outcome Evaluation: Patient has been in afib this shift and cardiologist consulted. Patients has not been in RVR and asymptomatic at this time. VSS and patient on room air. IVF infusing with no new complaints per patient.

## 2023-08-25 NOTE — SIGNIFICANT NOTE
08/25/23 1341   OTHER   Discipline occupational therapist   Rehab Time/Intention   Session Not Performed unable to treat, medical status change;other (see comments)  ([facility] request to hold this date, pt tachycardic. OT will follow up when medically appropriate for OT.)   Therapy Assessment/Plan (PT)   Criteria for Skilled Interventions Met (PT) yes;meets criteria;skilled treatment is necessary   Recommendation   OT - Next Appointment 08/26/23

## 2023-08-25 NOTE — CONSULTS
Referring Provider: Varsha Burnett MD  Reason for Consultation:  Atrial fibrillation  Status post stent    Patient Care Team:  Amarjit Jurado Jr., MD as PCP - Vilma Hernandez MD as Consulting Physician (Cardiology)    Chief complaint  Weakness    Subjective .     History of present illness:  Campbell Alex is a 83 y.o. male who presents with history of multiple cardiac and noncardiac problems was admitted to the hospital with weakness.  Patient was found to have COVID-19 infection.  Patient did have fever and cough.  Patient was admitted to the hospital.  Patient was noted to have atrial fibrillation.  Denies having any chest discomfort heaviness or tightness in the chest.  Cardiology consultation was requested.        ROS      The patient has been without any chest discomfort, shortness of breath, palpitations, dizziness or syncope.  Denies having any headache, abdominal pain, nausea, vomiting, diarrhea, constipation, loss of weight or loss of appetite.  Denies having any excessive bruising, hematuria or blood in the stool.    Review of all systems negative except as indicated      History  Past Medical History:   Diagnosis Date    Allergic 1970    Arthritis     Benign prostatic hyperplasia 1980    Cancer     Cataract 1990    Cholelithiasis 1958    Coronary artery disease     Erectile dysfunction 2020    HL (hearing loss) 1995    Hyperlipidemia     Hypertension     Osteopenia 1980    Prostatism     Renal insufficiency 1940    Suspected COVID-19 virus infection 09/07/2021    Visual impairment 1948       Past Surgical History:   Procedure Laterality Date    ANKLE SURGERY      CARDIAC CATHETERIZATION  2020    EYE SURGERY  1960    NOSE SURGERY      ROTATOR CUFF REPAIR      SKIN CANCER EXCISION      L shoulder    UMBILICAL HERNIA REPAIR         Family History   Problem Relation Age of Onset    Hypertension Brother     Hyperlipidemia Brother        Social History     Tobacco Use    Smoking status: Former      Packs/day: 0.50     Years: 2.00     Pack years: 1.00     Types: Cigarettes, Pipe, Cigars     Quit date: 1960     Years since quittin.6     Passive exposure: Past    Smokeless tobacco: Never    Tobacco comments:     1-2/day   Vaping Use    Vaping Use: Never used   Substance Use Topics    Alcohol use: Yes     Comment: socially    Drug use: Never        Medications Prior to Admission   Medication Sig Dispense Refill Last Dose    aspirin (aspirin) 81 MG EC tablet Take 1 tablet by mouth Daily.       atenolol (TENORMIN) 25 MG tablet Take 1 tablet by mouth Daily.       atorvastatin (LIPITOR) 20 MG tablet Take 1 tablet by mouth Every Night.       azelastine (ASTELIN) 0.1 % nasal spray 2 sprays into the nostril(s) as directed by provider 2 (Two) Times a Day. Use in each nostril as directed 30 mL 3     Calcium Carbonate 1500 (600 Ca) MG tablet Take 1 tablet by mouth Daily.       cloNIDine (CATAPRES) 0.2 MG tablet Take 1 tablet by mouth 2 (Two) Times a Day.       colestipol (COLESTID) 1 g tablet Take 1 tablet by mouth Daily.       finasteride (PROSCAR) 5 MG tablet Take 1 tablet by mouth Every Evening.       hydroCHLOROthiazide (HYDRODIURIL) 25 MG tablet Take 1 tablet by mouth Daily.       isosorbide mononitrate (IMDUR) 30 MG 24 hr tablet Take 1 tablet by mouth Daily.       losartan (COZAAR) 100 MG tablet Take 1 tablet by mouth Daily.       Probiotic Product (PROBIOTIC DAILY PO) Take 1 capsule by mouth Daily.       tamsulosin (FLOMAX) 0.4 MG capsule 24 hr capsule Take 1 capsule by mouth 2 (Two) Times a Day.            Codeine, Iodine, Nabumetone, and Propoxyphene    Scheduled Meds:aspirin, 81 mg, Oral, Daily  atorvastatin, 20 mg, Oral, Nightly  azelastine, 2 spray, Each Nare, BID  cloNIDine, 0.1 mg, Oral, BID  dexAMETHasone, 6 mg, Oral, Daily With Breakfast  enoxaparin, 1 mg/kg, Subcutaneous, Q12H  finasteride, 5 mg, Oral, Nightly  ipratropium-albuterol, 1 puff, Inhalation, Q6H - RT  isosorbide mononitrate, 30 mg,  "Oral, Daily  metoprolol tartrate, 12.5 mg, Oral, Q12H  sodium chloride, 10 mL, Intravenous, Q12H  tamsulosin, 0.4 mg, Oral, BID      Continuous Infusions:sodium chloride, 100 mL/hr, Last Rate: 100 mL/hr (08/25/23 0201)      PRN Meds:.  acetaminophen    albuterol sulfate HFA    Calcium Replacement - Follow Nurse / BPA Driven Protocol    Magnesium Standard Dose Replacement - Follow Nurse / BPA Driven Protocol    nitroglycerin    ondansetron **OR** ondansetron    Phosphorus Replacement - Follow Nurse / BPA Driven Protocol    Potassium Replacement - Follow Nurse / BPA Driven Protocol    sodium chloride    sodium chloride    sodium chloride    Objective     VITAL SIGNS  Vitals:    08/25/23 0953 08/25/23 1413 08/25/23 1425 08/25/23 1427   BP: 156/96 132/83     BP Location: Left arm Left arm     Patient Position: Lying Lying     Pulse: 117 67 73 69   Resp: 20 20 18 18   Temp: 97.8 °F (36.6 °C) 97.6 °F (36.4 °C)     TempSrc: Axillary Oral     SpO2: 99% 95% 97% 96%   Weight:       Height:           Flowsheet Rows      Flowsheet Row First Filed Value   Admission Height 185.4 cm (73\") Documented at 08/23/2023 1729   Admission Weight 89.8 kg (198 lb) Documented at 08/23/2023 1729              Intake/Output Summary (Last 24 hours) at 8/25/2023 1623  Last data filed at 8/25/2023 1413  Gross per 24 hour   Intake 1200 ml   Output 1800 ml   Net -600 ml        TELEMETRY: Sinus rhythm    Physical Exam:  The patient is alert, oriented and in no distress.  Vital signs as noted above.  Head and neck revealed no carotid bruits or jugular venous distention.  No thyromegaly or lymphadenopathy is present  Lungs clear.  No wheezing.  Breath sounds are normal bilaterally.  Heart normal first and second heart sounds. No murmur.  No precordial rub is present.  No gallop is present.  Abdomen soft and nontender.  No organomegaly is present.  Extremities with good peripheral pulses without any pedal edema.  Skin warm and dry.  Musculoskeletal " system is grossly normal  CNS grossly normal    Reviewed and updated.  Results Review:   I reviewed the patient's new clinical results.  Lab Results (last 24 hours)       Procedure Component Value Units Date/Time    High Sensitivity Troponin T 2Hr [[id number]]  (Abnormal) Collected: 08/25/23 0800    Specimen: Blood Updated: 08/25/23 0905     HS Troponin T 20 ng/L      Troponin T Delta 0 ng/L     Narrative:      High Sensitive Troponin T Reference Range:  <10.0 ng/L- Negative Female for AMI  <15.0 ng/L- Negative Male for AMI  >=10 - Abnormal Female indicating possible myocardial injury.  >=15 - Abnormal Male indicating possible myocardial injury.   Clinicians would have to utilize clinical acumen, EKG, Troponin, and serial changes to determine if it is an Acute Myocardial Infarction or myocardial injury due to an underlying chronic condition.         High Sensitivity Troponin T [[id number]]  (Abnormal) Collected: 08/25/23 0420    Specimen: Blood Updated: 08/25/23 0724     HS Troponin T 20 ng/L     Narrative:      High Sensitive Troponin T Reference Range:  <10.0 ng/L- Negative Female for AMI  <15.0 ng/L- Negative Male for AMI  >=10 - Abnormal Female indicating possible myocardial injury.  >=15 - Abnormal Male indicating possible myocardial injury.   Clinicians would have to utilize clinical acumen, EKG, Troponin, and serial changes to determine if it is an Acute Myocardial Infarction or myocardial injury due to an underlying chronic condition.         Basic Metabolic Panel [[id number]]  (Abnormal) Collected: 08/25/23 0420    Specimen: Blood Updated: 08/25/23 0459     Glucose 114 mg/dL      BUN 16 mg/dL      Creatinine 0.66 mg/dL      Sodium 131 mmol/L      Potassium 3.7 mmol/L      Chloride 98 mmol/L      CO2 24.0 mmol/L      Calcium 7.9 mg/dL      BUN/Creatinine Ratio 24.2     Anion Gap 9.0 mmol/L      eGFR 91.9 mL/min/1.73     Narrative:      GFR Normal >60  Chronic Kidney Disease <60  Kidney Failure <15    The GFR  formula is only valid for adults with stable renal function between ages 18 and 70.    CBC (No Diff) [[id number]]  (Abnormal) Collected: 08/25/23 0420    Specimen: Blood Updated: 08/25/23 0426     WBC 4.40 10*3/mm3      RBC 3.57 10*6/mm3      Hemoglobin 11.7 g/dL      Hematocrit 34.3 %      MCV 95.9 fL      MCH 32.8 pg      MCHC 34.1 g/dL      RDW 13.4 %      RDW-SD 44.2 fl      MPV 7.4 fL      Platelets 159 10*3/mm3     Blood Culture - Blood, Arm, Left [[id number]]  (Normal) Collected: 08/23/23 1821    Specimen: Blood from Arm, Left Updated: 08/24/23 1831     Blood Culture No growth at 24 hours    Blood Culture - Blood, Arm, Right [[id number]]  (Normal) Collected: 08/23/23 1811    Specimen: Blood from Arm, Right Updated: 08/24/23 1831     Blood Culture No growth at 24 hours            Imaging Results (Last 24 Hours)       Procedure Component Value Units Date/Time    XR Chest 1 View [[id number]] Collected: 08/25/23 1021     Updated: 08/25/23 1024    Narrative:      XR CHEST 1 VW    Date of Exam: 8/25/2023 10:02 AM EDT    Indication: sob    Comparison: AP portable chest 8/23/2023    Findings:  No acute airspace disease. Heart size is within normal limits. No pleural effusion or pneumothorax or acute osseous abnormalities are identified.      Impression:      Impression:  No acute cardiopulmonary findings.      Electronically Signed: Alyssa Lizama MD    8/25/2023 10:22 AM EDT    Workstation ID: [id number]        LAB RESULTS (LAST 7 DAYS)    CBC  Results from last 7 days   Lab Units 08/25/23  0420 08/24/23  1304 08/23/23 1811   WBC 10*3/mm3 4.40 6.10 8.70   RBC 10*6/mm3 3.57* 3.64* 3.85*   HEMOGLOBIN g/dL 11.7* 12.2* 12.5*   HEMATOCRIT % 34.3* 35.4* 36.6*   MCV fL 95.9 97.3* 94.9   PLATELETS 10*3/mm3 159 169 184       BMP  Results from last 7 days   Lab Units 08/25/23  0420 08/24/23  1304 08/23/23  1811   SODIUM mmol/L 131* 128* 129*   POTASSIUM mmol/L 3.7 3.0* 3.6   CHLORIDE mmol/L 98 93* 93*   CO2 mmol/L 24.0 24.0 26.0    BUN mg/dL 16 20 20   CREATININE mg/dL 0.66* 0.67* 0.88   GLUCOSE mg/dL 114* 126* 141*       CMP   Results from last 7 days   Lab Units 08/25/23  0420 08/24/23  1304 08/23/23  1811   SODIUM mmol/L 131* 128* 129*   POTASSIUM mmol/L 3.7 3.0* 3.6   CHLORIDE mmol/L 98 93* 93*   CO2 mmol/L 24.0 24.0 26.0   BUN mg/dL 16 20 20   CREATININE mg/dL 0.66* 0.67* 0.88   GLUCOSE mg/dL 114* 126* 141*   ALBUMIN g/dL  --   --  3.9   BILIRUBIN mg/dL  --   --  0.6   ALK PHOS U/L  --   --  69   AST (SGOT) U/L  --   --  30   ALT (SGPT) U/L  --   --  13         BNP        TROPONIN  Results from last 7 days   Lab Units 08/25/23  0800 08/25/23  0420 08/23/23  1811   CK TOTAL U/L  --   --  263*   HSTROP T ng/L 20*   < >  --     < > = values in this interval not displayed.       CoAg        Creatinine Clearance  Estimated Creatinine Clearance: 100.9 mL/min (A) (by C-G formula based on SCr of 0.66 mg/dL (L)).    ABG        Radiology  XR Chest 1 View    Result Date: 8/25/2023  Impression: No acute cardiopulmonary findings. Electronically Signed: Alyssa Lizama MD  8/25/2023 10:22 AM EDT  Workstation ID: [id number]    XR Chest 1 View    Result Date: 8/23/2023  Impression: 1. No acute cardiopulmonary disease Electronically Signed: Louis Montelongo MD  8/23/2023 7:05 PM EDT  Workstation ID: [id number]       EKG      I personally viewed and interpreted the patient's EKG/Telemetry data: Sinus rhythm.  Subsequently atrial fibrillation.  Patient is in sinus rhythm.    ECHOCARDIOGRAM:        STRESS TEST        Cardiolite (Tc-99m sestamibi) stress test    HEART CATHETERIZATION  No results found for this or any previous visit.      OTHER:     Assessment & Plan     Principal Problem:    COVID-19  Active Problems:    Coronary artery disease    Primary hypertension    Type 2 diabetes mellitus without complication, without long-term current use of insulin    Psoriasis    Irritable bowel syndrome    Acute respiratory failure with hypoxia    Atrial fibrillation  with RVR    Hyponatremia    /////////////////////  History  ============  - Recent weakness and COVID-19 infection.    - Atrial fibrillation  Has converted to sinus rhythm.  Patient is in sinus rhythm now.     -status post right coronary artery stent placement (2005 ).      Cardiac catheterization 03/10/2017 revealed normal left ventricular function.  Normal left main coronary artery. 50% lad distal to diagonal branch.  Ostial diagonal branch has 80-90% disease (moderate size vessel) RCA has 30-40% plaquing.  RCA stent was   patent.  One of the PDA branches had 70% disease at its ostium.     Stress Cardiolite test is negative for myocardial ischemia 08/01/2016.     -palpitations improved.  Holter monitor 01/18/2016 showed occasional premature atrial and ventricular contractions.       -hypertension dyslipidemia       -benign prostatic hypertrophy       -psoriasis       -status post left ankle surgery  rotator cuff surgery umbilical hernia repair and surgery for basal cell carcinoma of the nose.       -allergy to darvon codeine Relafen and IVP dye  ===============   Plan  =============  Recent weakness and COVID-19 infection.    Atrial fibrillation  Has converted to sinus rhythm.  Patient is in sinus rhythm now.     -status post right coronary artery stent placement (2005 )  Patient is not having any angina pectoris or congestive heart failure.    Hypertension-  well-controlled.  132/83     Dyslipidemia-continue atorvastatin     medications were reviewed and updated.    Increase metoprolol to 25 mg twice daily.    Anticoagulation  Patient is on therapeutic subcu Lovenox.    Echocardiogram.     Close cardiac monitoring.    Further plan will depend on patient's progress.    Reviewed and updated-8/25/2023.  /////////////////////////////          Vilma Staples MD  08/25/23  16:23 EDT

## 2023-08-25 NOTE — PLAN OF CARE
Goal Outcome Evaluation:      Patient continues with NS @100ml/hr, potassium replacement given per protocol with recheck at 3.7, no supplemental o2 needed to maintain sats >90%, no cough noted this shift, patient remains afebrile.

## 2023-08-26 ENCOUNTER — APPOINTMENT (OUTPATIENT)
Dept: CARDIOLOGY | Facility: HOSPITAL | Age: 83
DRG: 177 | End: 2023-08-26
Payer: MEDICARE

## 2023-08-26 LAB
ANION GAP SERPL CALCULATED.3IONS-SCNC: 11 MMOL/L (ref 5–15)
BH CV ECHO MEAS - AI P1/2T: 1335 MSEC
BH CV ECHO MEAS - AO MAX PG: 13.4 MMHG
BH CV ECHO MEAS - AO V2 MAX: 183 CM/SEC
BH CV ECHO MEAS - LV MAX PG: 7.6 MMHG
BH CV ECHO MEAS - LV MEAN PG: 3 MMHG
BH CV ECHO MEAS - LV V1 MAX: 138 CM/SEC
BH CV ECHO MEAS - LV V1 VTI: 25.7 CM
BH CV ECHO MEAS - RAP SYSTOLE: 3 MMHG
BH CV ECHO MEAS - RVSP: 31.9 MMHG
BH CV ECHO MEAS - TR MAX PG: 28.9 MMHG
BH CV ECHO MEAS - TR MAX VEL: 269 CM/SEC
BUN SERPL-MCNC: 15 MG/DL (ref 8–23)
BUN/CREAT SERPL: 25 (ref 7–25)
CALCIUM SPEC-SCNC: 8.1 MG/DL (ref 8.6–10.5)
CHLORIDE SERPL-SCNC: 99 MMOL/L (ref 98–107)
CO2 SERPL-SCNC: 21 MMOL/L (ref 22–29)
CREAT SERPL-MCNC: 0.6 MG/DL (ref 0.76–1.27)
DEPRECATED RDW RBC AUTO: 43.3 FL (ref 37–54)
DEPRECATED RDW RBC AUTO: 44.6 FL (ref 37–54)
EGFRCR SERPLBLD CKD-EPI 2021: 94.6 ML/MIN/1.73
ERYTHROCYTE [DISTWIDTH] IN BLOOD BY AUTOMATED COUNT: 12.9 % (ref 12.3–15.4)
ERYTHROCYTE [DISTWIDTH] IN BLOOD BY AUTOMATED COUNT: 13.2 % (ref 12.3–15.4)
GLUCOSE SERPL-MCNC: 171 MG/DL (ref 65–99)
HCT VFR BLD AUTO: 30.3 % (ref 37.5–51)
HCT VFR BLD AUTO: 32.1 % (ref 37.5–51)
HGB BLD-MCNC: 10.5 G/DL (ref 13–17.7)
HGB BLD-MCNC: 11.1 G/DL (ref 13–17.7)
MAGNESIUM SERPL-MCNC: 1.8 MG/DL (ref 1.6–2.4)
MCH RBC QN AUTO: 32.2 PG (ref 26.6–33)
MCH RBC QN AUTO: 32.4 PG (ref 26.6–33)
MCHC RBC AUTO-ENTMCNC: 34.5 G/DL (ref 31.5–35.7)
MCHC RBC AUTO-ENTMCNC: 34.7 G/DL (ref 31.5–35.7)
MCV RBC AUTO: 93 FL (ref 79–97)
MCV RBC AUTO: 93.9 FL (ref 79–97)
PLATELET # BLD AUTO: 160 10*3/MM3 (ref 140–450)
PLATELET # BLD AUTO: 170 10*3/MM3 (ref 140–450)
PMV BLD AUTO: 7.7 FL (ref 6–12)
PMV BLD AUTO: 8.2 FL (ref 6–12)
POTASSIUM SERPL-SCNC: 3.7 MMOL/L (ref 3.5–5.2)
RBC # BLD AUTO: 3.26 10*6/MM3 (ref 4.14–5.8)
RBC # BLD AUTO: 3.42 10*6/MM3 (ref 4.14–5.8)
SODIUM SERPL-SCNC: 131 MMOL/L (ref 136–145)
TSH SERPL DL<=0.05 MIU/L-ACNC: 0.61 UIU/ML (ref 0.27–4.2)
WBC NRBC COR # BLD: 3 10*3/MM3 (ref 3.4–10.8)
WBC NRBC COR # BLD: 5.3 10*3/MM3 (ref 3.4–10.8)

## 2023-08-26 PROCEDURE — 94664 DEMO&/EVAL PT USE INHALER: CPT

## 2023-08-26 PROCEDURE — 83735 ASSAY OF MAGNESIUM: CPT | Performed by: INTERNAL MEDICINE

## 2023-08-26 PROCEDURE — 93321 DOPPLER ECHO F-UP/LMTD STD: CPT

## 2023-08-26 PROCEDURE — 93325 DOPPLER ECHO COLOR FLOW MAPG: CPT | Performed by: INTERNAL MEDICINE

## 2023-08-26 PROCEDURE — 94761 N-INVAS EAR/PLS OXIMETRY MLT: CPT

## 2023-08-26 PROCEDURE — 80048 BASIC METABOLIC PNL TOTAL CA: CPT | Performed by: STUDENT IN AN ORGANIZED HEALTH CARE EDUCATION/TRAINING PROGRAM

## 2023-08-26 PROCEDURE — 93308 TTE F-UP OR LMTD: CPT

## 2023-08-26 PROCEDURE — 93321 DOPPLER ECHO F-UP/LMTD STD: CPT | Performed by: INTERNAL MEDICINE

## 2023-08-26 PROCEDURE — 94799 UNLISTED PULMONARY SVC/PX: CPT

## 2023-08-26 PROCEDURE — 84443 ASSAY THYROID STIM HORMONE: CPT | Performed by: INTERNAL MEDICINE

## 2023-08-26 PROCEDURE — 25010000002 ENOXAPARIN PER 10 MG: Performed by: STUDENT IN AN ORGANIZED HEALTH CARE EDUCATION/TRAINING PROGRAM

## 2023-08-26 PROCEDURE — 99232 SBSQ HOSP IP/OBS MODERATE 35: CPT | Performed by: INTERNAL MEDICINE

## 2023-08-26 PROCEDURE — 36415 COLL VENOUS BLD VENIPUNCTURE: CPT | Performed by: STUDENT IN AN ORGANIZED HEALTH CARE EDUCATION/TRAINING PROGRAM

## 2023-08-26 PROCEDURE — 93308 TTE F-UP OR LMTD: CPT | Performed by: INTERNAL MEDICINE

## 2023-08-26 PROCEDURE — 93325 DOPPLER ECHO COLOR FLOW MAPG: CPT

## 2023-08-26 PROCEDURE — 85027 COMPLETE CBC AUTOMATED: CPT | Performed by: STUDENT IN AN ORGANIZED HEALTH CARE EDUCATION/TRAINING PROGRAM

## 2023-08-26 PROCEDURE — 25010000002 HYDRALAZINE PER 20 MG: Performed by: NURSE PRACTITIONER

## 2023-08-26 RX ORDER — HYDRALAZINE HYDROCHLORIDE 20 MG/ML
20 INJECTION INTRAMUSCULAR; INTRAVENOUS EVERY 6 HOURS PRN
Status: DISCONTINUED | OUTPATIENT
Start: 2023-08-26 | End: 2023-08-27 | Stop reason: HOSPADM

## 2023-08-26 RX ADMIN — METOPROLOL TARTRATE 25 MG: 25 TABLET, FILM COATED ORAL at 22:24

## 2023-08-26 RX ADMIN — CLONIDINE HYDROCHLORIDE 0.1 MG: 0.1 TABLET ORAL at 09:22

## 2023-08-26 RX ADMIN — ATORVASTATIN CALCIUM 20 MG: 20 TABLET, FILM COATED ORAL at 22:24

## 2023-08-26 RX ADMIN — TAMSULOSIN HYDROCHLORIDE 0.4 MG: 0.4 CAPSULE ORAL at 09:23

## 2023-08-26 RX ADMIN — Medication 10 ML: at 09:23

## 2023-08-26 RX ADMIN — HYDRALAZINE HYDROCHLORIDE 20 MG: 20 INJECTION INTRAMUSCULAR; INTRAVENOUS at 12:57

## 2023-08-26 RX ADMIN — ENOXAPARIN SODIUM 90 MG: 100 INJECTION SUBCUTANEOUS at 12:57

## 2023-08-26 RX ADMIN — IPRATROPIUM BROMIDE AND ALBUTEROL 1 PUFF: 20; 100 SPRAY, METERED RESPIRATORY (INHALATION) at 07:36

## 2023-08-26 RX ADMIN — DEXAMETHASONE 6 MG: 6 TABLET ORAL at 09:22

## 2023-08-26 RX ADMIN — IPRATROPIUM BROMIDE AND ALBUTEROL 1 PUFF: 20; 100 SPRAY, METERED RESPIRATORY (INHALATION) at 14:31

## 2023-08-26 RX ADMIN — AZELASTINE HYDROCHLORIDE 2 SPRAY: 137 SPRAY, METERED NASAL at 22:25

## 2023-08-26 RX ADMIN — IPRATROPIUM BROMIDE AND ALBUTEROL 1 PUFF: 20; 100 SPRAY, METERED RESPIRATORY (INHALATION) at 19:29

## 2023-08-26 RX ADMIN — APIXABAN 5 MG: 5 TABLET, FILM COATED ORAL at 22:25

## 2023-08-26 RX ADMIN — ISOSORBIDE MONONITRATE 30 MG: 30 TABLET, EXTENDED RELEASE ORAL at 09:23

## 2023-08-26 RX ADMIN — METOPROLOL TARTRATE 25 MG: 25 TABLET, FILM COATED ORAL at 09:22

## 2023-08-26 RX ADMIN — SODIUM CHLORIDE 40 ML: 9 INJECTION, SOLUTION INTRAVENOUS at 14:45

## 2023-08-26 RX ADMIN — CLONIDINE HYDROCHLORIDE 0.1 MG: 0.1 TABLET ORAL at 22:24

## 2023-08-26 RX ADMIN — FINASTERIDE 5 MG: 5 TABLET, FILM COATED ORAL at 22:23

## 2023-08-26 RX ADMIN — ASPIRIN 81 MG: 81 TABLET, COATED ORAL at 09:23

## 2023-08-26 RX ADMIN — Medication 10 ML: at 22:25

## 2023-08-26 RX ADMIN — AZELASTINE HYDROCHLORIDE 2 SPRAY: 137 SPRAY, METERED NASAL at 09:25

## 2023-08-26 NOTE — PROGRESS NOTES
[facility]     Progress Note    Patient Name: Campbell Alex  : 1940  MRN: [id number]  Primary Care Physician:  Amarjit Jurado Jr., MD  Date of admission: 2023  Service date and time: 23 14:29 EDT  Subjective   Subjective     Chief Complaint: Weakness    HPI:  Campbell Alex is a 83 y.o. male with PMHx of HTN, HLD, CAD, BPH who presented to [facility] on 2023 complaining of weakness.  HPI supplemented with information from wife and son at bedside.  Due to weakness patient was unable to get out of bed this morning, and when wife tried to help him he fell to ground and could not get up due to weakness.  Symptoms complicated by fever, patient down for 30 mins.  He uses a walker to get around at baseline.  Due to weakness he was brought to [facility] for evaluation.     In the ER, vitals 102.4F, HR 75, RR 26, /50, 94% 10L HFNC.  Labs notable for sodium 129, Cl 93, glucose 141, hgb 12.5.  CXR without acute cardiopulmonary process.  RVP positive for covid.           Objective     Patient laying on the bed comfortably.  Has no complaint today.  Off the oxygen this morning.    Patient stated diarrhea improving.    Continue breathing treatment, will continue Decadron.    Echo pending    Patient converted to normal sinus rhythm.    Currently on full dose Lovenox.    Waiting on recommendation from cardiology regarding anticoagulation on discharge.      Denies chest pain, shortness of breath, nausea and vomiting.        Disposition: Charged home in 24 hours      Objective     Vitals:   Temp:  [97.6 °F (36.4 °C)-98.3 °F (36.8 °C)] 97.6 °F (36.4 °C)  Heart Rate:  [57-77] 75  Resp:  [11-22] 11  BP: (145-182)/(71-93) 145/73  Physical Exam    Constitutional: Awake, alert   Respiratory: Clear to auscultation bilaterally, nonlabored respirations    Cardiovascular: IRIR, , no murmurs, rubs, or gallops, palpable pedal pulses bilaterally   Gastrointestinal: Positive bowel sounds, soft,  nontender, nondistended   Musculoskeletal: No bilateral ankle edema, no clubbing or cyanosis to extremities   Psychiatric: Appropriate affect, cooperative   Neurologic: Oriented x 3, strength symmetric in all extremities, Cranial Nerves grossly intact to confrontation, speech clear   Skin: No rashes     Result Review    Result Review:  I have personally reviewed the results from the time of this admission to 8/26/2023 14:29 EDT and agree with these findings:  [x]  Laboratory list / accordion  [x]  Microbiology  []  Radiology  [x]  EKG/Telemetry   []  Cardiology/Vascular   []  Pathology  []  Old records  []  Other:          Assessment & Plan   Assessment / Plan       Active Hospital Problems:  Active Hospital Problems    Diagnosis     **COVID-19     Acute respiratory failure with hypoxia     Atrial fibrillation with RVR     Hyponatremia     Irritable bowel syndrome     Coronary artery disease     Primary hypertension     Psoriasis     Type 2 diabetes mellitus without complication, without long-term current use of insulin      Plan:      Acute respiratory failure with hypoxia secondary to COVID-19. :  Improving  -Start patient on Combivent, Decadron 6 mg p.o. daily.  -Recommended remdesivir, patient would like to discuss with Dr. Zhang lanier.  -Albuterol as needed.    A-fib with RVR : Resolved  -Converted to sinus rhythm  -On metoprolol 25 mg twice daily  -Echo is pending.   -Follow cardiology recommendation regarding anticoagulation on discharge.    Hyponatremia : stable     DM type II : SSI     DVT prophylaxis:  Medical DVT prophylaxis orders are present.    CODE STATUS:   Code Status (Patient has no pulse and is not breathing): CPR (Attempt to Resuscitate)  Medical Interventions (Patient has pulse or is breathing): Full Support    Disposition:  I expect patient to be discharged 2 to 3 days..    Varsha Burnett MD

## 2023-08-26 NOTE — PLAN OF CARE
Goal Outcome Evaluation:  Plan of Care Reviewed With: patient         Progress: improving  Outcome Evaluation: Pt resting this shift, is in good spirits. Pt heart rate has been controlled but blood pressures have been elevated throughout the shift, PRN Hydralazine ordered per cardio and effective. Pt is not requiring oxygen, iv fluids were discontinued. Wife at bedside nad very supportive. Will continue to monitor and document as needed.

## 2023-08-26 NOTE — PROGRESS NOTES
Referring Provider: Varsha Burnett MD    Reason for follow-up:  Atrial fibrillation  Status post stent     Patient Care Team:  Amarjit Jurado Jr., MD as PCP - Vilma Hernandez MD as Consulting Physician (Cardiology)    Subjective .      ROS    COVID-19 infection.  Patient was not seen in the room to minimize transmission of infection and to minimize PPE use.  Patient was examined through the window.    History  Past Medical History:   Diagnosis Date    Allergic     Arthritis     Benign prostatic hyperplasia     Cancer     Cataract     Cholelithiasis     Coronary artery disease     Erectile dysfunction     HL (hearing loss)     Hyperlipidemia     Hypertension     Osteopenia     Prostatism     Renal insufficiency     Suspected COVID-19 virus infection 2021    Visual impairment        Past Surgical History:   Procedure Laterality Date    ANKLE SURGERY      CARDIAC CATHETERIZATION      EYE SURGERY      NOSE SURGERY      ROTATOR CUFF REPAIR      SKIN CANCER EXCISION      L shoulder    UMBILICAL HERNIA REPAIR         Family History   Problem Relation Age of Onset    Hypertension Brother     Hyperlipidemia Brother        Social History     Tobacco Use    Smoking status: Former     Packs/day: 0.50     Years: 2.00     Pack years: 1.00     Types: Cigarettes, Pipe, Cigars     Quit date: 1960     Years since quittin.6     Passive exposure: Past    Smokeless tobacco: Never    Tobacco comments:     1-2/day   Vaping Use    Vaping Use: Never used   Substance Use Topics    Alcohol use: Yes     Comment: socially    Drug use: Never        Medications Prior to Admission   Medication Sig Dispense Refill Last Dose    aspirin (aspirin) 81 MG EC tablet Take 1 tablet by mouth Daily.       atenolol (TENORMIN) 25 MG tablet Take 1 tablet by mouth Daily.       atorvastatin (LIPITOR) 20 MG tablet Take 1 tablet by mouth Every Night.       azelastine (ASTELIN) 0.1 % nasal  spray 2 sprays into the nostril(s) as directed by provider 2 (Two) Times a Day. Use in each nostril as directed 30 mL 3     Calcium Carbonate 1500 (600 Ca) MG tablet Take 1 tablet by mouth Daily.       cloNIDine (CATAPRES) 0.2 MG tablet Take 1 tablet by mouth 2 (Two) Times a Day.       colestipol (COLESTID) 1 g tablet Take 1 tablet by mouth Daily.       finasteride (PROSCAR) 5 MG tablet Take 1 tablet by mouth Every Evening.       hydroCHLOROthiazide (HYDRODIURIL) 25 MG tablet Take 1 tablet by mouth Daily.       isosorbide mononitrate (IMDUR) 30 MG 24 hr tablet Take 1 tablet by mouth Daily.       losartan (COZAAR) 100 MG tablet Take 1 tablet by mouth Daily.       Probiotic Product (PROBIOTIC DAILY PO) Take 1 capsule by mouth Daily.       tamsulosin (FLOMAX) 0.4 MG capsule 24 hr capsule Take 1 capsule by mouth 2 (Two) Times a Day.          Allergies  Codeine, Iodine, Nabumetone, and Propoxyphene    Scheduled Meds:aspirin, 81 mg, Oral, Daily  atorvastatin, 20 mg, Oral, Nightly  azelastine, 2 spray, Each Nare, BID  cloNIDine, 0.1 mg, Oral, BID  dexAMETHasone, 6 mg, Oral, Daily With Breakfast  enoxaparin, 1 mg/kg, Subcutaneous, Q12H  finasteride, 5 mg, Oral, Nightly  ipratropium-albuterol, 1 puff, Inhalation, Q6H - RT  isosorbide mononitrate, 30 mg, Oral, Daily  metoprolol tartrate, 25 mg, Oral, Q12H  sodium chloride, 10 mL, Intravenous, Q12H  tamsulosin, 0.4 mg, Oral, BID      Continuous Infusions:sodium chloride, 100 mL/hr, Last Rate: 100 mL/hr (08/25/23 9086)      PRN Meds:.  acetaminophen    albuterol sulfate HFA    Calcium Replacement - Follow Nurse / BPA Driven Protocol    Magnesium Standard Dose Replacement - Follow Nurse / BPA Driven Protocol    nitroglycerin    ondansetron **OR** ondansetron    Phosphorus Replacement - Follow Nurse / BPA Driven Protocol    Potassium Replacement - Follow Nurse / BPA Driven Protocol    sodium chloride    sodium chloride    sodium chloride    Objective     VITAL SIGNS  Vitals:     "08/26/23 0032 08/26/23 0101 08/26/23 0140 08/26/23 0457   BP:  155/74 156/71 156/72   BP Location:   Left arm Left arm   Patient Position:   Lying Lying   Pulse:  59 57 60   Resp:   16 22   Temp:   97.8 °F (36.6 °C) 98.1 °F (36.7 °C)   TempSrc:   Axillary Axillary   SpO2:  91% 90% 93%   Weight: 89.8 kg (197 lb 15.6 oz)      Height:           Flowsheet Rows      Flowsheet Row First Filed Value   Admission Height 185.4 cm (73\") Documented at 08/23/2023 1729   Admission Weight 89.8 kg (198 lb) Documented at 08/23/2023 1729              Intake/Output Summary (Last 24 hours) at 8/26/2023 0721  Last data filed at 8/26/2023 0400  Gross per 24 hour   Intake 1200 ml   Output 1775 ml   Net -575 ml        TELEMETRY: Sinus rhythm    Physical Exam:    Patient was not seen in the room.  Patient was examined through the window.  The patient is in no distress.  Vital signs as noted above.      Results Review:   I reviewed the patient's new clinical results.  Lab Results (last 24 hours)       Procedure Component Value Units Date/Time    Magnesium [[id number]]  (Normal) Collected: 08/26/23 0018    Specimen: Blood Updated: 08/26/23 0146     Magnesium 1.8 mg/dL     Basic Metabolic Panel [[id number]]  (Abnormal) Collected: 08/26/23 0018    Specimen: Blood Updated: 08/26/23 0146     Glucose 171 mg/dL      BUN 15 mg/dL      Creatinine 0.60 mg/dL      Sodium 131 mmol/L      Potassium 3.7 mmol/L      Chloride 99 mmol/L      CO2 21.0 mmol/L      Calcium 8.1 mg/dL      BUN/Creatinine Ratio 25.0     Anion Gap 11.0 mmol/L      eGFR 94.6 mL/min/1.73     Narrative:      GFR Normal >60  Chronic Kidney Disease <60  Kidney Failure <15    The GFR formula is only valid for adults with stable renal function between ages 18 and 70.    TSH [[id number]]  (Normal) Collected: 08/26/23 0018    Specimen: Blood Updated: 08/26/23 0146     TSH 0.615 uIU/mL     CBC (No Diff) [[id number]]  (Abnormal) Collected: 08/26/23 0018    Specimen: Blood Updated: 08/26/23 0114 "     WBC 3.00 10*3/mm3      RBC 3.42 10*6/mm3      Hemoglobin 11.1 g/dL      Hematocrit 32.1 %      MCV 93.9 fL      MCH 32.4 pg      MCHC 34.5 g/dL      RDW 13.2 %      RDW-SD 44.6 fl      MPV 8.2 fL      Platelets 160 10*3/mm3     Blood Culture - Blood, Arm, Right [[id number]]  (Normal) Collected: 08/23/23 1811    Specimen: Blood from Arm, Right Updated: 08/25/23 1831     Blood Culture No growth at 2 days    Blood Culture - Blood, Arm, Left [[id number]]  (Normal) Collected: 08/23/23 1821    Specimen: Blood from Arm, Left Updated: 08/25/23 1831     Blood Culture No growth at 2 days    High Sensitivity Troponin T 2Hr [[id number]]  (Abnormal) Collected: 08/25/23 0800    Specimen: Blood Updated: 08/25/23 0905     HS Troponin T 20 ng/L      Troponin T Delta 0 ng/L     Narrative:      High Sensitive Troponin T Reference Range:  <10.0 ng/L- Negative Female for AMI  <15.0 ng/L- Negative Male for AMI  >=10 - Abnormal Female indicating possible myocardial injury.  >=15 - Abnormal Male indicating possible myocardial injury.   Clinicians would have to utilize clinical acumen, EKG, Troponin, and serial changes to determine if it is an Acute Myocardial Infarction or myocardial injury due to an underlying chronic condition.         High Sensitivity Troponin T [[id number]]  (Abnormal) Collected: 08/25/23 0420    Specimen: Blood Updated: 08/25/23 0724     HS Troponin T 20 ng/L     Narrative:      High Sensitive Troponin T Reference Range:  <10.0 ng/L- Negative Female for AMI  <15.0 ng/L- Negative Male for AMI  >=10 - Abnormal Female indicating possible myocardial injury.  >=15 - Abnormal Male indicating possible myocardial injury.   Clinicians would have to utilize clinical acumen, EKG, Troponin, and serial changes to determine if it is an Acute Myocardial Infarction or myocardial injury due to an underlying chronic condition.                 Imaging Results (Last 24 Hours)       Procedure Component Value Units Date/Time    XR Chest 1  View [[id number]] Collected: 08/25/23 1021     Updated: 08/25/23 1024    Narrative:      XR CHEST 1 VW    Date of Exam: 8/25/2023 10:02 AM EDT    Indication: sob    Comparison: AP portable chest 8/23/2023    Findings:  No acute airspace disease. Heart size is within normal limits. No pleural effusion or pneumothorax or acute osseous abnormalities are identified.      Impression:      Impression:  No acute cardiopulmonary findings.      Electronically Signed: Alyssa Lizama MD    8/25/2023 10:22 AM EDT    Workstation ID: [id number]        LAB RESULTS (LAST 7 DAYS)    CBC  Results from last 7 days   Lab Units 08/26/23  0018 08/25/23 0420 08/24/23  1304 08/23/23  1811   WBC 10*3/mm3 3.00* 4.40 6.10 8.70   RBC 10*6/mm3 3.42* 3.57* 3.64* 3.85*   HEMOGLOBIN g/dL 11.1* 11.7* 12.2* 12.5*   HEMATOCRIT % 32.1* 34.3* 35.4* 36.6*   MCV fL 93.9 95.9 97.3* 94.9   PLATELETS 10*3/mm3 160 159 169 184       BMP  Results from last 7 days   Lab Units 08/26/23  0018 08/25/23  0420 08/24/23  1304 08/23/23  1811   SODIUM mmol/L 131* 131* 128* 129*   POTASSIUM mmol/L 3.7 3.7 3.0* 3.6   CHLORIDE mmol/L 99 98 93* 93*   CO2 mmol/L 21.0* 24.0 24.0 26.0   BUN mg/dL 15 16 20 20   CREATININE mg/dL 0.60* 0.66* 0.67* 0.88   GLUCOSE mg/dL 171* 114* 126* 141*   MAGNESIUM mg/dL 1.8  --   --   --        CMP   Results from last 7 days   Lab Units 08/26/23  0018 08/25/23  0420 08/24/23  1304 08/23/23  1811   SODIUM mmol/L 131* 131* 128* 129*   POTASSIUM mmol/L 3.7 3.7 3.0* 3.6   CHLORIDE mmol/L 99 98 93* 93*   CO2 mmol/L 21.0* 24.0 24.0 26.0   BUN mg/dL 15 16 20 20   CREATININE mg/dL 0.60* 0.66* 0.67* 0.88   GLUCOSE mg/dL 171* 114* 126* 141*   ALBUMIN g/dL  --   --   --  3.9   BILIRUBIN mg/dL  --   --   --  0.6   ALK PHOS U/L  --   --   --  69   AST (SGOT) U/L  --   --   --  30   ALT (SGPT) U/L  --   --   --  13         BNP        TROPONIN  Results from last 7 days   Lab Units 08/25/23  0800 08/25/23  0420 08/23/23  1811   CK TOTAL U/L  --   --  263*    HSTROP T ng/L 20*   < >  --     < > = values in this interval not displayed.       CoAg        Creatinine Clearance  Estimated Creatinine Clearance: 114.3 mL/min (A) (by C-G formula based on SCr of 0.6 mg/dL (L)).    ABG        Radiology  XR Chest 1 View    Result Date: 8/25/2023  Impression: No acute cardiopulmonary findings. Electronically Signed: Alyssa Lizama MD  8/25/2023 10:22 AM EDT  Workstation ID: [id number]         EKG      I personally viewed and interpreted the patient's EKG/Telemetry data: Sinus rhythm followed by atrial fibrillation followed by sinus rhythm.    ECHOCARDIOGRAM:            STRESS TEST        Cardiolite (Tc-99m sestamibi) stress test    CARDIAC CATHETERIZATION  No results found for this or any previous visit.                OTHER:         Assessment & Plan     Principal Problem:    COVID-19  Active Problems:    Coronary artery disease    Primary hypertension    Type 2 diabetes mellitus without complication, without long-term current use of insulin    Psoriasis    Irritable bowel syndrome    Acute respiratory failure with hypoxia    Atrial fibrillation with RVR    Hyponatremia      ////////////////////  History  ============  - Recent weakness and COVID-19 infection.     - Atrial fibrillation  Has converted to sinus rhythm.  Patient is in sinus rhythm now.      -status post right coronary artery stent placement (2005 ).      Cardiac catheterization 03/10/2017 revealed normal left ventricular function.  Normal left main coronary artery. 50% lad distal to diagonal branch.  Ostial diagonal branch has 80-90% disease (moderate size vessel) RCA has 30-40% plaquing.  RCA stent was   patent.  One of the PDA branches had 70% disease at its ostium.     Stress Cardiolite test is negative for myocardial ischemia 08/01/2016.     -palpitations improved.  Holter monitor 01/18/2016 showed occasional premature atrial and ventricular contractions.       -hypertension dyslipidemia       -benign prostatic  hypertrophy       -psoriasis       -status post left ankle surgery  rotator cuff surgery umbilical hernia repair and surgery for basal cell carcinoma of the nose.       -allergy to darvon codeine Relafen and IVP dye  ===============   Plan  =============  Recent weakness and COVID-19 infection.     Atrial fibrillation  Has converted to sinus rhythm.-8/25/2023  Patient is in sinus rhythm today.      -status post right coronary artery stent placement (2005 )  Patient is not having any angina pectoris or congestive heart failure.     Hypertension-  well-controlled.  132/83     Dyslipidemia-continue atorvastatin     medications were reviewed and updated.     Continue metoprolol to 25 mg twice daily.     Anticoagulation  Patient is on therapeutic subcu Lovenox.     Echocardiogram.     Close cardiac monitoring.     Further plan will depend on patient's progress.     Reviewed and updated-8/26/2023  /////////////////////////////        Vilma Staples MD  08/26/23  07:21 EDT

## 2023-08-27 ENCOUNTER — READMISSION MANAGEMENT (OUTPATIENT)
Dept: CALL CENTER | Facility: HOSPITAL | Age: 83
End: 2023-08-27
Payer: MEDICARE

## 2023-08-27 VITALS
HEART RATE: 70 BPM | BODY MASS INDEX: 25.65 KG/M2 | DIASTOLIC BLOOD PRESSURE: 72 MMHG | WEIGHT: 193.56 LBS | HEIGHT: 73 IN | OXYGEN SATURATION: 94 % | SYSTOLIC BLOOD PRESSURE: 144 MMHG | RESPIRATION RATE: 18 BRPM | TEMPERATURE: 96.6 F

## 2023-08-27 PROBLEM — E87.1 HYPONATREMIA: Status: RESOLVED | Noted: 2023-08-25 | Resolved: 2023-08-27

## 2023-08-27 PROBLEM — I48.91 ATRIAL FIBRILLATION WITH RVR: Status: RESOLVED | Noted: 2023-08-25 | Resolved: 2023-08-27

## 2023-08-27 PROBLEM — U07.1 COVID-19: Status: RESOLVED | Noted: 2023-08-23 | Resolved: 2023-08-27

## 2023-08-27 PROBLEM — J96.01 ACUTE RESPIRATORY FAILURE WITH HYPOXIA: Status: RESOLVED | Noted: 2023-08-25 | Resolved: 2023-08-27

## 2023-08-27 LAB — HOLD SPECIMEN: NORMAL

## 2023-08-27 PROCEDURE — 99232 SBSQ HOSP IP/OBS MODERATE 35: CPT | Performed by: INTERNAL MEDICINE

## 2023-08-27 PROCEDURE — 94761 N-INVAS EAR/PLS OXIMETRY MLT: CPT

## 2023-08-27 PROCEDURE — 25010000002 HYDRALAZINE PER 20 MG: Performed by: NURSE PRACTITIONER

## 2023-08-27 PROCEDURE — 94799 UNLISTED PULMONARY SVC/PX: CPT

## 2023-08-27 PROCEDURE — 94664 DEMO&/EVAL PT USE INHALER: CPT

## 2023-08-27 RX ORDER — DEXAMETHASONE 6 MG/1
6 TABLET ORAL
Qty: 5 TABLET | Refills: 0 | Status: SHIPPED | OUTPATIENT
Start: 2023-08-27 | End: 2023-09-01

## 2023-08-27 RX ORDER — ALBUTEROL SULFATE 90 UG/1
2 AEROSOL, METERED RESPIRATORY (INHALATION) EVERY 4 HOURS PRN
Qty: 18 G | Refills: 0 | Status: SHIPPED | OUTPATIENT
Start: 2023-08-27 | End: 2023-09-26

## 2023-08-27 RX ADMIN — CLONIDINE HYDROCHLORIDE 0.1 MG: 0.1 TABLET ORAL at 09:37

## 2023-08-27 RX ADMIN — ISOSORBIDE MONONITRATE 30 MG: 30 TABLET, EXTENDED RELEASE ORAL at 09:37

## 2023-08-27 RX ADMIN — APIXABAN 5 MG: 5 TABLET, FILM COATED ORAL at 09:37

## 2023-08-27 RX ADMIN — HYDRALAZINE HYDROCHLORIDE 20 MG: 20 INJECTION INTRAMUSCULAR; INTRAVENOUS at 00:31

## 2023-08-27 RX ADMIN — DEXAMETHASONE 6 MG: 6 TABLET ORAL at 09:37

## 2023-08-27 RX ADMIN — IPRATROPIUM BROMIDE AND ALBUTEROL 1 PUFF: 20; 100 SPRAY, METERED RESPIRATORY (INHALATION) at 07:27

## 2023-08-27 RX ADMIN — TAMSULOSIN HYDROCHLORIDE 0.4 MG: 0.4 CAPSULE ORAL at 09:37

## 2023-08-27 RX ADMIN — METOPROLOL TARTRATE 25 MG: 25 TABLET, FILM COATED ORAL at 09:37

## 2023-08-27 RX ADMIN — ASPIRIN 81 MG: 81 TABLET, COATED ORAL at 09:37

## 2023-08-27 RX ADMIN — IPRATROPIUM BROMIDE AND ALBUTEROL 1 PUFF: 20; 100 SPRAY, METERED RESPIRATORY (INHALATION) at 01:00

## 2023-08-27 NOTE — PLAN OF CARE
Goal Outcome Evaluation:  Plan of Care Reviewed With: patient        Progress: improving  Outcome Evaluation: Pt cleared to discharge by cardiology and hospitalist. Pt wife at bedside and taking home via family car. Will cont to monitor and document as needed.

## 2023-08-27 NOTE — DISCHARGE SUMMARY
[facility]         DISCHARGE SUMMARY    Patient Name: Campbell Alex  : 1940  MRN: [id number]    Date of Admission: 2023  Date of Discharge:  2023  Primary Care Physician: Amarjit Jurado Jr., MD    Consults       Date and Time Order Name Status Description    2023 11:22 AM Inpatient Cardiology Consult      2023  7:59 PM Hospitalist (on-call MD unless specified)              Presenting Problem:   Weakness [R53.1]  Acute febrile illness [R50.9]  COVID-19 [U07.1]    Active and Resolved Hospital Problems:  Active Hospital Problems    Diagnosis POA    Irritable bowel syndrome [K58.9] Yes    Coronary artery disease [I25.10] Yes    Primary hypertension [I10] Yes    Psoriasis [L40.9] Yes    Type 2 diabetes mellitus without complication, without long-term current use of insulin [E11.9] Yes      Resolved Hospital Problems    Diagnosis POA    **COVID-19 [U07.1] Yes    Acute respiratory failure with hypoxia [J96.01] Unknown    Atrial fibrillation with RVR [I48.91] Unknown    Hyponatremia [E87.1] Unknown         Hospital Course     Hospital Course:  Campbell Alex is a 83 y.o. male with PMHx of HTN, HLD, CAD, BPH who admitted to [facility] on 2023 for COVID 19, acute respiratory failure with hypoxia, hyponatremia and diarrhea.  Patient treated with supportive care, inhaler, Decadron.  Patient refused remdesivir infusion.  Patient admission was complicated by A-fib with RVR.  Cardiology consulted.  Patient received IV metoprolol and converted to sinus rhythm.  Treated with full dose Lovenox.  Cardiology recommended discharge patient home on metoprolol and Eliquis.  During this admission patient condition continued to improve shortness of breath was resolved, were able to wean off the oxygen before discharge.  At this point patient received maximum benefit from this hospitalization was safely discharged home to follow-up with his PCP and other specialist.    DISCHARGE  Follow Up Recommendations for labs and diagnostics:   -Follow-up with your PCP in 1 week  -Follow-up with cardiology 1 to 2 weeks  -PCP to repeat CBC, CMP in 1 week  -Keep a log with your blood pressure and follow-up with your PCP to adjust your medication accordingly      Day of Discharge     Vital Signs:  Temp:  [96.6 °F (35.9 °C)-97.8 °F (36.6 °C)] 96.6 °F (35.9 °C)  Heart Rate:  [61-84] 68  Resp:  [11-22] 18  BP: (145-197)/(73-96) 149/76  Physical Exam:  Constitutional: Awake, alert   Eyes: PERRLA, sclerae anicteric, no conjunctival injection   HENT: NCAT, mucous membranes moist   Neck: Supple, no thyromegaly, no lymphadenopathy, trachea midline   Respiratory: Clear to auscultation bilaterally, nonlabored respirations    Cardiovascular: RRR, no murmurs, rubs, or gallops, palpable pedal pulses bilaterally   Gastrointestinal: Positive bowel sounds, soft, nontender, nondistended   Musculoskeletal: No bilateral ankle edema, no clubbing or cyanosis to extremities   Psychiatric: Appropriate affect, cooperative   Neurologic: Oriented x 3, strength symmetric in all extremities, Cranial Nerves grossly intact to confrontation, speech clear   Skin: No rashes     Pertinent  and/or Most Recent Results     LAB RESULTS:      Lab 08/26/23  2330 08/26/23  0018 08/25/23  0420 08/24/23  1304 08/23/23  1812 08/23/23  1811   WBC 5.30 3.00* 4.40 6.10  --  8.70   HEMOGLOBIN 10.5* 11.1* 11.7* 12.2*  --  12.5*   HEMATOCRIT 30.3* 32.1* 34.3* 35.4*  --  36.6*   PLATELETS 170 160 159 169  --  184   NEUTROS ABS  --   --   --   --   --  7.40*   LYMPHS ABS  --   --   --   --   --  0.30*   MONOS ABS  --   --   --   --   --  0.90   EOS ABS  --   --   --   --   --  0.00   MCV 93.0 93.9 95.9 97.3*  --  94.9   LACTATE  --   --   --   --  0.8  --          Lab 08/26/23  0018 08/25/23  0420 08/24/23  1304 08/23/23  1811   SODIUM 131* 131* 128* 129*   POTASSIUM 3.7 3.7 3.0* 3.6   CHLORIDE 99 98 93* 93*   CO2 21.0* 24.0 24.0 26.0   ANION GAP 11.0 9.0  11.0 10.0   BUN 15 16 20 20   CREATININE 0.60* 0.66* 0.67* 0.88   EGFR 94.6 91.9 91.5 84.3   GLUCOSE 171* 114* 126* 141*   CALCIUM 8.1* 7.9* 8.3* 9.2   MAGNESIUM 1.8  --   --   --    TSH 0.615  --   --   --          Lab 08/23/23  1811   TOTAL PROTEIN 6.6   ALBUMIN 3.9   GLOBULIN 2.7   ALT (SGPT) 13   AST (SGOT) 30   BILIRUBIN 0.6   ALK PHOS 69         Lab 08/25/23  0800 08/25/23  0420   HSTROP T 20* 20*                 Brief Urine Lab Results  (Last result in the past 365 days)        Color   Clarity   Blood   Leuk Est   Nitrite   Protein   CREAT   Urine HCG        08/23/23 1831 Yellow   Clear   Negative   Negative   Negative   Negative                 Microbiology Results (last 10 days)       Procedure Component Value - Date/Time    Blood Culture - Blood, Arm, Left [[id number]]  (Normal) Collected: 08/23/23 1821    Lab Status: Preliminary result Specimen: Blood from Arm, Left Updated: 08/26/23 1831     Blood Culture No growth at 3 days    Respiratory Panel PCR w/COVID-19(SARS-CoV-2) [facility]BY/[facility]GICA/[facility]/COR/MAD/JOANN In-House, NP Swab in UTM/VTM, 3-4 HR TAT - Swab, Nasopharynx [[id number]]  (Abnormal) Collected: 08/23/23 1813    Lab Status: Final result Specimen: Swab from Nasopharynx Updated: 08/23/23 1928     ADENOVIRUS, PCR Not Detected     Coronavirus 229E Not Detected     Coronavirus HKU1 Not Detected     Coronavirus NL63 Not Detected     Coronavirus OC43 Not Detected     COVID19 Detected     Human Metapneumovirus Not Detected     Human Rhinovirus/Enterovirus Not Detected     Influenza A PCR Not Detected     Influenza B PCR Not Detected     Parainfluenza Virus 1 Not Detected     Parainfluenza Virus 2 Not Detected     Parainfluenza Virus 3 Not Detected     Parainfluenza Virus 4 Not Detected     RSV, PCR Not Detected     Bordetella pertussis pcr Not Detected     Bordetella parapertussis PCR Not Detected     Chlamydophila pneumoniae PCR Not Detected     Mycoplasma pneumo by PCR Not Detected    Narrative:      In the  setting of a positive respiratory panel with a viral infection PLUS a negative procalcitonin without other underlying concern for bacterial infection, consider observing off antibiotics or discontinuation of antibiotics and continue supportive care. If the respiratory panel is positive for atypical bacterial infection (Bordetella pertussis, Chlamydophila pneumoniae, or Mycoplasma pneumoniae), consider antibiotic de-escalation to target atypical bacterial infection.    Blood Culture - Blood, Arm, Right [[id number]]  (Normal) Collected: 08/23/23 1811    Lab Status: Preliminary result Specimen: Blood from Arm, Right Updated: 08/26/23 1831     Blood Culture No growth at 3 days            XR Chest 1 View    Result Date: 8/25/2023  Impression: Impression: No acute cardiopulmonary findings. Electronically Signed: Alyssa Lizama MD  8/25/2023 10:22 AM EDT  Workstation ID: [id number]    XR Chest 1 View    Result Date: 8/23/2023  Impression: Impression: 1. No acute cardiopulmonary disease Electronically Signed: Louis Montelongo MD  8/23/2023 7:05 PM EDT  Workstation ID: [id number]             Results for orders placed during the hospital encounter of 08/23/23    Adult Transthoracic Echo Limited W/ Cont if Necessary Per Protocol    Interpretation Summary    Left ventricular ejection fraction appears to be 56 - 60%.    Estimated right ventricular systolic pressure from tricuspid regurgitation is normal (<35 mmHg).    Indications  Atrial fibrillation    Technically satisfactory study.  Mitral valve is structurally normal.  Tricuspid valve is structurally normal.  Mild tricuspid regurgitation.  Aortic valve is structurally normal.  Pulmonic valve could not be well visualized.  No evidence for mitral or aortic regurgitation is seen by Doppler study.  Left atrium is normal in size.  Right atrium is normal in size.  Left ventricle is normal in size and contractility with ejection fraction of 60%.  Right ventricle is normal in size.  Atrial  septum is intact.  Aorta is normal.  No pericardial effusion or intracardiac thrombus is seen.    Impression  Structurally and functionally normal cardiac valves except for mild tricuspid regurgitation.  No evidence for pulmonary hypertension is present..  Left ventricular size and contractility is normal with ejection fraction of 60%.      Labs Pending at Discharge:  Pending Labs       Order Current Status    Blood Culture - Blood, Arm, Left Preliminary result    Blood Culture - Blood, Arm, Right Preliminary result              Discharge Details        Discharge Medications        New Medications        Instructions Start Date   albuterol sulfate  (90 Base) MCG/ACT inhaler  Commonly known as: PROVENTIL HFA;VENTOLIN HFA;PROAIR HFA   2 puffs, Inhalation, Every 4 Hours PRN      apixaban 5 MG tablet tablet  Commonly known as: ELIQUIS   5 mg, Oral, Every 12 Hours Scheduled      dexAMETHasone 6 MG tablet  Commonly known as: DECADRON   6 mg, Oral, Daily With Breakfast      metoprolol tartrate 25 MG tablet  Commonly known as: LOPRESSOR   25 mg, Oral, Every 12 Hours Scheduled             Continue These Medications        Instructions Start Date   aspirin 81 MG EC tablet   Take 1 tablet by mouth Daily.      atorvastatin 20 MG tablet  Commonly known as: LIPITOR   Take 1 tablet by mouth Every Night.      azelastine 0.1 % nasal spray  Commonly known as: ASTELIN   2 sprays, Nasal, 2 Times Daily, Use in each nostril as directed      Calcium Carbonate 1500 (600 Ca) MG tablet   600 mg, Oral, Daily      cloNIDine 0.2 MG tablet  Commonly known as: CATAPRES   0.2 mg, Oral, 2 Times Daily      colestipol 1 g tablet  Commonly known as: COLESTID   1 g, Oral, Daily      finasteride 5 MG tablet  Commonly known as: PROSCAR   Take 1 tablet by mouth Every Evening.      isosorbide mononitrate 30 MG 24 hr tablet  Commonly known as: IMDUR   30 mg, Oral, Daily      PROBIOTIC DAILY PO   1 capsule, Oral, Daily      tamsulosin 0.4 MG capsule  24 hr capsule  Commonly known as: FLOMAX   1 capsule, Oral, 2 Times Daily             Stop These Medications      atenolol 25 MG tablet  Commonly known as: TENORMIN     hydroCHLOROthiazide 25 MG tablet  Commonly known as: HYDRODIURIL     losartan 100 MG tablet  Commonly known as: COZAAR              Allergies   Allergen Reactions    Codeine GI Intolerance    Iodine Hives    Nabumetone Unknown - High Severity    Propoxyphene Hives         Discharge Disposition:       Discharge Condition: .cond    Diet:  Hospital:  Diet Order   Procedures    Diet: Cardiac Diets, Diabetic Diets; Healthy Heart (2-3 Na+); Consistent Carbohydrate; Texture: Mechanical Ground (NDD 2); Fluid Consistency: Thin (IDDSI 0)         Discharge Activity:   Activity Instructions       Activity as Tolerated                CODE STATUS:  Code Status and Medical Interventions:   Ordered at: 08/23/23 2032     Code Status (Patient has no pulse and is not breathing):    CPR (Attempt to Resuscitate)     Medical Interventions (Patient has pulse or is breathing):    Full Support         Future Appointments   Date Time Provider Department Center   9/13/2023 10:30 AM Amarjit Jurado Jr., MD [facility]   11/10/2023  9:00 AM [facility]   12/11/2023  9:40 AM Vilma Staples MD [facility]       Additional Instructions for the Follow-ups that You Need to Schedule       Discharge Follow-up with PCP   As directed       Currently Documented PCP:    Amarjit Jurado Jr., MD    PCP Phone Number:    [phone]     Follow Up Details: 1 week        Discharge Follow-up with Specialty: Cardiology; 1 Week   As directed      Specialty: Cardiology   Follow Up: 1 Week                Time spent on Discharge including face to face service:  33 minutes    Varsha Burnett MD

## 2023-08-27 NOTE — PROGRESS NOTES
Referring Provider: Varsha Burnett MD    Reason for follow-up:  Atrial fibrillation  Status post stent     Patient Care Team:  Amarjit Jurado Jr., MD as PCP - Vilma Hernandez MD as Consulting Physician (Cardiology)    Subjective .      ROS    COVID-19 infection.  Patient was not seen in the room to minimize transmission of infection and to minimize PPE use.  Patient was examined through the window.    History  Past Medical History:   Diagnosis Date    Allergic     Arthritis     Benign prostatic hyperplasia     Cancer     Cataract     Cholelithiasis     Coronary artery disease     Erectile dysfunction     HL (hearing loss)     Hyperlipidemia     Hypertension     Osteopenia     Prostatism     Renal insufficiency     Suspected COVID-19 virus infection 2021    Visual impairment        Past Surgical History:   Procedure Laterality Date    ANKLE SURGERY      CARDIAC CATHETERIZATION      EYE SURGERY      NOSE SURGERY      ROTATOR CUFF REPAIR      SKIN CANCER EXCISION      L shoulder    UMBILICAL HERNIA REPAIR         Family History   Problem Relation Age of Onset    Hypertension Brother     Hyperlipidemia Brother        Social History     Tobacco Use    Smoking status: Former     Packs/day: 0.50     Years: 2.00     Pack years: 1.00     Types: Cigarettes, Pipe, Cigars     Quit date: 1960     Years since quittin.6     Passive exposure: Past    Smokeless tobacco: Never    Tobacco comments:     1-2/day   Vaping Use    Vaping Use: Never used   Substance Use Topics    Alcohol use: Yes     Comment: socially    Drug use: Never        Medications Prior to Admission   Medication Sig Dispense Refill Last Dose    aspirin (aspirin) 81 MG EC tablet Take 1 tablet by mouth Daily.       atenolol (TENORMIN) 25 MG tablet Take 1 tablet by mouth Daily.       atorvastatin (LIPITOR) 20 MG tablet Take 1 tablet by mouth Every Night.       azelastine (ASTELIN) 0.1 % nasal  spray 2 sprays into the nostril(s) as directed by provider 2 (Two) Times a Day. Use in each nostril as directed 30 mL 3     Calcium Carbonate 1500 (600 Ca) MG tablet Take 1 tablet by mouth Daily.       cloNIDine (CATAPRES) 0.2 MG tablet Take 1 tablet by mouth 2 (Two) Times a Day.       colestipol (COLESTID) 1 g tablet Take 1 tablet by mouth Daily.       finasteride (PROSCAR) 5 MG tablet Take 1 tablet by mouth Every Evening.       hydroCHLOROthiazide (HYDRODIURIL) 25 MG tablet Take 1 tablet by mouth Daily.       isosorbide mononitrate (IMDUR) 30 MG 24 hr tablet Take 1 tablet by mouth Daily.       losartan (COZAAR) 100 MG tablet Take 1 tablet by mouth Daily.       Probiotic Product (PROBIOTIC DAILY PO) Take 1 capsule by mouth Daily.       tamsulosin (FLOMAX) 0.4 MG capsule 24 hr capsule Take 1 capsule by mouth 2 (Two) Times a Day.          Allergies  Codeine, Iodine, Nabumetone, and Propoxyphene    Scheduled Meds:apixaban, 5 mg, Oral, Q12H  aspirin, 81 mg, Oral, Daily  atorvastatin, 20 mg, Oral, Nightly  azelastine, 2 spray, Each Nare, BID  cloNIDine, 0.1 mg, Oral, BID  dexAMETHasone, 6 mg, Oral, Daily With Breakfast  finasteride, 5 mg, Oral, Nightly  ipratropium-albuterol, 1 puff, Inhalation, Q6H - RT  isosorbide mononitrate, 30 mg, Oral, Daily  metoprolol tartrate, 25 mg, Oral, Q12H  sodium chloride, 10 mL, Intravenous, Q12H  tamsulosin, 0.4 mg, Oral, BID      Continuous Infusions:     PRN Meds:.  acetaminophen    albuterol sulfate HFA    Calcium Replacement - Follow Nurse / BPA Driven Protocol    hydrALAZINE    Magnesium Standard Dose Replacement - Follow Nurse / BPA Driven Protocol    nitroglycerin    ondansetron **OR** ondansetron    Phosphorus Replacement - Follow Nurse / BPA Driven Protocol    Potassium Replacement - Follow Nurse / BPA Driven Protocol    sodium chloride    sodium chloride    sodium chloride    Objective     VITAL SIGNS  Vitals:    08/27/23 0400 08/27/23 0500 08/27/23 0727 08/27/23 0729   BP:     "   BP Location:       Patient Position:       Pulse: 67  68 68   Resp: 18  18 18   Temp:       TempSrc:       SpO2: 95%  94% 94%   Weight:  87.8 kg (193 lb 9 oz)     Height:           Flowsheet Rows      Flowsheet Row First Filed Value   Admission Height 185.4 cm (73\") Documented at 08/23/2023 1729   Admission Weight 89.8 kg (198 lb) Documented at 08/23/2023 1729              Intake/Output Summary (Last 24 hours) at 8/27/2023 0851  Last data filed at 8/26/2023 2200  Gross per 24 hour   Intake --   Output 1950 ml   Net -1950 ml          TELEMETRY: Sinus rhythm    Physical Exam:    Patient was not seen in the room.  Patient was examined through the window.  The patient is in no distress.  Vital signs as noted above.      Results Review:   I reviewed the patient's new clinical results.  Lab Results (last 24 hours)       Procedure Component Value Units Date/Time    Extra Tubes [[id number]] Collected: 08/26/23 2330    Specimen: Blood, Venous Line Updated: 08/27/23 0030    Narrative:      The following orders were created for panel order Extra Tubes.  Procedure                               Abnormality         Status                     ---------                               -----------         ------                     Green Top (Gel)[[id number]]                                  Final result                 Please view results for these tests on the individual orders.    Green Top (Gel) [[id number]] Collected: 08/26/23 2330    Specimen: Blood Updated: 08/27/23 0030     Extra Tube Hold for add-ons.     Comment: Auto resulted.       CBC (No Diff) [[id number]]  (Abnormal) Collected: 08/26/23 2330    Specimen: Blood Updated: 08/26/23 2357     WBC 5.30 10*3/mm3      RBC 3.26 10*6/mm3      Hemoglobin 10.5 g/dL      Hematocrit 30.3 %      MCV 93.0 fL      MCH 32.2 pg      MCHC 34.7 g/dL      RDW 12.9 %      RDW-SD 43.3 fl      MPV 7.7 fL      Platelets 170 10*3/mm3     Blood Culture - Blood, Arm, Left [[id number]]  (Normal) " Collected: 08/23/23 1821    Specimen: Blood from Arm, Left Updated: 08/26/23 1831     Blood Culture No growth at 3 days    Blood Culture - Blood, Arm, Right [[id number]]  (Normal) Collected: 08/23/23 1811    Specimen: Blood from Arm, Right Updated: 08/26/23 1831     Blood Culture No growth at 3 days            Imaging Results (Last 24 Hours)       ** No results found for the last 24 hours. **        LAB RESULTS (LAST 7 DAYS)    CBC  Results from last 7 days   Lab Units 08/26/23  2330 08/26/23  0018 08/25/23  0420 08/24/23  1304 08/23/23 1811   WBC 10*3/mm3 5.30 3.00* 4.40 6.10 8.70   RBC 10*6/mm3 3.26* 3.42* 3.57* 3.64* 3.85*   HEMOGLOBIN g/dL 10.5* 11.1* 11.7* 12.2* 12.5*   HEMATOCRIT % 30.3* 32.1* 34.3* 35.4* 36.6*   MCV fL 93.0 93.9 95.9 97.3* 94.9   PLATELETS 10*3/mm3 170 160 159 169 184         BMP  Results from last 7 days   Lab Units 08/26/23  0018 08/25/23 0420 08/24/23  1304 08/23/23  1811   SODIUM mmol/L 131* 131* 128* 129*   POTASSIUM mmol/L 3.7 3.7 3.0* 3.6   CHLORIDE mmol/L 99 98 93* 93*   CO2 mmol/L 21.0* 24.0 24.0 26.0   BUN mg/dL 15 16 20 20   CREATININE mg/dL 0.60* 0.66* 0.67* 0.88   GLUCOSE mg/dL 171* 114* 126* 141*   MAGNESIUM mg/dL 1.8  --   --   --          CMP   Results from last 7 days   Lab Units 08/26/23  0018 08/25/23  0420 08/24/23  1304 08/23/23  1811   SODIUM mmol/L 131* 131* 128* 129*   POTASSIUM mmol/L 3.7 3.7 3.0* 3.6   CHLORIDE mmol/L 99 98 93* 93*   CO2 mmol/L 21.0* 24.0 24.0 26.0   BUN mg/dL 15 16 20 20   CREATININE mg/dL 0.60* 0.66* 0.67* 0.88   GLUCOSE mg/dL 171* 114* 126* 141*   ALBUMIN g/dL  --   --   --  3.9   BILIRUBIN mg/dL  --   --   --  0.6   ALK PHOS U/L  --   --   --  69   AST (SGOT) U/L  --   --   --  30   ALT (SGPT) U/L  --   --   --  13           BNP        TROPONIN  Results from last 7 days   Lab Units 08/25/23  0800 08/25/23  0420 08/23/23  1811   CK TOTAL U/L  --   --  263*   HSTROP T ng/L 20*   < >  --     < > = values in this interval not displayed.          CoAg        Creatinine Clearance  Estimated Creatinine Clearance: 111.8 mL/min (A) (by C-G formula based on SCr of 0.6 mg/dL (L)).    ABG        Radiology  XR Chest 1 View    Result Date: 8/25/2023  Impression: No acute cardiopulmonary findings. Electronically Signed: Alyssa Lizama MD  8/25/2023 10:22 AM EDT  Workstation ID: [id number]         EKG      I personally viewed and interpreted the patient's EKG/Telemetry data: Sinus rhythm followed by atrial fibrillation followed by sinus rhythm.    ECHOCARDIOGRAM:    Results for orders placed during the hospital encounter of 08/23/23    Adult Transthoracic Echo Limited W/ Cont if Necessary Per Protocol    Interpretation Summary    Left ventricular ejection fraction appears to be 56 - 60%.    Estimated right ventricular systolic pressure from tricuspid regurgitation is normal (<35 mmHg).    Indications  Atrial fibrillation    Technically satisfactory study.  Mitral valve is structurally normal.  Tricuspid valve is structurally normal.  Mild tricuspid regurgitation.  Aortic valve is structurally normal.  Pulmonic valve could not be well visualized.  No evidence for mitral or aortic regurgitation is seen by Doppler study.  Left atrium is normal in size.  Right atrium is normal in size.  Left ventricle is normal in size and contractility with ejection fraction of 60%.  Right ventricle is normal in size.  Atrial septum is intact.  Aorta is normal.  No pericardial effusion or intracardiac thrombus is seen.    Impression  Structurally and functionally normal cardiac valves except for mild tricuspid regurgitation.  No evidence for pulmonary hypertension is present..  Left ventricular size and contractility is normal with ejection fraction of 60%.        STRESS TEST        Cardiolite (Tc-99m sestamibi) stress test    CARDIAC CATHETERIZATION  No results found for this or any previous visit.                OTHER:         Assessment & Plan     Active Problems:    Coronary  artery disease    Primary hypertension    Type 2 diabetes mellitus without complication, without long-term current use of insulin    Psoriasis    Irritable bowel syndrome      ////////////////////  History  ============  - Recent weakness and COVID-19 infection.     - Atrial fibrillation  Has converted to sinus rhythm.  Patient is in sinus rhythm now.      -status post right coronary artery stent placement (2005 ).      Cardiac catheterization 03/10/2017 revealed normal left ventricular function.  Normal left main coronary artery. 50% lad distal to diagonal branch.  Ostial diagonal branch has 80-90% disease (moderate size vessel) RCA has 30-40% plaquing.  RCA stent was   patent.  One of the PDA branches had 70% disease at its ostium.     Stress Cardiolite test is negative for myocardial ischemia 08/01/2016.     -palpitations improved.  Holter monitor 01/18/2016 showed occasional premature atrial and ventricular contractions.       -hypertension dyslipidemia       -benign prostatic hypertrophy       -psoriasis       -status post left ankle surgery  rotator cuff surgery umbilical hernia repair and surgery for basal cell carcinoma of the nose.       -allergy to darvon codeine Relafen and IVP dye  ===============   Plan  =============  Recent weakness and COVID-19 infection.     Atrial fibrillation  Has converted to sinus rhythm.-8/25/2023  Patient is in sinus rhythm today.      -status post right coronary artery stent placement (2005 )  Patient is not having any angina pectoris or congestive heart failure.     Hypertension-  well-controlled.  132/83     Dyslipidemia-continue atorvastatin     medications were reviewed and updated.     Continue metoprolol to 25 mg twice daily.     Anticoagulation  Patient is on therapeutic subcu Lovenox.     Echocardiogram.     Close cardiac monitoring.     Further plan will depend on patient's progress.     Reviewed and updated 8/27/2023  /////////////////////////////        Vilma  MD Bel  08/27/23  08:51 EDT

## 2023-08-27 NOTE — OUTREACH NOTE
Prep Survey      Flowsheet Row Responses   [facility] patient discharged from? [facility]   Is LACE score < 7 ? No   Eligibility [facility]   Date of Admission 08/23/23   Date of Discharge 08/27/23   Discharge Disposition Home-Health Care Sv   Discharge diagnosis **COVID-19A-fib with RVR.   Does the patient have one of the following disease processes/diagnoses(primary or secondary)? Other   Does the patient have Home health ordered? No   Is there a DME ordered? No   Comments regarding appointments OP PT KORT   Prep survey completed? Yes            WENDY BALTAZAR - Registered Nurse

## 2023-08-27 NOTE — CASE MANAGEMENT/SOCIAL WORK
Case Management Discharge Note      Final Note: home with spouse     Transportation Services  Private: Car    Final Discharge Disposition Code: 01 - home or self-care

## 2023-08-27 NOTE — NURSING NOTE
pt pulled put IV and pulled off heart monitor. He does not understand why he is in the hospital and wants to go home, he also states he is tired and wanted to put his clothes on, assisted putting clothes on and helped him back to bed but he was very angry and did not want help. He continued to refuse the heart monitor, he also refused for me to take his blood pressure and restart an IV. Took a few minutes for him to calm down and he started to reorient a little. He is in bed with bed alarm on and 02 sensor. He is on room air with sp02 at 96%. He states he has not had much sleep and is tired. PRN hydralazine needed earlier for elevated bp.

## 2023-08-28 ENCOUNTER — TRANSITIONAL CARE MANAGEMENT TELEPHONE ENCOUNTER (OUTPATIENT)
Dept: CALL CENTER | Facility: HOSPITAL | Age: 83
End: 2023-08-28
Payer: MEDICARE

## 2023-08-28 LAB
BACTERIA SPEC AEROBE CULT: NORMAL
BACTERIA SPEC AEROBE CULT: NORMAL

## 2023-08-28 NOTE — OUTREACH NOTE
Call Center TCM Note      Flowsheet Row Responses   [facility] patient discharged from? [facility]   Does the patient have one of the following disease processes/diagnoses(primary or secondary)? Other   TCM attempt successful? Yes   Call start time 1433   Call end time 1435   Discharge diagnosis **COVID-19/A-fib with RVR.   Does the patient have all medications ordered at discharge? Yes   Is the patient taking all medications as directed (includes completed medication regime)? Yes   Comments states he will message PCP tomorrow   Does the patient have an appointment with their PCP within 7-14 days of discharge? No   Nursing Interventions Patient declined scheduling/rescheduling appointment at this time   Has home health visited the patient within 72 hours of discharge? N/A   Psychosocial issues? No   Did the patient receive a copy of their discharge instructions? Yes   Nursing interventions Reviewed instructions with patient   What is the patient's perception of their health status since discharge? Improving   Is the patient/caregiver able to teach back signs and symptoms related to disease process for when to call PCP? Yes   Is the patient/caregiver able to teach back signs and symptoms related to disease process for when to call 911? Yes   Is the patient/caregiver able to teach back the hierarchy of who to call/visit for symptoms/problems? PCP, Specialist, Home health nurse, Urgent Care, ED, 911 Yes   TCM call completed? Yes   Wrap up additional comments Doing well, no questions, states he will message PCP tomorrow about a f/u appt.   Call end time 1435   Would this patient benefit from a Referral to Amb Social Work? No   Is the patient interested in additional calls from an ambulatory ? No            Philly Bush RN    8/28/2023, 14:35 EDT

## 2023-08-30 ENCOUNTER — TELEPHONE (OUTPATIENT)
Dept: CARDIOLOGY | Facility: CLINIC | Age: 83
End: 2023-08-30

## 2023-08-30 NOTE — TELEPHONE ENCOUNTER
"    Caller: Campbell Alex \"Zuleyma\"    Relationship to patient: Self    Best call back number: [phone]    Patient is needing: PATIENT STATED THAT HE SAW DR. BOWLING IN HOSPITAL AND DR. BOWLING ADVISED PATIENT TO CALL OFFICE WITH BLOOD PRESSURE REPORT. MONDAY 08.28.23 BLOOD PRESSURE .82 MORNING AND EVENING 157/79, TUESDAYS 08.29.23 MORNING READING /77 AND EVENING 184/63 WEDNESDAY 08.30.23 MORNING  137/77 AND EVENING 143/77. PATIENT STATED THAT IF ANY QUESTIONS CALL HIM.           "

## 2023-09-01 LAB
QT INTERVAL: 318 MS
QT INTERVAL: 355 MS
QT INTERVAL: 358 MS
QT INTERVAL: 439 MS
QTC INTERVAL: 443 MS
QTC INTERVAL: 463 MS
QTC INTERVAL: 493 MS
QTC INTERVAL: 495 MS

## 2023-09-05 ENCOUNTER — TELEPHONE (OUTPATIENT)
Dept: FAMILY MEDICINE CLINIC | Facility: CLINIC | Age: 83
End: 2023-09-05
Payer: MEDICARE

## 2023-09-05 NOTE — TELEPHONE ENCOUNTER
HUB TO SHARE: SENT FOLLOWING [employer] MESSAGE. Unfortunately we do not have the availability to see a new patient any sooner than what you are scheduled. We would recommend to keep your September appointments with Dr Jurado and Alisa Salazar and [facility]as should be able to keep filling your medication until you see Dr Lozoya in November.

## 2023-09-05 NOTE — TELEPHONE ENCOUNTER
Caller: nash soto    Relationship to patient: Emergency Contact    Best call back number: [phone]    Patient is needing: ASKS THAT PATIENT BE SEEN SOONER THAN NOV  FOR NEW PATIENT APPOINTMENT.  PLEASE ADVISE

## 2023-09-07 ENCOUNTER — READMISSION MANAGEMENT (OUTPATIENT)
Dept: CALL CENTER | Facility: HOSPITAL | Age: 83
End: 2023-09-07
Payer: MEDICARE

## 2023-09-07 ENCOUNTER — LAB (OUTPATIENT)
Dept: LAB | Facility: HOSPITAL | Age: 83
End: 2023-09-07
Payer: MEDICARE

## 2023-09-07 ENCOUNTER — OFFICE VISIT (OUTPATIENT)
Dept: FAMILY MEDICINE CLINIC | Facility: CLINIC | Age: 83
End: 2023-09-07
Payer: MEDICARE

## 2023-09-07 VITALS
HEIGHT: 73 IN | HEART RATE: 75 BPM | OXYGEN SATURATION: 99 % | WEIGHT: 187 LBS | TEMPERATURE: 97.9 F | DIASTOLIC BLOOD PRESSURE: 76 MMHG | BODY MASS INDEX: 24.78 KG/M2 | SYSTOLIC BLOOD PRESSURE: 134 MMHG

## 2023-09-07 DIAGNOSIS — E87.1 HYPONATREMIA: ICD-10-CM

## 2023-09-07 DIAGNOSIS — I48.91 ATRIAL FIBRILLATION, UNSPECIFIED TYPE: ICD-10-CM

## 2023-09-07 DIAGNOSIS — E87.1 HYPONATREMIA: Primary | ICD-10-CM

## 2023-09-07 LAB
ALBUMIN SERPL-MCNC: 3.5 G/DL (ref 3.5–5.2)
ALBUMIN/GLOB SERPL: 1.4 G/DL
ALP SERPL-CCNC: 56 U/L (ref 39–117)
ALT SERPL W P-5'-P-CCNC: 13 U/L (ref 1–41)
ANION GAP SERPL CALCULATED.3IONS-SCNC: 7.6 MMOL/L (ref 5–15)
AST SERPL-CCNC: 15 U/L (ref 1–40)
BASOPHILS # BLD AUTO: 0 10*3/MM3 (ref 0–0.2)
BASOPHILS NFR BLD AUTO: 0.5 % (ref 0–1.5)
BILIRUB SERPL-MCNC: 0.5 MG/DL (ref 0–1.2)
BUN SERPL-MCNC: 18 MG/DL (ref 8–23)
BUN/CREAT SERPL: 26.9 (ref 7–25)
CALCIUM SPEC-SCNC: 9 MG/DL (ref 8.6–10.5)
CHLORIDE SERPL-SCNC: 105 MMOL/L (ref 98–107)
CO2 SERPL-SCNC: 25.4 MMOL/L (ref 22–29)
CREAT SERPL-MCNC: 0.67 MG/DL (ref 0.76–1.27)
DEPRECATED RDW RBC AUTO: 45.1 FL (ref 37–54)
EGFRCR SERPLBLD CKD-EPI 2021: 91.5 ML/MIN/1.73
EOSINOPHIL # BLD AUTO: 0.2 10*3/MM3 (ref 0–0.4)
EOSINOPHIL NFR BLD AUTO: 2.2 % (ref 0.3–6.2)
ERYTHROCYTE [DISTWIDTH] IN BLOOD BY AUTOMATED COUNT: 13.6 % (ref 12.3–15.4)
GLOBULIN UR ELPH-MCNC: 2.5 GM/DL
GLUCOSE SERPL-MCNC: 112 MG/DL (ref 65–99)
HCT VFR BLD AUTO: 32.7 % (ref 37.5–51)
HGB BLD-MCNC: 11 G/DL (ref 13–17.7)
LYMPHOCYTES # BLD AUTO: 1.2 10*3/MM3 (ref 0.7–3.1)
LYMPHOCYTES NFR BLD AUTO: 14.4 % (ref 19.6–45.3)
MCH RBC QN AUTO: 32.7 PG (ref 26.6–33)
MCHC RBC AUTO-ENTMCNC: 33.7 G/DL (ref 31.5–35.7)
MCV RBC AUTO: 97.1 FL (ref 79–97)
MONOCYTES # BLD AUTO: 1 10*3/MM3 (ref 0.1–0.9)
MONOCYTES NFR BLD AUTO: 11.7 % (ref 5–12)
NEUTROPHILS NFR BLD AUTO: 6.1 10*3/MM3 (ref 1.7–7)
NEUTROPHILS NFR BLD AUTO: 71.2 % (ref 42.7–76)
NRBC BLD AUTO-RTO: 0 /100 WBC (ref 0–0.2)
PLATELET # BLD AUTO: 288 10*3/MM3 (ref 140–450)
PMV BLD AUTO: 7.5 FL (ref 6–12)
POTASSIUM SERPL-SCNC: 4 MMOL/L (ref 3.5–5.2)
PROT SERPL-MCNC: 6 G/DL (ref 6–8.5)
RBC # BLD AUTO: 3.37 10*6/MM3 (ref 4.14–5.8)
SODIUM SERPL-SCNC: 138 MMOL/L (ref 136–145)
WBC NRBC COR # BLD: 8.5 10*3/MM3 (ref 3.4–10.8)

## 2023-09-07 PROCEDURE — 36415 COLL VENOUS BLD VENIPUNCTURE: CPT

## 2023-09-07 PROCEDURE — 80053 COMPREHEN METABOLIC PANEL: CPT

## 2023-09-07 PROCEDURE — 85025 COMPLETE CBC W/AUTO DIFF WBC: CPT

## 2023-09-07 NOTE — OUTREACH NOTE
Medical Week 2 Survey      Flowsheet Row Responses   [facility] patient discharged from? [facility]   Does the patient have one of the following disease processes/diagnoses(primary or secondary)? Other   Week 2 attempt successful? Yes   Call start time 0937   Discharge diagnosis **COVID-19/A-fib with RVR.   Call end time 0939   Meds reviewed with patient/caregiver? Yes   Is the patient having any side effects they believe may be caused by any medication additions or changes? No   Does the patient have all medications ordered at discharge? Yes   Is the patient taking all medications as directed (includes completed medication regime)? Yes   Does the patient have a primary care provider?  Yes   Does the patient have an appointment with their PCP within 7 days of discharge? Greater than 7 days   Comments regarding PCP Patient has pcp and pain management appointments today   What is preventing the patient from scheduling follow up appointments within 7 days of discharge? Earlier appointment not available   Nursing Interventions Verified appointment date/time/provider   Has the patient kept scheduled appointments due by today? N/A   Has home health visited the patient within 72 hours of discharge? N/A   Psychosocial issues? No   Did the patient receive a copy of their discharge instructions? Yes   Nursing interventions Reviewed instructions with patient   What is the patient's perception of their health status since discharge? New symptoms unrelated to diagnosis  [Patient is having extreme low back pain, and has an appointment with pain management today.]   Is the patient/caregiver able to teach back signs and symptoms related to disease process for when to call PCP? Yes   Is the patient/caregiver able to teach back signs and symptoms related to disease process for when to call 911? Yes   Is the patient/caregiver able to teach back the hierarchy of who to call/visit for symptoms/problems? PCP, Specialist, Home health  nurse, Urgent Care, ED, 911 Yes   If the patient is a current smoker, are they able to teach back resources for cessation? Not a smoker   Week 2 Call Completed? Yes   Call end time 0939            Jagruti BEE - Licensed Nurse

## 2023-09-07 NOTE — PROGRESS NOTES
Subjective        Campbell Alex is a 83 y.o. male.     Chief Complaint   Patient presents with    Hospital Follow Up Visit     TCM       History of Present Illness  Patient is here for hospital follow up for weakness , acute febrile illness and covid 19.  He presented to hospital on 8/23/2023 for covid 19 , acute respiratory failure with hypoxia, hyponatremia and diarrhea.   Treated with supportive care and remdesivir. Tmax 103.8.   Admission complicated by Afib with RVR cardiology consulted started Iv metoprolol and converted to sinus rhythm. Treated with Lovenox sent home on metoprolol and eliquis.   Discharged home 8/27/2023   Notes reviewed.  Hgb 10.5 low hct 30.3 low  sodium 131 low   Glucose 171 high he is diabetic. Covid detected.  Echo left ventricular EF 82063%  Structurally and functionally normal cardiac v except for mild tricuspid regurgitation.   No evidence for pulmonary hypertension    Hypertension: he is checking his blood pressure and brought with him to office.   He had one low 84/ otherwise normal pressures.discussed the metoprolol and that can cause low blood pressure. He is to take blood pressure prior to meds. If less than 100 systolic don't take wait repeat one hour check if ok then take meds.   Family instructions given. He is taking metoprolol for his heart rate due to afib. Discussed what would be too low heart rate also     Afib: on eliquis tolerating well will f/u with cardiology.    Covid : he is using albuterol as needed. No fevers. No chills.    Back pain: tylenol use massage therapy, talk to PT about treatment. Movement.   Avoid heat.   He has tramadol with pain management.         The following portions of the patient's history were reviewed and updated as appropriate: allergies, current medications, past family history, past medical history, past social history, past surgical history and problem list.      Current Outpatient Medications:     albuterol sulfate  (90 Base)  MCG/ACT inhaler, Inhale 2 puffs Every 4 (Four) Hours As Needed for Wheezing or Shortness of Air for up to 30 days., Disp: 18 g, Rfl: 0    apixaban (ELIQUIS) 5 MG tablet tablet, Take 1 tablet by mouth Every 12 (Twelve) Hours for 30 days. Indications: Atrial Fibrillation, Disp: 60 tablet, Rfl: 0    aspirin (aspirin) 81 MG EC tablet, Take 1 tablet by mouth Daily., Disp: , Rfl:     atorvastatin (LIPITOR) 20 MG tablet, Take 1 tablet by mouth Every Night., Disp: , Rfl:     azelastine (ASTELIN) 0.1 % nasal spray, 2 sprays into the nostril(s) as directed by provider 2 (Two) Times a Day. Use in each nostril as directed, Disp: 30 mL, Rfl: 3    Calcium Carbonate 1500 (600 Ca) MG tablet, Take 1 tablet by mouth Daily., Disp: , Rfl:     cloNIDine (CATAPRES) 0.2 MG tablet, Take 1 tablet by mouth 2 (Two) Times a Day., Disp: , Rfl:     colestipol (COLESTID) 1 g tablet, Take 1 tablet by mouth Daily., Disp: , Rfl:     finasteride (PROSCAR) 5 MG tablet, Take 1 tablet by mouth Every Evening., Disp: , Rfl:     isosorbide mononitrate (IMDUR) 30 MG 24 hr tablet, Take 1 tablet by mouth Daily., Disp: , Rfl:     metoprolol tartrate (LOPRESSOR) 25 MG tablet, Take 1 tablet by mouth Every 12 (Twelve) Hours for 30 days., Disp: 60 tablet, Rfl: 0    Probiotic Product (PROBIOTIC DAILY PO), Take 1 capsule by mouth Daily., Disp: , Rfl:     tamsulosin (FLOMAX) 0.4 MG capsule 24 hr capsule, Take 1 capsule by mouth 2 (Two) Times a Day., Disp: , Rfl:     Recent Results (from the past 4032 hour(s))   Tissue Pathology Exam    Collection Time: 04/13/23  2:00 PM    Specimen: Ear, Right; Tissue   Result Value Ref Range    Case Report       Surgical Pathology Report                         Case: [id number]                                  Authorizing Provider:  Mario Johnson MD Collected:           04/13/2023 02:00 PM          Ordering Location:     [facility]       Received:            04/14/2023 09:26 AM                                  "LABORATORY                                                                   Pathologist:           Juan Drake MD                                                             Specimen:    Ear, Right, bcc face/ear on right preauricle                                               Final Diagnosis       Skin, right ear, excision:    Ulcerated invasive basal cell carcinoma    Surgical margins negative for tumor, see comment     JPR/sms       Comment       The surgical margins are negative for tumor, however, the deep margin is close at less than 1 mm. Clinical followup for recurrence is recommended.     JPR/sms       Gross Description       1. Ear, Right.  Received in formalin designated \"Right ear\" is an unoriented skin ellipse measuring 1.6 x 1.1 x 0.3 cm. The surface is homogenous tan but slightly dome shaped. The base is inked and the specimen is serially sectioned and submitted in one cassette.     WALTER/sms      Hemoglobin A1c    Collection Time: 06/14/23  9:37 AM    Specimen: Blood   Result Value Ref Range    Hemoglobin A1C 6.20 (H) 4.80 - 5.60 %   Basic Metabolic Panel    Collection Time: 06/14/23  9:37 AM    Specimen: Blood   Result Value Ref Range    Glucose 98 65 - 99 mg/dL    BUN 17 8 - 23 mg/dL    Creatinine 0.78 0.76 - 1.27 mg/dL    Sodium 130 (L) 136 - 145 mmol/L    Potassium 4.0 3.5 - 5.2 mmol/L    Chloride 92 (L) 98 - 107 mmol/L    CO2 26.5 22.0 - 29.0 mmol/L    Calcium 9.3 8.6 - 10.5 mg/dL    BUN/Creatinine Ratio 21.8 7.0 - 25.0    Anion Gap 11.5 5.0 - 15.0 mmol/L    eGFR 87.4 >60.0 mL/min/1.73   ECG 12 Lead Chest Pain    Collection Time: 08/23/23  5:46 PM   Result Value Ref Range    QT Interval 395 ms   Comprehensive Metabolic Panel    Collection Time: 08/23/23  6:11 PM    Specimen: Blood   Result Value Ref Range    Glucose 141 (H) 65 - 99 mg/dL    BUN 20 8 - 23 mg/dL    Creatinine 0.88 0.76 - 1.27 mg/dL    Sodium 129 (L) 136 - 145 mmol/L    Potassium 3.6 3.5 - 5.2 mmol/L    Chloride 93 (L) 98 - 107 " mmol/L    CO2 26.0 22.0 - 29.0 mmol/L    Calcium 9.2 8.6 - 10.5 mg/dL    Total Protein 6.6 6.0 - 8.5 g/dL    Albumin 3.9 3.5 - 5.2 g/dL    ALT (SGPT) 13 1 - 41 U/L    AST (SGOT) 30 1 - 40 U/L    Alkaline Phosphatase 69 39 - 117 U/L    Total Bilirubin 0.6 0.0 - 1.2 mg/dL    Globulin 2.7 gm/dL    A/G Ratio 1.4 g/dL    BUN/Creatinine Ratio 22.7 7.0 - 25.0    Anion Gap 10.0 5.0 - 15.0 mmol/L    eGFR 84.3 >60.0 mL/min/1.73   Blood Culture - Blood, Arm, Right    Collection Time: 08/23/23  6:11 PM    Specimen: Arm, Right; Blood   Result Value Ref Range    Blood Culture No growth at 5 days    CBC Auto Differential    Collection Time: 08/23/23  6:11 PM    Specimen: Blood   Result Value Ref Range    WBC 8.70 3.40 - 10.80 10*3/mm3    RBC 3.85 (L) 4.14 - 5.80 10*6/mm3    Hemoglobin 12.5 (L) 13.0 - 17.7 g/dL    Hematocrit 36.6 (L) 37.5 - 51.0 %    MCV 94.9 79.0 - 97.0 fL    MCH 32.5 26.6 - 33.0 pg    MCHC 34.3 31.5 - 35.7 g/dL    RDW 13.5 12.3 - 15.4 %    RDW-SD 45.9 37.0 - 54.0 fl    MPV 7.8 6.0 - 12.0 fL    Platelets 184 140 - 450 10*3/mm3    Neutrophil % 85.0 (H) 42.7 - 76.0 %    Lymphocyte % 3.8 (L) 19.6 - 45.3 %    Monocyte % 10.7 5.0 - 12.0 %    Eosinophil % 0.2 (L) 0.3 - 6.2 %    Basophil % 0.3 0.0 - 1.5 %    Neutrophils, Absolute 7.40 (H) 1.70 - 7.00 10*3/mm3    Lymphocytes, Absolute 0.30 (L) 0.70 - 3.10 10*3/mm3    Monocytes, Absolute 0.90 0.10 - 0.90 10*3/mm3    Eosinophils, Absolute 0.00 0.00 - 0.40 10*3/mm3    Basophils, Absolute 0.00 0.00 - 0.20 10*3/mm3    nRBC 0.0 0.0 - 0.2 /100 WBC   Green Top (Gel)    Collection Time: 08/23/23  6:11 PM   Result Value Ref Range    Extra Tube HOLD    Lavender Top    Collection Time: 08/23/23  6:11 PM   Result Value Ref Range    Extra Tube hold for add-on    Gold Top - SST    Collection Time: 08/23/23  6:11 PM   Result Value Ref Range    Extra Tube Hold for add-ons.    Light Blue Top    Collection Time: 08/23/23  6:11 PM   Result Value Ref Range    Extra Tube Hold for add-ons.     CK    Collection Time: 08/23/23  6:11 PM    Specimen: Blood   Result Value Ref Range    Creatine Kinase 263 (H) 20 - 200 U/L   POC Lactate    Collection Time: 08/23/23  6:12 PM    Specimen: Blood   Result Value Ref Range    Lactate 0.8 0.3 - 2.0 mmol/L   Respiratory Panel PCR w/COVID-19(SARS-CoV-2) TOBY/BELGICA/MERNA/PAD/COR/MAD/JOANN In-House, NP Swab in UTM/VTM, 3-4 HR TAT - Swab, Nasopharynx    Collection Time: 08/23/23  6:13 PM    Specimen: Nasopharynx; Swab   Result Value Ref Range    ADENOVIRUS, PCR Not Detected Not Detected    Coronavirus 229E Not Detected Not Detected    Coronavirus HKU1 Not Detected Not Detected    Coronavirus NL63 Not Detected Not Detected    Coronavirus OC43 Not Detected Not Detected    COVID19 Detected (C) Not Detected - Ref. Range    Human Metapneumovirus Not Detected Not Detected    Human Rhinovirus/Enterovirus Not Detected Not Detected    Influenza A PCR Not Detected Not Detected    Influenza B PCR Not Detected Not Detected    Parainfluenza Virus 1 Not Detected Not Detected    Parainfluenza Virus 2 Not Detected Not Detected    Parainfluenza Virus 3 Not Detected Not Detected    Parainfluenza Virus 4 Not Detected Not Detected    RSV, PCR Not Detected Not Detected    Bordetella pertussis pcr Not Detected Not Detected    Bordetella parapertussis PCR Not Detected Not Detected    Chlamydophila pneumoniae PCR Not Detected Not Detected    Mycoplasma pneumo by PCR Not Detected Not Detected   Blood Culture - Blood, Arm, Left    Collection Time: 08/23/23  6:21 PM    Specimen: Arm, Left; Blood   Result Value Ref Range    Blood Culture No growth at 5 days    Urinalysis With Microscopic If Indicated (No Culture) - Urine, Clean Catch    Collection Time: 08/23/23  6:31 PM    Specimen: Urine, Clean Catch   Result Value Ref Range    Color, UA Yellow Yellow, Straw    Appearance, UA Clear Clear    pH, UA 7.5 5.0 - 8.0    Specific Gravity, UA 1.017 1.005 - 1.030    Glucose, UA Negative Negative    Ketones, UA  Negative Negative    Bilirubin, UA Negative Negative    Blood, UA Negative Negative    Protein, UA Negative Negative    Leuk Esterase, UA Negative Negative    Nitrite, UA Negative Negative    Urobilinogen, UA 1.0 E.U./dL 0.2 - 1.0 E.U./dL   CBC (No Diff)    Collection Time: 08/24/23  1:04 PM    Specimen: Blood   Result Value Ref Range    WBC 6.10 3.40 - 10.80 10*3/mm3    RBC 3.64 (L) 4.14 - 5.80 10*6/mm3    Hemoglobin 12.2 (L) 13.0 - 17.7 g/dL    Hematocrit 35.4 (L) 37.5 - 51.0 %    MCV 97.3 (H) 79.0 - 97.0 fL    MCH 33.4 (H) 26.6 - 33.0 pg    MCHC 34.3 31.5 - 35.7 g/dL    RDW 13.4 12.3 - 15.4 %    RDW-SD 44.2 37.0 - 54.0 fl    MPV 7.9 6.0 - 12.0 fL    Platelets 169 140 - 450 10*3/mm3   Basic Metabolic Panel    Collection Time: 08/24/23  1:04 PM    Specimen: Blood   Result Value Ref Range    Glucose 126 (H) 65 - 99 mg/dL    BUN 20 8 - 23 mg/dL    Creatinine 0.67 (L) 0.76 - 1.27 mg/dL    Sodium 128 (L) 136 - 145 mmol/L    Potassium 3.0 (L) 3.5 - 5.2 mmol/L    Chloride 93 (L) 98 - 107 mmol/L    CO2 24.0 22.0 - 29.0 mmol/L    Calcium 8.3 (L) 8.6 - 10.5 mg/dL    BUN/Creatinine Ratio 29.9 (H) 7.0 - 25.0    Anion Gap 11.0 5.0 - 15.0 mmol/L    eGFR 91.5 >60.0 mL/min/1.73   CBC (No Diff)    Collection Time: 08/25/23  4:20 AM    Specimen: Blood   Result Value Ref Range    WBC 4.40 3.40 - 10.80 10*3/mm3    RBC 3.57 (L) 4.14 - 5.80 10*6/mm3    Hemoglobin 11.7 (L) 13.0 - 17.7 g/dL    Hematocrit 34.3 (L) 37.5 - 51.0 %    MCV 95.9 79.0 - 97.0 fL    MCH 32.8 26.6 - 33.0 pg    MCHC 34.1 31.5 - 35.7 g/dL    RDW 13.4 12.3 - 15.4 %    RDW-SD 44.2 37.0 - 54.0 fl    MPV 7.4 6.0 - 12.0 fL    Platelets 159 140 - 450 10*3/mm3   Basic Metabolic Panel    Collection Time: 08/25/23  4:20 AM    Specimen: Blood   Result Value Ref Range    Glucose 114 (H) 65 - 99 mg/dL    BUN 16 8 - 23 mg/dL    Creatinine 0.66 (L) 0.76 - 1.27 mg/dL    Sodium 131 (L) 136 - 145 mmol/L    Potassium 3.7 3.5 - 5.2 mmol/L    Chloride 98 98 - 107 mmol/L    CO2 24.0 22.0  - 29.0 mmol/L    Calcium 7.9 (L) 8.6 - 10.5 mg/dL    BUN/Creatinine Ratio 24.2 7.0 - 25.0    Anion Gap 9.0 5.0 - 15.0 mmol/L    eGFR 91.9 >60.0 mL/min/1.73   High Sensitivity Troponin T    Collection Time: 08/25/23  4:20 AM    Specimen: Blood   Result Value Ref Range    HS Troponin T 20 (H) <15 ng/L   ECG 12 Lead Tachycardia    Collection Time: 08/25/23  6:45 AM   Result Value Ref Range    QT Interval 318 ms    QTC Interval 495 ms   High Sensitivity Troponin T 2Hr    Collection Time: 08/25/23  8:00 AM    Specimen: Blood   Result Value Ref Range    HS Troponin T 20 (H) <15 ng/L    Troponin T Delta 0 >=-4 - <+4 ng/L   ECG 12 Lead Rhythm Change    Collection Time: 08/25/23 10:01 AM   Result Value Ref Range    QT Interval 355 ms    QTC Interval 493 ms   ECG 12 Lead Rhythm Change    Collection Time: 08/25/23 11:50 AM   Result Value Ref Range    QT Interval 358 ms    QTC Interval 463 ms   ECG 12 Lead Rhythm Change    Collection Time: 08/25/23 10:01 PM   Result Value Ref Range    QT Interval 439 ms    QTC Interval 443 ms   CBC (No Diff)    Collection Time: 08/26/23 12:18 AM    Specimen: Blood   Result Value Ref Range    WBC 3.00 (L) 3.40 - 10.80 10*3/mm3    RBC 3.42 (L) 4.14 - 5.80 10*6/mm3    Hemoglobin 11.1 (L) 13.0 - 17.7 g/dL    Hematocrit 32.1 (L) 37.5 - 51.0 %    MCV 93.9 79.0 - 97.0 fL    MCH 32.4 26.6 - 33.0 pg    MCHC 34.5 31.5 - 35.7 g/dL    RDW 13.2 12.3 - 15.4 %    RDW-SD 44.6 37.0 - 54.0 fl    MPV 8.2 6.0 - 12.0 fL    Platelets 160 140 - 450 10*3/mm3   Basic Metabolic Panel    Collection Time: 08/26/23 12:18 AM    Specimen: Blood   Result Value Ref Range    Glucose 171 (H) 65 - 99 mg/dL    BUN 15 8 - 23 mg/dL    Creatinine 0.60 (L) 0.76 - 1.27 mg/dL    Sodium 131 (L) 136 - 145 mmol/L    Potassium 3.7 3.5 - 5.2 mmol/L    Chloride 99 98 - 107 mmol/L    CO2 21.0 (L) 22.0 - 29.0 mmol/L    Calcium 8.1 (L) 8.6 - 10.5 mg/dL    BUN/Creatinine Ratio 25.0 7.0 - 25.0    Anion Gap 11.0 5.0 - 15.0 mmol/L    eGFR 94.6 >60.0  "mL/min/1.73   Magnesium    Collection Time: 08/26/23 12:18 AM    Specimen: Blood   Result Value Ref Range    Magnesium 1.8 1.6 - 2.4 mg/dL   TSH    Collection Time: 08/26/23 12:18 AM    Specimen: Blood   Result Value Ref Range    TSH 0.615 0.270 - 4.200 uIU/mL   Adult Transthoracic Echo Limited W/ Cont if Necessary Per Protocol    Collection Time: 08/26/23  2:39 PM   Result Value Ref Range    LV V1 max 138.0 cm/sec    LV V1 max PG 7.6 mmHg    LV V1 mean PG 3.0 mmHg    LV V1 VTI 25.7 cm    Ao pk vinny 183.0 cm/sec    Ao max PG 13.4 mmHg    AI P1/2t 1,335 msec    TR max vinny 269.0 cm/sec    TR max PG 28.9 mmHg    RVSP(TR) 31.9 mmHg    RAP systole 3.0 mmHg   CBC (No Diff)    Collection Time: 08/26/23 11:30 PM    Specimen: Blood   Result Value Ref Range    WBC 5.30 3.40 - 10.80 10*3/mm3    RBC 3.26 (L) 4.14 - 5.80 10*6/mm3    Hemoglobin 10.5 (L) 13.0 - 17.7 g/dL    Hematocrit 30.3 (L) 37.5 - 51.0 %    MCV 93.0 79.0 - 97.0 fL    MCH 32.2 26.6 - 33.0 pg    MCHC 34.7 31.5 - 35.7 g/dL    RDW 12.9 12.3 - 15.4 %    RDW-SD 43.3 37.0 - 54.0 fl    MPV 7.7 6.0 - 12.0 fL    Platelets 170 140 - 450 10*3/mm3   Green Top (Gel)    Collection Time: 08/26/23 11:30 PM   Result Value Ref Range    Extra Tube Hold for add-ons.          Review of Systems    Objective     /76 (BP Location: Right arm, Patient Position: Sitting, Cuff Size: Large Adult)   Pulse 75   Temp 97.9 °F (36.6 °C) (Infrared)   Ht 185.4 cm (73\")   Wt 84.8 kg (187 lb)   SpO2 99%   BMI 24.67 kg/m²     Physical Exam  Vitals and nursing note reviewed.   Constitutional:       Appearance: Normal appearance.   HENT:      Head: Normocephalic.   Cardiovascular:      Rate and Rhythm: Normal rate and regular rhythm.      Heart sounds: Normal heart sounds.   Pulmonary:      Effort: Pulmonary effort is normal.      Breath sounds: Normal breath sounds.   Abdominal:      Palpations: Abdomen is soft.   Neurological:      Mental Status: He is alert.       Result Review :        "         Assessment & Plan    Diagnoses and all orders for this visit:    1. Hyponatremia (Primary)  -     CBC & Differential; Future  -     Comprehensive Metabolic Panel; Future    2. Atrial fibrillation, unspecified type  -     CBC & Differential; Future  -     Comprehensive Metabolic Panel; Future      There are no Patient Instructions on file for this visit.    Follow Up   No follow-ups on file.    Patient was given instructions and counseling regarding his condition or for health maintenance advice. Please see specific information pulled into the AVS if appropriate.     Alisa Salazar, APRN    09/07/23

## 2023-09-08 ENCOUNTER — TELEPHONE (OUTPATIENT)
Dept: CARDIOLOGY | Facility: CLINIC | Age: 83
End: 2023-09-08
Payer: MEDICARE

## 2023-09-08 NOTE — TELEPHONE ENCOUNTER
Patient was Dx Afib last hospital stay. Patient was d/c on Eliquis and Metoprolol.   Called and spoke with patient and wife - advised they will need a hospital f/u apt next week with EKG.   Advised will send in refills if needed - she states he does not need them at this time.   Made apt for 9/12/23 @ 940a.   Understood.

## 2023-09-08 NOTE — TELEPHONE ENCOUNTER
"  Caller: Campbell Alex \"Zuleyma\"    Relationship: Self    Best call back number:  [phone]        PATIENT IS UNDER THE IMPRESSION THAT THERE IS SOME TESTING THAT NEEDS TO BE DONE PRIOR TO MEDICATION REFILLS, HE SAID HE WAS DIAGNOSED WITH AFIB, AND THAT HE WANTS TO BE SEEN TO DISCUSS THESE THINGS.  POSSIBLE HOSPITAL FOLLOW UP FROM RECENT HOSPITAL STAY?  "

## 2023-09-12 ENCOUNTER — OFFICE VISIT (OUTPATIENT)
Dept: CARDIOLOGY | Facility: CLINIC | Age: 83
End: 2023-09-12
Payer: MEDICARE

## 2023-09-12 VITALS
HEIGHT: 73 IN | DIASTOLIC BLOOD PRESSURE: 68 MMHG | HEART RATE: 82 BPM | OXYGEN SATURATION: 96 % | WEIGHT: 183 LBS | BODY MASS INDEX: 24.25 KG/M2 | SYSTOLIC BLOOD PRESSURE: 118 MMHG

## 2023-09-12 DIAGNOSIS — I25.10 CORONARY ARTERY DISEASE INVOLVING NATIVE CORONARY ARTERY OF NATIVE HEART WITHOUT ANGINA PECTORIS: ICD-10-CM

## 2023-09-12 DIAGNOSIS — Z95.5 STATUS POST CORONARY ARTERY STENT PLACEMENT: Primary | ICD-10-CM

## 2023-09-12 DIAGNOSIS — E78.2 MIXED HYPERLIPIDEMIA: ICD-10-CM

## 2023-09-12 DIAGNOSIS — I10 ESSENTIAL HYPERTENSION: ICD-10-CM

## 2023-09-12 NOTE — PROGRESS NOTES
Encounter Date:09/12/2023  Last seen 8/27/2023      Patient ID: Campbell Alex is a 83 y.o. male.    Chief Complaint:  Status post stent  Atrial fibrillation  Hypertension  Dyslipidemia      History of Present Illness    Patient recently was admitted to the hospital with weakness and COVID-19 infection.  Patient had atrial fibrillation and has converted to sinus rhythm.  Patient was started on Eliquis for anticoagulation and was discharged home.      Since I have last seen, the patient has been without any chest discomfort ,shortness of breath, palpitations, dizziness or syncope.  Denies having any headache ,abdominal pain ,nausea, vomiting , diarrhea constipation, loss of weight or loss of appetite.  Denies having any excessive bruising ,hematuria or blood in the stool.    Review of all systems negative except as indicated.    Reviewed ROS.    Assessment and Plan       ///////////////////  History  ============  - History of weakness and COVID-19 infection.-Improved     - Atrial fibrillation  Has converted to sinus rhythm.  Patient is in sinus rhythm now.    -Anticoagulation-on Eliquis      -status post right coronary artery stent placement (2005 ).      Cardiac catheterization 03/10/2017 revealed normal left ventricular function.  Normal left main coronary artery. 50% lad distal to diagonal branch.  Ostial diagonal branch has 80-90% disease (moderate size vessel) RCA has 30-40% plaquing.  RCA stent was   patent.  One of the PDA branches had 70% disease at its ostium.    Echocardiogram 8/26/2023     Structurally and functionally normal cardiac valves except for mild tricuspid regurgitation.  No evidence for pulmonary hypertension is present..  Left ventricular size and contractility is normal with ejection fraction of 60%.  Stress Cardiolite test is negative for myocardial ischemia 08/01/2016.     -palpitations improved.  Holter monitor 01/18/2016 showed occasional premature atrial and ventricular contractions.        -hypertension dyslipidemia       -benign prostatic hypertrophy       -psoriasis       -status post left ankle surgery  rotator cuff surgery umbilical hernia repair and surgery for basal cell carcinoma of the nose.       -allergy to darvon codeine Relafen and IVP dye  ===============   Plan  =============  History of weakness and COVID-19 infection.-Improved     Atrial fibrillation  Has converted to sinus rhythm.-8/25/2023  Patient is in sinus rhythm today.  EKG 9/12/2023-sinus rhythm    Anticoagulation  Patient is on Eliquis.  Observe for toxic effects.      -status post right coronary artery stent placement (2005 )  Patient is not having any angina pectoris or congestive heart failure.     Hypertension-  well-controlled.  118/68     Dyslipidemia-continue atorvastatin     medications were reviewed and updated.     Continue metoprolol to 25 mg twice daily.     Echocardiogram.-As above      Further plan will depend on patient's progress.     Reviewed and updated-9/12/2023  /////////////////////////////        No diagnosis found.LAB RESULTS (LAST 7 DAYS)    CBC  Results from last 7 days   Lab Units 09/07/23  1248   WBC 10*3/mm3 8.50   RBC 10*6/mm3 3.37*   HEMOGLOBIN g/dL 11.0*   HEMATOCRIT % 32.7*   MCV fL 97.1*   PLATELETS 10*3/mm3 288       BMP  Results from last 7 days   Lab Units 09/07/23  1248   SODIUM mmol/L 138   POTASSIUM mmol/L 4.0   CHLORIDE mmol/L 105   CO2 mmol/L 25.4   BUN mg/dL 18   CREATININE mg/dL 0.67*   GLUCOSE mg/dL 112*       CMP   Results from last 7 days   Lab Units 09/07/23  1248   SODIUM mmol/L 138   POTASSIUM mmol/L 4.0   CHLORIDE mmol/L 105   CO2 mmol/L 25.4   BUN mg/dL 18   CREATININE mg/dL 0.67*   GLUCOSE mg/dL 112*   ALBUMIN g/dL 3.5   BILIRUBIN mg/dL 0.5   ALK PHOS U/L 56   AST (SGOT) U/L 15   ALT (SGPT) U/L 13         BNP        TROPONIN        CoAg        Creatinine Clearance  Estimated Creatinine Clearance: 94.6 mL/min (A) (by C-G formula based on SCr of 0.67 mg/dL (L)).    ABG         Radiology  No radiology results for the last day                The following portions of the patient's history were reviewed and updated as appropriate: allergies, current medications, past family history, past medical history, past social history, past surgical history, and problem list.    Review of Systems   Constitutional: Negative for malaise/fatigue.   Cardiovascular:  Negative for chest pain, dyspnea on exertion, leg swelling and palpitations.   Respiratory:  Negative for cough and shortness of breath.    Gastrointestinal:  Negative for abdominal pain, nausea and vomiting.   Neurological:  Negative for dizziness, focal weakness, headaches, light-headedness and numbness.   All other systems reviewed and are negative.      Current Outpatient Medications:   •  albuterol sulfate  (90 Base) MCG/ACT inhaler, Inhale 2 puffs Every 4 (Four) Hours As Needed for Wheezing or Shortness of Air for up to 30 days., Disp: 18 g, Rfl: 0  •  apixaban (ELIQUIS) 5 MG tablet tablet, Take 1 tablet by mouth Every 12 (Twelve) Hours for 30 days. Indications: Atrial Fibrillation, Disp: 60 tablet, Rfl: 0  •  aspirin (aspirin) 81 MG EC tablet, Take 1 tablet by mouth Daily., Disp: , Rfl:   •  atorvastatin (LIPITOR) 20 MG tablet, Take 1 tablet by mouth Every Night., Disp: , Rfl:   •  azelastine (ASTELIN) 0.1 % nasal spray, 2 sprays into the nostril(s) as directed by provider 2 (Two) Times a Day. Use in each nostril as directed, Disp: 30 mL, Rfl: 3  •  Calcium Carbonate 1500 (600 Ca) MG tablet, Take 1 tablet by mouth Daily., Disp: , Rfl:   •  cloNIDine (CATAPRES) 0.2 MG tablet, Take 1 tablet by mouth 2 (Two) Times a Day., Disp: , Rfl:   •  colestipol (COLESTID) 1 g tablet, Take 1 tablet by mouth Daily., Disp: , Rfl:   •  finasteride (PROSCAR) 5 MG tablet, Take 1 tablet by mouth Every Evening., Disp: , Rfl:   •  isosorbide mononitrate (IMDUR) 30 MG 24 hr tablet, Take 1 tablet by mouth Daily., Disp: , Rfl:   •  metoprolol tartrate  (LOPRESSOR) 25 MG tablet, Take 1 tablet by mouth Every 12 (Twelve) Hours for 30 days., Disp: 60 tablet, Rfl: 0  •  Probiotic Product (PROBIOTIC DAILY PO), Take 1 capsule by mouth Daily., Disp: , Rfl:   •  tamsulosin (FLOMAX) 0.4 MG capsule 24 hr capsule, Take 1 capsule by mouth 2 (Two) Times a Day., Disp: , Rfl:     Allergies   Allergen Reactions   • Codeine GI Intolerance   • Iodine Hives   • Nabumetone Unknown - High Severity   • Propoxyphene Hives       Family History   Problem Relation Age of Onset   • Hypertension Brother    • Hyperlipidemia Brother        Past Surgical History:   Procedure Laterality Date   • ANKLE SURGERY     • CARDIAC CATHETERIZATION     • EYE SURGERY     • NOSE SURGERY     • ROTATOR CUFF REPAIR     • SKIN CANCER EXCISION      L shoulder   • UMBILICAL HERNIA REPAIR         Past Medical History:   Diagnosis Date   • Allergic    • Arthritis    • Benign prostatic hyperplasia    • Cancer    • Cataract    • Cholelithiasis    • Coronary artery disease    • Erectile dysfunction    • HL (hearing loss)    • Hyperlipidemia    • Hypertension    • Osteopenia    • Prostatism    • Renal insufficiency    • Suspected COVID-19 virus infection 2021   • Visual impairment        Family History   Problem Relation Age of Onset   • Hypertension Brother    • Hyperlipidemia Brother        Social History     Socioeconomic History   • Marital status:    Tobacco Use   • Smoking status: Former     Packs/day: 0.50     Years: 2.00     Pack years: 1.00     Types: Cigarettes, Pipe, Cigars     Quit date: 1960     Years since quittin.7     Passive exposure: Past   • Smokeless tobacco: Never   • Tobacco comments:     1-2/day   Vaping Use   • Vaping Use: Never used   Substance and Sexual Activity   • Alcohol use: Yes     Comment: socially   • Drug use: Never   • Sexual activity: Not Currently     Partners: Female     Birth control/protection: None           ECG  "12 Lead    Date/Time: 9/12/2023 9:55 AM  Performed by: Vilma Staples MD  Authorized by: Vilma Staples MD   Comparison: compared with previous ECG   Similar to previous ECG  Comparison to previous ECG: Normal sinus rhythm sinus arrhythmia 82/min normal axis normal intervals no ectopy no significant change from previous EKG.        Objective:       Physical Exam    Ht 185.4 cm (73\")   Wt 83 kg (183 lb)   BMI 24.14 kg/m²   The patient is alert, oriented and in no distress.    Vital signs as noted above.    Head and neck revealed no carotid bruits or jugular venous distension.  No thyromegaly or lymphadenopathy is present.    Lungs clear.  No wheezing.  Breath sounds are normal bilaterally.    Heart normal first and second heart sounds.  No murmur..  No pericardial rub is present.  No gallop is present.    Abdomen soft and nontender.  No organomegaly is present.    Extremities revealed good peripheral pulses without any pedal edema.    Skin warm and dry.    Musculoskeletal system is grossly normal.    CNS grossly normal.    Reviewed and updated.        "

## 2023-09-13 ENCOUNTER — OFFICE VISIT (OUTPATIENT)
Dept: FAMILY MEDICINE CLINIC | Facility: CLINIC | Age: 83
End: 2023-09-13
Payer: MEDICARE

## 2023-09-13 VITALS
TEMPERATURE: 98.2 F | DIASTOLIC BLOOD PRESSURE: 67 MMHG | WEIGHT: 185 LBS | OXYGEN SATURATION: 97 % | BODY MASS INDEX: 24.52 KG/M2 | RESPIRATION RATE: 18 BRPM | HEART RATE: 75 BPM | SYSTOLIC BLOOD PRESSURE: 130 MMHG | HEIGHT: 73 IN

## 2023-09-13 DIAGNOSIS — Z00.00 ENCOUNTER FOR ANNUAL WELLNESS EXAM IN MEDICARE PATIENT: Primary | ICD-10-CM

## 2023-09-13 PROCEDURE — 1170F FXNL STATUS ASSESSED: CPT | Performed by: FAMILY MEDICINE

## 2023-09-13 PROCEDURE — G0439 PPPS, SUBSEQ VISIT: HCPCS | Performed by: FAMILY MEDICINE

## 2023-09-13 PROCEDURE — 3075F SYST BP GE 130 - 139MM HG: CPT | Performed by: FAMILY MEDICINE

## 2023-09-13 PROCEDURE — 1159F MED LIST DOCD IN RCRD: CPT | Performed by: FAMILY MEDICINE

## 2023-09-13 PROCEDURE — 1160F RVW MEDS BY RX/DR IN RCRD: CPT | Performed by: FAMILY MEDICINE

## 2023-09-13 PROCEDURE — 3078F DIAST BP <80 MM HG: CPT | Performed by: FAMILY MEDICINE

## 2023-09-15 ENCOUNTER — READMISSION MANAGEMENT (OUTPATIENT)
Dept: CALL CENTER | Facility: HOSPITAL | Age: 83
End: 2023-09-15
Payer: MEDICARE

## 2023-09-15 NOTE — OUTREACH NOTE
Medical Week 3 Survey      Flowsheet Row Responses   [facility] patient discharged from? [facility]   Does the patient have one of the following disease processes/diagnoses(primary or secondary)? Other   Week 3 attempt successful? Yes   Call start time 1334   Call end time 1336   Discharge diagnosis **COVID-19/A-fib with RVR.   Meds reviewed with patient/caregiver? Yes   Is the patient having any side effects they believe may be caused by any medication additions or changes? No   Does the patient have all medications ordered at discharge? Yes   Is the patient taking all medications as directed (includes completed medication regime)? Yes   Does the patient have a primary care provider?  Yes   Comments regarding PCP Patient has been to several follow up appointments.   Has the patient kept scheduled appointments due by today? Yes   Has home health visited the patient within 72 hours of discharge? N/A   Psychosocial issues? No   Did the patient receive a copy of their discharge instructions? Yes   Nursing interventions Reviewed instructions with patient   What is the patient's perception of their health status since discharge? New symptoms unrelated to diagnosis  [Patient states he is having incontinence through the night and getting up to go to the bathroom is effecting his sleep. He asked what he can do about this. Patient encouraged to call Dr. Jurado for advice or a referral to a urologist. ]   Is the patient/caregiver able to teach back signs and symptoms related to disease process for when to call PCP? Yes   Is the patient/caregiver able to teach back signs and symptoms related to disease process for when to call 911? Yes   Is the patient/caregiver able to teach back the hierarchy of who to call/visit for symptoms/problems? PCP, Specialist, Home health nurse, Urgent Care, ED, 911 Yes   If the patient is a current smoker, are they able to teach back resources for cessation? Not a smoker   Week 3 Call Completed?  Yes   Graduated Yes   Is the patient interested in additional calls from an ambulatory ? No   Would this patient benefit from a Referral to [facility] Social [facility]? No   Call end time [phone]            Jagruti BEE - Licensed Nurse

## 2023-09-18 ENCOUNTER — TELEPHONE (OUTPATIENT)
Dept: CARDIOLOGY | Facility: CLINIC | Age: 83
End: 2023-09-18
Payer: MEDICARE

## 2023-09-18 NOTE — TELEPHONE ENCOUNTER
Rx Refill Note  Requested Prescriptions     Pending Prescriptions Disp Refills    apixaban (ELIQUIS) 5 MG tablet tablet 60 tablet 0     Sig: Take 1 tablet by mouth Every 12 (Twelve) Hours for 30 days. Indications: Atrial Fibrillation    metoprolol tartrate (LOPRESSOR) 25 MG tablet 60 tablet 0     Sig: Take 1 tablet by mouth Every 12 (Twelve) Hours for 30 days.      Last office visit with prescribing clinician: 9/12/2023   Last telemedicine visit with prescribing clinician: Visit date not found   Next office visit with prescribing clinician: 12/11/2023                         Would you like a call back once the refill request has been completed: [] Yes [] No    If the office needs to give you a call back, can they leave a voicemail: [] Yes [] No    Eloise Steel MA  09/18/23, 10:56 EDT

## 2023-09-18 NOTE — TELEPHONE ENCOUNTER
Called patient and advised we sent in eliquis and metoprolol.  Advised to call PCP to refill inhaler. Patient understood.

## 2023-09-18 NOTE — TELEPHONE ENCOUNTER
Incoming Refill Request      Medication requested (name and dose): eliquis,metoprolol tartrate,albuterol inhaler    Pharm[facility] where [facility] should be sent: [facility]    Additional details provided by patient:     Best call back number: [phone]    Does the patient have less than a 3 day supply:  [] Yes  [x] No    Any Kidd [facility] Rep  09/18/23, 10:30 EDT        Incoming Refill Request      Medication requested (name and dose): eliquis,metoprolol tartrate,albuterol inhaler    Pharm[facility] where request [facility] be sent: [facility]    Additional details provided by patient:     Best call back number: [phone]    Does the patient have less than a 3 day supply:  [] Yes  [x] No    [facility]  09/18/23, 10:30 EDT

## 2023-09-22 NOTE — ADDENDUM NOTE
Addended by: MANPREET COELHO on: 9/22/2023 03:43 PM     Modules accepted: Orders     Tay Baird is requesting a refill on the following medications:  Requested Prescriptions     Pending Prescriptions Disp Refills    enalapril (VASOTEC) 20 MG tablet [Pharmacy Med Name: ENALAPRIL MALEATE 20 MG TAB] 90 tablet 1     Sig: take 1/2 tablet by mouth once daily       Date of last visit: 7/8/2022  Date of next visit (if applicable): 12/53/2875  Date of last refill: 1/27/2022  [facility] Name: [facility] [place]

## 2023-09-22 NOTE — TELEPHONE ENCOUNTER
"Caller: Campbell Alex \"Zuleyma\"    Relationship: Self    Best call back number: [phone]    What medications are you currently taking:   Current Outpatient Medications on File Prior to Visit   Medication Sig Dispense Refill    albuterol sulfate  (90 Base) MCG/ACT inhaler Inhale 2 puffs Every 4 (Four) Hours As Needed for Wheezing or Shortness of Air for up to 30 days. 18 g 0    aspirin (aspirin) 81 MG EC tablet Take 1 tablet by mouth Daily.      atorvastatin (LIPITOR) 20 MG tablet Take 1 tablet by mouth Every Night.      azelastine (ASTELIN) 0.1 % nasal spray 2 sprays into the nostril(s) as directed by provider 2 (Two) Times a Day. Use in each nostril as directed 30 mL 3    Calcium Carbonate 1500 (600 Ca) MG tablet Take 1 tablet by mouth Daily.      cloNIDine (CATAPRES) 0.2 MG tablet Take 1 tablet by mouth 2 (Two) Times a Day.      colestipol (COLESTID) 1 g tablet Take 1 tablet by mouth Daily.      finasteride (PROSCAR) 5 MG tablet Take 1 tablet by mouth Every Evening.      isosorbide mononitrate (IMDUR) 30 MG 24 hr tablet Take 1 tablet by mouth Daily.      Probiotic Product (PROBIOTIC DAILY PO) Take 1 capsule by mouth Daily.      tamsulosin (FLOMAX) 0.4 MG capsule 24 hr capsule Take 1 capsule by mouth 2 (Two) Times a Day.       No current facility-administered medications on file prior to visit.          When did you start taking these medications: 8.24.23 AROUND THIS TIME    Which medication are you concerned about: ELIQUIS 5MG    Who prescribed you this medication: DR. BOWLING    What are your concerns: PT'S WIFE IS WANTING TO SEE IF THE PT CAN BE PRESCRIBED SOMETHING ELSE THAT IS CHEAPER AND WOULD 'DO THE JOB' BECAUSE IT COSTS OVER $600 A MONTH TO FILL BECAUSE THE PT DOESN'T HAVE INSURANCE. SHE ALSO WANTED TO KNOW IF IT WOULD BE A LONG TERM MEDICATION. THE PT HAS 9 PILLS LEFT.     How long have you had these concerns: TODAY    "

## 2023-09-29 ENCOUNTER — TELEPHONE (OUTPATIENT)
Dept: CARDIOLOGY | Facility: CLINIC | Age: 83
End: 2023-09-29
Payer: MEDICARE

## 2023-09-29 NOTE — TELEPHONE ENCOUNTER
Patient's wife called stating eliquis is too expensive and asked for alt medication.   Gave wife list of medications that can replace eliquis and advised to call pharmacy to get an estimated price on them.   Advised Coumadin would also be an option but patient would need a blood test done every month on that one.   Advised once they made their decision, to let us know and we can send in Rx and make INR apt if needed.   understood

## 2023-10-04 ENCOUNTER — OFFICE VISIT (OUTPATIENT)
Dept: FAMILY MEDICINE CLINIC | Facility: CLINIC | Age: 83
End: 2023-10-04
Payer: MEDICARE

## 2023-10-04 VITALS
HEIGHT: 73 IN | WEIGHT: 180 LBS | BODY MASS INDEX: 23.86 KG/M2 | DIASTOLIC BLOOD PRESSURE: 78 MMHG | TEMPERATURE: 98.9 F | OXYGEN SATURATION: 98 % | HEART RATE: 100 BPM | SYSTOLIC BLOOD PRESSURE: 142 MMHG

## 2023-10-04 DIAGNOSIS — I10 PRIMARY HYPERTENSION: Chronic | ICD-10-CM

## 2023-10-04 DIAGNOSIS — M48.062 SPINAL STENOSIS OF LUMBAR REGION WITH NEUROGENIC CLAUDICATION: Chronic | ICD-10-CM

## 2023-10-04 DIAGNOSIS — I25.10 CORONARY ARTERY DISEASE INVOLVING NATIVE CORONARY ARTERY OF NATIVE HEART WITHOUT ANGINA PECTORIS: Chronic | ICD-10-CM

## 2023-10-04 DIAGNOSIS — I48.91 ATRIAL FIBRILLATION, UNSPECIFIED TYPE: Chronic | ICD-10-CM

## 2023-10-04 DIAGNOSIS — E11.9 TYPE 2 DIABETES MELLITUS WITHOUT COMPLICATION, WITHOUT LONG-TERM CURRENT USE OF INSULIN: Chronic | ICD-10-CM

## 2023-10-04 DIAGNOSIS — E78.2 MIXED HYPERLIPIDEMIA: Chronic | ICD-10-CM

## 2023-10-04 DIAGNOSIS — F41.8 DEPRESSION WITH ANXIETY: Chronic | ICD-10-CM

## 2023-10-04 DIAGNOSIS — G93.32 POST-COVID-19 SYNDROME MANIFESTING AS CHRONIC FATIGUE: Chronic | ICD-10-CM

## 2023-10-04 DIAGNOSIS — R53.83 FATIGUE, UNSPECIFIED TYPE: Chronic | ICD-10-CM

## 2023-10-04 DIAGNOSIS — U09.9 POST-COVID-19 SYNDROME MANIFESTING AS CHRONIC FATIGUE: Chronic | ICD-10-CM

## 2023-10-04 DIAGNOSIS — E87.1 HYPONATREMIA: Chronic | ICD-10-CM

## 2023-10-04 RX ORDER — SERTRALINE HYDROCHLORIDE 25 MG/1
25 TABLET, FILM COATED ORAL DAILY
Qty: 30 TABLET | Refills: 0 | Status: SHIPPED | OUTPATIENT
Start: 2023-10-04 | End: 2023-10-04 | Stop reason: SDUPTHER

## 2023-10-04 RX ORDER — SERTRALINE HYDROCHLORIDE 25 MG/1
25 TABLET, FILM COATED ORAL DAILY
Qty: 30 TABLET | Refills: 1 | Status: SHIPPED | OUTPATIENT
Start: 2023-10-04 | End: 2023-10-09

## 2023-10-04 NOTE — PATIENT INSTRUCTIONS
Contact [facility] about benefits personal care and cleaning  [facility]   You should hear from home health

## 2023-10-04 NOTE — PROGRESS NOTES
Subjective        Campbell Alex is a 83 y.o. male.     Chief Complaint   Patient presents with    Weight Loss     Weight loss, hair loss, abd pain, difficulty performing ADL, generalized weakness       History of Present Illness  Patient is here for multiple medical problems: he is losing weight, having hair loss, abd pain, generalized weakness, difficulty performing ADL. Has worsened since covid.   He is currently in PT. He said they have cut him back to 2 times a day.   He said he felt that they were just having him do too much. Irritated his hip.       The following portions of the patient's history were reviewed and updated as appropriate: allergies, current medications, past family history, past medical history, past social history, past surgical history and problem list.      Current Outpatient Medications:     apixaban (ELIQUIS) 5 MG tablet tablet, Take 1 tablet by mouth Every 12 (Twelve) Hours for 30 days. Indications: Atrial Fibrillation, Disp: 60 tablet, Rfl: 0    aspirin (aspirin) 81 MG EC tablet, Take 1 tablet by mouth Daily., Disp: , Rfl:     atorvastatin (LIPITOR) 20 MG tablet, Take 1 tablet by mouth Every Night., Disp: , Rfl:     azelastine (ASTELIN) 0.1 % nasal spray, 2 sprays into the nostril(s) as directed by provider 2 (Two) Times a Day. Use in each nostril as directed, Disp: 30 mL, Rfl: 3    Calcium Carbonate 1500 (600 Ca) MG tablet, Take 1 tablet by mouth Daily., Disp: , Rfl:     cloNIDine (CATAPRES) 0.2 MG tablet, Take 1 tablet by mouth 2 (Two) Times a Day., Disp: , Rfl:     colestipol (COLESTID) 1 g tablet, Take 1 tablet by mouth Daily., Disp: , Rfl:     finasteride (PROSCAR) 5 MG tablet, Take 1 tablet by mouth Every Evening., Disp: , Rfl:     isosorbide mononitrate (IMDUR) 30 MG 24 hr tablet, Take 1 tablet by mouth Daily., Disp: , Rfl:     metoprolol tartrate (LOPRESSOR) 25 MG tablet, Take 1 tablet by mouth Every 12 (Twelve) Hours for 30 days., Disp: 60 tablet, Rfl: 0    Probiotic Product  (PROBIOTIC DAILY PO), Take 1 capsule by mouth Daily., Disp: , Rfl:     tamsulosin (FLOMAX) 0.4 MG capsule 24 hr capsule, Take 1 capsule by mouth 2 (Two) Times a Day., Disp: , Rfl:     sertraline (Zoloft) 25 MG tablet, Take 1 tablet by mouth Daily., Disp: 30 tablet, Rfl: 0    Recent Results (from the past 4032 hour(s))   Hemoglobin A1c    Collection Time: 06/14/23  9:37 AM    Specimen: Blood   Result Value Ref Range    Hemoglobin A1C 6.20 (H) 4.80 - 5.60 %   Basic Metabolic Panel    Collection Time: 06/14/23  9:37 AM    Specimen: Blood   Result Value Ref Range    Glucose 98 65 - 99 mg/dL    BUN 17 8 - 23 mg/dL    Creatinine 0.78 0.76 - 1.27 mg/dL    Sodium 130 (L) 136 - 145 mmol/L    Potassium 4.0 3.5 - 5.2 mmol/L    Chloride 92 (L) 98 - 107 mmol/L    CO2 26.5 22.0 - 29.0 mmol/L    Calcium 9.3 8.6 - 10.5 mg/dL    BUN/Creatinine Ratio 21.8 7.0 - 25.0    Anion Gap 11.5 5.0 - 15.0 mmol/L    eGFR 87.4 >60.0 mL/min/1.73   ECG 12 Lead Chest Pain    Collection Time: 08/23/23  5:46 PM   Result Value Ref Range    QT Interval 395 ms   Comprehensive Metabolic Panel    Collection Time: 08/23/23  6:11 PM    Specimen: Blood   Result Value Ref Range    Glucose 141 (H) 65 - 99 mg/dL    BUN 20 8 - 23 mg/dL    Creatinine 0.88 0.76 - 1.27 mg/dL    Sodium 129 (L) 136 - 145 mmol/L    Potassium 3.6 3.5 - 5.2 mmol/L    Chloride 93 (L) 98 - 107 mmol/L    CO2 26.0 22.0 - 29.0 mmol/L    Calcium 9.2 8.6 - 10.5 mg/dL    Total Protein 6.6 6.0 - 8.5 g/dL    Albumin 3.9 3.5 - 5.2 g/dL    ALT (SGPT) 13 1 - 41 U/L    AST (SGOT) 30 1 - 40 U/L    Alkaline Phosphatase 69 39 - 117 U/L    Total Bilirubin 0.6 0.0 - 1.2 mg/dL    Globulin 2.7 gm/dL    A/G Ratio 1.4 g/dL    BUN/Creatinine Ratio 22.7 7.0 - 25.0    Anion Gap 10.0 5.0 - 15.0 mmol/L    eGFR 84.3 >60.0 mL/min/1.73   Blood Culture - Blood, Arm, Right    Collection Time: 08/23/23  6:11 PM    Specimen: Arm, Right; Blood   Result Value Ref Range    Blood Culture No growth at 5 days    CBC Auto  Differential    Collection Time: 08/23/23  6:11 PM    Specimen: Blood   Result Value Ref Range    WBC 8.70 3.40 - 10.80 10*3/mm3    RBC 3.85 (L) 4.14 - 5.80 10*6/mm3    Hemoglobin 12.5 (L) 13.0 - 17.7 g/dL    Hematocrit 36.6 (L) 37.5 - 51.0 %    MCV 94.9 79.0 - 97.0 fL    MCH 32.5 26.6 - 33.0 pg    MCHC 34.3 31.5 - 35.7 g/dL    RDW 13.5 12.3 - 15.4 %    RDW-SD 45.9 37.0 - 54.0 fl    MPV 7.8 6.0 - 12.0 fL    Platelets 184 140 - 450 10*3/mm3    Neutrophil % 85.0 (H) 42.7 - 76.0 %    Lymphocyte % 3.8 (L) 19.6 - 45.3 %    Monocyte % 10.7 5.0 - 12.0 %    Eosinophil % 0.2 (L) 0.3 - 6.2 %    Basophil % 0.3 0.0 - 1.5 %    Neutrophils, Absolute 7.40 (H) 1.70 - 7.00 10*3/mm3    Lymphocytes, Absolute 0.30 (L) 0.70 - 3.10 10*3/mm3    Monocytes, Absolute 0.90 0.10 - 0.90 10*3/mm3    Eosinophils, Absolute 0.00 0.00 - 0.40 10*3/mm3    Basophils, Absolute 0.00 0.00 - 0.20 10*3/mm3    nRBC 0.0 0.0 - 0.2 /100 WBC   Green Top (Gel)    Collection Time: 08/23/23  6:11 PM   Result Value Ref Range    Extra Tube HOLD    Lavender Top    Collection Time: 08/23/23  6:11 PM   Result Value Ref Range    Extra Tube hold for add-on    Gold Top - SST    Collection Time: 08/23/23  6:11 PM   Result Value Ref Range    Extra Tube Hold for add-ons.    Light Blue Top    Collection Time: 08/23/23  6:11 PM   Result Value Ref Range    Extra Tube Hold for add-ons.    CK    Collection Time: 08/23/23  6:11 PM    Specimen: Blood   Result Value Ref Range    Creatine Kinase 263 (H) 20 - 200 U/L   POC Lactate    Collection Time: 08/23/23  6:12 PM    Specimen: Blood   Result Value Ref Range    Lactate 0.8 0.3 - 2.0 mmol/L   Respiratory Panel PCR w/COVID-19(SARS-CoV-2) TOBY/BELGICA/MERNA/PAD/COR/MAD/JOANN In-House, NP Swab in [facility], 3-4 HR TAT - Swab, Nasopharynx    Collection Time: 08/23/23  6:13 PM    Specimen: Nasopharynx; Swab   Result Value Ref Range    ADENOVIRUS, PCR Not Detected Not Detected    Coronavirus 229E Not Detected Not Detected    Coronavirus HKU1 Not  Detected Not Detected    Coronavirus NL63 Not Detected Not Detected    Coronavirus OC43 Not Detected Not Detected    COVID19 Detected (C) Not Detected - Ref. Range    Human Metapneumovirus Not Detected Not Detected    Human Rhinovirus/Enterovirus Not Detected Not Detected    Influenza A PCR Not Detected Not Detected    Influenza B PCR Not Detected Not Detected    Parainfluenza Virus 1 Not Detected Not Detected    Parainfluenza Virus 2 Not Detected Not Detected    Parainfluenza Virus 3 Not Detected Not Detected    Parainfluenza Virus 4 Not Detected Not Detected    RSV, PCR Not Detected Not Detected    Bordetella pertussis pcr Not Detected Not Detected    Bordetella parapertussis PCR Not Detected Not Detected    Chlamydophila pneumoniae PCR Not Detected Not Detected    Mycoplasma pneumo by PCR Not Detected Not Detected   Blood Culture - Blood, Arm, Left    Collection Time: 08/23/23  6:21 PM    Specimen: Arm, Left; Blood   Result Value Ref Range    Blood Culture No growth at 5 days    Urinalysis With Microscopic If Indicated (No Culture) - Urine, Clean Catch    Collection Time: 08/23/23  6:31 PM    Specimen: Urine, Clean Catch   Result Value Ref Range    Color, UA Yellow Yellow, Straw    Appearance, UA Clear Clear    pH, UA 7.5 5.0 - 8.0    Specific Gravity, UA 1.017 1.005 - 1.030    Glucose, UA Negative Negative    Ketones, UA Negative Negative    Bilirubin, UA Negative Negative    Blood, UA Negative Negative    Protein, UA Negative Negative    Leuk Esterase, UA Negative Negative    Nitrite, UA Negative Negative    Urobilinogen, UA 1.0 E.U./dL 0.2 - 1.0 E.U./dL   CBC (No Diff)    Collection Time: 08/24/23  1:04 PM    Specimen: Blood   Result Value Ref Range    WBC 6.10 3.40 - 10.80 10*3/mm3    RBC 3.64 (L) 4.14 - 5.80 10*6/mm3    Hemoglobin 12.2 (L) 13.0 - 17.7 g/dL    Hematocrit 35.4 (L) 37.5 - 51.0 %    MCV 97.3 (H) 79.0 - 97.0 fL    MCH 33.4 (H) 26.6 - 33.0 pg    MCHC 34.3 31.5 - 35.7 g/dL    RDW 13.4 12.3 - 15.4 %     RDW-SD 44.2 37.0 - 54.0 fl    MPV 7.9 6.0 - 12.0 fL    Platelets 169 140 - 450 10*3/mm3   Basic Metabolic Panel    Collection Time: 08/24/23  1:04 PM    Specimen: Blood   Result Value Ref Range    Glucose 126 (H) 65 - 99 mg/dL    BUN 20 8 - 23 mg/dL    Creatinine 0.67 (L) 0.76 - 1.27 mg/dL    Sodium 128 (L) 136 - 145 mmol/L    Potassium 3.0 (L) 3.5 - 5.2 mmol/L    Chloride 93 (L) 98 - 107 mmol/L    CO2 24.0 22.0 - 29.0 mmol/L    Calcium 8.3 (L) 8.6 - 10.5 mg/dL    BUN/Creatinine Ratio 29.9 (H) 7.0 - 25.0    Anion Gap 11.0 5.0 - 15.0 mmol/L    eGFR 91.5 >60.0 mL/min/1.73   CBC (No Diff)    Collection Time: 08/25/23  4:20 AM    Specimen: Blood   Result Value Ref Range    WBC 4.40 3.40 - 10.80 10*3/mm3    RBC 3.57 (L) 4.14 - 5.80 10*6/mm3    Hemoglobin 11.7 (L) 13.0 - 17.7 g/dL    Hematocrit 34.3 (L) 37.5 - 51.0 %    MCV 95.9 79.0 - 97.0 fL    MCH 32.8 26.6 - 33.0 pg    MCHC 34.1 31.5 - 35.7 g/dL    RDW 13.4 12.3 - 15.4 %    RDW-SD 44.2 37.0 - 54.0 fl    MPV 7.4 6.0 - 12.0 fL    Platelets 159 140 - 450 10*3/mm3   Basic Metabolic Panel    Collection Time: 08/25/23  4:20 AM    Specimen: Blood   Result Value Ref Range    Glucose 114 (H) 65 - 99 mg/dL    BUN 16 8 - 23 mg/dL    Creatinine 0.66 (L) 0.76 - 1.27 mg/dL    Sodium 131 (L) 136 - 145 mmol/L    Potassium 3.7 3.5 - 5.2 mmol/L    Chloride 98 98 - 107 mmol/L    CO2 24.0 22.0 - 29.0 mmol/L    Calcium 7.9 (L) 8.6 - 10.5 mg/dL    BUN/Creatinine Ratio 24.2 7.0 - 25.0    Anion Gap 9.0 5.0 - 15.0 mmol/L    eGFR 91.9 >60.0 mL/min/1.73   High Sensitivity Troponin T    Collection Time: 08/25/23  4:20 AM    Specimen: Blood   Result Value Ref Range    HS Troponin T 20 (H) <15 ng/L   ECG 12 Lead Tachycardia    Collection Time: 08/25/23  6:45 AM   Result Value Ref Range    QT Interval 318 ms    QTC Interval 495 ms   High Sensitivity Troponin T 2Hr    Collection Time: 08/25/23  8:00 AM    Specimen: Blood   Result Value Ref Range    HS Troponin T 20 (H) <15 ng/L    Troponin T  Delta 0 >=-4 - <+4 ng/L   ECG 12 Lead Rhythm Change    Collection Time: 08/25/23 10:01 AM   Result Value Ref Range    QT Interval 355 ms    QTC Interval 493 ms   ECG 12 Lead Rhythm Change    Collection Time: 08/25/23 11:50 AM   Result Value Ref Range    QT Interval 358 ms    QTC Interval 463 ms   ECG 12 Lead Rhythm Change    Collection Time: 08/25/23 10:01 PM   Result Value Ref Range    QT Interval 439 ms    QTC Interval 443 ms   CBC (No Diff)    Collection Time: 08/26/23 12:18 AM    Specimen: Blood   Result Value Ref Range    WBC 3.00 (L) 3.40 - 10.80 10*3/mm3    RBC 3.42 (L) 4.14 - 5.80 10*6/mm3    Hemoglobin 11.1 (L) 13.0 - 17.7 g/dL    Hematocrit 32.1 (L) 37.5 - 51.0 %    MCV 93.9 79.0 - 97.0 fL    MCH 32.4 26.6 - 33.0 pg    MCHC 34.5 31.5 - 35.7 g/dL    RDW 13.2 12.3 - 15.4 %    RDW-SD 44.6 37.0 - 54.0 fl    MPV 8.2 6.0 - 12.0 fL    Platelets 160 140 - 450 10*3/mm3   Basic Metabolic Panel    Collection Time: 08/26/23 12:18 AM    Specimen: Blood   Result Value Ref Range    Glucose 171 (H) 65 - 99 mg/dL    BUN 15 8 - 23 mg/dL    Creatinine 0.60 (L) 0.76 - 1.27 mg/dL    Sodium 131 (L) 136 - 145 mmol/L    Potassium 3.7 3.5 - 5.2 mmol/L    Chloride 99 98 - 107 mmol/L    CO2 21.0 (L) 22.0 - 29.0 mmol/L    Calcium 8.1 (L) 8.6 - 10.5 mg/dL    BUN/Creatinine Ratio 25.0 7.0 - 25.0    Anion Gap 11.0 5.0 - 15.0 mmol/L    eGFR 94.6 >60.0 mL/min/1.73   Magnesium    Collection Time: 08/26/23 12:18 AM    Specimen: Blood   Result Value Ref Range    Magnesium 1.8 1.6 - 2.4 mg/dL   TSH    Collection Time: 08/26/23 12:18 AM    Specimen: Blood   Result Value Ref Range    TSH 0.615 0.270 - 4.200 uIU/mL   Adult Transthoracic Echo Limited W/ Cont if Necessary Per Protocol    Collection Time: 08/26/23  2:39 PM   Result Value Ref Range    LV V1 max 138.0 cm/sec    LV V1 max PG 7.6 mmHg    LV V1 mean PG 3.0 mmHg    LV V1 VTI 25.7 cm    Ao pk vinny 183.0 cm/sec    Ao max PG 13.4 mmHg    AI P1/2t 1,335 msec    TR max vinny 269.0 cm/sec    TR  max PG 28.9 mmHg    RVSP(TR) 31.9 mmHg    RAP systole 3.0 mmHg   CBC (No Diff)    Collection Time: 08/26/23 11:30 PM    Specimen: Blood   Result Value Ref Range    WBC 5.30 3.40 - 10.80 10*3/mm3    RBC 3.26 (L) 4.14 - 5.80 10*6/mm3    Hemoglobin 10.5 (L) 13.0 - 17.7 g/dL    Hematocrit 30.3 (L) 37.5 - 51.0 %    MCV 93.0 79.0 - 97.0 fL    MCH 32.2 26.6 - 33.0 pg    MCHC 34.7 31.5 - 35.7 g/dL    RDW 12.9 12.3 - 15.4 %    RDW-SD 43.3 37.0 - 54.0 fl    MPV 7.7 6.0 - 12.0 fL    Platelets 170 140 - 450 10*3/mm3   Green Top (Gel)    Collection Time: 08/26/23 11:30 PM   Result Value Ref Range    Extra Tube Hold for add-ons.    Comprehensive Metabolic Panel    Collection Time: 09/07/23 12:48 PM    Specimen: Blood   Result Value Ref Range    Glucose 112 (H) 65 - 99 mg/dL    BUN 18 8 - 23 mg/dL    Creatinine 0.67 (L) 0.76 - 1.27 mg/dL    Sodium 138 136 - 145 mmol/L    Potassium 4.0 3.5 - 5.2 mmol/L    Chloride 105 98 - 107 mmol/L    CO2 25.4 22.0 - 29.0 mmol/L    Calcium 9.0 8.6 - 10.5 mg/dL    Total Protein 6.0 6.0 - 8.5 g/dL    Albumin 3.5 3.5 - 5.2 g/dL    ALT (SGPT) 13 1 - 41 U/L    AST (SGOT) 15 1 - 40 U/L    Alkaline Phosphatase 56 39 - 117 U/L    Total Bilirubin 0.5 0.0 - 1.2 mg/dL    Globulin 2.5 gm/dL    A/G Ratio 1.4 g/dL    BUN/Creatinine Ratio 26.9 (H) 7.0 - 25.0    Anion Gap 7.6 5.0 - 15.0 mmol/L    eGFR 91.5 >60.0 mL/min/1.73   CBC Auto Differential    Collection Time: 09/07/23 12:48 PM    Specimen: Blood   Result Value Ref Range    WBC 8.50 3.40 - 10.80 10*3/mm3    RBC 3.37 (L) 4.14 - 5.80 10*6/mm3    Hemoglobin 11.0 (L) 13.0 - 17.7 g/dL    Hematocrit 32.7 (L) 37.5 - 51.0 %    MCV 97.1 (H) 79.0 - 97.0 fL    MCH 32.7 26.6 - 33.0 pg    MCHC 33.7 31.5 - 35.7 g/dL    RDW 13.6 12.3 - 15.4 %    RDW-SD 45.1 37.0 - 54.0 fl    MPV 7.5 6.0 - 12.0 fL    Platelets 288 140 - 450 10*3/mm3    Neutrophil % 71.2 42.7 - 76.0 %    Lymphocyte % 14.4 (L) 19.6 - 45.3 %    Monocyte % 11.7 5.0 - 12.0 %    Eosinophil % 2.2 0.3 - 6.2 %     "Basophil % 0.5 0.0 - 1.5 %    Neutrophils, Absolute 6.10 1.70 - 7.00 10*3/mm3    Lymphocytes, Absolute 1.20 0.70 - 3.10 10*3/mm3    Monocytes, Absolute 1.00 (H) 0.10 - 0.90 10*3/mm3    Eosinophils, Absolute 0.20 0.00 - 0.40 10*3/mm3    Basophils, Absolute 0.00 0.00 - 0.20 10*3/mm3    nRBC 0.0 0.0 - 0.2 /100 WBC         Review of Systems    Objective     /78   Pulse 100   Temp 98.9 °F (37.2 °C) (Oral)   Ht 185.4 cm (73\")   Wt 81.6 kg (180 lb)   SpO2 98%   BMI 23.75 kg/m²     Physical Exam  Vitals and nursing note reviewed.   Constitutional:       Appearance: Normal appearance.   HENT:      Head: Normocephalic.      Right Ear: External ear normal.      Left Ear: External ear normal.      Nose: Nose normal.      Mouth/Throat:      Mouth: Mucous membranes are moist.   Cardiovascular:      Pulses: Normal pulses.      Heart sounds: Normal heart sounds.   Pulmonary:      Effort: Pulmonary effort is normal.      Breath sounds: Normal breath sounds.   Abdominal:      Palpations: Abdomen is soft.   Skin:     General: Skin is warm.   Neurological:      General: No focal deficit present.      Mental Status: He is alert and oriented to person, place, and time.   Psychiatric:         Attention and Perception: Attention normal.         Mood and Affect: Mood is anxious and depressed.         Speech: Speech normal.         Behavior: Behavior normal.         Thought Content: Thought content normal.         Cognition and Memory: Cognition normal.       Result Review :                Assessment & Plan    Diagnoses and all orders for this visit:    1. Hyponatremia  Comments:  stable  Orders:  -     Ambulatory Referral to Home Health    2. Atrial fibrillation, unspecified type  Comments:  stable  Orders:  -     Ambulatory Referral to Home Health    3. Spinal stenosis of lumbar region with neurogenic claudication  Comments:  will order home PT  Orders:  -     Ambulatory Referral to Home Health    4. Primary " hypertension  Comments:  stable  Orders:  -     Ambulatory Referral to Home Health    5. Coronary artery disease involving native coronary artery of native heart without angina pectoris  Comments:  stable  Orders:  -     Ambulatory Referral to Home Health    6. Type 2 diabetes mellitus without complication, without long-term current use of insulin  Comments:  stable  Orders:  -     Ambulatory Referral to Home Health    7. Fatigue, unspecified type  Comments:  home health to assist with home needs  Orders:  -     Ambulatory Referral to Home Health    8. 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 Mixed hyperlipidemia  Comments:  stable    10. Depression with anxiety  Comments:  start zoloft    Other orders  -     sertraline (Zoloft) 25 MG tablet; Take 1 tablet by mouth Daily.  Dispense: 30 tablet; Refill: 0      Patient Instructions   [facility] about [facility] personal [facility]  [facility]   You should hear from home health    Follow Up   No follow-ups on file.    Patient was given instructions and counseling regarding his condition or for health maintenance advice. Please see specific information pulled into the AVS if appropriate.     Alisa Salazar, APRN    10/04/23

## 2023-10-06 ENCOUNTER — TELEPHONE (OUTPATIENT)
Dept: FAMILY MEDICINE CLINIC | Facility: CLINIC | Age: 83
End: 2023-10-06
Payer: MEDICARE

## 2023-10-06 ENCOUNTER — HOME HEALTH ADMISSION (OUTPATIENT)
Dept: HOME HEALTH SERVICES | Facility: HOME HEALTHCARE | Age: 83
End: 2023-10-06
Payer: MEDICARE

## 2023-10-06 NOTE — TELEPHONE ENCOUNTER
I returned the patient's phone call and talked with his son patient states he took his Zoloft this morning at 730 around noon started feeling nauseated having stomach pain he feels like it was the medication he is declining to take it again we discussed any medication can cause an allergy at any time recommend that he take Benadryl every 6 hours for the next 24 hours see if symptoms resolve.  Of course if he worsens they are to go to the emergency room he has no hives no rash on the outside.  I offered to send Zoloft they declined further treatment pending how he feels but again stressed emergency room if his symptoms worsen

## 2023-10-06 NOTE — TELEPHONE ENCOUNTER
[facility] called and stated they do not provide dietician services, do you want to send him somewhere else for a dietician?    MA- please call Lorie and let her know

## 2023-10-06 NOTE — TELEPHONE ENCOUNTER
Caller: nash soto    Relationship: Emergency Contact    Best call back number: [phone]     What medications are you currently taking:   Current Outpatient Medications on File Prior to Visit   Medication Sig Dispense Refill    apixaban (ELIQUIS) 5 MG tablet tablet Take 1 tablet by mouth Every 12 (Twelve) Hours for 30 days. Indications: Atrial Fibrillation 60 tablet 0    aspirin (aspirin) 81 MG EC tablet Take 1 tablet by mouth Daily.      atorvastatin (LIPITOR) 20 MG tablet Take 1 tablet by mouth Every Night.      azelastine (ASTELIN) 0.1 % nasal spray 2 sprays into the nostril(s) as directed by provider 2 (Two) Times a Day. Use in each nostril as directed 30 mL 3    Calcium Carbonate 1500 (600 Ca) MG tablet Take 1 tablet by mouth Daily.      cloNIDine (CATAPRES) 0.2 MG tablet Take 1 tablet by mouth 2 (Two) Times a Day.      colestipol (COLESTID) 1 g tablet Take 1 tablet by mouth Daily.      finasteride (PROSCAR) 5 MG tablet Take 1 tablet by mouth Every Evening.      isosorbide mononitrate (IMDUR) 30 MG 24 hr tablet Take 1 tablet by mouth Daily.      metoprolol tartrate (LOPRESSOR) 25 MG tablet Take 1 tablet by mouth Every 12 (Twelve) Hours for 30 days. 60 tablet 0    Probiotic Product (PROBIOTIC DAILY PO) Take 1 capsule by mouth Daily.      sertraline (Zoloft) 25 MG tablet Take 1 tablet by mouth Daily. 30 tablet 1    tamsulosin (FLOMAX) 0.4 MG capsule 24 hr capsule Take 1 capsule by mouth 2 (Two) Times a Day.       No current facility-administered medications on file prior to visit.          When did you start taking these medications: TODAY    Which medication are you concerned about: ZOLOFT 25 MG    Who prescribed you this medication: ALISON RUDD    What are your concerns: STOMACH PAINS    How long have you had these concerns: TODAY

## 2023-10-07 ENCOUNTER — HOME CARE VISIT (OUTPATIENT)
Dept: HOME HEALTH SERVICES | Facility: HOME HEALTHCARE | Age: 83
End: 2023-10-07
Payer: MEDICARE

## 2023-10-07 VITALS
SYSTOLIC BLOOD PRESSURE: 132 MMHG | DIASTOLIC BLOOD PRESSURE: 78 MMHG | OXYGEN SATURATION: 98 % | HEART RATE: 89 BPM | RESPIRATION RATE: 17 BRPM | TEMPERATURE: 98.8 F

## 2023-10-07 PROCEDURE — G0299 HHS/HOSPICE OF RN EA 15 MIN: HCPCS

## 2023-10-07 NOTE — HOME HEALTH
"SOC Note: Pt has a history of arthitis and chronic pain. Pt has been getting injections and epidurals off and on with PT services outpatient. Pt has ahd a few falls in the past few months which has contributed to his weakness and declines but also was diagnosed with covid in August where he spent 5 days in the hospital. Pt has never completely regined his strength despite going to outpatient therapy and pain manamgement. Currently pt is able to walk short distance with walker and uses a wheelchair for longer distances. Pt reports sometimes he is able to do things and other times he is just so weak that he cannot. Pts last fall was earlier this week prior to his MD appt where HHC was ordered. Pt is agreeable with PT and OT services at this time.     Home Health ordered for: disciplines SN 1w4, 1eow6, PT, OT, and MSW evals scheduled    Reason for Hosp/Primary Dx/Co-morbidities: Pain, HTN, weakness, frequent falls,     Focus of Care: spinal stenosis    Patient's goal(s):\"to get back to walking    Current Functional status/mobility/DME: ambulates with walker and assist times one for safety    HB status/Living Arrangements: lives with spouse in 1 story home, 3 steps to leave home    Skin Integrity/wound status: no issues    Code Status: full    Fall Risk/Safety concerns: high    Medication issues/Concerns: none    SDOH Barriers (i.e. caregiver concerns, social isolation, transportation, food insecurity, environment, income etc.)/Need for MSW: Eval scheduled    Plan for next visit:   CP assess  pain monitoring with education  fall safety education  medication review"

## 2023-10-07 NOTE — Clinical Note
Informational Purposes Only:  As per home health protocol MD must be notified of any MAJOR drug interactions found upon medication reivew. Please advise if any changes should be made to pts medications.    Drug-Drug: aspirin and apixaban   Use of apixaban with aspirin may increase the risk of bleeding.    Thank you,   [facility]   phone [phone]  fax [phone]

## 2023-10-09 ENCOUNTER — HOME CARE VISIT (OUTPATIENT)
Dept: HOME HEALTH SERVICES | Facility: HOME HEALTHCARE | Age: 83
End: 2023-10-09
Payer: MEDICARE

## 2023-10-09 VITALS — HEART RATE: 76 BPM | SYSTOLIC BLOOD PRESSURE: 130 MMHG | OXYGEN SATURATION: 97 % | DIASTOLIC BLOOD PRESSURE: 70 MMHG

## 2023-10-09 PROCEDURE — G0151 HHCP-SERV OF PT,EA 15 MIN: HCPCS

## 2023-10-09 NOTE — HOME HEALTH
"Eval Note Pt referred for home health PT due to weakness and frequent falls related to Spinal stenosis, lumbar region with neurogenic claudication  Patient's goal(s): \" Walk better\"    Services required to achieve goals: PT    Potential Issues for goal attainment: chronic back problems.     Problems identified: Pt with back/hip pain, weakness, poor endurance affecting mobility.    Describe the Functional status and safety: Pt requires SBA with bed mobility and transfers, ambulation x 20 ft with use of wheeled walker. Pt with shuffling pattern and flexed trunk.     Describe any environmental issues: 3 steps with rail to get in and out house.     Any equipment needs: Recommend a lift chair, ramp, bed rail to assist with mobility.    POC confirmed with patient and spouse on 10.9.23    Plan for next visit: thera ex, transfer training, balance and gait training."

## 2023-10-10 ENCOUNTER — HOME CARE VISIT (OUTPATIENT)
Dept: HOME HEALTH SERVICES | Facility: HOME HEALTHCARE | Age: 83
End: 2023-10-10
Payer: MEDICARE

## 2023-10-11 ENCOUNTER — HOME CARE VISIT (OUTPATIENT)
Dept: HOME HEALTH SERVICES | Facility: HOME HEALTHCARE | Age: 83
End: 2023-10-11
Payer: MEDICARE

## 2023-10-11 ENCOUNTER — TELEPHONE (OUTPATIENT)
Dept: CARDIOLOGY | Facility: CLINIC | Age: 83
End: 2023-10-11
Payer: MEDICARE

## 2023-10-11 PROCEDURE — G0299 HHS/HOSPICE OF RN EA 15 MIN: HCPCS

## 2023-10-11 PROCEDURE — G0155 HHCP-SVS OF CSW,EA 15 MIN: HCPCS

## 2023-10-11 NOTE — HOME HEALTH
Face-to-face contact with patient and his wife, Megan. Their adult, Lisa, was at home at the time and participated in the conversation. Patient experiences chronic back pain due to arthritis. He has experienced a series of falls which has aggravated it. Patient ambulates with the assistance of a walker. He has Medicare A and B, but no coverage for his Rx meds. He has extremely high Rx bills. He has applied for Medicare Part D. Pt is a . He gets some meds from the V.A. at the present time. He is currently paying a great deal out-of-pocket to pay for his Rx Eliquis ($580 per month). He is trying to get his Rx Eliquis covered by the V.A. Pt was in the [employer] from [address]64[address]. He is not a [place] [employer] . 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 SHORTY counseled patient about V.A. benefits. Acosta referred patient to the V.A. Aid and Attendant Program and explained how to apply for this service. 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 He had covid last August and has suffered mental confusion ever since. He developed Afib while in the hospital with covid. He was placed on Eliquis for afib. Patient is a retired . The couple have two adult children. Their adult daughter, Lisa, lives in [facility] but is temporarily staying with the couple. Their son, Tyson, lives close by. SHORTY suggested that patient call the Veterans Benefits Administration (VBA) as opposed to the [facility]. Patient and patient's wife and daughter will f/u by calling the VHA and VBA to assist the couple. Patient/wife discussed the need for a ramp to help patient get in and out of the house. Their daughter, Lisa, is already in touch with somebody to get them a W/C ramp built.

## 2023-10-11 NOTE — TELEPHONE ENCOUNTER
Incoming Refill Request      Medication requested (name and dose): ELIQUIS 5 MG  METOPROLOL 25 MG    [phone]    Additional details provided by patient:     Best call back number: [phone]    Does the patient have less than a 3 day supply:  [] Yes  [x] No    Raul Boland Rep  10/11/23, 13:59 EDT

## 2023-10-11 NOTE — TELEPHONE ENCOUNTER
Rx Refill Note  Requested Prescriptions     Pending Prescriptions Disp Refills    apixaban (ELIQUIS) 5 MG tablet tablet 60 tablet 0     Sig: Take 1 tablet by mouth Every 12 (Twelve) Hours for 30 days. Indications: Atrial Fibrillation    metoprolol tartrate (LOPRESSOR) 25 MG tablet 60 tablet 0     Sig: Take 1 tablet by mouth Every 12 (Twelve) Hours for 30 days. Indications: High Blood Pressure Disorder      Last office visit with prescribing clinician: 9/12/2023   Last telemedicine visit with prescribing clinician: Visit date not found   Next office visit with prescribing clinician: 12/11/2023                         Would you like a call back once the refill request has been completed: [] Yes [] No    If the office needs to give you a call back, can they leave a voicemail: [] Yes [] No    Eloise Steel MA  10/11/23, 14:21 EDT

## 2023-10-12 ENCOUNTER — HOME CARE VISIT (OUTPATIENT)
Dept: HOME HEALTH SERVICES | Facility: HOME HEALTHCARE | Age: 83
End: 2023-10-12
Payer: MEDICARE

## 2023-10-12 VITALS
RESPIRATION RATE: 17 BRPM | TEMPERATURE: 98 F | DIASTOLIC BLOOD PRESSURE: 60 MMHG | OXYGEN SATURATION: 94 % | SYSTOLIC BLOOD PRESSURE: 110 MMHG | HEART RATE: 77 BPM

## 2023-10-12 NOTE — HOME HEALTH
Routine Visit Note:CORI SEEN 10/11/23 FOR ROUTINE SKILLED NURSE VISIT. PATIENT IS HAVING INCONTINENCE AT NIGHT. SUGGESTED TO PATIENT AND SPOUSE TO USE CONDOM CATHETERS. [facility] SADAF ARIAS CALLED FOR PATIENT WHILE PATIENT PRESENT. SADAF IS GOING TO CONTACT [facility] DO[facility] TO SEE ABOUT GETTING PATIENT A RAMP FOR HOME AS WELL AS PULL UP BRIEFS, BED PADS, AND CONDOM CATHETERS. PATIENT IS NOT SERVICE CONECTED BUT ACCORDING TO SADAF THAT MAY NOT MATTER AND SHE FELT LIKE SHE MAYBE ABLE TO GET PATIENT A RAMP. SADAF SAYS PT AND OT CAN CALL HER WITH GRAB BAR NEEDS AND SHE WILL ORDER. OT AND PT NOTIFIED VIA EMAIL. PATIENT IS ALERT AND ORIENTED X4, CONTINENT OF BOWEL AND BLADDER WITH LAST BM 10/11/23,  VS WNL, LUNGS CLEAR, SKIN IS CLEAR OF NEW AREAS, DENIES FALLS, DENIES PAIN, DENIES MEDICATION CHANGES, AND BOTTLES REVIEWED. PATIENT TOLERATED HIGH RISK MEDICATION TEACHING WELL AND STATED UNDERSTANDING.     HOMEBOUND STATUS: IS HOMEBOUND DUE TO WEAKNESS, AMBULATION REQUIRES ASSISTANCE, LIMITED ENDURANCE, POOR COORDINATION, DIFFICULTY AMBULATING, GAIT LIMITED TO HOUSEHOLD DISTANCES, IMPAIRED DRIVING ABILITY, MECHANICAL ASSISITANCE, FALL RISK, AND IMPAIRED GAIT.      Skill/education provided: CARDIOPULMONARY ASSESSMENT, GASTROINTESTINAL ASSESSMENT, SKIN ASSESSMENT, SAFETY ASSESSMENT, PAIN ASSESSMENT, MEDICATION ASSESSMENT     Patient/caregiver response: PATIENT STATED UNDERSTANDING      Plan for next visit: CARDIOPULMONARY ASSESSMENT, GASTROINTESTINAL ASSESSMENT, SKIN ASSESSMENT, SAFETY ASSESSMENT, PAIN ASSESSMENT, MEDICATION ASSESSMENT        Other pertinent info: NA

## 2023-10-13 ENCOUNTER — HOME CARE VISIT (OUTPATIENT)
Dept: HOME HEALTH SERVICES | Facility: HOME HEALTHCARE | Age: 83
End: 2023-10-13
Payer: MEDICARE

## 2023-10-13 PROCEDURE — G0152 HHCP-SERV OF OT,EA 15 MIN: HCPCS

## 2023-10-15 VITALS
DIASTOLIC BLOOD PRESSURE: 88 MMHG | TEMPERATURE: 98.2 F | OXYGEN SATURATION: 99 % | HEART RATE: 83 BPM | SYSTOLIC BLOOD PRESSURE: 150 MMHG

## 2023-10-15 NOTE — HOME HEALTH
Pt is a 82 yo male who lives in a 1 story home with spouse.   Pt referred to HH secondary to weakness and frequent falls related to Spinal stenosis, lumbar region with neurogenic claudication.        PLOF pt independent with all self care      Currently pt independent with feeding grooming and toileting.   Pt requires MIN assist with bathing and dressing and pts wife maintains the home.      Skilled OT for ther ex transfers dme needs and adl training.   Pt and therapist in agreement with goals and poc.      Next visit ther ex    Pt denies any falls or med changes        Th x 4

## 2023-10-16 ENCOUNTER — HOME CARE VISIT (OUTPATIENT)
Dept: HOME HEALTH SERVICES | Facility: HOME HEALTHCARE | Age: 83
End: 2023-10-16
Payer: MEDICARE

## 2023-10-16 VITALS
OXYGEN SATURATION: 95 % | SYSTOLIC BLOOD PRESSURE: 132 MMHG | HEART RATE: 82 BPM | RESPIRATION RATE: 15 BRPM | TEMPERATURE: 98.3 F | DIASTOLIC BLOOD PRESSURE: 70 MMHG

## 2023-10-16 PROCEDURE — G0157 HHC PT ASSISTANT EA 15: HCPCS

## 2023-10-17 ENCOUNTER — HOME CARE VISIT (OUTPATIENT)
Dept: HOME HEALTH SERVICES | Facility: HOME HEALTHCARE | Age: 83
End: 2023-10-17
Payer: MEDICARE

## 2023-10-17 VITALS
SYSTOLIC BLOOD PRESSURE: 146 MMHG | TEMPERATURE: 98.7 F | DIASTOLIC BLOOD PRESSURE: 85 MMHG | OXYGEN SATURATION: 98 % | HEART RATE: 80 BPM

## 2023-10-17 PROCEDURE — G0299 HHS/HOSPICE OF RN EA 15 MIN: HCPCS

## 2023-10-17 PROCEDURE — G0153 HHCP-SVS OF S/L PATH,EA 15MN: HCPCS

## 2023-10-17 NOTE — HOME HEALTH
pt sitting up and is prepared for PT session-    c/o fatigue and weakness but wants to participate and is very motivated to improve.       pt reporting no med changes or complications.      pt was minimally unsteady upon standing and struggled to stand with noted quad weakness and was extremely fatigued during gait trg,  eventually requested sitting.   pt was challenged to perform most ther es with several rest periods needed.   pt was unsteady upon standing but was able to self correct with the walker   pt was minimally unsteady upon standing but was able to self correct         plan for next session-  gait trg, hep reviewed, transfer trg

## 2023-10-18 VITALS
TEMPERATURE: 97.9 F | OXYGEN SATURATION: 97 % | SYSTOLIC BLOOD PRESSURE: 124 MMHG | RESPIRATION RATE: 16 BRPM | HEART RATE: 65 BPM | DIASTOLIC BLOOD PRESSURE: 60 MMHG

## 2023-10-18 NOTE — HOME HEALTH
Assessment/Referral: Pt is an extremely pleasant 83 y.o.m. who was referred for an ST evaluation due to coughing after drinking thin liquids. While scheduling patient's initial evaluation appointment over the phone, patient's spouse reported that post COVID, pt sometimes misuses words. For example, spouse stated that pt called the [email or web address], a microwave. In addition to a bedside swallow assessment, SLP administered the [place] Aphasia Screening Test (MAST).    When SLP arrived, pt was in his bedroom and needed assistance getting up from his office chair to ambulate to the living room. Pt moaned with each step due to chronic low back pain. He stated that he cannot take NSAIDs due to his dx of a-fib and that additional surgery is not an option.  Pt had taken Tramadol in the past but the side effects of severe drowsiness caused pt to sleep most days. SLP recommended consulting with his physician to inquire if Tramadol HCL 50 mg tablet can be cut in half and if so, pt could possibly take 1/2 dosage, find some pain relief that would improve his quality of life and pt could potentially experience less drowsiness with lowered dosage. SLP also suggested that pt ask his physician if Ultram may be appropriate for pt to try if Tramadol is not successful.     Pt reported that he is no longer coughing after drinking liquids and no fluid was audible in his lungs. An oral mech exam revealed 2 dental implants and a few missing molars; normal strength, ROM and function of lingual, labial,  muscles. Recommended Biotene for xerostomia. A bedside swallow assessment showed no clinical signs or symptoms of aspiration before, after or during swallow of thin liquids and regular food. Pt and spouse were educated about dysphagia including s/sx of aspiration and instructed to notify pt's physician immediately if he begins having swallowing problems again.     The MAST was administered. Pt scored 100/100 showing normal receptive and  expressive language.    PLOF/Environment: Patient previously experienced brain fog due to long COVID but pt reports he is currently back to his baseline. Pt was also coughing after drinking thin liquids but he is now no longer demonstrating s/sx of dysphagia.  Pt lives in a 1 story home (with basement) with his jatin wife of many years.    Evaluation only to be completed due to pt showing no s/sx of dysphagia and no receptive or expressive language disorder.

## 2023-10-18 NOTE — HOME HEALTH
Routine Visit Note:CORI SEEN 10/17/23 FOR ROUTINE SKILLED NURSE VISIT. PATIENT IS DOING EXERCISES ON SN ARRIVAL. [facility] IS CALL[facility] TO SEE WHAT DEVELOPMENTS IF ANY ARE FROM LAST CALL ON 10/11/23. NURSE COVERING FOR PATIENTS CARE MANAGER SAYS THE DOCTOR HAS NOT LOOKED AT THE NOTE YET. SHE SAYS SHE WILL CALL MD'S NURSE SO SHE CAN ALERT MD THAT NOTE NEEDS ATTENTION. PATIENT IS ALERT AND ORIENTED X4, CONTINENT OF BOWEL AND BLADDER WITH LAST BM 10/17/23,  VS WNL, LUNGS CLEAR, SKIN IS CLEAR OF NEW AREAS, DENIES FALLS, DENIES PAIN, DENIES MEDICATION CHANGES, AND BOTTLES REVIEWED. PATIENT TOLERATED HIGH RISK MEDICATION TEACHING WELL AND STATED UNDERSTANDING.     HOMEBOUND STATUS: IS HOMEBOUND DUE TO WEAKNESS, AMBULATION REQUIRES ASSISTANCE, LIMITED ENDURANCE, POOR COORDINATION, DIFFICULTY AMBULATING, GAIT LIMITED TO HOUSEHOLD DISTANCES, IMPAIRED DRIVING ABILITY, MECHANICAL ASSISITANCE, FALL RISK, AND IMPAIRED GAIT.      Skill/education provided: CARDIOPULMONARY ASSESSMENT, GASTROINTESTINAL ASSESSMENT, SKIN ASSESSMENT, SAFETY ASSESSMENT, PAIN ASSESSMENT, MEDICATION ASSESSMENT     Patient/caregiver response: PATIENT STATED UNDERSTANDING      Plan for next visit: CARDIOPULMONARY ASSESSMENT, GASTROINTESTINAL ASSESSMENT, SKIN ASSESSMENT, SAFETY ASSESSMENT, PAIN ASSESSMENT, MEDICATION ASSESSMENT        Other pertinent info: NA

## 2023-10-19 ENCOUNTER — HOME CARE VISIT (OUTPATIENT)
Dept: HOME HEALTH SERVICES | Facility: HOME HEALTHCARE | Age: 83
End: 2023-10-19
Payer: MEDICARE

## 2023-10-19 ENCOUNTER — TELEPHONE (OUTPATIENT)
Dept: FAMILY MEDICINE CLINIC | Facility: CLINIC | Age: 83
End: 2023-10-19
Payer: MEDICARE

## 2023-10-19 VITALS
RESPIRATION RATE: 18 BRPM | TEMPERATURE: 98.3 F | SYSTOLIC BLOOD PRESSURE: 132 MMHG | DIASTOLIC BLOOD PRESSURE: 68 MMHG | HEART RATE: 82 BPM | OXYGEN SATURATION: 95 %

## 2023-10-19 VITALS
HEART RATE: 91 BPM | OXYGEN SATURATION: 99 % | TEMPERATURE: 98.7 F | SYSTOLIC BLOOD PRESSURE: 130 MMHG | DIASTOLIC BLOOD PRESSURE: 70 MMHG

## 2023-10-19 PROCEDURE — G0157 HHC PT ASSISTANT EA 15: HCPCS

## 2023-10-19 PROCEDURE — G0152 HHCP-SERV OF OT,EA 15 MIN: HCPCS

## 2023-10-19 NOTE — HOME HEALTH
Routine Visit Note:    Skill/education provided: OT instr and educated pt on B UE HEP     Patient/caregiver response: pt required MOD assist    Plan for next visit: ther ex    Other pertinent info: pt denies any falls or med changes

## 2023-10-20 NOTE — HOME HEALTH
pt presents today with c/o L Hip pain and reports this was increased following previous PT session and that he has been limited in the ability to perform PT hep due to this.  pt is motivated to improve but is very concerned regarding pain.   pt is forgetful but spouse reports pt has been having these pains at times for several months.         pt was limited today in all areas and was greatly challenged to perform all ther ex with cues needed to fully contract/hold and to perform in the correct plane and with c/o soreness.    pt was unsteady upon standing and greatly struggled to standing with noted weakness.  pt was cued to properly deep plb for energy conservation with noted dyspnea .       reduced standing/wb activities today due to the soreness.           plan for next session-  cont PT with gait trg, balance trg,  transfer education and with PRE

## 2023-10-24 ENCOUNTER — HOME CARE VISIT (OUTPATIENT)
Dept: HOME HEALTH SERVICES | Facility: HOME HEALTHCARE | Age: 83
End: 2023-10-24
Payer: MEDICARE

## 2023-10-24 PROCEDURE — G0299 HHS/HOSPICE OF RN EA 15 MIN: HCPCS

## 2023-10-25 ENCOUNTER — HOME CARE VISIT (OUTPATIENT)
Dept: HOME HEALTH SERVICES | Facility: HOME HEALTHCARE | Age: 83
End: 2023-10-25
Payer: MEDICARE

## 2023-10-25 VITALS
HEART RATE: 94 BPM | OXYGEN SATURATION: 93 % | TEMPERATURE: 98.2 F | RESPIRATION RATE: 15 BRPM | DIASTOLIC BLOOD PRESSURE: 70 MMHG | SYSTOLIC BLOOD PRESSURE: 128 MMHG

## 2023-10-25 VITALS
TEMPERATURE: 97.7 F | SYSTOLIC BLOOD PRESSURE: 120 MMHG | OXYGEN SATURATION: 99 % | RESPIRATION RATE: 17 BRPM | DIASTOLIC BLOOD PRESSURE: 60 MMHG | HEART RATE: 77 BPM

## 2023-10-25 PROCEDURE — G0157 HHC PT ASSISTANT EA 15: HCPCS

## 2023-10-25 NOTE — HOME HEALTH
pt sitting up and is prepared for PT session.  pt feels well and is motivated to continue to improve at this time.   pt has been attempting hep review but struggles and reports this has been very limited.      pt was limited today in all areas with weakness and fatigue and needed several cues to properly deep plb for energy conservation.  pt was educated on proper lumbar stab technique and to avoid hip rotation/compensatory movement with ther ex.  pt was unsteady upon standing and continues to struggle to stand,  but overall tolerance and ability were greatly improved from previous PT session.               plan for next session-  cont PT with gait trg, hep review,  balance trg and wb activities as tolerated.   avoid excessive standing activities per pt request.

## 2023-10-25 NOTE — HOME HEALTH
Routine Visit Note:CORI SEEN 10/24/23 FOR ROUTINE SKILLED NURSE VISIT. [facility]ED [facility] TO [facility], SOMEONE TO INSTALL GRAB BARS, BRIEFS, PADS, AND CONDOM CATHETERS. [facility] THAT PADS, BRIEFS AND CONDOM CATHETERS ARE ON THE WAY (SHIPPED FRIDAY). [employer] CALLED WHILE NURSE IN HOME AND SET APPOINTMENT FOR 11/1/23 TO ASSESS WHERE THE RAMP WILL GO. SADAF GAVE NAME OF COMPANY THAT WILL DO GRAB BARS SO PATIENT WOULD KNOW WHO WOULD BE CALLING.  PATIENT IS ALERT AND ORIENTED X4, CONTINENT OF BOWEL AND BLADDER WITH LAST BM 10/24/23,  VS WNL, LUNGS CLEAR, SKIN IS CLEAR OF NEW AREAS, DENIES FALLS, DENIES PAIN, DENIES MEDICATION CHANGES, AND BOTTLES REVIEWED. PATIENT TOLERATED HIGH RISK MEDICATION TEACHING WELL AND STATED UNDERSTANDING.     HOMEBOUND STATUS: IS HOMEBOUND DUE TO WEAKNESS, AMBULATION REQUIRES ASSISTANCE, LIMITED ENDURANCE, POOR COORDINATION, DIFFICULTY AMBULATING, GAIT LIMITED TO HOUSEHOLD DISTANCES, IMPAIRED DRIVING ABILITY, MECHANICAL ASSISITANCE, FALL RISK, AND IMPAIRED GAIT.      Skill/education provided: CARDIOPULMONARY ASSESSMENT, GASTROINTESTINAL ASSESSMENT, SKIN ASSESSMENT, SAFETY ASSESSMENT, PAIN ASSESSMENT, MEDICATION ASSESSMENT     Patient/caregiver response: PATIENT STATED UNDERSTANDING      Plan for next visit: CARDIOPULMONARY ASSESSMENT, GASTROINTESTINAL ASSESSMENT, SKIN ASSESSMENT, SAFETY ASSESSMENT, PAIN ASSESSMENT, MEDICATION ASSESSMENT        Other pertinent info: NA

## 2023-10-26 ENCOUNTER — HOME CARE VISIT (OUTPATIENT)
Dept: HOME HEALTH SERVICES | Facility: HOME HEALTHCARE | Age: 83
End: 2023-10-26
Payer: MEDICARE

## 2023-10-26 VITALS
OXYGEN SATURATION: 95 % | HEART RATE: 82 BPM | RESPIRATION RATE: 15 BRPM | SYSTOLIC BLOOD PRESSURE: 134 MMHG | TEMPERATURE: 98.3 F | DIASTOLIC BLOOD PRESSURE: 70 MMHG

## 2023-10-26 PROCEDURE — G0157 HHC PT ASSISTANT EA 15: HCPCS

## 2023-10-27 ENCOUNTER — HOME CARE VISIT (OUTPATIENT)
Dept: HOME HEALTH SERVICES | Facility: HOME HEALTHCARE | Age: 83
End: 2023-10-27
Payer: MEDICARE

## 2023-10-27 PROCEDURE — G0152 HHCP-SERV OF OT,EA 15 MIN: HCPCS

## 2023-10-27 NOTE — HOME HEALTH
pt agrees to PT session but admits he has increased back pain from yesterday but is managing with meds and reports some relief with this.     pt wants to continue to improve and return to plf but is concernred regarding continued deficits and low endurance with overall functional deficits.    pt was very limited today in all areas, with weakness and fatigue and needed extensive rest periods.   pt struggled with most activities with c/o soreness and needed extensive rest periods.  pt was minimally unsteady upon standing but was able to self correct with the walker.      pt was educated on proper form with hep review, often needing cues to properly contract/hold and to perform through the correct plane.                plan for next session-  cont PT with gait trg,  hep review,  balance trg and transfer education

## 2023-10-29 VITALS
OXYGEN SATURATION: 98 % | DIASTOLIC BLOOD PRESSURE: 80 MMHG | TEMPERATURE: 98.4 F | SYSTOLIC BLOOD PRESSURE: 120 MMHG | HEART RATE: 84 BPM

## 2023-10-30 NOTE — HOME HEALTH
Routine Visit Note:    Skill/education provided: OT further instr and educated pt on HEP    Patient/caregiver response: pt required MIN assist for correct form    Plan for next visit: ther ex/HEP    Other pertinent info: pt denies any falls or med changes

## 2023-10-31 ENCOUNTER — HOME CARE VISIT (OUTPATIENT)
Dept: HOME HEALTH SERVICES | Facility: HOME HEALTHCARE | Age: 83
End: 2023-10-31
Payer: MEDICARE

## 2023-10-31 VITALS
DIASTOLIC BLOOD PRESSURE: 70 MMHG | HEART RATE: 83 BPM | RESPIRATION RATE: 14 BRPM | TEMPERATURE: 97.6 F | OXYGEN SATURATION: 95 % | SYSTOLIC BLOOD PRESSURE: 134 MMHG

## 2023-10-31 PROCEDURE — G0299 HHS/HOSPICE OF RN EA 15 MIN: HCPCS

## 2023-10-31 PROCEDURE — G0157 HHC PT ASSISTANT EA 15: HCPCS

## 2023-10-31 RX ORDER — CLONIDINE HYDROCHLORIDE 0.2 MG/1
0.2 TABLET ORAL 2 TIMES DAILY
Qty: 180 TABLET | Refills: 0 | Status: SHIPPED | OUTPATIENT
Start: 2023-10-31

## 2023-11-01 ENCOUNTER — HOME CARE VISIT (OUTPATIENT)
Dept: HOME HEALTH SERVICES | Facility: HOME HEALTHCARE | Age: 83
End: 2023-11-01
Payer: MEDICARE

## 2023-11-01 VITALS
DIASTOLIC BLOOD PRESSURE: 60 MMHG | OXYGEN SATURATION: 99 % | HEART RATE: 78 BPM | SYSTOLIC BLOOD PRESSURE: 100 MMHG | RESPIRATION RATE: 16 BRPM | TEMPERATURE: 98 F

## 2023-11-01 NOTE — HOME HEALTH
Routine Visit Note:CORI SEEN 10/2723 FOR ROUTINE SKILLED NURSE VISIT. PATIENT HAS RECIEVED HIS GRAB BARS BUT NO ONE HAS BEEN OUT TO INSTALL. [facility] CALL[facility] TO [facility]IRE AND INSTALATION [facility]EDULED BY [facility]. PATIENT IS SHOWN HOW TO USE HIS CONDOM CATHETERS. HE HAS ALSO RECIEVED HIS BRIEFS, AND BED PADS. PATIENT HAS SOMEONE COMING OUT TOMORROW TO LOOK AT WHAT MUST BE DONE TO INSTALL A RAMP. PATIENT IS ALERT AND ORIENTED X4, CONTINENT OF BOWEL AND BLADDER WITH LAST BM 10/27/23,  VS WNL, LUNGS CLEAR, SKIN IS CLEAR OF NEW AREAS, DENIES FALLS, DENIES PAIN, DENIES MEDICATION CHANGES, AND BOTTLES REVIEWED. PATIENT TOLERATED HIGH RISK MEDICATION TEACHING WELL AND STATED UNDERSTANDING.     HOMEBOUND STATUS: IS HOMEBOUND DUE TO WEAKNESS, AMBULATION REQUIRES ASSISTANCE, LIMITED ENDURANCE, POOR COORDINATION, DIFFICULTY AMBULATING, GAIT LIMITED TO HOUSEHOLD DISTANCES, IMPAIRED DRIVING ABILITY, MECHANICAL ASSISITANCE, FALL RISK, AND IMPAIRED GAIT.      Skill/education provided: CARDIOPULMONARY ASSESSMENT, GASTROINTESTINAL ASSESSMENT, SKIN ASSESSMENT, SAFETY ASSESSMENT, PAIN ASSESSMENT, MEDICATION ASSESSMENT     Patient/caregiver response: PATIENT STATED UNDERSTANDING      Plan for next visit: CARDIOPULMONARY ASSESSMENT, GASTROINTESTINAL ASSESSMENT, SKIN ASSESSMENT, SAFETY ASSESSMENT, PAIN ASSESSMENT, MEDICATION ASSESSMENT        Other pertinent info: NA

## 2023-11-01 NOTE — HOME HEALTH
"pt sitting up and prepared for PT session but is very agitated regarding continued weakness and states \" I am getting worse\"   pt reports he greatly struggled to stand earlier today and had to have assistance from his spouse     pt was limited today in all areas with weakness,  low endurance and struggled to stand with noted quad weakness and repeated attempts needed .    pt struggled to ambulate with noted hip flexor weakness and limited hip flexion.  pt was educated on proper form with hep review, not yet ind,  cues needed to avoid compensatory movement and to fully contract/hold and to properly deep plb for energy conservation.  pt was educated on correct posture and struggled to maintain this.                plan for next session-  cont PT with gait trg, balance trg and transfer education"

## 2023-11-02 ENCOUNTER — HOME CARE VISIT (OUTPATIENT)
Dept: HOME HEALTH SERVICES | Facility: HOME HEALTHCARE | Age: 83
End: 2023-11-02
Payer: MEDICARE

## 2023-11-02 VITALS
TEMPERATURE: 98 F | SYSTOLIC BLOOD PRESSURE: 120 MMHG | DIASTOLIC BLOOD PRESSURE: 68 MMHG | HEART RATE: 68 BPM | OXYGEN SATURATION: 98 %

## 2023-11-02 PROCEDURE — G0151 HHCP-SERV OF PT,EA 15 MIN: HCPCS

## 2023-11-03 ENCOUNTER — HOME CARE VISIT (OUTPATIENT)
Dept: HOME HEALTH SERVICES | Facility: HOME HEALTHCARE | Age: 83
End: 2023-11-03
Payer: MEDICARE

## 2023-11-03 PROCEDURE — G0152 HHCP-SERV OF OT,EA 15 MIN: HCPCS

## 2023-11-05 VITALS — OXYGEN SATURATION: 96 % | DIASTOLIC BLOOD PRESSURE: 80 MMHG | SYSTOLIC BLOOD PRESSURE: 120 MMHG | HEART RATE: 95 BPM

## 2023-11-06 NOTE — HOME HEALTH
Routine Visit Note:    Skill/education provided: bathroom tranfers    Patient/caregiver response: CGA and verbal cues    Plan for next visit: ther ex and transfers    Other pertinent info: pt denies any falls or med changes

## 2023-11-07 ENCOUNTER — TELEPHONE (OUTPATIENT)
Dept: CARDIOLOGY | Facility: CLINIC | Age: 83
End: 2023-11-07
Payer: MEDICARE

## 2023-11-07 ENCOUNTER — HOME CARE VISIT (OUTPATIENT)
Dept: HOME HEALTH SERVICES | Facility: HOME HEALTHCARE | Age: 83
End: 2023-11-07
Payer: MEDICARE

## 2023-11-07 VITALS
SYSTOLIC BLOOD PRESSURE: 126 MMHG | TEMPERATURE: 98.2 F | DIASTOLIC BLOOD PRESSURE: 70 MMHG | RESPIRATION RATE: 17 BRPM | HEART RATE: 83 BPM | OXYGEN SATURATION: 95 %

## 2023-11-07 PROCEDURE — G0157 HHC PT ASSISTANT EA 15: HCPCS

## 2023-11-07 NOTE — TELEPHONE ENCOUNTER
FACILITY: [facility]   DEEP SHEEHAN  PHONE: [phone]  FAX: [phone]  PROCEDURE: LUMBAR SPINE SURGERY  SCHEDULED: TBD  MEDS TO HOLD: ELIQUIS AND ASPIRIN FOR 3 DAYS    PLACED ON DR BOWLING'S DESK FOR REVIEW.

## 2023-11-08 ENCOUNTER — HOSPITAL ENCOUNTER (OUTPATIENT)
Dept: CT IMAGING | Facility: HOSPITAL | Age: 83
Discharge: HOME | End: 2023-11-08
Admitting: PAIN MEDICINE
Payer: MEDICARE

## 2023-11-08 ENCOUNTER — TRANSCRIBE ORDERS (OUTPATIENT)
Dept: ADMINISTRATIVE | Facility: HOSPITAL | Age: 83
End: 2023-11-08
Payer: MEDICARE

## 2023-11-08 DIAGNOSIS — R52 PAIN: ICD-10-CM

## 2023-11-08 DIAGNOSIS — R52 PAIN: Primary | ICD-10-CM

## 2023-11-08 PROCEDURE — 72131 CT LUMBAR SPINE W/O DYE: CPT

## 2023-11-08 NOTE — HOME HEALTH
pt up and is prepared for PT session,   feeling fair and agrees to PT but states he will be very limited.   pt reports he had an MRI yesterdy and is awaiting the results.  pt reports no med changes or complications reported     pt was compliant,  participated well but struggled with weakness and fatigue and increased pain with all activities.  pt required instruction throughout hep review to properly and fully contract/hold with cues to avoid compensatory movement.     pt was cued to properly advance the LEs during gait trg with correct heel strike and step length.      pt was very faitgued to end gait trg and struggled to slowly return to chair       plan for next session-  cont with gait trg,  balance trg,  hep review and advance as able to goals

## 2023-11-09 ENCOUNTER — HOME CARE VISIT (OUTPATIENT)
Dept: HOME HEALTH SERVICES | Facility: HOME HEALTHCARE | Age: 83
End: 2023-11-09
Payer: MEDICARE

## 2023-11-09 ENCOUNTER — TRANSCRIBE ORDERS (OUTPATIENT)
Dept: LAB | Facility: HOSPITAL | Age: 83
End: 2023-11-09
Payer: MEDICARE

## 2023-11-09 ENCOUNTER — LAB (OUTPATIENT)
Dept: LAB | Facility: HOSPITAL | Age: 83
End: 2023-11-09
Payer: MEDICARE

## 2023-11-09 VITALS
RESPIRATION RATE: 14 BRPM | DIASTOLIC BLOOD PRESSURE: 80 MMHG | SYSTOLIC BLOOD PRESSURE: 132 MMHG | HEART RATE: 82 BPM | TEMPERATURE: 98.2 F | OXYGEN SATURATION: 95 %

## 2023-11-09 DIAGNOSIS — M48.56XA LUMBAR VERTEBRAL COLLAPSE, INITIAL ENCOUNTER: ICD-10-CM

## 2023-11-09 DIAGNOSIS — M48.56XA LUMBAR VERTEBRAL COLLAPSE, INITIAL ENCOUNTER: Primary | ICD-10-CM

## 2023-11-09 LAB
ALBUMIN SERPL-MCNC: 3.5 G/DL (ref 3.5–5.2)
ALBUMIN/GLOB SERPL: 1.2 G/DL
ALP SERPL-CCNC: 113 U/L (ref 39–117)
ALT SERPL W P-5'-P-CCNC: 12 U/L (ref 1–41)
ANION GAP SERPL CALCULATED.3IONS-SCNC: 9.5 MMOL/L (ref 5–15)
AST SERPL-CCNC: 15 U/L (ref 1–40)
BASOPHILS # BLD AUTO: 0.06 10*3/MM3 (ref 0–0.2)
BASOPHILS NFR BLD AUTO: 0.8 % (ref 0–1.5)
BILIRUB SERPL-MCNC: 0.3 MG/DL (ref 0–1.2)
BUN SERPL-MCNC: 18 MG/DL (ref 8–23)
BUN/CREAT SERPL: 26.1 (ref 7–25)
CALCIUM SPEC-SCNC: 8.8 MG/DL (ref 8.6–10.5)
CHLORIDE SERPL-SCNC: 98 MMOL/L (ref 98–107)
CO2 SERPL-SCNC: 26.5 MMOL/L (ref 22–29)
CREAT SERPL-MCNC: 0.69 MG/DL (ref 0.76–1.27)
DEPRECATED RDW RBC AUTO: 43.1 FL (ref 37–54)
EGFRCR SERPLBLD CKD-EPI 2021: 90.7 ML/MIN/1.73
EOSINOPHIL # BLD AUTO: 0.6 10*3/MM3 (ref 0–0.4)
EOSINOPHIL NFR BLD AUTO: 8.2 % (ref 0.3–6.2)
ERYTHROCYTE [DISTWIDTH] IN BLOOD BY AUTOMATED COUNT: 12.8 % (ref 12.3–15.4)
GLOBULIN UR ELPH-MCNC: 3 GM/DL
GLUCOSE SERPL-MCNC: 129 MG/DL (ref 65–99)
HCT VFR BLD AUTO: 32.3 % (ref 37.5–51)
HGB BLD-MCNC: 10.7 G/DL (ref 13–17.7)
IMM GRANULOCYTES # BLD AUTO: 0.02 10*3/MM3 (ref 0–0.05)
IMM GRANULOCYTES NFR BLD AUTO: 0.3 % (ref 0–0.5)
LYMPHOCYTES # BLD AUTO: 1.11 10*3/MM3 (ref 0.7–3.1)
LYMPHOCYTES NFR BLD AUTO: 15.2 % (ref 19.6–45.3)
MCH RBC QN AUTO: 30.5 PG (ref 26.6–33)
MCHC RBC AUTO-ENTMCNC: 33.1 G/DL (ref 31.5–35.7)
MCV RBC AUTO: 92 FL (ref 79–97)
MONOCYTES # BLD AUTO: 1.09 10*3/MM3 (ref 0.1–0.9)
MONOCYTES NFR BLD AUTO: 14.9 % (ref 5–12)
NEUTROPHILS NFR BLD AUTO: 4.43 10*3/MM3 (ref 1.7–7)
NEUTROPHILS NFR BLD AUTO: 60.6 % (ref 42.7–76)
NRBC BLD AUTO-RTO: 0 /100 WBC (ref 0–0.2)
PLATELET # BLD AUTO: 296 10*3/MM3 (ref 140–450)
PMV BLD AUTO: 9.7 FL (ref 6–12)
POTASSIUM SERPL-SCNC: 4.6 MMOL/L (ref 3.5–5.2)
PROT SERPL-MCNC: 6.5 G/DL (ref 6–8.5)
RBC # BLD AUTO: 3.51 10*6/MM3 (ref 4.14–5.8)
SODIUM SERPL-SCNC: 134 MMOL/L (ref 136–145)
WBC NRBC COR # BLD: 7.31 10*3/MM3 (ref 3.4–10.8)

## 2023-11-09 PROCEDURE — G0157 HHC PT ASSISTANT EA 15: HCPCS

## 2023-11-09 PROCEDURE — 85025 COMPLETE CBC W/AUTO DIFF WBC: CPT

## 2023-11-09 PROCEDURE — 36415 COLL VENOUS BLD VENIPUNCTURE: CPT

## 2023-11-09 PROCEDURE — 84165 PROTEIN E-PHORESIS SERUM: CPT

## 2023-11-09 PROCEDURE — 80053 COMPREHEN METABOLIC PANEL: CPT

## 2023-11-10 ENCOUNTER — OFFICE VISIT (OUTPATIENT)
Dept: FAMILY MEDICINE CLINIC | Facility: CLINIC | Age: 83
End: 2023-11-10
Payer: MEDICARE

## 2023-11-10 ENCOUNTER — HOME CARE VISIT (OUTPATIENT)
Dept: HOME HEALTH SERVICES | Facility: HOME HEALTHCARE | Age: 83
End: 2023-11-10
Payer: MEDICARE

## 2023-11-10 VITALS
WEIGHT: 180 LBS | SYSTOLIC BLOOD PRESSURE: 128 MMHG | HEIGHT: 73 IN | DIASTOLIC BLOOD PRESSURE: 64 MMHG | HEART RATE: 97 BPM | RESPIRATION RATE: 18 BRPM | OXYGEN SATURATION: 97 % | BODY MASS INDEX: 23.86 KG/M2

## 2023-11-10 DIAGNOSIS — I48.0 PAROXYSMAL ATRIAL FIBRILLATION: ICD-10-CM

## 2023-11-10 DIAGNOSIS — E78.2 MIXED HYPERLIPIDEMIA: Chronic | ICD-10-CM

## 2023-11-10 DIAGNOSIS — Z95.5 STATUS POST CORONARY ARTERY STENT PLACEMENT: ICD-10-CM

## 2023-11-10 DIAGNOSIS — M54.42 CHRONIC BILATERAL LOW BACK PAIN WITH LEFT-SIDED SCIATICA: ICD-10-CM

## 2023-11-10 DIAGNOSIS — I10 PRIMARY HYPERTENSION: Primary | ICD-10-CM

## 2023-11-10 DIAGNOSIS — G89.29 CHRONIC BILATERAL LOW BACK PAIN WITH LEFT-SIDED SCIATICA: ICD-10-CM

## 2023-11-10 LAB
ALBUMIN SERPL ELPH-MCNC: 3.1 G/DL (ref 2.9–4.4)
ALBUMIN/GLOB SERPL: 1.1 {RATIO} (ref 0.7–1.7)
ALPHA1 GLOB SERPL ELPH-MCNC: 0.3 G/DL (ref 0–0.4)
ALPHA2 GLOB SERPL ELPH-MCNC: 0.8 G/DL (ref 0.4–1)
B-GLOBULIN SERPL ELPH-MCNC: 0.8 G/DL (ref 0.7–1.3)
GAMMA GLOB SERPL ELPH-MCNC: 1 G/DL (ref 0.4–1.8)
GLOBULIN SER CALC-MCNC: 2.9 G/DL (ref 2.2–3.9)
LABORATORY COMMENT REPORT: NORMAL
M PROTEIN SERPL ELPH-MCNC: NORMAL G/DL
PROT SERPL-MCNC: 6 G/DL (ref 6–8.5)

## 2023-11-10 NOTE — PROGRESS NOTES
"Chief Complaint  Chief Complaint   Patient presents with    Establish Care     Subjective        Campbell Alex is a 83 y.o. male who presents to [facility].    History of Present Illness  Here to establish care with me.  We reviewed medical history and current medications.    Paroxysmal a fib on eliquis.  Sees Dr. Staples.    HLD on atorvastatin 20 mg daily.  HTN on clonidine 0.2 mg bid, metoprolol 25 mg bid.  He was prescribed losartan 100 mg daily [facility] who wanted him to stop the clonidine.    CAD on asa 81 mg daily, imdur 30 mg daily, above.  BPH on flomax 0.4 mg daily.  Overactive bladder on gemtesa 75 mg daily.      He is having significant lower back pain.  Seeing spine specialist and pain management at [facility].  Has had multiple injections, ablations, w/o much relief.  Pursuing possible surgical clearance from Dr. Staples.  He cannot use NSAID's d/t CAD / a fib.  Tthey tried tramadol but it made him very sleepy.  He is using tylenol alone.      Objective   /64   Pulse 97   Resp 18   Ht 185.4 cm (73\")   Wt 81.6 kg (180 lb)   SpO2 97%   BMI 23.75 kg/m²     Estimated body mass index is 23.75 kg/m² as calculated from the following:    Height as of this encounter: 185.4 cm (73\").    Weight as of this encounter: 81.6 kg (180 lb).     Physical Exam   GEN: In no acute distress, uncomfortable appearing  CV: Irregular rate and rhythm, no murmurs, 2+ peripheral pulses, No extremity edema.   RESP: Lungs clear to auscultation anteriorly and posteriorly in all lung fields bilaterally.  NEURO: AAO to person, place, and time. CN 2-12 intact grossly.   PSYCH: Affect normal, insight fair      Result Review :    The following data was reviewed by: Rocky Lozoya DO on 11/10/2023:  CMP          8/26/2023    00:18 9/7/2023    12:48 11/9/2023    12:20   CMP   Glucose 171  112  129    BUN 15  18  18    Creatinine 0.60  0.67  0.69    EGFR 94.6  91.5  90.7    Sodium " 131  138  134    Potassium 3.7  4.0  4.6    Chloride 99  105  98    Calcium 8.1  9.0  8.8    Total Protein  6.0  6.5    Albumin  3.5  3.5    Globulin  2.5  3.0    Total Bilirubin  0.5  0.3    Alkaline Phosphatase  56  113    AST (SGOT)  15  15    ALT (SGPT)  13  12    Albumin/Globulin Ratio  1.4  1.2    BUN/Creatinine Ratio 25.0  26.9  26.1    Anion Gap 11.0  7.6  9.5      CBC          8/26/2023    00:18 8/26/2023    23:30 9/7/2023    12:48 11/9/2023    12:20   CBC   WBC 3.00  5.30  8.50  7.31    RBC 3.42  3.26  3.37  3.51    Hemoglobin 11.1  10.5  11.0  10.7    Hematocrit 32.1  30.3  32.7  32.3    MCV 93.9  93.0  97.1  92.0    MCH 32.4  32.2  32.7  30.5    MCHC 34.5  34.7  33.7  33.1    RDW 13.2  12.9  13.6  12.8    Platelets 160  170  288  296        Most Recent A1C          6/14/2023    09:37   HGBA1C Most Recent   Hemoglobin A1C 6.20         Patient has been erroneously marked as diabetic. Based on the available clinical information, he does not have diabetes and should therefore be excluded from diabetic health maintenance and quality measures for the remainder of the reporting period.     Assessment and Plan     Diagnoses and all orders for this visit:    1. Primary hypertension (Primary)  Recommended wean of clonidine to once daily x 1 wk at night, half tab of losartan in the am.  After 1 wk stop the clonidine and go to full tab of losartan in the am.  Continue metoprolol 25 mg bid.  They wished to run the above by Dr. Staples for his recommendations which is reasonable.    2. Mixed hyperlipidemia  Continue atorvastatin 20 mg daily.    3. Status post coronary artery stent placement  Continue asa 81 mg daily, imdur 30 mg daily.  Continue f/u with Dr. Staples.    4. Chronic bilateral low back pain with left-sided sciatica  Continue f/u with [facility] spine and pain management.  Discussed possible trail of very low dose gabapentin, he was going to run it by Dr. Anand for his opinion.    5. Paroxysmal  atrial fibrillation  Continue metoprolol tartrate 25 mg bid and eliquis 5 mg bid.        Follow Up     Return in about 3 months (around 2/10/2024) for BP, back pain check .

## 2023-11-10 NOTE — HOME HEALTH
pt up,  prepared for PT session but with continued c/o pain in the low back.   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    pt still awaiting results of the CT scan.      pt has an apt with neurosurgeon on 11/13.     pt was limited today in all areas and continues to struggle with weakness and limitations with low back pain with challenges in all areas.    pt was challenged in the ability to both transfer and stand and gait tolerance remains significantly limited.      pt needed several rest periods throughout to complete PT session and LLE activities were performed mostly in a limited rom.          plan for next session-  cont PT with gait trg,   hep review,    transfer trg as tolerated

## 2023-11-14 ENCOUNTER — TRANSCRIBE ORDERS (OUTPATIENT)
Dept: ADMINISTRATIVE | Facility: HOSPITAL | Age: 83
End: 2023-11-14
Payer: MEDICARE

## 2023-11-14 ENCOUNTER — TELEPHONE (OUTPATIENT)
Dept: CARDIOLOGY | Facility: CLINIC | Age: 83
End: 2023-11-14
Payer: MEDICARE

## 2023-11-14 ENCOUNTER — HOME CARE VISIT (OUTPATIENT)
Dept: HOME HEALTH SERVICES | Facility: HOME HEALTHCARE | Age: 83
End: 2023-11-14
Payer: MEDICARE

## 2023-11-14 VITALS
RESPIRATION RATE: 15 BRPM | TEMPERATURE: 98.4 F | SYSTOLIC BLOOD PRESSURE: 134 MMHG | OXYGEN SATURATION: 96 % | DIASTOLIC BLOOD PRESSURE: 70 MMHG | HEART RATE: 82 BPM

## 2023-11-14 DIAGNOSIS — M80.88XG OSTEOPOROTIC COMPRESSION FRACTURE OF SPINE WITH DELAYED HEALING: Primary | ICD-10-CM

## 2023-11-14 PROCEDURE — G0157 HHC PT ASSISTANT EA 15: HCPCS

## 2023-11-14 NOTE — TELEPHONE ENCOUNTER
Patient took Pepto bismol this morning. It says on bottle to not take if on blood thinners. Patient on Eliquis.     His [facility] provider wants him to stop clonidine and start losartan. Would Dr. Staples be ok with that?

## 2023-11-14 NOTE — TELEPHONE ENCOUNTER
Spoke with patient - denies any signs on bleeding but still worried about taking pepto bismol while being on eliquis.     [facility] provider wants to change Clonidine to losartan because he has been on clonidine for over a year and they think that is too long of a period to be on Clonidine.     Patient also asking if it is ok to take Gabapentin with his cardiac meds and conditions.     Please advise.

## 2023-11-14 NOTE — TELEPHONE ENCOUNTER
Spoke with patient - advised ok with medications changes.   Advised ok with taking pepto - just take as needed and As directed on bottle.   Patient understood

## 2023-11-15 ENCOUNTER — HOME CARE VISIT (OUTPATIENT)
Dept: HOME HEALTH SERVICES | Facility: HOME HEALTHCARE | Age: 83
End: 2023-11-15
Payer: MEDICARE

## 2023-11-15 VITALS
RESPIRATION RATE: 18 BRPM | DIASTOLIC BLOOD PRESSURE: 58 MMHG | TEMPERATURE: 98 F | SYSTOLIC BLOOD PRESSURE: 120 MMHG | HEART RATE: 78 BPM | OXYGEN SATURATION: 95 %

## 2023-11-15 PROCEDURE — G0299 HHS/HOSPICE OF RN EA 15 MIN: HCPCS

## 2023-11-15 NOTE — HOME HEALTH
pt sitting up and feeling better with decreased pain.  pt is motivated to improve and return to plf.   pt has been attempting hep review as able but with limitations and continued weakness.    pt was seen by neurosurgeon yesterday and reports he was cleared for surgery if he chooses, but will attempt new meds prior to this,  not yet obtained    pt was compliant,  participated well but with limitations and limited standing/gait tolerance and requested sitting several times.  pt tolerated this visit better than any visit to date with improved ability to perform ther ex.     pt was cued on proper form with hep review,  not yet ind with cues to properly contract/hold and to perform in the correct plane   pt was encouraged to use the walker and was encouraged to use at all times             plan for next  session-  cont PT with gait trg, hep review, balance trg,  transfer education all with lumbar stab techniques

## 2023-11-16 ENCOUNTER — HOME CARE VISIT (OUTPATIENT)
Dept: HOME HEALTH SERVICES | Facility: HOME HEALTHCARE | Age: 83
End: 2023-11-16
Payer: MEDICARE

## 2023-11-16 VITALS
SYSTOLIC BLOOD PRESSURE: 128 MMHG | DIASTOLIC BLOOD PRESSURE: 60 MMHG | OXYGEN SATURATION: 95 % | RESPIRATION RATE: 15 BRPM | HEART RATE: 82 BPM | TEMPERATURE: 98.2 F

## 2023-11-16 PROCEDURE — G0157 HHC PT ASSISTANT EA 15: HCPCS

## 2023-11-16 NOTE — HOME HEALTH
Routine Visit Note: Patient seated in living room upon arrival. States he is hving a very painful day today. Seen Dr. Gu on Monday and L3-5 are fractured, he states the fractures are healing but they are causing a lot of pain. Dr. Gu is consulting with another physician to see about pain injections. He also ordered a bone density test with and without contrast but they are concerned because of the contrast and his allergy to iodine. Cancelled appt today with [facility]ia [facility]y [facility] because of pain level today. Dr. Gu called in prescriptions for Gabapentin and Tylenol #3 that they are going to  and try tonight at bedtime. Patient is very down and upset that he is not able to live life normally due to this pain. Wife opened mail during visit and stated he got approved for his long term insurance and they will cover a lift chair. [facility] came out yesterday and measured to put ramp over the steps on the front of the house since steps are very difficult for patient to maneuver. Patient states his back pain is constant aching pain, has some pain down his left leg. States he has some numbness at times but no tingling. Discussion regarding comfort measures and means to distract himself during painful times, patient appreciative of conversation and suggestions. Verbalizes he is trying to stay positive.    Skill/education provided: Cardiopulmonary assessment, pain assessment and education, fall/safety education, medication education, psychosocial assessment and education    Patient/caregiver response: verbalized understanding of education and able to provide teachback to SN    Plan for next visit: Cardiopulmonary assessment, pain assessment and education, f/u if ramp installed, assess safety/falls, medication education    Other pertinent info:

## 2023-11-17 ENCOUNTER — HOME CARE VISIT (OUTPATIENT)
Dept: HOME HEALTH SERVICES | Facility: HOME HEALTHCARE | Age: 83
End: 2023-11-17
Payer: MEDICARE

## 2023-11-17 PROCEDURE — G0152 HHCP-SERV OF OT,EA 15 MIN: HCPCS

## 2023-11-17 NOTE — PROGRESS NOTES
Encounter Date:12/11/2023  Last seen 9/12/2023      Patient ID: Campbell Alex is a 83 y.o. male.         Chief Complaint:  Status post stent  Atrial fibrillation  Hypertension  Dyslipidemia        History of Present Illness     Since I have last seen, the patient has been without any chest discomfort ,shortness of breath, palpitations, dizziness or syncope.  Denies having any headache ,abdominal pain ,nausea, vomiting , diarrhea constipation, loss of weight or loss of appetite.  Denies having any excessive bruising ,hematuria or blood in the stool.  Patient is having occasional nosebleeds.    Review of all systems negative except as indicated.    Reviewed ROS.    Assessment and Plan         ///////////////////  History  ===========   - Atrial fibrillation  Has converted to sinus rhythm.  Patient is in sinus rhythm now.     -Anticoagulation-on Eliquis      -status post right coronary artery stent placement (2005 ).      Cardiac catheterization 03/10/2017 revealed normal left ventricular function.  Normal left main coronary artery. 50% lad distal to diagonal branch.  Ostial diagonal branch has 80-90% disease (moderate size vessel) RCA has 30-40% plaquing.  RCA stent was   patent.  One of the PDA branches had 70% disease at its ostium.     Echocardiogram 8/26/2023      Structurally and functionally normal cardiac valves except for mild tricuspid regurgitation.  No evidence for pulmonary hypertension is present..  Left ventricular size and contractility is normal with ejection fraction of 60%.  Stress Cardiolite test is negative for myocardial ischemia 08/01/2016.     -palpitations improved.  Holter monitor 01/18/2016 showed occasional premature atrial and ventricular contractions.    - History of weakness and COVID-19 infection.-Improved      -hypertension dyslipidemia       -benign prostatic hypertrophy       -psoriasis       -status post left ankle surgery  rotator cuff surgery umbilical hernia repair and surgery for  basal cell carcinoma of the nose.       -allergy to darvon codeine Relafen and IVP dye  ===============   Plan  =============   Atrial fibrillation  Has converted to sinus rhythm.-8/25/2023  EKG 9/12/2023-sinus rhythm  EKG was not performed today.     Anticoagulation  Patient is on Eliquis.  Observe for toxic effects.  Reduce dose to half a tablet twice daily for a day or 2 if he has any further nosebleeds.  Consider ENT evaluation.      -status post right coronary artery stent placement (2005 )  Patient is not having any angina pectoris or congestive heart failure.     Hypertension-  well-controlled.  140/70  Patient blood pressure is running normal at home.  Maintain blood pressure log.     Dyslipidemia-continue atorvastatin     medications were reviewed and updated.     Continue metoprolol to 25 mg twice daily.     Further plan will depend on patient's progress.     Reviewed and updated-12/11/2023  /////////////////////////////       Diagnosis Plan   1. Status post coronary artery stent placement        2. Essential hypertension        3. Mixed hyperlipidemia        4. Coronary artery disease involving native coronary artery of native heart without angina pectoris        LAB RESULTS (LAST 7 DAYS)    CBC        BMP        CMP         BNP        TROPONIN        CoAg        Creatinine Clearance  Estimated Creatinine Clearance: 77.3 mL/min (by C-G formula based on SCr of 0.78 mg/dL).    ABG        Radiology  No radiology results for the last day                The following portions of the patient's history were reviewed and updated as appropriate: allergies, current medications, past family history, past medical history, past social history, past surgical history, and problem list.    Review of Systems   Constitutional: Negative for malaise/fatigue.   Cardiovascular:  Negative for chest pain, leg swelling, palpitations and syncope.   Respiratory:  Negative for shortness of breath.    Skin:  Negative for rash.    Gastrointestinal:  Negative for nausea and vomiting.   Neurological:  Negative for dizziness, light-headedness and numbness.   All other systems reviewed and are negative.        Current Outpatient Medications:     acetaminophen (Tylenol) 325 MG tablet, Take 2 tablets by mouth Every 4 (Four) Hours As Needed for Fever or Mild Pain. Indications: Fever, Pain, Disp: , Rfl:     apixaban (ELIQUIS) 5 MG tablet tablet, Take 1 tablet by mouth 2 (Two) Times a Day., Disp: , Rfl:     aspirin (aspirin) 81 MG EC tablet, Take 1 tablet by mouth Daily. Indications: Disease involving Lipid Deposits in the Arteries, Disp: , Rfl:     atorvastatin (LIPITOR) 20 MG tablet, Take 1 tablet by mouth Every Night. Indications: High Amount of Fats in the Blood, Disp: , Rfl:     azelastine (ASTELIN) 0.1 % nasal spray, 2 sprays into the nostril(s) as directed by provider 2 (Two) Times a Day. Use in each nostril as directed, Disp: 30 mL, Rfl: 3    bisacodyl (DULCOLAX) 10 MG suppository, Insert 1 suppository into the rectum Daily., Disp: 10 suppository, Rfl: 0    Calcium Carbonate 1500 (600 Ca) MG tablet, Take 1 tablet by mouth Daily. Indications: Low Amount of Calcium in the Blood, Disp: , Rfl:     cloNIDine (CATAPRES) 0.2 MG tablet, TAKE 1 TABLET BY MOUTH TWICE A DAY, Disp: 180 tablet, Rfl: 0    colestipol (COLESTID) 1 g tablet, Take 1 tablet by mouth Daily. Indications: High Amount of Cholesterol in the Blood, Disp: , Rfl:     diphenhydrAMINE (Benadryl Allergy) 25 MG tablet, Take 1 tablet by mouth Every 6 (Six) Hours As Needed for Allergies or Sleep. Indications: Life-Threatening Hypersensitivity Reaction, Skin Reaction due to an Allergy, Trouble Sleeping, Disp: , Rfl:     Fiber powder, Take 1 dose by mouth Daily As Needed (constipation). Indications: constipation, Disp: , Rfl:     finasteride (PROSCAR) 5 MG tablet, Take 1 tablet by mouth Every Evening. Indications: Benign Enlargement of Prostate, Disp: , Rfl:     gabapentin (NEURONTIN) 100 MG  capsule, Take 1 capsule by mouth Daily. Indications: Neuropathic Pain, Disp: , Rfl:     Iron, Ferrous Sulfate, 325 (65 Fe) MG tablet, Take 1 tablet by mouth Daily. Indications: Anemia From Inadequate Iron in the Body, Iron Deficiency, Disp: , Rfl:     isosorbide mononitrate (IMDUR) 30 MG 24 hr tablet, Take 1 tablet by mouth Daily. Indications: Stable Angina Pectoris, Disp: , Rfl:     metoprolol tartrate (LOPRESSOR) 25 MG tablet, Take 1 tablet by mouth 2 (Two) Times a Day. Indications: High Blood Pressure Disorder, Disp: , Rfl:     Probiotic Product (PROBIOTIC DAILY PO), Take 1 capsule by mouth Daily. Indications: vitamin, Disp: , Rfl:     tamsulosin (FLOMAX) 0.4 MG capsule 24 hr capsule, Take 1 capsule by mouth 2 (Two) Times a Day. Indications: Benign Enlargement of Prostate, Disp: , Rfl:     acetaminophen-codeine (TYLENOL #3) 300-30 MG per tablet, Take 2 tablets by mouth Every 4 (Four) Hours As Needed. Indications: Mild to Moderate Pain (Patient not taking: Reported on 12/11/2023), Disp: , Rfl:     docusate sodium (COLACE) 100 MG capsule, Take 100 mg by mouth 2 (Two) Times a Day As Needed for Constipation. Indications: Constipation (Patient not taking: Reported on 11/10/2023), Disp: , Rfl:     polyethylene glycol (MIRALAX) 17 g packet, Take 17 g by mouth 2 (Two) Times a Day As Needed (constipation). Indications: Constipation (Patient not taking: Reported on 11/10/2023), Disp: , Rfl:     Vibegron (Gemtesa) 75 MG tablet, Take 1 tablet by mouth Daily. Indications: Urinary Incontinence (Patient not taking: Reported on 12/11/2023), Disp: , Rfl:     Allergies   Allergen Reactions    Codeine GI Intolerance    Iodine Hives    Nabumetone Unknown - High Severity    Propoxyphene Hives    Zoloft [Sertraline] GI Intolerance       Family History   Problem Relation Age of Onset    Arthritis Mother     Cancer Mother         Kj VICTORIA    Hypertension Brother     Hyperlipidemia Brother     Kidney disease Brother         KJ     "Diabetes Brother        Past Surgical History:   Procedure Laterality Date    ANKLE SURGERY      CARDIAC CATHETERIZATION  2020    COLONOSCOPY      CORONARY STENT PLACEMENT      EYE SURGERY  1960    NOSE SURGERY      ROTATOR CUFF REPAIR      SKIN CANCER EXCISION      L shoulder    SPINE SURGERY      UMBILICAL HERNIA REPAIR      UMBILICAL HERNIA REPAIR         Past Medical History:   Diagnosis Date    Allergic 1970    Anemia n0t sure    Anxiety     Arthritis     Benign prostatic hyperplasia 1980    Cancer     Cataract 1990    CHF (congestive heart failure) stint    Cholelithiasis 1958    Coronary artery disease     Erectile dysfunction 2020    HL (hearing loss) 1995    Hyperlipidemia     Hypertension     Osteopenia 1980    Prostatism     Renal insufficiency 1940    Suspected COVID-19 virus infection 09/07/2021    Urinary tract infection     Visual impairment 1948       Family History   Problem Relation Age of Onset    Arthritis Mother     Cancer Mother         Kj VICTORIA    Hypertension Brother     Hyperlipidemia Brother     Kidney disease Brother         KJ    Diabetes Brother        Social History     Socioeconomic History    Marital status:    Tobacco Use    Smoking status: Never     Passive exposure: Past    Smokeless tobacco: Never    Tobacco comments:     1-2/day   Vaping Use    Vaping Use: Never used   Substance and Sexual Activity    Alcohol use: Never     Comment: socially    Drug use: Never    Sexual activity: Not Currently     Partners: Female     Birth control/protection: None         Procedures      Objective:       Physical Exam    /72 (BP Location: Left arm, Patient Position: Sitting, Cuff Size: Adult)   Pulse 80   Ht 185.4 cm (73\")   Wt 78.9 kg (174 lb) Comment: verbal  SpO2 98% Comment: RA  BMI 22.96 kg/m²   The patient is alert, oriented and in no distress.    Vital signs as noted above.    Head and neck revealed no carotid bruits or jugular venous distension.  No thyromegaly or " lymphadenopathy is present.    Lungs clear.  No wheezing.  Breath sounds are normal bilaterally.    Heart normal first and second heart sounds.  No murmur..  No pericardial rub is present.  No gallop is present.    Abdomen soft and nontender.  No organomegaly is present.    Extremities revealed good peripheral pulses without any pedal edema.    Skin warm and dry.    Musculoskeletal system is grossly normal.    CNS grossly normal.    Reviewed and updated.

## 2023-11-17 NOTE — HOME HEALTH
pt up and is prepared for PT session.   feeling better but with continued back pain.  pt presents today with a back brace donned and requests education proper use.     pt reports he has been attempting hep review as tolerated but with limitations.     pt feels he is improving but remains concerned regarding continued weakness.   pt is on new meds ordered by Neuro.   meds updated.     pt was educated today on proper lumbar stab and back brace was donned throughout.  pt was minimally unsteady upon standing but was able to self correct with the walker.  pt has improved with PT and is tolerating the visits better,  but continues to struggle with limitations.   following hep review and during gait trg,  pt         plan for next session- cont/dc PT at time of next visit per progress to goals

## 2023-11-19 VITALS
DIASTOLIC BLOOD PRESSURE: 70 MMHG | TEMPERATURE: 98.6 F | SYSTOLIC BLOOD PRESSURE: 110 MMHG | OXYGEN SATURATION: 95 % | HEART RATE: 56 BPM

## 2023-11-20 NOTE — HOME HEALTH
Pt discharged from OT services as pt has met goals.  Pt and therapist in agreement with dc.   Pt to continue with HEP for maintenance.      Pt denies any falls or med changes

## 2023-11-21 ENCOUNTER — HOME CARE VISIT (OUTPATIENT)
Dept: HOME HEALTH SERVICES | Facility: HOME HEALTHCARE | Age: 83
End: 2023-11-21
Payer: MEDICARE

## 2023-11-21 ENCOUNTER — APPOINTMENT (OUTPATIENT)
Dept: CT IMAGING | Facility: HOSPITAL | Age: 83
End: 2023-11-21
Payer: MEDICARE

## 2023-11-21 ENCOUNTER — HOSPITAL ENCOUNTER (EMERGENCY)
Facility: HOSPITAL | Age: 83
Discharge: HOME | End: 2023-11-22
Attending: EMERGENCY MEDICINE | Admitting: EMERGENCY MEDICINE
Payer: MEDICARE

## 2023-11-21 VITALS
OXYGEN SATURATION: 99 % | DIASTOLIC BLOOD PRESSURE: 70 MMHG | HEART RATE: 76 BPM | TEMPERATURE: 98.4 F | SYSTOLIC BLOOD PRESSURE: 118 MMHG

## 2023-11-21 DIAGNOSIS — K59.00 CONSTIPATION, UNSPECIFIED CONSTIPATION TYPE: ICD-10-CM

## 2023-11-21 DIAGNOSIS — R10.84 GENERALIZED ABDOMINAL PAIN: Primary | ICD-10-CM

## 2023-11-21 LAB
ADV 40+41 DNA STL QL NAA+NON-PROBE: NOT DETECTED
ALBUMIN SERPL-MCNC: 4 G/DL (ref 3.5–5.2)
ALBUMIN/GLOB SERPL: 1.3 G/DL
ALP SERPL-CCNC: 99 U/L (ref 39–117)
ALT SERPL W P-5'-P-CCNC: 10 U/L (ref 1–41)
ANION GAP SERPL CALCULATED.3IONS-SCNC: 12 MMOL/L (ref 5–15)
AST SERPL-CCNC: 22 U/L (ref 1–40)
ASTRO TYP 1-8 RNA STL QL NAA+NON-PROBE: NOT DETECTED
BASOPHILS # BLD AUTO: 0.1 10*3/MM3 (ref 0–0.2)
BASOPHILS NFR BLD AUTO: 0.9 % (ref 0–1.5)
BILIRUB SERPL-MCNC: 0.4 MG/DL (ref 0–1.2)
BILIRUB UR QL STRIP: NEGATIVE
BUN SERPL-MCNC: 22 MG/DL (ref 8–23)
BUN/CREAT SERPL: 28.2 (ref 7–25)
C CAYETANENSIS DNA STL QL NAA+NON-PROBE: NOT DETECTED
C COLI+JEJ+UPSA DNA STL QL NAA+NON-PROBE: NOT DETECTED
CALCIUM SPEC-SCNC: 10 MG/DL (ref 8.6–10.5)
CHLORIDE SERPL-SCNC: 100 MMOL/L (ref 98–107)
CLARITY UR: CLEAR
CO2 SERPL-SCNC: 24 MMOL/L (ref 22–29)
COLOR UR: YELLOW
CREAT SERPL-MCNC: 0.78 MG/DL (ref 0.76–1.27)
CRYPTOSP DNA STL QL NAA+NON-PROBE: NOT DETECTED
DEPRECATED RDW RBC AUTO: 47.7 FL (ref 37–54)
E HISTOLYT DNA STL QL NAA+NON-PROBE: NOT DETECTED
EAEC PAA PLAS AGGR+AATA ST NAA+NON-PRB: NOT DETECTED
EC STX1+STX2 GENES STL QL NAA+NON-PROBE: NOT DETECTED
EGFRCR SERPLBLD CKD-EPI 2021: 87.4 ML/MIN/1.73
EOSINOPHIL # BLD AUTO: 0.3 10*3/MM3 (ref 0–0.4)
EOSINOPHIL NFR BLD AUTO: 3.9 % (ref 0.3–6.2)
EPEC EAE GENE STL QL NAA+NON-PROBE: NOT DETECTED
ERYTHROCYTE [DISTWIDTH] IN BLOOD BY AUTOMATED COUNT: 15.2 % (ref 12.3–15.4)
ETEC LTA+ST1A+ST1B TOX ST NAA+NON-PROBE: NOT DETECTED
G LAMBLIA DNA STL QL NAA+NON-PROBE: NOT DETECTED
GLOBULIN UR ELPH-MCNC: 3.2 GM/DL
GLUCOSE BLDC GLUCOMTR-MCNC: 113 MG/DL (ref 70–105)
GLUCOSE SERPL-MCNC: 111 MG/DL (ref 65–99)
GLUCOSE UR STRIP-MCNC: NEGATIVE MG/DL
HCT VFR BLD AUTO: 36.2 % (ref 37.5–51)
HEMOCCULT STL QL IA: POSITIVE
HGB BLD-MCNC: 12.3 G/DL (ref 13–17.7)
HGB UR QL STRIP.AUTO: NEGATIVE
HOLD SPECIMEN: NORMAL
KETONES UR QL STRIP: NEGATIVE
LEUKOCYTE ESTERASE UR QL STRIP.AUTO: NEGATIVE
LIPASE SERPL-CCNC: 15 U/L (ref 13–60)
LYMPHOCYTES # BLD AUTO: 1.8 10*3/MM3 (ref 0.7–3.1)
LYMPHOCYTES NFR BLD AUTO: 20.8 % (ref 19.6–45.3)
MCH RBC QN AUTO: 31.2 PG (ref 26.6–33)
MCHC RBC AUTO-ENTMCNC: 34 G/DL (ref 31.5–35.7)
MCV RBC AUTO: 91.5 FL (ref 79–97)
MONOCYTES # BLD AUTO: 1.1 10*3/MM3 (ref 0.1–0.9)
MONOCYTES NFR BLD AUTO: 13.3 % (ref 5–12)
NEUTROPHILS NFR BLD AUTO: 5.2 10*3/MM3 (ref 1.7–7)
NEUTROPHILS NFR BLD AUTO: 61.1 % (ref 42.7–76)
NITRITE UR QL STRIP: NEGATIVE
NOROVIRUS GI+II RNA STL QL NAA+NON-PROBE: NOT DETECTED
NRBC BLD AUTO-RTO: 0 /100 WBC (ref 0–0.2)
P SHIGELLOIDES DNA STL QL NAA+NON-PROBE: NOT DETECTED
PH UR STRIP.AUTO: 6.5 [PH] (ref 5–8)
PLATELET # BLD AUTO: 312 10*3/MM3 (ref 140–450)
PMV BLD AUTO: 7.7 FL (ref 6–12)
POTASSIUM SERPL-SCNC: 4.9 MMOL/L (ref 3.5–5.2)
PROT SERPL-MCNC: 7.2 G/DL (ref 6–8.5)
PROT UR QL STRIP: NEGATIVE
RBC # BLD AUTO: 3.96 10*6/MM3 (ref 4.14–5.8)
RVA RNA STL QL NAA+NON-PROBE: NOT DETECTED
S ENT+BONG DNA STL QL NAA+NON-PROBE: NOT DETECTED
SAPO I+II+IV+V RNA STL QL NAA+NON-PROBE: NOT DETECTED
SHIGELLA SP+EIEC IPAH ST NAA+NON-PROBE: NOT DETECTED
SODIUM SERPL-SCNC: 136 MMOL/L (ref 136–145)
SP GR UR STRIP: 1.01 (ref 1–1.03)
UROBILINOGEN UR QL STRIP: NORMAL
V CHOL+PARA+VUL DNA STL QL NAA+NON-PROBE: NOT DETECTED
V CHOLERAE DNA STL QL NAA+NON-PROBE: NOT DETECTED
WBC NRBC COR # BLD AUTO: 8.6 10*3/MM3 (ref 3.4–10.8)
WHOLE BLOOD HOLD COAG: NORMAL
Y ENTEROCOL DNA STL QL NAA+NON-PROBE: NOT DETECTED

## 2023-11-21 PROCEDURE — P9612 CATHETERIZE FOR URINE SPEC: HCPCS

## 2023-11-21 PROCEDURE — 82274 ASSAY TEST FOR BLOOD FECAL: CPT | Performed by: PHYSICIAN ASSISTANT

## 2023-11-21 PROCEDURE — 85025 COMPLETE CBC W/AUTO DIFF WBC: CPT

## 2023-11-21 PROCEDURE — 99284 EMERGENCY DEPT VISIT MOD MDM: CPT

## 2023-11-21 PROCEDURE — 25810000003 LACTATED RINGERS SOLUTION: Performed by: PHYSICIAN ASSISTANT

## 2023-11-21 PROCEDURE — 82948 REAGENT STRIP/BLOOD GLUCOSE: CPT

## 2023-11-21 PROCEDURE — 83690 ASSAY OF LIPASE: CPT | Performed by: PHYSICIAN ASSISTANT

## 2023-11-21 PROCEDURE — 74176 CT ABD & PELVIS W/O CONTRAST: CPT

## 2023-11-21 PROCEDURE — G0151 HHCP-SERV OF PT,EA 15 MIN: HCPCS

## 2023-11-21 PROCEDURE — 87507 IADNA-DNA/RNA PROBE TQ 12-25: CPT | Performed by: PHYSICIAN ASSISTANT

## 2023-11-21 PROCEDURE — 80053 COMPREHEN METABOLIC PANEL: CPT

## 2023-11-21 PROCEDURE — 81003 URINALYSIS AUTO W/O SCOPE: CPT

## 2023-11-21 RX ORDER — BISACODYL 10 MG
10 SUPPOSITORY, RECTAL RECTAL DAILY
Status: DISCONTINUED | OUTPATIENT
Start: 2023-11-22 | End: 2023-11-21

## 2023-11-21 RX ORDER — ACETAMINOPHEN AND CODEINE PHOSPHATE 300; 30 MG/1; MG/1
1 TABLET ORAL ONCE
Status: COMPLETED | OUTPATIENT
Start: 2023-11-21 | End: 2023-11-22

## 2023-11-21 RX ORDER — SODIUM CHLORIDE 0.9 % (FLUSH) 0.9 %
10 SYRINGE (ML) INJECTION AS NEEDED
Status: DISCONTINUED | OUTPATIENT
Start: 2023-11-21 | End: 2023-11-22 | Stop reason: HOSPADM

## 2023-11-21 RX ORDER — MINERAL OIL 100 G/100G
1 OIL RECTAL ONCE
Status: COMPLETED | OUTPATIENT
Start: 2023-11-21 | End: 2023-11-22

## 2023-11-21 RX ORDER — BISACODYL 10 MG
10 SUPPOSITORY, RECTAL RECTAL ONCE
Status: COMPLETED | OUTPATIENT
Start: 2023-11-21 | End: 2023-11-21

## 2023-11-21 RX ADMIN — BISACODYL 10 MG: 10 SUPPOSITORY RECTAL at 21:55

## 2023-11-21 RX ADMIN — SODIUM CHLORIDE, POTASSIUM CHLORIDE, SODIUM LACTATE AND CALCIUM CHLORIDE 500 ML: 600; 310; 30; 20 INJECTION, SOLUTION INTRAVENOUS at 20:36

## 2023-11-21 NOTE — HOME HEALTH
Routine Visit Note:   Skill/education provided: transfer training, balance and gait training.   Patient/caregiver response: tolerated with reports of fatigue.   Plan for next visit: transfer training, balance and gait training.  Other pertinent info: Pt requesting continued PT as he will be seeing neurosurgeon later today and will determine if pt will have surgery.

## 2023-11-22 ENCOUNTER — HOME CARE VISIT (OUTPATIENT)
Dept: HOME HEALTH SERVICES | Facility: HOME HEALTHCARE | Age: 83
End: 2023-11-22
Payer: MEDICARE

## 2023-11-22 VITALS
HEART RATE: 75 BPM | SYSTOLIC BLOOD PRESSURE: 158 MMHG | HEIGHT: 73 IN | OXYGEN SATURATION: 94 % | WEIGHT: 176.37 LBS | BODY MASS INDEX: 23.37 KG/M2 | TEMPERATURE: 97.9 F | DIASTOLIC BLOOD PRESSURE: 85 MMHG | RESPIRATION RATE: 17 BRPM

## 2023-11-22 RX ORDER — BISACODYL 10 MG
10 SUPPOSITORY, RECTAL RECTAL DAILY
Qty: 10 SUPPOSITORY | Refills: 0 | Status: SHIPPED | OUTPATIENT
Start: 2023-11-22

## 2023-11-22 RX ADMIN — ACETAMINOPHEN AND CODEINE PHOSPHATE 1 TABLET: 300; 30 TABLET ORAL at 00:01

## 2023-11-22 RX ADMIN — MINERAL OIL 1 ENEMA: 118 ENEMA RECTAL at 00:01

## 2023-11-22 NOTE — ED NOTES
Pt presented to the ED with complaints of diarrhea for the past 4 days. Pt states that the diarrhea became constant without stopping today. Pt stated that when he called his GI doctor they recommended him to come be evaluated in the ED. Pt has hx of spinal fusion and incontinence of the bladder. Pt is alert and oriented x4. Pt's family is at bedside. Pt placed in gown. Pt placed on continuous cardiac and oxygen saturation monitoring. PT is in ER stretcher with call light within reach with instructions to use. Pt is free from needs at this time.

## 2023-11-22 NOTE — ED PROVIDER NOTES
"Subjective   History of Present Illness  Chief Complaint: Diarrhea, abdominal cramping    Patient is a 83-year-old  male with history of CHF, anxiety, hypertension, renal insufficiency presents to the ER with complaints of abdominal cramping and diarrhea since last night.  He states that he has been having constant diarrhea all day long today.  He reports lower abdominal cramping that he rates as mild intensity.  No nausea or vomiting.  He reports dark stool but states that he has taken Pepto.  No dysuria or hematuria.  No chest pain or shortness of breath.  No headache lightheadedness or dizziness.  No fever.  No previous abdominal surgeries.  Family states that patient has \"flares\" of diarrhea, has been seen by GI previously but has no formal diagnoses.    PCP: Rocky Lozoya    History provided by:  Patient      Review of Systems   Constitutional:  Positive for fatigue. Negative for chills and fever.   HENT:  Negative for sore throat and trouble swallowing.    Eyes: Negative.    Respiratory:  Negative for shortness of breath and wheezing.    Cardiovascular:  Negative for chest pain.   Gastrointestinal:  Positive for abdominal pain and diarrhea. Negative for nausea and vomiting.   Endocrine: Negative.    Genitourinary:  Negative for dysuria.   Musculoskeletal:  Negative for myalgias.   Skin:  Negative for rash.   Allergic/Immunologic: Negative.    Neurological:  Negative for weakness and headaches.   Psychiatric/Behavioral:  Negative for behavioral problems.    All other systems reviewed and are negative.      Past Medical History:   Diagnosis Date    Allergic 1970    Anemia n0t sure    Anxiety     Arthritis     Benign prostatic hyperplasia 1980    Cancer     Cataract 1990    CHF (congestive heart failure) stint    Cholelithiasis 1958    Coronary artery disease     Erectile dysfunction 2020    HL (hearing loss) 1995    Hyperlipidemia     Hypertension     Osteopenia 1980    Prostatism     Renal insufficiency " 1940    Suspected COVID-19 virus infection 09/07/2021    Urinary tract infection     Visual impairment 1948       Allergies   Allergen Reactions    Codeine GI Intolerance    Iodine Hives    Nabumetone Unknown - High Severity    Propoxyphene Hives    Zoloft [Sertraline] GI Intolerance       Past Surgical History:   Procedure Laterality Date    ANKLE SURGERY      CARDIAC CATHETERIZATION  2020    COLONOSCOPY      CORONARY STENT PLACEMENT      EYE SURGERY  1960    NOSE SURGERY      ROTATOR CUFF REPAIR      SKIN CANCER EXCISION      L shoulder    SPINE SURGERY      UMBILICAL HERNIA REPAIR      UMBILICAL HERNIA REPAIR         Family History   Problem Relation Age of Onset    Arthritis Mother     Cancer Mother         Kj VICTORIA    Hypertension Brother     Hyperlipidemia Brother     Kidney disease Brother         KJ    Diabetes Brother        Social History     Socioeconomic History    Marital status:    Tobacco Use    Smoking status: Never     Passive exposure: Past    Smokeless tobacco: Never    Tobacco comments:     1-2/day   Vaping Use    Vaping Use: Never used   Substance and Sexual Activity    Alcohol use: Never     Comment: socially    Drug use: Never    Sexual activity: Not Currently     Partners: Female     Birth control/protection: None           Objective   Physical Exam  Vitals and nursing note reviewed.   Constitutional:       Appearance: Normal appearance. He is normal weight.   HENT:      Head: Normocephalic and atraumatic.      Mouth/Throat:      Mouth: Mucous membranes are moist.   Eyes:      Extraocular Movements: Extraocular movements intact.      Pupils: Pupils are equal, round, and reactive to light.   Cardiovascular:      Rate and Rhythm: Normal rate and regular rhythm.      Pulses: Normal pulses.      Heart sounds: Normal heart sounds. No murmur heard.  Pulmonary:      Effort: Pulmonary effort is normal.      Breath sounds: Normal breath sounds.   Abdominal:      General: Abdomen is flat.  "     Palpations: Abdomen is soft.      Tenderness: There is abdominal tenderness. There is no guarding.   Genitourinary:     Rectum: Normal. Guaiac result positive.      Comments: Fecal impaction noted on RICHA  No hematochezia  Musculoskeletal:         General: Normal range of motion.      Cervical back: Normal range of motion.   Skin:     General: Skin is warm.      Capillary Refill: Capillary refill takes less than 2 seconds.      Findings: No bruising.   Neurological:      General: No focal deficit present.      Mental Status: He is alert. Mental status is at baseline.      Cranial Nerves: No cranial nerve deficit.      Motor: No weakness.   Psychiatric:         Mood and Affect: Mood normal.         Behavior: Behavior normal.         Procedures           ED Course  ED Course as of 11/22/23 0522 Tue Nov 21, 2023 2045 Patient had 600 mL out of his bladder by In-N-Out catheter.  CT scan shows severe constipation [MC]   2137 Manual disimpaction performed, will do suppository and then retry enema []   2326 Patient has some relief and improvement after initial enema.  Nurse reported brown liquid that was removed, no formed stool.  We will try mineral oil enema [MC]      ED Course User Index  [MC] Jocelin Austin PA    /85   Pulse 75   Temp 97.9 °F (36.6 °C) (Oral)   Resp 17   Ht 185.4 cm (73\")   Wt 80 kg (176 lb 5.9 oz)   SpO2 94%   BMI 23.27 kg/m²   Labs Reviewed   COMPREHENSIVE METABOLIC PANEL - Abnormal; Notable for the following components:       Result Value    Glucose 111 (*)     BUN/Creatinine Ratio 28.2 (*)     All other components within normal limits    Narrative:     GFR Normal >60  Chronic Kidney Disease <60  Kidney Failure <15    The GFR formula is only valid for adults with stable renal function between ages 18 and 70.   CBC WITH AUTO DIFFERENTIAL - Abnormal; Notable for the following components:    RBC 3.96 (*)     Hemoglobin 12.3 (*)     Hematocrit 36.2 (*)     Monocyte % 13.3 (*)     " Monocytes, Absolute 1.10 (*)     All other components within normal limits   OCCULT BLOOD, FECAL BY IMMUNOASSAY - Abnormal; Notable for the following components:    Occult Blood, Fecal by Immunoassay Positive (*)     All other components within normal limits   POCT GLUCOSE FINGERSTICK - Abnormal; Notable for the following components:    Glucose 113 (*)     All other components within normal limits   GASTROINTESTINAL PANEL, PCR (PREFERRED) DOES NOT INCLUDE CDIFF - Normal    Narrative:     If Aeromonas, Staphylococcus aureus or Bacillus cereus are suspected, please order [id number]: Stool Culture, Aeromonas, S aureus, B Cereus.   URINALYSIS W/ MICROSCOPIC IF INDICATED (NO CULTURE) - Normal    Narrative:     Urine microscopic not indicated.   LIPASE - Normal   RAINBOW DRAW    Narrative:     The following orders were created for panel order [place] Draw.  Procedure                               Abnormality         Status                     ---------                               -----------         ------                     Gold Top - SST[[id number]]                                   Final result               Light Blue Top[[id number]]                                   Final result                 Please view results for these tests on the individual orders.   CBC AND DIFFERENTIAL    Narrative:     The following orders were created for panel order CBC & Differential.  Procedure                               Abnormality         Status                     ---------                               -----------         ------                     CBC Auto Differential[[id number]]        Abnormal            Final result                 Please view results for these tests on the individual orders.   GOLD TOP - SST   LIGHT BLUE TOP     Medications   lactated ringers bolus 500 mL (0 mL Intravenous Stopped 11/21/23 2131)   bisacodyl (DULCOLAX) suppository 10 mg (10 mg Rectal Given 11/21/23 2155)   mineral oil enema 1 enema (1 enema  Rectal Given 11/22/23 0001)   acetaminophen-codeine (TYLENOL with CODEINE #3) 300-30 MG per tablet 1 tablet (1 tablet Oral Given 11/22/23 0001)     CT Abdomen Pelvis Without Contrast    Result Date: 11/21/2023  Impression: Findings are concerning for constipation. A large stool ball is noted within the rectum with associated stercoral colitis/proctitis. Circumferential bladder wall thickening may be due to under distention. Please correlate clinically for cystitis. Unchanged appearance of severe compression deformity of L4 vertebral body. Nonspecific mild bronchial wall thickening and bronchiectasis in the visualized lung bases. This may represent bronchitis in the correct clinical setting. Electronically Signed: Yoan Givens DO  11/21/2023 8:34 PM EST  Workstation ID: [id number]                                          Medical Decision Making  Differential Dx (Includes but not limited to): Constipation, fecal impaction, gastroenteritis, diverticulitis  Medical Records Reviewed: 2 D Echo    ·  Left ventricular ejection fraction appears to be 56 - 60%.  ·  Estimated right ventricular systolic pressure from tricuspid regurgitation is normal (<35 mmHg).    Labs: On my interpretation, GI panel negative for infection.  Fecal occult positive.  Urinalysis negative for UTI.  CBC no leukocytosis.  CMP glucose 111.  Imaging: On my interpretation, moderate constipation with large stool ball in the rectum  Telemetry: N/A  Testing considered but not ordered: Chest x-ray patient denies chest pain or shortness of breath  Nature of Complaint: Acute  Admission vs Discharge: Discharge  Discussion: While in the ED IV was placed and labs were obtained appropriate PPE was worn during exam and throughout all encounters with the patient.  Patient is afebrile evaluation.  He is afebrile, nontoxic appearance in no acute distress.  Lab work reassuring.  In and out urine catheter removed 600 mL from the patient's bladder, he reported  feeling better after this.  CT imaging suggestive of moderate constipation with large fecal impaction within the rectum.  Large ball of stool likely contributing to acute urinary retention.  Manual disimpaction was performed and patient did have some improvement.  Patient was given suppository followed by an enema with successful bowel movement and a lot of brown liquid.  Patient does report feeling better, however there was not a large ball of stool that had been removed.  Patient was complaining of chronic pain and requested his nighttime pain medication, this was ordered.  Offered repeat enema in the emergency department for further treatment for severe constipation, patient declined, states he prefers to do this at home.  Prescription sent for suppository as well as enema.  Recommended follow-up with PCP and GI for recheck.    Discharge plan and instructions were discussed with the patient who verbalized understanding and is in agreement with the plan, all questions were answered at this time.  Patient is aware of signs symptoms that would require immediate return to the emergency room.  Patient understands importance of following up with primary care provider for further evaluation and worsening concerns as well as blood pressure recheck in the next 4 weeks.    Patient was discharged in improved stable condition with an upright steady gait.    Patient is aware that discharge does not mean that nothing is wrong but indicates no emergencies present and they must continue care with follow-up as given below or physician of their choice.    Problems Addressed:  Constipation, unspecified constipation type: acute illness or injury  Generalized abdominal pain: acute illness or injury    Amount and/or Complexity of Data Reviewed  Labs: ordered. Decision-making details documented in ED Course.  Radiology: ordered. Decision-making details documented in ED Course.    Risk  OTC drugs.  Prescription drug  management.        Final diagnoses:   Generalized abdominal pain   Constipation, unspecified constipation type       ED Disposition  ED Disposition       ED Disposition   Discharge    Condition   Stable    Comment   --               Pedro Lozoya, DO  [address] [facility]  [address]  [place] [id number] [phone]    Schedule an appointment as soon as possible for a visit in 2 days  As needed, If symptoms worsen    Xiang Ash MD  [address] [facility] [place] [id number] [phone]    Schedule an appointment as soon as possible for a visit in 2 days  As needed, If symptoms worsen         Medication List        New Prescriptions      bisacodyl 10 MG suppository  Commonly known as: DULCOLAX  Insert 1 suppository into the rectum Daily.               Where to Get Your Medications        These medications were sent to [facility] [id number] - [place], [place] - [address] - [phone]  - [phone] FX  [address] [place] [address] [place] [address]      Phone: [phone]   bisacodyl 10 MG suppository            Jocelin Austin PA  11/22/23 0524

## 2023-11-22 NOTE — DISCHARGE INSTRUCTIONS
Your medication as prescribed.    Continue your MiraLAX as prescribed.  Recommend suppository daily until you facilitate regular bowel movements.    Can try enema if suppository does not work.  You can ask the home health nurse to help with enema.    Follow-up with primary care this week for recheck  Follow-up with GI for recheck    Return to the ER for new or worsening symptoms

## 2023-11-22 NOTE — ED NOTES
Pt cleaned up due to having bowel movement. Applied zinc oxide cream to inner thighs and scrotum region due to reddened area from wiping.

## 2023-11-22 NOTE — ED NOTES
Pt declined to do further enemas in ed. Pt states he wants to go home and continue them there. Family educated about how to use enema kit. Son verbalized understanding. Provider notified

## 2023-11-22 NOTE — ED NOTES
Soap suds enema performed. Pt had no chunks come out only watery stool. Pt tolerated well. Pt states he feels a lot more relief and that he does feel better than when he first came in. Pt cleaned up after enema. Pt given new gown and new bedding.

## 2023-11-27 ENCOUNTER — HOME CARE VISIT (OUTPATIENT)
Dept: HOME HEALTH SERVICES | Facility: HOME HEALTHCARE | Age: 83
End: 2023-11-27
Payer: MEDICARE

## 2023-11-27 VITALS
DIASTOLIC BLOOD PRESSURE: 84 MMHG | SYSTOLIC BLOOD PRESSURE: 140 MMHG | OXYGEN SATURATION: 99 % | HEART RATE: 92 BPM | TEMPERATURE: 98 F

## 2023-11-27 PROCEDURE — G0151 HHCP-SERV OF PT,EA 15 MIN: HCPCS

## 2023-11-28 ENCOUNTER — HOME CARE VISIT (OUTPATIENT)
Dept: HOME HEALTH SERVICES | Facility: HOME HEALTHCARE | Age: 83
End: 2023-11-28
Payer: MEDICARE

## 2023-11-28 VITALS
OXYGEN SATURATION: 98 % | RESPIRATION RATE: 18 BRPM | HEART RATE: 68 BPM | SYSTOLIC BLOOD PRESSURE: 132 MMHG | DIASTOLIC BLOOD PRESSURE: 60 MMHG | TEMPERATURE: 98.6 F

## 2023-11-28 PROCEDURE — G0299 HHS/HOSPICE OF RN EA 15 MIN: HCPCS

## 2023-11-28 NOTE — HOME HEALTH
Routine Visit Note: Medications reviewed. Appetite fair. CP assessed. Patient and spouse requested for another visit to be added next week to assess GI status.     Skill/education provided: Vital signs stable. No wounds present. Cardiopulmonary assessment completed. Patient denies falls. Education provided about bowel movements and schedule. No edema present.     Patient/caregiver response: Patient verbilized understanding.     Plan for next visit: Vital signs, review medications, cardiopulmonary assessment, assess falls and pain, cp assess, assess GI status, assess blood sugar    Other pertinent info: na

## 2023-11-28 NOTE — HOME HEALTH
Must be completed and at least every 30 days.   Clinical condition of patient at initial or last assessment:   Pt requires I with bed mobility, sba to supervision with transfers. SBA with ambulation x 50 ft with use of wheeled walker.    Current clinical condition of patient:   Pt requires I with bed mobility, CGA to SBA with transfers.CGA to SBA with ambulation x 40 ft with use of wheeled walker.   Overall progress towards measurable treatment goals:   Pt making some progress towards goals.   Effectiveness of current plan:   Pt with slight decline in function. Pt has fracture in lumbar spine and retropulsio in canal.   Plan for continuing or discontinuing service:   Will continue PT into next recertification period. Neurosurgeon recommending patient to continue PT.    Changes to goals or care plan update to be completed in guideline section and communicated to MD:   Added 1 PT visits.   Statement of expectation of continued progress toward goals:   Pt with fair prognosis for meeting goals.   Why is it necessary for therapy to continue?   Skilled PT required to improve safe mobility at home.   Routine Visit Note:   Skill/education provided: thera ex, transfer training, balance and gait training.   Patient/caregiver response: tolerated with reports of fatigue. Pt with improved understaning of HEP.  Plan for next visit: thera ex, transfer training, balance and gait training.

## 2023-11-29 ENCOUNTER — HOME CARE VISIT (OUTPATIENT)
Dept: HOME HEALTH SERVICES | Facility: HOME HEALTHCARE | Age: 83
End: 2023-11-29
Payer: MEDICARE

## 2023-11-29 ENCOUNTER — HOSPITAL ENCOUNTER (OUTPATIENT)
Dept: NUCLEAR MEDICINE | Facility: HOSPITAL | Age: 83
Discharge: HOME | End: 2023-11-29
Payer: MEDICARE

## 2023-11-29 DIAGNOSIS — M80.88XG OSTEOPOROTIC COMPRESSION FRACTURE OF SPINE WITH DELAYED HEALING: ICD-10-CM

## 2023-11-29 PROCEDURE — 78306 BONE IMAGING WHOLE BODY: CPT

## 2023-11-29 PROCEDURE — 0 TECHNETIUM MEDRONATE KIT: Performed by: STUDENT IN AN ORGANIZED HEALTH CARE EDUCATION/TRAINING PROGRAM

## 2023-11-29 PROCEDURE — A9503 TC99M MEDRONATE: HCPCS | Performed by: STUDENT IN AN ORGANIZED HEALTH CARE EDUCATION/TRAINING PROGRAM

## 2023-11-29 RX ORDER — TC 99M MEDRONATE 20 MG/10ML
26 INJECTION, POWDER, LYOPHILIZED, FOR SOLUTION INTRAVENOUS
Status: COMPLETED | OUTPATIENT
Start: 2023-11-29 | End: 2023-11-29

## 2023-11-29 RX ADMIN — TC 99M MEDRONATE 26 MILLICURIE: 20 INJECTION, POWDER, LYOPHILIZED, FOR SOLUTION INTRAVENOUS at 11:30

## 2023-12-01 ENCOUNTER — HOME CARE VISIT (OUTPATIENT)
Dept: HOME HEALTH SERVICES | Facility: HOME HEALTHCARE | Age: 83
End: 2023-12-01
Payer: MEDICARE

## 2023-12-01 VITALS
TEMPERATURE: 98 F | DIASTOLIC BLOOD PRESSURE: 78 MMHG | HEART RATE: 83 BPM | OXYGEN SATURATION: 95 % | SYSTOLIC BLOOD PRESSURE: 128 MMHG

## 2023-12-01 PROCEDURE — G0151 HHCP-SERV OF PT,EA 15 MIN: HCPCS

## 2023-12-01 NOTE — HOME HEALTH
60 Day Summary  Home Health need continues for: PT  Primary diagnoses/co-morbidities/recent procedures in past 60 days that impact current episode: Spinal stenosis, lumbar region with neurogenic claudication     Current level of functional ability: requires mod assist with transfers and min assist with ambulation.   Homebound status and living arrangements: Lives with spouse who is primary caregiver.  Goals accomplished and/or measurable progress toward unmet goals in past 60 days: transfers and gait goals.   Focus of care for next 60 days for each discipline ordered: Spinal stenosis, lumbar region with neurogenic claudication     Skin integrity/wound status: intact.  Estimated date when home care services will end : 12 .22.23  SDOH changes/barriers (i.e. Caregiver availability, social isolation, environment, income, transportation access, food insecurity etc.) none  Need for MSW referral? N  Plan for next visit thera ex, transfer training, balance and gait training. 2w1 3w2

## 2023-12-04 ENCOUNTER — HOME CARE VISIT (OUTPATIENT)
Dept: HOME HEALTH SERVICES | Facility: HOME HEALTHCARE | Age: 83
End: 2023-12-04
Payer: MEDICARE

## 2023-12-04 VITALS
DIASTOLIC BLOOD PRESSURE: 76 MMHG | OXYGEN SATURATION: 98 % | HEART RATE: 89 BPM | TEMPERATURE: 98.9 F | RESPIRATION RATE: 17 BRPM | SYSTOLIC BLOOD PRESSURE: 124 MMHG

## 2023-12-04 PROCEDURE — G0299 HHS/HOSPICE OF RN EA 15 MIN: HCPCS

## 2023-12-06 ENCOUNTER — HOME CARE VISIT (OUTPATIENT)
Dept: HOME HEALTH SERVICES | Facility: HOME HEALTHCARE | Age: 83
End: 2023-12-06
Payer: MEDICARE

## 2023-12-06 VITALS
DIASTOLIC BLOOD PRESSURE: 70 MMHG | RESPIRATION RATE: 14 BRPM | HEART RATE: 72 BPM | OXYGEN SATURATION: 96 % | SYSTOLIC BLOOD PRESSURE: 132 MMHG | TEMPERATURE: 97.2 F

## 2023-12-06 PROCEDURE — G0157 HHC PT ASSISTANT EA 15: HCPCS

## 2023-12-07 ENCOUNTER — HOME CARE VISIT (OUTPATIENT)
Dept: HOME HEALTH SERVICES | Facility: HOME HEALTHCARE | Age: 83
End: 2023-12-07
Payer: MEDICARE

## 2023-12-07 VITALS
HEART RATE: 77 BPM | OXYGEN SATURATION: 96 % | SYSTOLIC BLOOD PRESSURE: 128 MMHG | TEMPERATURE: 97.9 F | DIASTOLIC BLOOD PRESSURE: 66 MMHG | RESPIRATION RATE: 15 BRPM

## 2023-12-07 PROCEDURE — G0157 HHC PT ASSISTANT EA 15: HCPCS

## 2023-12-07 NOTE — HOME HEALTH
pt sitting up and eating breakfast on arrival.     pt is ok to participate and motivated to improve.       pt reports no falls and no med changes.    pt is motivated to improve but is very concerned regarding increased weakness and fatigue.   pt reports he may be having surgery within the next 3 weeks.           pt was very limited and standing tolerance was poor.  pt was able to stand only up to 1-2 mins prior to fatigue.  pt was unable to ambulate today with c/o weakness in the LEs.     pt required very close assistance to stand with cues to contract the quads as able.           plan for next session- cont PT with standing,   transfers and hep review

## 2023-12-08 NOTE — HOME HEALTH
pt up in his wc on arrival, reports his surgery may be 1/6/24 for a Laminectomy.  pt is motivated to improve prior to surgery, reporting continued weakness and functional deficits.      pt has been mobile in the home with the wc and ambulation is currently very limited.  pt and spouse request car transfer trg today       pt was challenged today to stand for extended periods of time and became very weak upon standing.   pt was too weak to ambulate and requested holding.    pt needed instruction throughout to properly perform ther ex with cues given to contract/hold and to avoid compensation.  pt was educated on proper transfer technique in and out of the car with cues given to properly position the wc and to correctly pivot.    spouse was present and educated as well         plan for next session-  cont PT with standing and transfer trg as tolerated, gait trg as able.    cont with PRE toward est PT goals

## 2023-12-11 ENCOUNTER — OFFICE VISIT (OUTPATIENT)
Dept: CARDIOLOGY | Facility: CLINIC | Age: 83
End: 2023-12-11
Payer: MEDICARE

## 2023-12-11 VITALS
OXYGEN SATURATION: 98 % | WEIGHT: 174 LBS | BODY MASS INDEX: 23.06 KG/M2 | DIASTOLIC BLOOD PRESSURE: 72 MMHG | SYSTOLIC BLOOD PRESSURE: 140 MMHG | HEART RATE: 80 BPM | HEIGHT: 73 IN

## 2023-12-11 DIAGNOSIS — E78.2 MIXED HYPERLIPIDEMIA: ICD-10-CM

## 2023-12-11 DIAGNOSIS — I10 ESSENTIAL HYPERTENSION: ICD-10-CM

## 2023-12-11 DIAGNOSIS — I25.10 CORONARY ARTERY DISEASE INVOLVING NATIVE CORONARY ARTERY OF NATIVE HEART WITHOUT ANGINA PECTORIS: ICD-10-CM

## 2023-12-11 DIAGNOSIS — Z95.5 STATUS POST CORONARY ARTERY STENT PLACEMENT: Primary | ICD-10-CM

## 2023-12-11 PROCEDURE — 3078F DIAST BP <80 MM HG: CPT | Performed by: INTERNAL MEDICINE

## 2023-12-11 PROCEDURE — 1160F RVW MEDS BY RX/DR IN RCRD: CPT | Performed by: INTERNAL MEDICINE

## 2023-12-11 PROCEDURE — 3077F SYST BP >= 140 MM HG: CPT | Performed by: INTERNAL MEDICINE

## 2023-12-11 PROCEDURE — 1159F MED LIST DOCD IN RCRD: CPT | Performed by: INTERNAL MEDICINE

## 2023-12-11 PROCEDURE — 99214 OFFICE O/P EST MOD 30 MIN: CPT | Performed by: INTERNAL MEDICINE

## 2023-12-12 ENCOUNTER — HOME CARE VISIT (OUTPATIENT)
Dept: HOME HEALTH SERVICES | Facility: HOME HEALTHCARE | Age: 83
End: 2023-12-12
Payer: MEDICARE

## 2023-12-12 VITALS
TEMPERATURE: 98 F | DIASTOLIC BLOOD PRESSURE: 70 MMHG | SYSTOLIC BLOOD PRESSURE: 124 MMHG | OXYGEN SATURATION: 98 % | HEART RATE: 94 BPM

## 2023-12-12 PROCEDURE — G0151 HHCP-SERV OF PT,EA 15 MIN: HCPCS

## 2023-12-12 NOTE — HOME HEALTH
Routine Visit Note:   Skill/education provided: thera ex, transfer training, balance training.   Patient/caregiver response: tolerated with reports of fatigue.  Plan for next visit: thera ex, transfer training, balance and gait training as tolerated.

## 2023-12-13 ENCOUNTER — HOME CARE VISIT (OUTPATIENT)
Dept: HOME HEALTH SERVICES | Facility: HOME HEALTHCARE | Age: 83
End: 2023-12-13
Payer: MEDICARE

## 2023-12-13 VITALS
RESPIRATION RATE: 15 BRPM | SYSTOLIC BLOOD PRESSURE: 130 MMHG | DIASTOLIC BLOOD PRESSURE: 66 MMHG | HEART RATE: 82 BPM | OXYGEN SATURATION: 95 % | TEMPERATURE: 98 F

## 2023-12-13 PROCEDURE — G0157 HHC PT ASSISTANT EA 15: HCPCS

## 2023-12-14 ENCOUNTER — HOME CARE VISIT (OUTPATIENT)
Dept: HOME HEALTH SERVICES | Facility: HOME HEALTHCARE | Age: 83
End: 2023-12-14
Payer: MEDICARE

## 2023-12-14 VITALS
TEMPERATURE: 98.1 F | HEART RATE: 82 BPM | RESPIRATION RATE: 15 BRPM | DIASTOLIC BLOOD PRESSURE: 66 MMHG | OXYGEN SATURATION: 98 % | SYSTOLIC BLOOD PRESSURE: 128 MMHG

## 2023-12-14 PROCEDURE — G0157 HHC PT ASSISTANT EA 15: HCPCS

## 2023-12-14 NOTE — HOME HEALTH
visit was scheduled and time agreed upon,  arrived and pt was eating breakfast and slightly agitated stating he is running behind this morning.    pt agrees to PT session and reports no new pain or other complications.  pt now reporting surgery is scheduled for 1/8/24.      pt was limited today in all areas,  with weakness and functional deficits.  pt was educated on proper form with hep review,   needing cues to properly contract/hold and to perform in the correct plane.  pt was minimally unsteady upon standing but was able to self correct.    pt was challenged today to stand for extended periods of time and needed cues to stand upright with proper posture.                an foplr next session-  cont PT with standing and transfers,   hep review,  balance trg and advance all activities as able-  gait trg as pt is able

## 2023-12-15 NOTE — HOME HEALTH
pt prepared for PT session,  no new c/o's and eager to participate with PT   pt reporting he has been attempting hep review but with continued limitations.            pt was limited today in all areas,  struggled at times with weakness and fatigue and needed several rest periods with a work/rest ratio of 50/50.  pt was minimally unsteady upon standing and needed cues to properly position the LEs prior to standing with proper knee flexion.  pt was educated today on proper form with hep review, not yet ind with cues needed to maintain proper plane and to sustain contractions as able.            plan for next session-   cont PT with gait trg, hep review, transfer trg,  balance trg and advance as tolerated to goals

## 2023-12-19 ENCOUNTER — OFFICE VISIT (OUTPATIENT)
Dept: FAMILY MEDICINE CLINIC | Facility: CLINIC | Age: 83
End: 2023-12-19
Payer: MEDICARE

## 2023-12-19 ENCOUNTER — HOME CARE VISIT (OUTPATIENT)
Dept: HOME HEALTH SERVICES | Facility: HOME HEALTHCARE | Age: 83
End: 2023-12-19
Payer: MEDICARE

## 2023-12-19 VITALS
RESPIRATION RATE: 18 BRPM | HEART RATE: 82 BPM | DIASTOLIC BLOOD PRESSURE: 69 MMHG | HEIGHT: 73 IN | SYSTOLIC BLOOD PRESSURE: 129 MMHG | BODY MASS INDEX: 22.96 KG/M2 | OXYGEN SATURATION: 98 %

## 2023-12-19 VITALS
TEMPERATURE: 97.8 F | HEART RATE: 82 BPM | OXYGEN SATURATION: 94 % | DIASTOLIC BLOOD PRESSURE: 64 MMHG | SYSTOLIC BLOOD PRESSURE: 124 MMHG | RESPIRATION RATE: 13 BRPM

## 2023-12-19 DIAGNOSIS — M54.42 CHRONIC BILATERAL LOW BACK PAIN WITH LEFT-SIDED SCIATICA: ICD-10-CM

## 2023-12-19 DIAGNOSIS — R22.32 NODULE OF SKIN OF LEFT HAND: Primary | ICD-10-CM

## 2023-12-19 DIAGNOSIS — H53.9 VISION CHANGES: ICD-10-CM

## 2023-12-19 DIAGNOSIS — G89.29 CHRONIC BILATERAL LOW BACK PAIN WITH LEFT-SIDED SCIATICA: ICD-10-CM

## 2023-12-19 PROCEDURE — 3074F SYST BP LT 130 MM HG: CPT | Performed by: STUDENT IN AN ORGANIZED HEALTH CARE EDUCATION/TRAINING PROGRAM

## 2023-12-19 PROCEDURE — G0157 HHC PT ASSISTANT EA 15: HCPCS

## 2023-12-19 PROCEDURE — 3078F DIAST BP <80 MM HG: CPT | Performed by: STUDENT IN AN ORGANIZED HEALTH CARE EDUCATION/TRAINING PROGRAM

## 2023-12-19 PROCEDURE — 99214 OFFICE O/P EST MOD 30 MIN: CPT | Performed by: STUDENT IN AN ORGANIZED HEALTH CARE EDUCATION/TRAINING PROGRAM

## 2023-12-19 NOTE — PROGRESS NOTES
"Chief Complaint  Chief Complaint   Patient presents with    Hand Pain     Bump on top of left hand    Memory Loss    Eye Problem     Subjective        Campbell Alex is a 83 y.o. male who presents to [facility].    History of Present Illness  Here for acute visit.  Bump on L hand.    It has been present for months.  He tends to bump it a lot.  It is painful when he bumps it.  It is not open.  It does not drain.  It does not bleed.  It has not changed much in size.      Having some vision difficulties.  He has not seen his eye dr recently.    He continues to deal with lower back issues.  As of now he is scheduled with Dr. Gu for surgery in early January.  He has spoken with a couple neurosurgeons from [place] who have given him different opinions so he and his wife are currently weighing the options.      Objective   /69   Pulse 82   Resp 18   Ht 185.4 cm (73\")   SpO2 98%   BMI 22.96 kg/m²     Estimated body mass index is 22.96 kg/m² as calculated from the following:    Height as of this encounter: 185.4 cm (73\").    Weight as of 12/11/23: 78.9 kg (174 lb).     Physical Exam   GEN: In no acute distress, non toxic appearing  SKIN: L hand dorsal surface nodule on skin surface, rough with scale, no surrounding redness, no opening or drainage.  Mild ttp.     Result Review :              Assessment and Plan     Diagnoses and all orders for this visit:    1. Nodule of skin of left hand (Primary)  Possible cyst underneath surface of skin that has repeatedly been bumped and irritated / inflamed.  Ultimately not worrisome based on appearance today.  I did recommend covering it with padded bandage to try and prevent further irritation.  Recommend showing it to his dermatologist at his next appt for their opinion.    2. Vision changes  Recommend f/u with eye     3. Chronic bilateral low back pain with left-sided sciatica  Currently scheduled for spinal surgery but weighing " options.  Continue gabapentin 100 mg daily.       Follow Up   Has f/u scheduled in February

## 2023-12-20 ENCOUNTER — HOME CARE VISIT (OUTPATIENT)
Dept: HOME HEALTH SERVICES | Facility: HOME HEALTHCARE | Age: 83
End: 2023-12-20
Payer: MEDICARE

## 2023-12-20 NOTE — HOME HEALTH
pt sitting up and prepared for PT session,     had earlier am meds and denies any new pain today.   pt reporting he has been attempting hep review,     pt reporting no falls or medical changes  pt reports he obtained a second opinion and was informed he needs a laminectomy with fusion of L3-L5.      pt was compliant but with limitations and weakness and struggled to both stand and ambulate with close assistance needed.  pt was educated on proper form with hep review,  not yet ind with this.  pt was minimally unsteady upon standing but was able to self correct.    pt was able to stand up to 3 mins prior to fatigue but with c/o fatigue.                 plan for next session- cont with transfers,  balance/wb activities and hep review

## 2023-12-22 ENCOUNTER — HOME CARE VISIT (OUTPATIENT)
Dept: HOME HEALTH SERVICES | Facility: HOME HEALTHCARE | Age: 83
End: 2023-12-22
Payer: MEDICARE

## 2023-12-22 VITALS
OXYGEN SATURATION: 96 % | TEMPERATURE: 98.4 F | SYSTOLIC BLOOD PRESSURE: 124 MMHG | DIASTOLIC BLOOD PRESSURE: 70 MMHG | HEART RATE: 84 BPM

## 2023-12-22 PROCEDURE — G0151 HHCP-SERV OF PT,EA 15 MIN: HCPCS

## 2023-12-26 ENCOUNTER — TELEPHONE (OUTPATIENT)
Dept: CARDIOLOGY | Facility: CLINIC | Age: 83
End: 2023-12-26
Payer: MEDICARE

## 2023-12-26 NOTE — TELEPHONE ENCOUNTER
Patient called to inform us that he is having surgery on 01/03/2023 for laminectomy. Patient advised that Dr. Ernandez would like patient to be off Kromatid. I advised patient that we would need a cardiac clearance from surgeons office. Patient had given me the office phone number to surgeons office and I proceeded to call office to advise that we needed a cardiac clearance. I spoke with Milagros and she let me know that she would fax the clearance over now.     Milagros- [phone] option 3.

## 2023-12-26 NOTE — TELEPHONE ENCOUNTER
FACILITY: [facility]   DR: Deo Stokes  PHONE: [phone]  FAX: [phone]  PROCEDURE: Lumbar Laminectomy and fusion    SCHEDULED: TBD   MEDS TO HOLD:  Eliquis

## 2023-12-29 ENCOUNTER — TRANSCRIBE ORDERS (OUTPATIENT)
Dept: ADMINISTRATIVE | Facility: HOSPITAL | Age: 83
End: 2023-12-29
Payer: MEDICARE

## 2023-12-29 ENCOUNTER — LAB (OUTPATIENT)
Dept: LAB | Facility: HOSPITAL | Age: 83
End: 2023-12-29
Payer: MEDICARE

## 2023-12-29 DIAGNOSIS — E55.9 AVITAMINOSIS D: ICD-10-CM

## 2023-12-29 DIAGNOSIS — E55.9 AVITAMINOSIS D: Primary | ICD-10-CM

## 2023-12-29 DIAGNOSIS — Z01.818 OTHER SPECIFIED PRE-OPERATIVE EXAMINATION: ICD-10-CM

## 2023-12-29 DIAGNOSIS — Z01.818 OTHER SPECIFIED PRE-OPERATIVE EXAMINATION: Primary | ICD-10-CM

## 2023-12-29 LAB
25(OH)D3 SERPL-MCNC: 36.9 NG/ML (ref 30–100)
ANION GAP SERPL CALCULATED.3IONS-SCNC: 10 MMOL/L (ref 5–15)
APTT PPP: 31.3 SECONDS (ref 24–31)
BASOPHILS # BLD AUTO: 0.08 10*3/MM3 (ref 0–0.2)
BASOPHILS NFR BLD AUTO: 0.9 % (ref 0–1.5)
BUN SERPL-MCNC: 19 MG/DL (ref 8–23)
BUN/CREAT SERPL: 23.8 (ref 7–25)
CALCIUM SPEC-SCNC: 9 MG/DL (ref 8.6–10.5)
CHLORIDE SERPL-SCNC: 101 MMOL/L (ref 98–107)
CO2 SERPL-SCNC: 24 MMOL/L (ref 22–29)
CREAT SERPL-MCNC: 0.8 MG/DL (ref 0.76–1.27)
DEPRECATED RDW RBC AUTO: 43.3 FL (ref 37–54)
EGFRCR SERPLBLD CKD-EPI 2021: 86.7 ML/MIN/1.73
EOSINOPHIL # BLD AUTO: 0.39 10*3/MM3 (ref 0–0.4)
EOSINOPHIL NFR BLD AUTO: 4.5 % (ref 0.3–6.2)
ERYTHROCYTE [DISTWIDTH] IN BLOOD BY AUTOMATED COUNT: 13.3 % (ref 12.3–15.4)
GLUCOSE SERPL-MCNC: 118 MG/DL (ref 65–99)
HCT VFR BLD AUTO: 34.6 % (ref 37.5–51)
HGB BLD-MCNC: 11.6 G/DL (ref 13–17.7)
IMM GRANULOCYTES # BLD AUTO: 0.02 10*3/MM3 (ref 0–0.05)
IMM GRANULOCYTES NFR BLD AUTO: 0.2 % (ref 0–0.5)
INR PPP: 1.05 (ref 0.93–1.1)
LYMPHOCYTES # BLD AUTO: 1.83 10*3/MM3 (ref 0.7–3.1)
LYMPHOCYTES NFR BLD AUTO: 21 % (ref 19.6–45.3)
MCH RBC QN AUTO: 30.3 PG (ref 26.6–33)
MCHC RBC AUTO-ENTMCNC: 33.5 G/DL (ref 31.5–35.7)
MCV RBC AUTO: 90.3 FL (ref 79–97)
MONOCYTES # BLD AUTO: 0.92 10*3/MM3 (ref 0.1–0.9)
MONOCYTES NFR BLD AUTO: 10.6 % (ref 5–12)
NEUTROPHILS NFR BLD AUTO: 5.46 10*3/MM3 (ref 1.7–7)
NEUTROPHILS NFR BLD AUTO: 62.8 % (ref 42.7–76)
NRBC BLD AUTO-RTO: 0 /100 WBC (ref 0–0.2)
PLATELET # BLD AUTO: 245 10*3/MM3 (ref 140–450)
PMV BLD AUTO: 10.7 FL (ref 6–12)
POTASSIUM SERPL-SCNC: 3.7 MMOL/L (ref 3.5–5.2)
PROTHROMBIN TIME: 11.4 SECONDS (ref 9.6–11.7)
RBC # BLD AUTO: 3.83 10*6/MM3 (ref 4.14–5.8)
SODIUM SERPL-SCNC: 135 MMOL/L (ref 136–145)
WBC NRBC COR # BLD AUTO: 8.7 10*3/MM3 (ref 3.4–10.8)

## 2023-12-29 PROCEDURE — 80048 BASIC METABOLIC PNL TOTAL CA: CPT

## 2023-12-29 PROCEDURE — 85610 PROTHROMBIN TIME: CPT

## 2023-12-29 PROCEDURE — 36415 COLL VENOUS BLD VENIPUNCTURE: CPT

## 2023-12-29 PROCEDURE — 82306 VITAMIN D 25 HYDROXY: CPT

## 2023-12-29 PROCEDURE — 85025 COMPLETE CBC W/AUTO DIFF WBC: CPT

## 2023-12-29 PROCEDURE — 85730 THROMBOPLASTIN TIME PARTIAL: CPT

## 2024-01-02 ENCOUNTER — TELEPHONE (OUTPATIENT)
Dept: FAMILY MEDICINE CLINIC | Facility: CLINIC | Age: 84
End: 2024-01-02
Payer: MEDICARE

## 2024-01-19 ENCOUNTER — TRANSCRIBE ORDERS (OUTPATIENT)
Dept: HOME HEALTH SERVICES | Facility: HOME HEALTHCARE | Age: 84
End: 2024-01-19
Payer: MEDICARE

## 2024-01-19 ENCOUNTER — HOME HEALTH ADMISSION (OUTPATIENT)
Dept: HOME HEALTH SERVICES | Facility: HOME HEALTHCARE | Age: 84
End: 2024-01-19
Payer: MEDICARE

## 2024-01-19 DIAGNOSIS — S32.040D CLOSED WEDGE COMPRESSION FRACTURE OF L4 VERTEBRA WITH ROUTINE HEALING, SUBSEQUENT ENCOUNTER: ICD-10-CM

## 2024-01-19 DIAGNOSIS — M48.062 LUMBAR STENOSIS WITH NEUROGENIC CLAUDICATION: ICD-10-CM

## 2024-01-19 DIAGNOSIS — Z47.89 ORTHOPEDIC AFTERCARE: Primary | ICD-10-CM

## 2024-01-22 ENCOUNTER — HOME CARE VISIT (OUTPATIENT)
Dept: HOME HEALTH SERVICES | Facility: HOME HEALTHCARE | Age: 84
End: 2024-01-22
Payer: MEDICARE

## 2024-01-22 PROCEDURE — G0151 HHCP-SERV OF PT,EA 15 MIN: HCPCS

## 2024-01-22 NOTE — HOME HEALTH
"SOC Note: Patient referred for home health for thera ex, transfer training, balance and gait training sp lumbar fusion. Back brace when up. No bending lifting, twisiting or pulling.    Home Health ordered for: PT, OT, ST    Reason for Hosp/Primary Dx/Co-morbidities: lumbar fusion.    Focus of Care: lumbar fusion    Patient's goal(s):\"Walk with a walker\"    Current Functional status/mobility/DME: Requires min assist with bed mobility, transfers and ambulation with use of wheeled walker. Pt using wc as primary mobility device.     HB status/Living Arrangements: Lives with spouse in a single story house with ramp access.     Skin Integrity/wound status: Lumbar incision well approximated.     Code Status: CPR    Fall Risk/Safety concerns: high    Educated on Emergency Plan, steps to take prior to going to the ER and when to Call [facility]:  Instructed using agency booklet.      Medication issues/Concerns: none    Additional Problems/Concerns: none    SDOH Barriers (i.e. caregiver concerns, social isolation, transportation, food insecurity, environment, income etc.)/Need for MSW: none    Plan for next visit: thera ex, transfer training, balance and gait training."

## 2024-01-23 ENCOUNTER — OFFICE VISIT (OUTPATIENT)
Dept: FAMILY MEDICINE CLINIC | Facility: CLINIC | Age: 84
End: 2024-01-23
Payer: MEDICARE

## 2024-01-23 VITALS
DIASTOLIC BLOOD PRESSURE: 58 MMHG | OXYGEN SATURATION: 98 % | RESPIRATION RATE: 18 BRPM | BODY MASS INDEX: 22.96 KG/M2 | SYSTOLIC BLOOD PRESSURE: 112 MMHG | HEART RATE: 71 BPM | HEIGHT: 73 IN

## 2024-01-23 DIAGNOSIS — Z98.890 STATUS POST LUMBAR LAMINECTOMY: ICD-10-CM

## 2024-01-23 DIAGNOSIS — Z09 HOSPITAL DISCHARGE FOLLOW-UP: ICD-10-CM

## 2024-01-23 DIAGNOSIS — M54.42 CHRONIC BILATERAL LOW BACK PAIN WITH LEFT-SIDED SCIATICA: Primary | ICD-10-CM

## 2024-01-23 DIAGNOSIS — D50.9 IRON DEFICIENCY ANEMIA, UNSPECIFIED IRON DEFICIENCY ANEMIA TYPE: ICD-10-CM

## 2024-01-23 DIAGNOSIS — G89.29 CHRONIC BILATERAL LOW BACK PAIN WITH LEFT-SIDED SCIATICA: Primary | ICD-10-CM

## 2024-01-23 PROCEDURE — 99214 OFFICE O/P EST MOD 30 MIN: CPT | Performed by: STUDENT IN AN ORGANIZED HEALTH CARE EDUCATION/TRAINING PROGRAM

## 2024-01-23 PROCEDURE — 1159F MED LIST DOCD IN RCRD: CPT | Performed by: STUDENT IN AN ORGANIZED HEALTH CARE EDUCATION/TRAINING PROGRAM

## 2024-01-23 PROCEDURE — 1160F RVW MEDS BY RX/DR IN RCRD: CPT | Performed by: STUDENT IN AN ORGANIZED HEALTH CARE EDUCATION/TRAINING PROGRAM

## 2024-01-23 NOTE — PROGRESS NOTES
"Chief Complaint  Chief Complaint   Patient presents with    Hospital Follow Up Visit     Subjective        Campbell Alex is a 84 y.o. male who presents to [facility].    History of Present Illness  Hospitalized at [facility] in [facility] for lumbar laminectomy w/ L3-L5 fusion on January 3 w/ Dr. Stokes then discharged to acute rehab.  Now in home health.  He is largely pain free.  He is able to get up and walk with assistance without pain.  He is being seen by home PT.  Incision looks good, photo uploaded into chart yesterday.      He developed UTI in rehab and was started on levofloxacin which he will finish this Thursday.    In the hospital he was given IV iron infusions and recommended to f/u with hematology.    He is now taking oral iron every day.  He reports he has been anemic for some time.  Last normal hgb in his chart that I can see was from 2021.  Following that his hgb became borderline.  Lowest at 10.5.  His MCV is normal.  They were called by hematologist in [place] but they would like to see a local hematologist.      Objective   /58   Pulse 71   Resp 18   Ht 185.4 cm (73\")   SpO2 98%   BMI 22.96 kg/m²     Estimated body mass index is 22.96 kg/m² as calculated from the following:    Height as of this encounter: 185.4 cm (73\").    Weight as of 12/11/23: 78.9 kg (174 lb).     Physical Exam   GEN: In no acute distress, non toxic appearing  HEENT: Pupils equal and reactive to light, sclera clear. Mucous membranes moist.  RESP: No IWOB  SKIN: Incision well appearing, see photo in chart from 1/22     Result Review :              Assessment and Plan     Diagnoses and all orders for this visit:    1. Chronic bilateral low back pain with left-sided sciatica (Primary)  2. Status post lumbar laminectomy  3. Hospital discharge follow-up  Overall doing well.  Incision well appearing.  Complete course of levofloxacin for UTI.  Continue PT.  Discussed " importance of fall prevention.  Update me if any changes.      4. Iron deficiency anemia, unspecified iron deficiency anemia type  Anemia, but MCV normal.  Reportedly received IV iron in hospital so they likely checked iron levels.  We will need to obtain hospital records.  They are requesting referral to local hematologist.    Referral order placed.    Recommend every other day iron to help with absorption and minimize GI upset.  -     Ambulatory Referral to Hematology / Oncology       Follow Up     Return in about 3 months (around 4/23/2024) for reschedule February appt to April.

## 2024-01-24 ENCOUNTER — HOME CARE VISIT (OUTPATIENT)
Dept: HOME HEALTH SERVICES | Facility: HOME HEALTHCARE | Age: 84
End: 2024-01-24
Payer: MEDICARE

## 2024-01-24 VITALS
HEART RATE: 79 BPM | DIASTOLIC BLOOD PRESSURE: 60 MMHG | RESPIRATION RATE: 18 BRPM | TEMPERATURE: 98.6 F | OXYGEN SATURATION: 98 % | SYSTOLIC BLOOD PRESSURE: 102 MMHG

## 2024-01-24 PROCEDURE — G0157 HHC PT ASSISTANT EA 15: HCPCS

## 2024-01-25 ENCOUNTER — HOME CARE VISIT (OUTPATIENT)
Dept: HOME HEALTH SERVICES | Facility: HOME HEALTHCARE | Age: 84
End: 2024-01-25
Payer: MEDICARE

## 2024-01-25 VITALS
HEART RATE: 82 BPM | TEMPERATURE: 98.2 F | SYSTOLIC BLOOD PRESSURE: 134 MMHG | DIASTOLIC BLOOD PRESSURE: 70 MMHG | RESPIRATION RATE: 14 BRPM | OXYGEN SATURATION: 95 %

## 2024-01-25 PROCEDURE — G0157 HHC PT ASSISTANT EA 15: HCPCS

## 2024-01-25 NOTE — HOME HEALTH
pt sitting up and is prepared for PT session,   feeling fair but with limitations.  pt reports he has not performed hep today due to a fear of fatigue prior PT.    pt reporting no med changes or complications.  pt returns to [facility] tomorrow for f/u with the surgeon    pt was limited today, but needed cues throughout to remain on task and to perform the activities correctly.   back brace donned throughout.  pt was educated today on proper lumbar stab and lumbar extension.    pt was unsteady upon standing and needed repeated attempts to stand.     pts gait pattern was shuffled and pt was fatigued to end gait trg.       educated pt on bed mobility including wc placement, use of the grab bar and proper log rolling with proper lumbar stab       plan for next session- educate on proper lumbar stab and back precautions.    cont with gait trg, hep review and balance trg as tolerated

## 2024-01-25 NOTE — HOME HEALTH
pt sitting up in his recliner,  feeling fair and is motivated to improve and return to plf          pt reporting he has not been ambulating due to weakness and spouse unable to assist with these.     pt reporting no med changes or complications      pt required instruction throughout to perform the activities correctly and within the correct plane.    pt was challenged today to stand for extended periods of time and required cues.    pt was challenged to perform ther ex through the the correct plan,  often performed in a limited rom.  pt was educated today on proper form with hep review and to properly deep plb for energy conservation.   educated pt on proper bed mobility with cues given to log roll and to use the grab bar         plan for next session-   cont PT with gait and standing as tolerated.  hep review with education for back safety and lumbar stab techniques

## 2024-01-29 ENCOUNTER — HOME CARE VISIT (OUTPATIENT)
Dept: HOME HEALTH SERVICES | Facility: HOME HEALTHCARE | Age: 84
End: 2024-01-29
Payer: MEDICARE

## 2024-01-29 VITALS
RESPIRATION RATE: 15 BRPM | OXYGEN SATURATION: 95 % | DIASTOLIC BLOOD PRESSURE: 70 MMHG | TEMPERATURE: 97.8 F | SYSTOLIC BLOOD PRESSURE: 128 MMHG | HEART RATE: 82 BPM

## 2024-01-29 PROCEDURE — G0157 HHC PT ASSISTANT EA 15: HCPCS

## 2024-01-30 ENCOUNTER — HOME CARE VISIT (OUTPATIENT)
Dept: HOME HEALTH SERVICES | Facility: HOME HEALTHCARE | Age: 84
End: 2024-01-30
Payer: MEDICARE

## 2024-01-30 VITALS — OXYGEN SATURATION: 96 % | SYSTOLIC BLOOD PRESSURE: 110 MMHG | HEART RATE: 79 BPM | DIASTOLIC BLOOD PRESSURE: 60 MMHG

## 2024-01-30 PROCEDURE — G0152 HHCP-SERV OF OT,EA 15 MIN: HCPCS

## 2024-01-30 NOTE — HOME HEALTH
pt sitting up in his recliner,  feeling fair and slowly improving.   pt was seen by the surgeon since last visit- reports satisfactory progress and returns in 8 weeks.   no changes to poc.    pt reports no med changes    pt was educated on proper form with hep review,    with cues given to contract/hold and to perform in the correct plane,  with cues needed to avoid bending/leaning/twisting  encouraged use of the walker with cues given to properly wb through the UEs and to keep the walker at a close distance.               plan for next session-  cont PT with gait trg,   balance trg and activities to improve strength/rom and with education for back safety precautions.

## 2024-01-31 ENCOUNTER — HOME CARE VISIT (OUTPATIENT)
Dept: HOME HEALTH SERVICES | Facility: HOME HEALTHCARE | Age: 84
End: 2024-01-31
Payer: MEDICARE

## 2024-01-31 VITALS
HEART RATE: 82 BPM | RESPIRATION RATE: 16 BRPM | TEMPERATURE: 98.2 F | SYSTOLIC BLOOD PRESSURE: 132 MMHG | OXYGEN SATURATION: 95 % | DIASTOLIC BLOOD PRESSURE: 68 MMHG

## 2024-01-31 VITALS — TEMPERATURE: 98 F | DIASTOLIC BLOOD PRESSURE: 63 MMHG | SYSTOLIC BLOOD PRESSURE: 105 MMHG | HEART RATE: 86 BPM

## 2024-01-31 PROCEDURE — G0157 HHC PT ASSISTANT EA 15: HCPCS

## 2024-01-31 PROCEDURE — G0153 HHCP-SVS OF S/L PATH,EA 15MN: HCPCS

## 2024-01-31 NOTE — HOME HEALTH
Pt is an 85 yo male who lives in a 2 story home with spouse.   Pt recently hospitalized secondary to Lumbar fusion.          PLOF pt independent with feeding grooming and toileting.   MIN to MOD assist with bathing and dressing.      Currently pt independent with feeding grooming and toileting.  MOD to MAX assist with bathing and dressing.      Skilled OT for ther ex transfers and adl training.   Pt and therapist in agreement with goals and poc.      Next visit ther ex    Pt denies any falls or med changes       T am

## 2024-01-31 NOTE — HOME HEALTH
"Eval Note    Patient's goal(s): \"I can't think as well as I used to.\"     Services required to achieve goals: 1wk8    Potential Issues for goal attainment: None    Problems identified: Immediate memory loss and short term memory loss; difficulty solving problems; occasional substitutions of words or neologisms in conversational speech (this will be addressed indirectly in therapy while working  to improve memory and problem solving.) Pt feels like he self-corrects most of the time but wife reports pt substitutes words more than he realizes and when he does substitute words, that makes it difficult for wife to know what message pt is trying to convey.    Describe the Functional status and safety: Moderate    Describe any environmental issues: None    Any equipment needs: None    POC confirmed with Zuleyma Alex and Megan Alex on date 1-31-24."

## 2024-01-31 NOTE — HOME HEALTH
pt up and prepared for PT session-  feeling well and is motivated to improve and return to plf.   pt reports he has been attempting hep review as he can but is often limited and is concerned regarding continued weakness and ongoing need for a wc.  pt wants to wean from the wc and return to regular walking     pt was limited today and needed cues throughout to perform the activities correctly, and within the correct plane.  pt was educated today on proper form with transfers,  with cues to properly position the LEs and to correctly position the wc for improved ability,      pt was minimally unsteady upon standing but was able to self correct with the walker.              plan for next session-   cont with gait trg, hep review,  balance  trg and transfer education

## 2024-02-01 ENCOUNTER — HOME CARE VISIT (OUTPATIENT)
Dept: HOME HEALTH SERVICES | Facility: HOME HEALTHCARE | Age: 84
End: 2024-02-01
Payer: MEDICARE

## 2024-02-01 VITALS
OXYGEN SATURATION: 96 % | HEART RATE: 85 BPM | SYSTOLIC BLOOD PRESSURE: 142 MMHG | DIASTOLIC BLOOD PRESSURE: 72 MMHG | RESPIRATION RATE: 16 BRPM | TEMPERATURE: 98.3 F

## 2024-02-01 PROCEDURE — G0157 HHC PT ASSISTANT EA 15: HCPCS

## 2024-02-02 NOTE — HOME HEALTH
pt in bed sleeping on arrival, reports fatigue from earlier md el.  pt is motivated to improve and return to plf.    no changes from previous PT session      pt was limited today in all areas, struggled with weakness and fatigue as well as transfers and gait trg.       pt was educated today on proper lumbar stab techniques and to avoid trunk flexion/rotation.    pt was minimally unsteady upon standing but was able to self correct.  pt was challenged to stand at times with weakness and c/o low back pain with activity   pt was educated today on proper stance and gilberto length/hip flexion and to improve heel strike           plan for next session-  cont PT with gait trg,  balance trg and standing/wb activities as tolerated-  transfer education and hep review

## 2024-02-05 ENCOUNTER — HOME CARE VISIT (OUTPATIENT)
Dept: HOME HEALTH SERVICES | Facility: HOME HEALTHCARE | Age: 84
End: 2024-02-05
Payer: MEDICARE

## 2024-02-05 VITALS
HEART RATE: 82 BPM | DIASTOLIC BLOOD PRESSURE: 68 MMHG | TEMPERATURE: 97.9 F | OXYGEN SATURATION: 95 % | SYSTOLIC BLOOD PRESSURE: 124 MMHG | RESPIRATION RATE: 15 BRPM

## 2024-02-05 PROCEDURE — G0157 HHC PT ASSISTANT EA 15: HCPCS

## 2024-02-06 ENCOUNTER — HOME CARE VISIT (OUTPATIENT)
Dept: HOME HEALTH SERVICES | Facility: HOME HEALTHCARE | Age: 84
End: 2024-02-06
Payer: MEDICARE

## 2024-02-06 VITALS — OXYGEN SATURATION: 96 % | SYSTOLIC BLOOD PRESSURE: 120 MMHG | HEART RATE: 75 BPM | DIASTOLIC BLOOD PRESSURE: 70 MMHG

## 2024-02-06 PROCEDURE — G0152 HHCP-SERV OF OT,EA 15 MIN: HCPCS

## 2024-02-06 NOTE — HOME HEALTH
pt sitting up in his wc on arrival,   feeling fair and is motivated to participate, improve and return to plf.    pt reports no med changes or complications.      pt has been attempting hep review but with limitations.     pt very much wants to continue to improve, to allow for safer transfers and ambulation.       pt was compliant,  participated well but with noted challenges in the ability to transfer and stand.   pt was minimally unsteady upon standing but was better able to correct this.      began gait trg on the ramp out of the home today and pt was tolerant of this with no c/o's of pain,  but with reports of fatigue.      pt was educated today on proper placement of the walker and correct posture up/down the ramp.           plan for next session-  cont PT with gait trg, hep review, balance trg and transfer education all with advancement as tolerated

## 2024-02-07 ENCOUNTER — HOME CARE VISIT (OUTPATIENT)
Dept: HOME HEALTH SERVICES | Facility: HOME HEALTHCARE | Age: 84
End: 2024-02-07
Payer: MEDICARE

## 2024-02-07 VITALS
TEMPERATURE: 98.5 F | DIASTOLIC BLOOD PRESSURE: 71 MMHG | HEART RATE: 71 BPM | OXYGEN SATURATION: 94 % | SYSTOLIC BLOOD PRESSURE: 116 MMHG

## 2024-02-07 VITALS
DIASTOLIC BLOOD PRESSURE: 68 MMHG | SYSTOLIC BLOOD PRESSURE: 138 MMHG | HEART RATE: 82 BPM | TEMPERATURE: 97.9 F | OXYGEN SATURATION: 95 % | RESPIRATION RATE: 15 BRPM

## 2024-02-07 PROCEDURE — G0157 HHC PT ASSISTANT EA 15: HCPCS

## 2024-02-07 PROCEDURE — G0153 HHCP-SVS OF S/L PATH,EA 15MN: HCPCS

## 2024-02-08 ENCOUNTER — HOME CARE VISIT (OUTPATIENT)
Dept: HOME HEALTH SERVICES | Facility: HOME HEALTHCARE | Age: 84
End: 2024-02-08
Payer: MEDICARE

## 2024-02-08 VITALS
RESPIRATION RATE: 15 BRPM | DIASTOLIC BLOOD PRESSURE: 74 MMHG | OXYGEN SATURATION: 95 % | HEART RATE: 87 BPM | SYSTOLIC BLOOD PRESSURE: 134 MMHG | TEMPERATURE: 98.2 F

## 2024-02-08 PROCEDURE — G0157 HHC PT ASSISTANT EA 15: HCPCS

## 2024-02-08 NOTE — HOME HEALTH
Routine Visit Note:    Skill/education provided: Direct instruction in attention and memory, as well as categorization and mental organization.    Patient/caregiver response: Pt maintained sustained attention in the home while completing a categorization task given occasional cuing frequ and min cuing intensity in 95% of the opps.    Pt employed selective attention in the home given occasional cuing frequ and min cuing intensity in 95% of opps.    Pt maintained sustained attention in the home given occasional cuing frequ and min cuing intensity for 12 min.      Plan for next visit: Continue with memory     Other pertinent info: Pt requests that visits be moved to Tuesdays, if possible.

## 2024-02-08 NOTE — HOME HEALTH
Routine Visit Note:    Skill/education provided: OT instr and educated pt on HEP    Patient/caregiver response: MOD assist    Plan for next visit: ther ex and transfers    Other pertinent info: pt denies any falls or med changes

## 2024-02-08 NOTE — HOME HEALTH
pt up in his wc upon arrival.  pt has had am meds and reports no changes or complications.    pt is motivated to participate, improve and return to plf.     pt reports    pt was limited today in all areas but is improving  functionally and ambulating better with improved step length and sequenicng.  pt ambulated today down the ramp to the mailbox using the walker but with noted challenges and LE weakness. pt was advised to not attempt this without PT for safety purposes and risk of falling.  pt was educated today on proper lumbar stab techniques and correct postural awareness.     pt continues to have noted LLE weakness compared to the r side.   pt was minimally unsteady upon standing needing the walker to correct his.        plan for next session-  cont PT with gait trg, hep review,  balance trg and wb activities with education on gait trg up/down the ramp.

## 2024-02-09 NOTE — HOME HEALTH
pt sitting up in his recliner,  prepared for PT session with no mentioned c/o's.     pt reports he has been attempting hep review and continues to advance with PT intervention.  no med changes or complications reported today       pt was compliant and participated well-   ambulating better with improved b step length and hip flexion.      pt was minimally unsteady upon standing and still challenged to transfer to standing with noted LE weakness.     pt tolerated ther ex well but ended PT session early due to incontinence.            plan for next session-  cont PT with gait trg, hep review,  balance trg and wb activities,   outdoor gait up/down the ramp

## 2024-02-12 ENCOUNTER — HOME CARE VISIT (OUTPATIENT)
Dept: HOME HEALTH SERVICES | Facility: HOME HEALTHCARE | Age: 84
End: 2024-02-12
Payer: MEDICARE

## 2024-02-12 VITALS
TEMPERATURE: 98.3 F | HEART RATE: 78 BPM | DIASTOLIC BLOOD PRESSURE: 64 MMHG | OXYGEN SATURATION: 97 % | SYSTOLIC BLOOD PRESSURE: 132 MMHG | RESPIRATION RATE: 15 BRPM

## 2024-02-12 PROCEDURE — G0157 HHC PT ASSISTANT EA 15: HCPCS

## 2024-02-13 ENCOUNTER — HOME CARE VISIT (OUTPATIENT)
Dept: HOME HEALTH SERVICES | Facility: HOME HEALTHCARE | Age: 84
End: 2024-02-13
Payer: MEDICARE

## 2024-02-13 VITALS
DIASTOLIC BLOOD PRESSURE: 79 MMHG | SYSTOLIC BLOOD PRESSURE: 132 MMHG | HEART RATE: 72 BPM | OXYGEN SATURATION: 98 % | TEMPERATURE: 98.4 F

## 2024-02-13 PROCEDURE — G0153 HHCP-SVS OF S/L PATH,EA 15MN: HCPCS

## 2024-02-14 ENCOUNTER — HOME CARE VISIT (OUTPATIENT)
Dept: HOME HEALTH SERVICES | Facility: HOME HEALTHCARE | Age: 84
End: 2024-02-14
Payer: MEDICARE

## 2024-02-14 VITALS
RESPIRATION RATE: 17 BRPM | OXYGEN SATURATION: 96 % | SYSTOLIC BLOOD PRESSURE: 134 MMHG | HEART RATE: 73 BPM | DIASTOLIC BLOOD PRESSURE: 68 MMHG | TEMPERATURE: 98.2 F

## 2024-02-14 PROCEDURE — G0157 HHC PT ASSISTANT EA 15: HCPCS

## 2024-02-15 ENCOUNTER — HOME CARE VISIT (OUTPATIENT)
Dept: HOME HEALTH SERVICES | Facility: HOME HEALTHCARE | Age: 84
End: 2024-02-15
Payer: MEDICARE

## 2024-02-15 VITALS
SYSTOLIC BLOOD PRESSURE: 122 MMHG | HEART RATE: 72 BPM | TEMPERATURE: 98.6 F | OXYGEN SATURATION: 93 % | DIASTOLIC BLOOD PRESSURE: 70 MMHG

## 2024-02-15 PROCEDURE — G0151 HHCP-SERV OF PT,EA 15 MIN: HCPCS

## 2024-02-16 ENCOUNTER — HOME CARE VISIT (OUTPATIENT)
Dept: HOME HEALTH SERVICES | Facility: HOME HEALTHCARE | Age: 84
End: 2024-02-16
Payer: MEDICARE

## 2024-02-16 PROCEDURE — G0152 HHCP-SERV OF OT,EA 15 MIN: HCPCS

## 2024-02-16 NOTE — PROGRESS NOTES
HEMATOLOGY ONCOLOGY OUTPATIENT CONSULTATION       Patient name: Campbell Alex  : 1940  MRN: [id number]  Primary Care Physician: Rocky Lozoya DO  Referring Physician: Rocky Lozoya, *  Reason For Consult: anemia      History of Present Illness:  Patient is a 84 y.o. male who was referred for anemia.  Patient has had Hemoccult positive stool no other signs of bleeding he does complain of ongoing fatigue.  He has medical history of renal insufficiency, coronary artery disease, CHF among other comorbid conditions    24 Hb 11, hct 33.2, MCV 90.5, platelets 188, WBC 5.24 SPEP was unremarkable with no monoclonal protein.  Patient has been on oral iron    2024 iron saturation 17, TIBC 226, ferritin of 311, normal haptoglobin, folate, reticulocyte count, normal LDH normal vitamin B12 levels    Patient has not had any EGD colonoscopy recently    Subjective:  Patient presents for initial consultation.      Past Medical History:   Diagnosis Date    Allergic 1970    Anemia n0t sure    Anxiety     Arthritis     Benign prostatic hyperplasia     Cancer     Cataract     CHF (congestive heart failure) stint    Cholelithiasis     Coronary artery disease     Erectile dysfunction     HL (hearing loss)     Hyperlipidemia     Hypertension     Osteopenia     Prostatism     Renal insufficiency     Suspected COVID-19 virus infection 2021    Urinary tract infection     Visual impairment        Past Surgical History:   Procedure Laterality Date    ANKLE SURGERY      CARDIAC CATHETERIZATION      COLONOSCOPY      CORONARY STENT PLACEMENT      EYE SURGERY      NOSE SURGERY      ROTATOR CUFF REPAIR      SKIN CANCER EXCISION      L shoulder    SPINE SURGERY      UMBILICAL HERNIA REPAIR      UMBILICAL HERNIA REPAIR           Current Outpatient Medications:     acetaminophen (Tylenol) 325 MG tablet, Take 2 tablets by mouth Every 4 (Four) Hours  As Needed for Fever or Mild Pain. Indications: Fever, Pain, Disp: , Rfl:     apixaban (ELIQUIS) 5 MG tablet tablet, Take 1 tablet by mouth 2 (Two) Times a Day. Indications: history of DVT/PE, Disp: , Rfl:     aspirin (aspirin) 81 MG EC tablet, Take 1 tablet by mouth Daily. Indications: Disease involving Lipid Deposits in the Arteries, Disp: , Rfl:     atorvastatin (LIPITOR) 20 MG tablet, Take 1 tablet by mouth Every Night. Indications: High Amount of Fats in the Blood, Disp: , Rfl:     azelastine (ASTELIN) 0.1 % nasal spray, 2 sprays into the nostril(s) as directed by provider 2 (Two) Times a Day. Use in each nostril as directed, Disp: 30 mL, Rfl: 3    Calcium Carbonate 1500 (600 Ca) MG tablet, Take 1 tablet by mouth Daily. Indications: Low Amount of Calcium in the Blood, Disp: , Rfl:     Fiber powder, Take 1 dose by mouth Daily As Needed (constipation). Indications: constipation, Disp: , Rfl:     Iron, Ferrous Sulfate, 325 (65 Fe) MG tablet, Take 1 tablet by mouth Daily. Indications: Anemia From Inadequate Iron in the Body, Iron Deficiency, Disp: , Rfl:     isosorbide mononitrate (IMDUR) 30 MG 24 hr tablet, Take 1 tablet by mouth Daily. Indications: Stable Angina Pectoris, Disp: , Rfl:     metoprolol tartrate (LOPRESSOR) 25 MG tablet, Take 1 tablet by mouth 2 (Two) Times a Day. Indications: High Blood Pressure Disorder, Disp: , Rfl:     polyethylene glycol (MIRALAX) 17 g packet, Take 17 g by mouth 2 (Two) Times a Day As Needed (constipation). Indications: Constipation, Disp: , Rfl:     saccharomyces boulardii (FLORASTOR) 250 MG capsule, Take 1 capsule by mouth 2 (Two) Times a Day., Disp: , Rfl:     tamsulosin (FLOMAX) 0.4 MG capsule 24 hr capsule, Take 1 capsule by mouth 2 (Two) Times a Day. Indications: Benign Enlargement of Prostate, Disp: , Rfl:     amoxicillin (AMOXIL) 500 MG capsule, Take 1 capsule by mouth 4 (Four) Times a Day. Indications: for preventative measures for tooth extraction, Disp: , Rfl:      "pantoprazole (PROTONIX) 40 MG EC tablet, Take 1 tablet by mouth Daily. Indications: Gastroesophageal Reflux Disease, Disp: 90 tablet, Rfl: 1    Allergies   Allergen Reactions    Codeine GI Intolerance    Iodine Hives    Nabumetone Unknown - High Severity    Propoxyphene Hives    Zoloft [Sertraline] GI Intolerance       Family History   Problem Relation Age of Onset    Arthritis Mother     Cancer Mother         Kj RAEMILEE    Hypertension Brother     Hyperlipidemia Brother     Kidney disease Brother         KJ    Diabetes Brother        Cancer-related family history includes Cancer in his mother.      Social History     Tobacco Use    Smoking status: Never     Passive exposure: Past    Smokeless tobacco: Never    Tobacco comments:     1-2/day   Vaping Use    Vaping Use: Never used   Substance Use Topics    Alcohol use: Never     Comment: socially    Drug use: Never     Social History     Social History Narrative    Not on file       ROS:   Review of Systems   Constitutional:  Negative for fatigue and fever.   HENT:  Negative for congestion and nosebleeds.    Eyes:  Negative for pain.   Respiratory:  Negative for cough and shortness of breath.    Cardiovascular:  Negative for chest pain.   Gastrointestinal:  Negative for abdominal pain, blood in stool, diarrhea, nausea and vomiting.   Endocrine: Negative for cold intolerance and heat intolerance.   Genitourinary:  Negative for difficulty urinating.   Musculoskeletal:  Negative for arthralgias.   Skin:  Negative for rash.   Neurological:  Negative for dizziness and headaches.   Hematological:  Does not bruise/bleed easily.   Psychiatric/Behavioral:  Negative for behavioral problems.          Objective:    Vital Signs:  Vitals:    02/20/24 0955   BP: 138/82   Pulse: 64   Resp: 18   Temp: 98.4 °F (36.9 °C)   SpO2: 98%   Weight: 78.9 kg (174 lb)  Comment: Verbal   Height: 185.4 cm (73\")   PainSc:   2   PainLoc: Hand     Body mass index is 22.96 kg/m².    ECOG  (0) Fully " active, able to carry on all predisease performance without restriction    Physical Exam:   Physical Exam  Constitutional:       Appearance: Normal appearance.   HENT:      Head: Normocephalic and atraumatic.   Eyes:      Pupils: Pupils are equal, round, and reactive to light.   Cardiovascular:      Rate and Rhythm: Normal rate and regular rhythm.      Pulses: Normal pulses.      Heart sounds: No murmur heard.  Pulmonary:      Effort: Pulmonary effort is normal.      Breath sounds: Normal breath sounds.   Abdominal:      General: There is no distension.      Palpations: Abdomen is soft. There is no mass.      Tenderness: There is no abdominal tenderness.   Musculoskeletal:         General: Normal range of motion.      Cervical back: Normal range of motion.   Skin:     General: Skin is warm.   Neurological:      General: No focal deficit present.      Mental Status: He is alert.   Psychiatric:         Mood and Affect: Mood normal.         Lab Results - Last 18 Months   Lab Units 02/20/24  0945 12/29/23  1548 11/21/23  1804   WBC 10*3/mm3 5.24 8.70 8.60   HEMOGLOBIN g/dL 11.0* 11.6* 12.3*   HEMATOCRIT % 33.2* 34.6* 36.2*   PLATELETS 10*3/mm3 188 245 312   MCV fL 90.5 90.3 91.5     Lab Results - Last 18 Months   Lab Units 12/29/23  1548 11/21/23  1804 11/09/23  1220 09/07/23  1248   SODIUM mmol/L 135* 136 134* 138   POTASSIUM mmol/L 3.7 4.9 4.6 4.0   CHLORIDE mmol/L 101 100 98 105   CO2 mmol/L 24.0 24.0 26.5 25.4   BUN mg/dL 19 22 18 18   CREATININE mg/dL 0.80 0.78 0.69* 0.67*   CALCIUM mg/dL 9.0 10.0 8.8 9.0   BILIRUBIN mg/dL  --  0.4 0.3 0.5   ALK PHOS U/L  --  99 113 56   ALT (SGPT) U/L  --  10 12 13   AST (SGOT) U/L  --  22 15 15   GLUCOSE mg/dL 118* 111* 129* 112*       Lab Results   Component Value Date    GLUCOSE 118 (H) 12/29/2023    BUN 19 12/29/2023    CREATININE 0.80 12/29/2023    EGFRIFNONA 73 12/17/2021    BCR 23.8 12/29/2023    K 3.7 12/29/2023    CO2 24.0 12/29/2023    CALCIUM 9.0 12/29/2023     "PROTENTOTREF 6.0 11/09/2023    ALBUMIN 4.0 11/21/2023    LABIL2 1.1 11/09/2023    AST 22 11/21/2023    ALT 10 11/21/2023       Lab Results - Last 18 Months   Lab Units 12/29/23  1548   INR  1.05   APTT seconds 31.3*       Lab Results   Component Value Date    IRON 39 (L) 02/20/2024    TIBC 226 (L) 02/20/2024    FERRITIN 311.00 02/20/2024       Lab Results   Component Value Date    FOLATE 6.39 02/20/2024       No results found for: \"OCCULTBLD\"    Lab Results   Component Value Date    RETICCTPCT 0.81 02/20/2024     Lab Results   Component Value Date    RYDPZNWL33 580 02/20/2024     No results found for: \"SPEP\", \"UPEP\"  LDH   Date Value Ref Range Status   02/20/2024 180 135 - 225 U/L Final     Lab Results   Component Value Date    SEDRATE 9 12/17/2021     Lab Results   Component Value Date    HAPTOGLOBIN 140 02/20/2024     Lab Results   Component Value Date    PTT 31.3 (H) 12/29/2023    INR 1.05 12/29/2023     No results found for: \"\"  No results found for: \"CEA\"  No components found for: \"CA-19-9\"  Lab Results   Component Value Date    PSA 1.180 01/20/2020     Lab Results   Component Value Date    SEDRATE 9 12/17/2021          Assessment & Plan     Patient is a 84-year-old male with normocytic anemia, iron deficiency    Normocytic anemia  Patient has been on oral iron continues to have mildly low iron saturation ferritin is normal  Iron deficiency is playing a role in his anemia I have recommended to infusions of Venofer 300 mg given the fact that he has been on oral iron and his iron saturation still seems to be slightly low  His retic count does not seem to be appropriately elevated based on his hemoglobin so there is likely some underlying bone marrow depression as well  He does not have hemolysis based on normal haptoglobin, LDH and reticulocyte count    We will plan on IV Venofer and see if his hemoglobin and iron saturation improved with that    If his hemoglobin continues to be low we will need to consider " doing a bone marrow biopsy rule out myelodysplastic syndrome or refractory anemia    He will also benefit from GI consultation and repeat EGD colonoscopy since he has Hemoccult positive stools with anemia    Thank you very much for providing the opportunity to participate in this patient’s care. Please do not hesitate to call if there are any other questions.  Follow-up in 3 months with CBC iron studies week prior

## 2024-02-18 VITALS — OXYGEN SATURATION: 97 % | DIASTOLIC BLOOD PRESSURE: 70 MMHG | HEART RATE: 75 BPM | SYSTOLIC BLOOD PRESSURE: 118 MMHG

## 2024-02-19 ENCOUNTER — HOME CARE VISIT (OUTPATIENT)
Dept: HOME HEALTH SERVICES | Facility: HOME HEALTHCARE | Age: 84
End: 2024-02-19
Payer: MEDICARE

## 2024-02-19 VITALS
SYSTOLIC BLOOD PRESSURE: 132 MMHG | DIASTOLIC BLOOD PRESSURE: 78 MMHG | OXYGEN SATURATION: 97 % | TEMPERATURE: 98 F | HEART RATE: 88 BPM

## 2024-02-19 PROCEDURE — G0151 HHCP-SERV OF PT,EA 15 MIN: HCPCS

## 2024-02-19 NOTE — HOME HEALTH
Routine Visit Note:    Skill/education provided: OT further instr and educated pt on HEP    Patient/caregiver response: pt required MIN assist for correct form    Plan for next visit: ther ex    Other pertinent info: pt denies any falls or med changes

## 2024-02-19 NOTE — HOME HEALTH
No new issues voiced.  Skill/education provided: thera ex, transfer training, balance and gait training.   Patient/caregiver response: tolerated with improved tech.   Plan for next visit: thera ex, transfer training, balance and gait training.

## 2024-02-20 ENCOUNTER — HOME CARE VISIT (OUTPATIENT)
Dept: HOME HEALTH SERVICES | Facility: HOME HEALTHCARE | Age: 84
End: 2024-02-20
Payer: MEDICARE

## 2024-02-20 ENCOUNTER — CONSULT (OUTPATIENT)
Dept: ONCOLOGY | Facility: CLINIC | Age: 84
End: 2024-02-20
Payer: MEDICARE

## 2024-02-20 ENCOUNTER — TELEPHONE (OUTPATIENT)
Dept: CARDIOLOGY | Facility: CLINIC | Age: 84
End: 2024-02-20
Payer: MEDICARE

## 2024-02-20 ENCOUNTER — LAB (OUTPATIENT)
Dept: LAB | Facility: HOSPITAL | Age: 84
End: 2024-02-20
Payer: MEDICARE

## 2024-02-20 VITALS
OXYGEN SATURATION: 98 % | SYSTOLIC BLOOD PRESSURE: 142 MMHG | DIASTOLIC BLOOD PRESSURE: 79 MMHG | TEMPERATURE: 98.1 F | HEART RATE: 67 BPM

## 2024-02-20 VITALS
HEART RATE: 64 BPM | WEIGHT: 174 LBS | SYSTOLIC BLOOD PRESSURE: 138 MMHG | OXYGEN SATURATION: 98 % | RESPIRATION RATE: 18 BRPM | TEMPERATURE: 98.4 F | DIASTOLIC BLOOD PRESSURE: 82 MMHG | BODY MASS INDEX: 23.06 KG/M2 | HEIGHT: 73 IN

## 2024-02-20 DIAGNOSIS — N40.0 PROSTATISM: ICD-10-CM

## 2024-02-20 DIAGNOSIS — D50.9 IRON DEFICIENCY ANEMIA, UNSPECIFIED IRON DEFICIENCY ANEMIA TYPE: ICD-10-CM

## 2024-02-20 LAB
BASOPHILS # BLD AUTO: 0.04 10*3/MM3 (ref 0–0.2)
BASOPHILS NFR BLD AUTO: 0.8 % (ref 0–1.5)
DEPRECATED RDW RBC AUTO: 47.7 FL (ref 37–54)
EOSINOPHIL # BLD AUTO: 0.73 10*3/MM3 (ref 0–0.4)
EOSINOPHIL NFR BLD AUTO: 13.9 % (ref 0.3–6.2)
ERYTHROCYTE [DISTWIDTH] IN BLOOD BY AUTOMATED COUNT: 14.9 % (ref 12.3–15.4)
FERRITIN SERPL-MCNC: 311 NG/ML (ref 30–400)
FOLATE SERPL-MCNC: 6.39 NG/ML (ref 4.78–24.2)
HAPTOGLOB SERPL-MCNC: 140 MG/DL (ref 30–200)
HCT VFR BLD AUTO: 33.2 % (ref 37.5–51)
HGB BLD-MCNC: 11 G/DL (ref 13–17.7)
IRON 24H UR-MRATE: 39 MCG/DL (ref 59–158)
IRON SATN MFR SERPL: 17 % (ref 20–50)
LDH SERPL-CCNC: 180 U/L (ref 135–225)
LYMPHOCYTES # BLD AUTO: 1.08 10*3/MM3 (ref 0.7–3.1)
LYMPHOCYTES NFR BLD AUTO: 20.6 % (ref 19.6–45.3)
MCH RBC QN AUTO: 30 PG (ref 26.6–33)
MCHC RBC AUTO-ENTMCNC: 33.1 G/DL (ref 31.5–35.7)
MCV RBC AUTO: 90.5 FL (ref 79–97)
MONOCYTES # BLD AUTO: 0.6 10*3/MM3 (ref 0.1–0.9)
MONOCYTES NFR BLD AUTO: 11.5 % (ref 5–12)
NEUTROPHILS NFR BLD AUTO: 2.79 10*3/MM3 (ref 1.7–7)
NEUTROPHILS NFR BLD AUTO: 53.2 % (ref 42.7–76)
PLATELET # BLD AUTO: 188 10*3/MM3 (ref 140–450)
PMV BLD AUTO: 10.2 FL (ref 6–12)
RBC # BLD AUTO: 3.67 10*6/MM3 (ref 4.14–5.8)
RETICS # AUTO: 0.03 10*6/MM3 (ref 0.02–0.13)
RETICS/RBC NFR AUTO: 0.81 % (ref 0.7–1.9)
TIBC SERPL-MCNC: 226 MCG/DL (ref 298–536)
TRANSFERRIN SERPL-MCNC: 152 MG/DL (ref 200–360)
VIT B12 BLD-MCNC: 580 PG/ML (ref 211–946)
WBC NRBC COR # BLD AUTO: 5.24 10*3/MM3 (ref 3.4–10.8)

## 2024-02-20 PROCEDURE — 83615 LACTATE (LD) (LDH) ENZYME: CPT | Performed by: INTERNAL MEDICINE

## 2024-02-20 PROCEDURE — 85025 COMPLETE CBC W/AUTO DIFF WBC: CPT | Performed by: INTERNAL MEDICINE

## 2024-02-20 PROCEDURE — 36415 COLL VENOUS BLD VENIPUNCTURE: CPT | Performed by: INTERNAL MEDICINE

## 2024-02-20 PROCEDURE — 83540 ASSAY OF IRON: CPT | Performed by: INTERNAL MEDICINE

## 2024-02-20 PROCEDURE — 83010 ASSAY OF HAPTOGLOBIN QUANT: CPT | Performed by: INTERNAL MEDICINE

## 2024-02-20 PROCEDURE — 82607 VITAMIN B-12: CPT | Performed by: INTERNAL MEDICINE

## 2024-02-20 PROCEDURE — 85045 AUTOMATED RETICULOCYTE COUNT: CPT | Performed by: INTERNAL MEDICINE

## 2024-02-20 PROCEDURE — 82728 ASSAY OF FERRITIN: CPT | Performed by: INTERNAL MEDICINE

## 2024-02-20 PROCEDURE — G0153 HHCP-SVS OF S/L PATH,EA 15MN: HCPCS

## 2024-02-20 PROCEDURE — 84466 ASSAY OF TRANSFERRIN: CPT | Performed by: INTERNAL MEDICINE

## 2024-02-20 PROCEDURE — 82746 ASSAY OF FOLIC ACID SERUM: CPT | Performed by: INTERNAL MEDICINE

## 2024-02-20 PROCEDURE — 1125F AMNT PAIN NOTED PAIN PRSNT: CPT | Performed by: INTERNAL MEDICINE

## 2024-02-20 NOTE — TELEPHONE ENCOUNTER
FACILITY: [facility]  [facility]  PHONE: [phone]  FAX: [phone]  PROCEDURE: skin cancer removal   SCHEDULED: 03/22/24  MEDS TO HOLD: Eliquis 4 days prior

## 2024-02-20 NOTE — LETTER
2024     Rocky Lozoya DO  [address]  [address]  [place] [address]    Patient: Campbell Alex   YOB: 1940   Date of Visit: 2024     Dear Rocky Lozyoa DO:       Thank you for referring Campbell Alex to me for evaluation. Below are the relevant portions of my assessment and plan of care.    If you have questions, please do not hesitate to call me. I look forward to following Campbell along with you.         Sincerely,        Owen Templeton MD        CC: Owen Murphy MD  24 1142  Signed                           HEMATOLOGY ONCOLOGY OUTPATIENT CONSULTATION       Patient name: Campbell Alex  : 1940  MRN: [id number]  Primary Care Physician: Rocky Lozoya DO  Referring Physician: Rocky Lozoya, *  Reason For Consult: anemia      History of Present Illness:  Patient is a 84 y.o. male who was referred for anemia.  Patient has had Hemoccult positive stool no other signs of bleeding he does complain of ongoing fatigue.  He has medical history of renal insufficiency, coronary artery disease, CHF among other comorbid conditions    24 Hb 11, hct 33.2, MCV 90.5, platelets 188, WBC 5.24 SPEP was unremarkable with no monoclonal protein.  Patient has been on oral iron    2024 iron saturation 17, TIBC 226, ferritin of 311, normal haptoglobin, folate, reticulocyte count, normal LDH normal vitamin B12 levels    Patient has not had any EGD colonoscopy recently    Subjective:  Patient presents for initial consultation.      Past Medical History:   Diagnosis Date   • Allergic    • Anemia n0t sure   • Anxiety    • Arthritis    • Benign prostatic hyperplasia    • Cancer    • Cataract    • CHF (congestive heart failure) stint   • Cholelithiasis    • Coronary artery disease    • Erectile dysfunction    • HL (hearing loss)    • Hyperlipidemia    • Hypertension    • Osteopenia    • Prostatism    • Renal  insufficiency 1940   • Suspected COVID-19 virus infection 09/07/2021   • Urinary tract infection    • Visual impairment 1948       Past Surgical History:   Procedure Laterality Date   • ANKLE SURGERY     • CARDIAC CATHETERIZATION  2020   • COLONOSCOPY     • CORONARY STENT PLACEMENT     • EYE SURGERY  1960   • NOSE SURGERY     • ROTATOR CUFF REPAIR     • SKIN CANCER EXCISION      L shoulder   • SPINE SURGERY     • UMBILICAL HERNIA REPAIR     • UMBILICAL HERNIA REPAIR           Current Outpatient Medications:   •  acetaminophen (Tylenol) 325 MG tablet, Take 2 tablets by mouth Every 4 (Four) Hours As Needed for Fever or Mild Pain. Indications: Fever, Pain, Disp: , Rfl:   •  apixaban (ELIQUIS) 5 MG tablet tablet, Take 1 tablet by mouth 2 (Two) Times a Day. Indications: history of DVT/PE, Disp: , Rfl:   •  aspirin (aspirin) 81 MG EC tablet, Take 1 tablet by mouth Daily. Indications: Disease involving Lipid Deposits in the Arteries, Disp: , Rfl:   •  atorvastatin (LIPITOR) 20 MG tablet, Take 1 tablet by mouth Every Night. Indications: High Amount of Fats in the Blood, Disp: , Rfl:   •  azelastine (ASTELIN) 0.1 % nasal spray, 2 sprays into the nostril(s) as directed by provider 2 (Two) Times a Day. Use in each nostril as directed, Disp: 30 mL, Rfl: 3  •  Calcium Carbonate 1500 (600 Ca) MG tablet, Take 1 tablet by mouth Daily. Indications: Low Amount of Calcium in the Blood, Disp: , Rfl:   •  Fiber powder, Take 1 dose by mouth Daily As Needed (constipation). Indications: constipation, Disp: , Rfl:   •  Iron, Ferrous Sulfate, 325 (65 Fe) MG tablet, Take 1 tablet by mouth Daily. Indications: Anemia From Inadequate Iron in the Body, Iron Deficiency, Disp: , Rfl:   •  isosorbide mononitrate (IMDUR) 30 MG 24 hr tablet, Take 1 tablet by mouth Daily. Indications: Stable Angina Pectoris, Disp: , Rfl:   •  metoprolol tartrate (LOPRESSOR) 25 MG tablet, Take 1 tablet by mouth 2 (Two) Times a Day. Indications: High Blood Pressure  Disorder, Disp: , Rfl:   •  polyethylene glycol (MIRALAX) 17 g packet, Take 17 g by mouth 2 (Two) Times a Day As Needed (constipation). Indications: Constipation, Disp: , Rfl:   •  saccharomyces boulardii (FLORASTOR) 250 MG capsule, Take 1 capsule by mouth 2 (Two) Times a Day., Disp: , Rfl:   •  tamsulosin (FLOMAX) 0.4 MG capsule 24 hr capsule, Take 1 capsule by mouth 2 (Two) Times a Day. Indications: Benign Enlargement of Prostate, Disp: , Rfl:   •  amoxicillin (AMOXIL) 500 MG capsule, Take 1 capsule by mouth 4 (Four) Times a Day. Indications: for preventative measures for tooth extraction, Disp: , Rfl:   •  pantoprazole (PROTONIX) 40 MG EC tablet, Take 1 tablet by mouth Daily. Indications: Gastroesophageal Reflux Disease, Disp: 90 tablet, Rfl: 1    Allergies   Allergen Reactions   • Codeine GI Intolerance   • Iodine Hives   • Nabumetone Unknown - High Severity   • Propoxyphene Hives   • Zoloft [Sertraline] GI Intolerance       Family History   Problem Relation Age of Onset   • Arthritis Mother    • Cancer Mother         Kj VICTORIA   • Hypertension Brother    • Hyperlipidemia Brother    • Kidney disease Brother         KJ   • Diabetes Brother        Cancer-related family history includes Cancer in his mother.      Social History     Tobacco Use   • Smoking status: Never     Passive exposure: Past   • Smokeless tobacco: Never   • Tobacco comments:     1-2/day   Vaping Use   • Vaping Use: Never used   Substance Use Topics   • Alcohol use: Never     Comment: socially   • Drug use: Never     Social History     Social History Narrative   • Not on file       ROS:   Review of Systems   Constitutional:  Negative for fatigue and fever.   HENT:  Negative for congestion and nosebleeds.    Eyes:  Negative for pain.   Respiratory:  Negative for cough and shortness of breath.    Cardiovascular:  Negative for chest pain.   Gastrointestinal:  Negative for abdominal pain, blood in stool, diarrhea, nausea and vomiting.  "  Endocrine: Negative for cold intolerance and heat intolerance.   Genitourinary:  Negative for difficulty urinating.   Musculoskeletal:  Negative for arthralgias.   Skin:  Negative for rash.   Neurological:  Negative for dizziness and headaches.   Hematological:  Does not bruise/bleed easily.   Psychiatric/Behavioral:  Negative for behavioral problems.          Objective:    Vital Signs:  Vitals:    02/20/24 0955   BP: 138/82   Pulse: 64   Resp: 18   Temp: 98.4 °F (36.9 °C)   SpO2: 98%   Weight: 78.9 kg (174 lb)  Comment: Verbal   Height: 185.4 cm (73\")   PainSc:   2   PainLoc: Hand     Body mass index is 22.96 kg/m².    ECOG  (0) Fully active, able to carry on all predisease performance without restriction    Physical Exam:   Physical Exam  Constitutional:       Appearance: Normal appearance.   HENT:      Head: Normocephalic and atraumatic.   Eyes:      Pupils: Pupils are equal, round, and reactive to light.   Cardiovascular:      Rate and Rhythm: Normal rate and regular rhythm.      Pulses: Normal pulses.      Heart sounds: No murmur heard.  Pulmonary:      Effort: Pulmonary effort is normal.      Breath sounds: Normal breath sounds.   Abdominal:      General: There is no distension.      Palpations: Abdomen is soft. There is no mass.      Tenderness: There is no abdominal tenderness.   Musculoskeletal:         General: Normal range of motion.      Cervical back: Normal range of motion.   Skin:     General: Skin is warm.   Neurological:      General: No focal deficit present.      Mental Status: He is alert.   Psychiatric:         Mood and Affect: Mood normal.         Lab Results - Last 18 Months   Lab Units 02/20/24  0945 12/29/23  1548 11/21/23  1804   WBC 10*3/mm3 5.24 8.70 8.60   HEMOGLOBIN g/dL 11.0* 11.6* 12.3*   HEMATOCRIT % 33.2* 34.6* 36.2*   PLATELETS 10*3/mm3 188 245 312   MCV fL 90.5 90.3 91.5     Lab Results - Last 18 Months   Lab Units 12/29/23  1548 11/21/23  1804 11/09/23  1220 09/07/23  1248 " "  SODIUM mmol/L 135* 136 134* 138   POTASSIUM mmol/L 3.7 4.9 4.6 4.0   CHLORIDE mmol/L 101 100 98 105   CO2 mmol/L 24.0 24.0 26.5 25.4   BUN mg/dL 19 22 18 18   CREATININE mg/dL 0.80 0.78 0.69* 0.67*   CALCIUM mg/dL 9.0 10.0 8.8 9.0   BILIRUBIN mg/dL  --  0.4 0.3 0.5   ALK PHOS U/L  --  99 113 56   ALT (SGPT) U/L  --  10 12 13   AST (SGOT) U/L  --  22 15 15   GLUCOSE mg/dL 118* 111* 129* 112*       Lab Results   Component Value Date    GLUCOSE 118 (H) 12/29/2023    BUN 19 12/29/2023    CREATININE 0.80 12/29/2023    EGFRIFNONA 73 12/17/2021    BCR 23.8 12/29/2023    K 3.7 12/29/2023    CO2 24.0 12/29/2023    CALCIUM 9.0 12/29/2023    PROTENTOTREF 6.0 11/09/2023    ALBUMIN 4.0 11/21/2023    LABIL2 1.1 11/09/2023    AST 22 11/21/2023    ALT 10 11/21/2023       Lab Results - Last 18 Months   Lab Units 12/29/23  1548   INR  1.05   APTT seconds 31.3*       Lab Results   Component Value Date    IRON 39 (L) 02/20/2024    TIBC 226 (L) 02/20/2024    FERRITIN 311.00 02/20/2024       Lab Results   Component Value Date    FOLATE 6.39 02/20/2024       No results found for: \"OCCULTBLD\"    Lab Results   Component Value Date    RETICCTPCT 0.81 02/20/2024     Lab Results   Component Value Date    LCLLHYYT75 580 02/20/2024     No results found for: \"SPEP\", \"UPEP\"  LDH   Date Value Ref Range Status   02/20/2024 180 135 - 225 U/L Final     Lab Results   Component Value Date    SEDRATE 9 12/17/2021     Lab Results   Component Value Date    HAPTOGLOBIN 140 02/20/2024     Lab Results   Component Value Date    PTT 31.3 (H) 12/29/2023    INR 1.05 12/29/2023     No results found for: \"\"  No results found for: \"CEA\"  No components found for: \"CA-19-9\"  Lab Results   Component Value Date    PSA 1.180 01/20/2020     Lab Results   Component Value Date    SEDRATE 9 12/17/2021          Assessment & Plan    Patient is a 84-year-old male with normocytic anemia, iron deficiency    Normocytic anemia  Patient has been on oral iron continues to have " mildly low iron saturation ferritin is normal  Iron deficiency is playing a role in his anemia I have recommended to infusions of Venofer 300 mg given the fact that he has been on oral iron and his iron saturation still seems to be slightly low  His retic count does not seem to be appropriately elevated based on his hemoglobin so there is likely some underlying bone marrow depression as well  He does not have hemolysis based on normal haptoglobin, LDH and reticulocyte count    We will plan on IV Venofer and see if his hemoglobin and iron saturation improved with that    If his hemoglobin continues to be low we will need to consider doing a bone marrow biopsy rule out myelodysplastic syndrome or refractory anemia    He will also benefit from GI consultation and repeat EGD colonoscopy since he has Hemoccult positive stools with anemia    Thank you very much for providing the opportunity to participate in this patient’s care. Please do not hesitate to call if there are any other questions.  Follow-up in 3 months with CBC iron studies week prior

## 2024-02-21 ENCOUNTER — PATIENT ROUNDING (BHMG ONLY) (OUTPATIENT)
Dept: ONCOLOGY | Facility: CLINIC | Age: 84
End: 2024-02-21
Payer: MEDICARE

## 2024-02-21 ENCOUNTER — HOME CARE VISIT (OUTPATIENT)
Dept: HOME HEALTH SERVICES | Facility: HOME HEALTHCARE | Age: 84
End: 2024-02-21
Payer: MEDICARE

## 2024-02-21 ENCOUNTER — TELEPHONE (OUTPATIENT)
Dept: CARDIOLOGY | Facility: CLINIC | Age: 84
End: 2024-02-21
Payer: MEDICARE

## 2024-02-21 VITALS
OXYGEN SATURATION: 95 % | SYSTOLIC BLOOD PRESSURE: 128 MMHG | TEMPERATURE: 97.8 F | RESPIRATION RATE: 15 BRPM | HEART RATE: 82 BPM | DIASTOLIC BLOOD PRESSURE: 68 MMHG

## 2024-02-21 PROBLEM — K90.9 MALABSORPTION OF IRON: Status: ACTIVE | Noted: 2024-02-21

## 2024-02-21 PROBLEM — D64.89 OTHER SPECIFIED ANEMIAS: Status: ACTIVE | Noted: 2023-02-07

## 2024-02-21 PROCEDURE — G0157 HHC PT ASSISTANT EA 15: HCPCS

## 2024-02-21 NOTE — HOME HEALTH
Routine Visit Note:    Skill/education provided: Direct instruction in sustained and selective attention strategies and problem solving    Patient/caregiver response: Pt maintained sustained attention in his home, given occasional cuing frequ and min cuing intensity in 95% of opps.  Pt employed selective attention in his home given occasional cuing frequ and min cuing intensity in 95% of opps.  Pt maintained sustained attention in his home given occasoinal cuing frequ and min cuing intensity for 8 min this date.  Pt solved problems using problem solving strategy given consistent cuing frequ and mod cuing intensity in 100% of opps.    Plan for next visit: Continue working to improve attention, memory and problem solving.

## 2024-02-21 NOTE — PROGRESS NOTES
February 21, 2024    Hello, may I speak with Campbell Alex?    My name is Haydee Pastor      I am  with [facility]  [address] [place] [address] [phone].    Before we get started may I verify your date of birth? 1940    I am calling to officially welcome you to our practice and ask about your recent visit. Is this a good time to talk? no    Tell me about your visit with us. What things went well?  A My Chart message was sent to the patient.         We're always looking for ways to make our patients' experiences even better. Do you have recommendations on ways we may improve?  no    Overall were you satisfied with your first visit to our practice? yes       I appreciate you taking the time to speak with me today. Is there anything else I can do for you? no      Thank you, and have a great day.

## 2024-02-21 NOTE — TELEPHONE ENCOUNTER
Dr. Marcell Perea called requesting cardiac clearance for extraction. Patient on Eliquis and aspirin. Fax number to office [phone]. Dr. Perea wanting to do extraction soon.  Phone:[phone].

## 2024-02-22 ENCOUNTER — LAB (OUTPATIENT)
Dept: LAB | Facility: HOSPITAL | Age: 84
End: 2024-02-22
Payer: MEDICARE

## 2024-02-22 ENCOUNTER — HOME CARE VISIT (OUTPATIENT)
Dept: HOME HEALTH SERVICES | Facility: HOME HEALTHCARE | Age: 84
End: 2024-02-22
Payer: MEDICARE

## 2024-02-22 ENCOUNTER — TELEPHONE (OUTPATIENT)
Dept: CARDIOLOGY | Facility: CLINIC | Age: 84
End: 2024-02-22
Payer: MEDICARE

## 2024-02-22 VITALS
HEART RATE: 82 BPM | SYSTOLIC BLOOD PRESSURE: 100 MMHG | TEMPERATURE: 98.3 F | DIASTOLIC BLOOD PRESSURE: 64 MMHG | OXYGEN SATURATION: 97 %

## 2024-02-22 LAB — HEMOCCULT STL QL IA: POSITIVE

## 2024-02-22 PROCEDURE — G0151 HHCP-SERV OF PT,EA 15 MIN: HCPCS

## 2024-02-22 PROCEDURE — 82274 ASSAY TEST FOR BLOOD FECAL: CPT

## 2024-02-22 NOTE — TELEPHONE ENCOUNTER
PATIENT IS CLEARED FOR PROCEDURE AND CAN HOLD MEDICATION FOR 3 DAYS. LETTER HAS BEEN SIGNED AND FAXED BACK TO THE DOCTORS OFFICE.

## 2024-02-22 NOTE — TELEPHONE ENCOUNTER
Montserrat with Dr Perea's office is faxing a new cardiac clearance for pt to have an extraction next week. Montserrat would like a call back at [phone] with an update because the pt needs to stop some of his medications by tomorrow since the extraction is next Wednesday.

## 2024-02-22 NOTE — HOME HEALTH
pt up in his recliner on arrival.    pt reports feeling fair and is motivated to participate and improve and feels he is improving with PT intervention.      pt reporting no falls but is currently mobile with wc only.   pt is c/o of a tooth ache and reports she will have this pulled soon.         pt was compliant and participated well,  standing and transferring better with less fatigue.      pt was limited but with c/o weakness and fatigue.  pt needed cues to properly perform ther ex with cues to fully contract/hold.  pt continues to have LE weakness primarily in the hips with limited hip flexion.        plan for next session-  cont PT with gait trg,   balance trg,  wb activities and gait trg up/down the ramp

## 2024-02-22 NOTE — TELEPHONE ENCOUNTER
FACILITY: [facility]  PHONE: [phone]  FAX: [phone]  PROCEDURE: TOOTH EXTRACTION  SCHEDULED: 2/28/24  MEDS TO HOLD: ELIQUIS    PLACED ON 'S DESK FOR REVIEW.

## 2024-02-23 ENCOUNTER — HOME CARE VISIT (OUTPATIENT)
Dept: HOME HEALTH SERVICES | Facility: HOME HEALTHCARE | Age: 84
End: 2024-02-23
Payer: MEDICARE

## 2024-02-23 PROCEDURE — G0152 HHCP-SERV OF OT,EA 15 MIN: HCPCS

## 2024-02-25 VITALS — DIASTOLIC BLOOD PRESSURE: 80 MMHG | OXYGEN SATURATION: 96 % | SYSTOLIC BLOOD PRESSURE: 124 MMHG | HEART RATE: 69 BPM

## 2024-02-26 NOTE — HOME HEALTH
Routine Visit Note:    Skill/education provided: OT instr and educated pt on HEP    Patient/caregiver response: pt required MOD verbal and tactile cues for correct form    Plan for next visit: ther ex    Other pertinent info: pt denies any falls or med changes

## 2024-02-27 ENCOUNTER — HOME CARE VISIT (OUTPATIENT)
Dept: HOME HEALTH SERVICES | Facility: HOME HEALTHCARE | Age: 84
End: 2024-02-27
Payer: MEDICARE

## 2024-02-27 ENCOUNTER — OFFICE VISIT (OUTPATIENT)
Dept: FAMILY MEDICINE CLINIC | Facility: CLINIC | Age: 84
End: 2024-02-27
Payer: MEDICARE

## 2024-02-27 VITALS
BODY MASS INDEX: 23.99 KG/M2 | OXYGEN SATURATION: 97 % | HEART RATE: 76 BPM | SYSTOLIC BLOOD PRESSURE: 139 MMHG | WEIGHT: 181 LBS | DIASTOLIC BLOOD PRESSURE: 68 MMHG | RESPIRATION RATE: 18 BRPM | HEIGHT: 73 IN

## 2024-02-27 DIAGNOSIS — R19.5 POSITIVE OCCULT STOOL BLOOD TEST: Primary | ICD-10-CM

## 2024-02-27 DIAGNOSIS — R19.4 CHANGE IN BOWEL HABITS: ICD-10-CM

## 2024-02-27 DIAGNOSIS — D50.9 IRON DEFICIENCY ANEMIA, UNSPECIFIED IRON DEFICIENCY ANEMIA TYPE: ICD-10-CM

## 2024-02-27 DIAGNOSIS — K92.1 BLOOD IN STOOL: Primary | ICD-10-CM

## 2024-02-27 PROCEDURE — 99214 OFFICE O/P EST MOD 30 MIN: CPT | Performed by: STUDENT IN AN ORGANIZED HEALTH CARE EDUCATION/TRAINING PROGRAM

## 2024-02-27 RX ORDER — PANTOPRAZOLE SODIUM 40 MG/1
40 TABLET, DELAYED RELEASE ORAL DAILY
Qty: 90 TABLET | Refills: 1 | Status: SHIPPED | OUTPATIENT
Start: 2024-02-27

## 2024-02-27 NOTE — PROGRESS NOTES
"Chief Complaint  Chief Complaint   Patient presents with    Rectal Bleeding    Constipation    Diarrhea     Subjective        Campbell Alex is a 84 y.o. male who presents to [facility].    History of Present Illness  Last week had blood work with Dr. Templeton and his iron levels were low.  IV iron was recommended.  He is also having blood in his stool.  It is dark.  He does not have any abd pain.  He has vomited once, no blood in his vomit.    Hemoccult positive Nov 2023 and Feb 2024.    He was placed on Protonix after his recent back surgery but we discontinued it as it did not appear there was a reason for continuing.  He is on Eliquis 5 mg twice daily for paroxysmal atrial fibrillation.  He has a tooth extraction scheduled for tomorrow so he is actually off the Eliquis for now.    Objective   /68   Pulse 76   Resp 18   Ht 185.4 cm (73\")   Wt 82.1 kg (181 lb)   SpO2 97%   BMI 23.88 kg/m²     Estimated body mass index is 23.88 kg/m² as calculated from the following:    Height as of this encounter: 185.4 cm (73\").    Weight as of this encounter: 82.1 kg (181 lb).     Physical Exam   GEN: In no acute distress, non toxic appearing  HEENT: Pupils equal and reactive to light, sclera clear.  NEURO: AAO to person, place, and time. CN 2-12 intact grossly.  PSYCH: Affect normal, insight fair     Result Review :              Assessment and Plan     Diagnoses and all orders for this visit:    1. Blood in stool (Primary)  -     pantoprazole (PROTONIX) 40 MG EC tablet; Take 1 tablet by mouth Daily. Indications: Gastroesophageal Reflux Disease  Dispense: 90 tablet; Refill: 1  2. Change in bowel habits  3. Iron deficiency anemia, unspecified iron deficiency anemia type    They are going to reach out to Dr. Templeton for further recommendations regarding IV iron.  They are going to reach out to GI they are already established with to plan for upper and lower scopes to evaluate for source of " bleed.  We are going to resume Protonix 40 mg daily since there is evidence of potential upper GI bleed.  He is currently holding Eliquis for dental procedure.  We discussed that should his bleeding worsen we would need to hold Eliquis indefinitely until a source of bleed could be found.

## 2024-02-28 ENCOUNTER — HOME CARE VISIT (OUTPATIENT)
Dept: HOME HEALTH SERVICES | Facility: HOME HEALTHCARE | Age: 84
End: 2024-02-28
Payer: MEDICARE

## 2024-02-28 PROCEDURE — G0157 HHC PT ASSISTANT EA 15: HCPCS

## 2024-02-29 ENCOUNTER — DOCUMENTATION (OUTPATIENT)
Dept: ONCOLOGY | Facility: CLINIC | Age: 84
End: 2024-02-29
Payer: MEDICARE

## 2024-02-29 ENCOUNTER — HOME CARE VISIT (OUTPATIENT)
Dept: HOME HEALTH SERVICES | Facility: HOME HEALTHCARE | Age: 84
End: 2024-02-29
Payer: MEDICARE

## 2024-02-29 VITALS
DIASTOLIC BLOOD PRESSURE: 80 MMHG | HEART RATE: 83 BPM | SYSTOLIC BLOOD PRESSURE: 138 MMHG | TEMPERATURE: 46.4 F | OXYGEN SATURATION: 98 %

## 2024-02-29 PROCEDURE — G0151 HHCP-SERV OF PT,EA 15 MIN: HCPCS

## 2024-02-29 NOTE — HOME HEALTH
Pt requests light activities only due to tooth extraction yesterday.  Skill/education provided: thera ex, transfer training, balance and gait training.   Patient/caregiver response: tolerated with improved tech.   Plan for next visit: thera ex, transfer training, balance and gait training.

## 2024-03-01 ENCOUNTER — HOME CARE VISIT (OUTPATIENT)
Dept: HOME HEALTH SERVICES | Facility: HOME HEALTHCARE | Age: 84
End: 2024-03-01
Payer: MEDICARE

## 2024-03-01 VITALS
DIASTOLIC BLOOD PRESSURE: 78 MMHG | SYSTOLIC BLOOD PRESSURE: 130 MMHG | TEMPERATURE: 97.6 F | HEART RATE: 78 BPM | OXYGEN SATURATION: 95 %

## 2024-03-01 VITALS — SYSTOLIC BLOOD PRESSURE: 130 MMHG | HEART RATE: 72 BPM | OXYGEN SATURATION: 98 % | DIASTOLIC BLOOD PRESSURE: 70 MMHG

## 2024-03-01 PROCEDURE — G0152 HHCP-SERV OF OT,EA 15 MIN: HCPCS

## 2024-03-04 ENCOUNTER — HOME CARE VISIT (OUTPATIENT)
Dept: HOME HEALTH SERVICES | Facility: HOME HEALTHCARE | Age: 84
End: 2024-03-04
Payer: MEDICARE

## 2024-03-04 VITALS
HEART RATE: 78 BPM | TEMPERATURE: 98.2 F | DIASTOLIC BLOOD PRESSURE: 68 MMHG | RESPIRATION RATE: 15 BRPM | OXYGEN SATURATION: 95 % | SYSTOLIC BLOOD PRESSURE: 134 MMHG

## 2024-03-04 PROCEDURE — G0157 HHC PT ASSISTANT EA 15: HCPCS

## 2024-03-04 NOTE — HOME HEALTH
Pt discharged from OT services this date as pt has met goals.   Pt to continue with HEP for maintenance.      Pt denies any falls or med changes

## 2024-03-05 ENCOUNTER — HOME CARE VISIT (OUTPATIENT)
Dept: HOME HEALTH SERVICES | Facility: HOME HEALTHCARE | Age: 84
End: 2024-03-05
Payer: MEDICARE

## 2024-03-05 VITALS
HEART RATE: 72 BPM | OXYGEN SATURATION: 96 % | TEMPERATURE: 99.1 F | SYSTOLIC BLOOD PRESSURE: 140 MMHG | DIASTOLIC BLOOD PRESSURE: 75 MMHG

## 2024-03-05 PROCEDURE — G0153 HHCP-SVS OF S/L PATH,EA 15MN: HCPCS

## 2024-03-05 NOTE — HOME HEALTH
pt sitting up in his recliner upon arrival,   back brace donned and pt is prepared for PT session.   pt reports he has been attempting hep review but with limitations.   pt is motivated to continue PT and improve reporting he is not at plof.      pt continues to show improvement with PT intervention and is showing continued gains in the ability to stand,   ambulate and transfer.  pt was educated today on proper form with hep review, not yet ind with cues needed to perform in the correct plane with proper contract/hold.   pt was minimnally unsteady upon standing but not significant.    pt was educated on proper form and use of the walker up/down the ramp in/out of the home.          plan for next session-   cont PT with gait trg, hep review,  balance trg

## 2024-03-05 NOTE — HOME HEALTH
"Routine Visit Note:    Skill/education provided: Direct instruction in problem solving, memory, attention and reading/reading comp.    Patient/caregiver response: Pt used problem solving strategies with complex problems given rare cuing frequency and min cuing intensity in 95% of opps.    Pt continues to use written communication in his daily diary to aid in memory and recall and to discuss past activities. He reported, \"It really does help.\"    Pt recalled 3/3 unrelated words given 8 minute duration with no cues with 100% acc.    Pt reports feeling much more capable now in completing banking tasks at home (such as balancing his checkbook and writing checks to pay bills) now than he was at the start of therapy. He must depend on himself for this because his daughter is not always around to help him. Pt has not asked his daughter for help in some time, as he feels like he is completing the tasks accurately (although it is difficult for him and it takes him extra time to complete these tasks).    Pt maintained sustained attention at home given occasional cuing frequ and min cuing intensity in 95% of opps.    Pt employed selective attention at home given occasional cuing frequ and min cuing intensity in 95% of opps.    Pt maintained sustained attention at home given occasional cuing frequ and min cuing intensity for 15 minutes.      Plan for next visit: Continue to work towards improved reading and reading comprehension, problem solving, attention and memory.    Other pertinent info: [facility] to [facility] new ones.  Pt's wife reported that patient has blood in his stool and has an appt for an upper and lower GI. MD wants patient to get an IV iron infusion. Pt's appt is one month away. Wife thinks pt may need an iron infusion now. SLP discussed this with pt and asked if patient would like an RN to come out to further discuss this issue. Pt declined. Pt also declined " having an iron infusion now. Pt states he will wait until April to be seen by a GI specialist and go from there.

## 2024-03-06 ENCOUNTER — HOME CARE VISIT (OUTPATIENT)
Dept: HOME HEALTH SERVICES | Facility: HOME HEALTHCARE | Age: 84
End: 2024-03-06
Payer: MEDICARE

## 2024-03-06 VITALS
OXYGEN SATURATION: 96 % | DIASTOLIC BLOOD PRESSURE: 66 MMHG | HEART RATE: 85 BPM | RESPIRATION RATE: 14 BRPM | SYSTOLIC BLOOD PRESSURE: 128 MMHG | TEMPERATURE: 97.7 F

## 2024-03-06 PROCEDURE — G0157 HHC PT ASSISTANT EA 15: HCPCS

## 2024-03-07 ENCOUNTER — HOME CARE VISIT (OUTPATIENT)
Dept: HOME HEALTH SERVICES | Facility: HOME HEALTHCARE | Age: 84
End: 2024-03-07
Payer: MEDICARE

## 2024-03-07 VITALS
OXYGEN SATURATION: 97 % | TEMPERATURE: 98.6 F | DIASTOLIC BLOOD PRESSURE: 74 MMHG | HEART RATE: 83 BPM | SYSTOLIC BLOOD PRESSURE: 122 MMHG

## 2024-03-07 PROCEDURE — G0151 HHCP-SERV OF PT,EA 15 MIN: HCPCS

## 2024-03-07 NOTE — HOME HEALTH
pt up and prepared for PT session but fatigued from [facility].    pt reporting he continues to perform PT hep as able but is  often too fatigue.  pt is motivated to continue to improve with PT, reports he is not at plf and wants to wean from the wc and return to ambulating regularly with the walker     pt was slightly more limited today,  with rest periods needed and cues to perform ther ex in the correct plane, with proper dequan and to avoid compensatory movement.     pt was educated on proper lumbar stab,  back brace donned throughout.  pt was minimally unsteady up/down ramp,  but dynamic balance has improved.     pt is showing improvement in the ability to perform PT hep review,  with improved rom and improving ability to contract/hold           plan for next session,   cont PT with outdoor gait trg,  hep review,  balance trg and transfer education as needed-   possible continuation of PT services to attain plof.

## 2024-03-07 NOTE — HOME HEALTH
Must be completed and at least every 30 days.   Clinical condition of patient at initial or last assessment:   SBA with bed mobility and transfers with use of wheeled walker for support. CGA for ambulation x 200 ft with use of wheeled walker on even and uneven surfaces. Pt continues to use wc as main mobility device for safety.   Current clinical condition of patient:   I with bed mobility and transfers with use of wheeled walker for support.  CGA for ambulation x 200 ft with use of wheeled walker on even and uneven surfaces. Pt continues to use wc as main mobility device for safety.   Overall progress towards measurable treatment goals:   Pt with significant improvement towards goals. Pt wants to add ability to manage steps to get in and out of house thru garage.  Effectiveness of current plan:   Pt with improved mobility.   Plan for continuing or discontinuing service:   PT for additional 3w2 2w1  Changes to goals or care plan update to be completed in guideline section and communicated to MD:   PT for additional 3w1 2w1  Statement of expectation of continued progress toward goals:   Pt with good prognosis of meeting goals.   Why is it necessary for therapy to continue?   Skilled PT required to improve safe mobility with use of wheeled walker and step management.   Skill/education provided: thera ex, transfer training, balance and gait training.   Patient/caregiver response: tolerated with improved tech.   Plan for next visit: thera ex, transfer training, balance and gait training.

## 2024-03-08 ENCOUNTER — TELEPHONE (OUTPATIENT)
Dept: ONCOLOGY | Facility: CLINIC | Age: 84
End: 2024-03-08
Payer: MEDICARE

## 2024-03-08 RX ORDER — AZELASTINE HYDROCHLORIDE 137 UG/1
SPRAY, METERED NASAL
Qty: 30 ML | Refills: 3 | OUTPATIENT
Start: 2024-03-08

## 2024-03-08 NOTE — TELEPHONE ENCOUNTER
Contacted Megan to let her know that the Iron infusions were approved and someone would be calling them to schedule the appointments. She voiced understanding.   She asked about the process of the iron infusions and I explained the protocol to the best of my ability.

## 2024-03-08 NOTE — TELEPHONE ENCOUNTER
Caller: nash soto    Relationship to patient: Emergency Contact    Best call back number: [phone]    Chief complaint: PATIENT SAW DR. RIOS BACK IN FEBRUARY AND HIS NOTES STATED PATIENT IS TO GET SCHEDULED FOR IRON INFUSIONS AND THEY HAVE NOT HEAR BACK REGARDING SCHEDULING, PLEASE ADVISE    Type of visit: IRON INFUSIONS

## 2024-03-11 ENCOUNTER — HOME CARE VISIT (OUTPATIENT)
Dept: HOME HEALTH SERVICES | Facility: HOME HEALTHCARE | Age: 84
End: 2024-03-11
Payer: MEDICARE

## 2024-03-11 VITALS
HEART RATE: 82 BPM | TEMPERATURE: 97.7 F | SYSTOLIC BLOOD PRESSURE: 132 MMHG | OXYGEN SATURATION: 95 % | RESPIRATION RATE: 15 BRPM | DIASTOLIC BLOOD PRESSURE: 60 MMHG

## 2024-03-11 PROCEDURE — G0157 HHC PT ASSISTANT EA 15: HCPCS

## 2024-03-12 ENCOUNTER — TELEPHONE (OUTPATIENT)
Dept: ONCOLOGY | Facility: CLINIC | Age: 84
End: 2024-03-12

## 2024-03-12 ENCOUNTER — HOME CARE VISIT (OUTPATIENT)
Dept: HOME HEALTH SERVICES | Facility: HOME HEALTHCARE | Age: 84
End: 2024-03-12
Payer: MEDICARE

## 2024-03-12 VITALS
DIASTOLIC BLOOD PRESSURE: 82 MMHG | SYSTOLIC BLOOD PRESSURE: 153 MMHG | HEART RATE: 79 BPM | OXYGEN SATURATION: 99 % | TEMPERATURE: 99 F

## 2024-03-12 PROCEDURE — G0153 HHCP-SVS OF S/L PATH,EA 15MN: HCPCS

## 2024-03-12 NOTE — HOME HEALTH
pt sitting up,  prepared for PT session-  feeling fair but with limitations and continued weakness.  pt continues to use the wc mostly for indoor mobility but occasional ambulates short distance with the walker.      pt was challenged today to stand for extended periods of time,  with noted quad weakness and c/o fatigue.  pt was minimally unsteaqdy upon standing and needed the walker to correct.    pt was able to ambulate further today but was very fatigued to end gait trg.   pt was cued at times to properly deep plb for energy conservation and to correcty pace activities.   pt is not yet ind, with hep review,  with cues needed to contract/hold and to perform with the proper dequan,  to contract/hold         plan for next session-   cont PT with gait trg, hep review,  balance trg and transfer education-  outdoor gait trg including the ramp

## 2024-03-12 NOTE — TELEPHONE ENCOUNTER
Provider: alice    Caller: nash soto    Relationship to Patient: pt's spouse    Phone Number: [phone]    Reason for Call: pt's spouse is inquiring in regards to labwork results, and if patient needs to be scheduled for infusion.  Patient's spouse left a message on friday

## 2024-03-12 NOTE — Clinical Note
Patient reported falling on 3-10-24 at his home while ambulating from the living room to the dining room. Pt was utilizing his walker and as he was ambulating, he believes he was blinded by the bright sunlight coming in from the dining room windows, became off balance and fell onto the carpeted floor. Bilateral hands and elbows broke his fall. Pt sustained no injuries.

## 2024-03-13 ENCOUNTER — HOME CARE VISIT (OUTPATIENT)
Dept: HOME HEALTH SERVICES | Facility: HOME HEALTHCARE | Age: 84
End: 2024-03-13
Payer: MEDICARE

## 2024-03-13 VITALS
SYSTOLIC BLOOD PRESSURE: 142 MMHG | RESPIRATION RATE: 15 BRPM | DIASTOLIC BLOOD PRESSURE: 72 MMHG | TEMPERATURE: 97.6 F | HEART RATE: 83 BPM | OXYGEN SATURATION: 95 %

## 2024-03-13 PROCEDURE — G0157 HHC PT ASSISTANT EA 15: HCPCS

## 2024-03-13 NOTE — HOME HEALTH
Routine Visit Note:    Skill/education provided: Direct instruction in ways to improve attention and memory.    Patient/caregiver response: Pt maintained sustained attention in the home given no cuing in 100% of opps.  Pt employed selective attention at home given no cues in 100% of opps.  Pt maintained sustained attention at home given no cues for 15 min.      Plan for next visit: Complete SLP Discipline Discharge with all goals met.    Other pertinent info: Pt reported that he fell on 3-10-24 at his home. His wife, Megan, was home but did not witness the fall. No injuries reported.

## 2024-03-14 ENCOUNTER — HOSPITAL ENCOUNTER (OUTPATIENT)
Dept: ONCOLOGY | Facility: HOSPITAL | Age: 84
Discharge: HOME | End: 2024-03-14
Payer: MEDICARE

## 2024-03-14 VITALS
BODY MASS INDEX: 25.05 KG/M2 | DIASTOLIC BLOOD PRESSURE: 83 MMHG | OXYGEN SATURATION: 97 % | HEART RATE: 87 BPM | TEMPERATURE: 98 F | HEIGHT: 73 IN | SYSTOLIC BLOOD PRESSURE: 153 MMHG | WEIGHT: 189 LBS | RESPIRATION RATE: 16 BRPM

## 2024-03-14 DIAGNOSIS — D64.89 ANEMIA DUE TO OTHER CAUSE, NOT CLASSIFIED: ICD-10-CM

## 2024-03-14 DIAGNOSIS — K90.9 MALABSORPTION OF IRON: Primary | ICD-10-CM

## 2024-03-14 PROCEDURE — 25010000002 IRON SUCROSE PER 1 MG: Performed by: INTERNAL MEDICINE

## 2024-03-14 PROCEDURE — 96366 THER/PROPH/DIAG IV INF ADDON: CPT

## 2024-03-14 PROCEDURE — 96365 THER/PROPH/DIAG IV INF INIT: CPT

## 2024-03-14 PROCEDURE — 25810000003 SODIUM CHLORIDE 0.9 % SOLUTION: Performed by: INTERNAL MEDICINE

## 2024-03-14 RX ORDER — SODIUM CHLORIDE 9 MG/ML
20 INJECTION, SOLUTION INTRAVENOUS ONCE
Status: COMPLETED | OUTPATIENT
Start: 2024-03-14 | End: 2024-03-14

## 2024-03-14 RX ADMIN — IRON SUCROSE 300 MG: 20 INJECTION, SOLUTION INTRAVENOUS at 13:36

## 2024-03-14 RX ADMIN — SODIUM CHLORIDE 20 ML/HR: 9 INJECTION, SOLUTION INTRAVENOUS at 13:36

## 2024-03-14 NOTE — PROGRESS NOTES
Pt here for Venofer Dose #1, peripheral IV started with blood return noted.  Pt tolerated infusion and monitored for 30 min post infusion,  pt discharged with spouse and AVS provided

## 2024-03-14 NOTE — HOME HEALTH
SBAR OUT Report: Mother    Verbal report given to lianna Martinez (full name & credentials) on this patient, who is now being transferred to MIU (unit) for routine progression of care. The patient is wearing a green \"Anesthesia-Duramorph\" band. Report consisted of patient's Situation, Background, Assessment and Recommendations (SBAR). [place] ID bands were compared with the identification form, and verified with the patient and receiving nurse. Information from the SBAR and the [facility] Report was reviewed with the receiving nurse; opportunity for questions and clarification provided. pt sitting up and is prepared for PT session,        pt states he has been attempting hep review and continues to advance.   pt reports he had a fall on 3/10 due to the sun glaring in his eyes.  pt fell on the carpet but denies any pain today from this.   pt reports he was assisted up by his son.    pt presents today with no c/o pain and no noted injuries.    pt reports no med changes or complications.    fall previously reported by ST    pt was compliant throughout,  participated well and voiced no c/o's but was weak at times and with noted functional deficits.    pt was minimally unsteady upon standing but was able to self correct.   back brace donned throughout and pt was cued on proper lumbar stab and to avoid trunk flexion when standing   spouse was present and educated on the activities performed         plan for next session- cont PT with gait trg,   transfer trg,  balance trg and advance as able to goals with education as needed

## 2024-03-15 ENCOUNTER — HOME CARE VISIT (OUTPATIENT)
Dept: HOME HEALTH SERVICES | Facility: HOME HEALTHCARE | Age: 84
End: 2024-03-15
Payer: MEDICARE

## 2024-03-15 VITALS
OXYGEN SATURATION: 95 % | HEART RATE: 72 BPM | TEMPERATURE: 97.7 F | RESPIRATION RATE: 15 BRPM | DIASTOLIC BLOOD PRESSURE: 64 MMHG | SYSTOLIC BLOOD PRESSURE: 136 MMHG

## 2024-03-15 PROCEDURE — G0157 HHC PT ASSISTANT EA 15: HCPCS

## 2024-03-15 NOTE — HOME HEALTH
pt up, prepared for PT session and voices no c/o's.  pt is motivated to participate, improve and return to plf.    pt has been attempting hep but with limitations     pt feels he is improving with PT and denies any new falls       pt continues to show gains in gait tolerance and dynamic balance.  pt was mininally unsteady upon standing but was able to self correct with the walker.  lumbar brace was donned throughout and pt was encouraged to wear at all times when up, per md orders.   pt was educated on proper lumbar stab techniques and to avoid forward flexed posture.    pt was fatigued to end PT session but was pleased to have participated.        plan for next session-  cont PT with gait trg, hep review,   transfer trg and balance trg as tolerated

## 2024-03-19 ENCOUNTER — HOME CARE VISIT (OUTPATIENT)
Dept: HOME HEALTH SERVICES | Facility: HOME HEALTHCARE | Age: 84
End: 2024-03-19
Payer: MEDICARE

## 2024-03-19 PROCEDURE — G0151 HHCP-SERV OF PT,EA 15 MIN: HCPCS

## 2024-03-19 NOTE — HOME HEALTH
60 Day Summary  Home Health need continues for: PT  Primary diagnoses/co-morbidities/recent procedures in past 60 days that impact current episode: lumbar fusion  Current level of functional ability: SBA for ambulation with use of wheeled walker in house, CGA for outside surfaces, Max assist with step management. Uses wc for long distances.   Homebound status and living arrangements: Lives with spouse. Spouse or children assist with taking patient for medical appts.  Goals accomplished and/or measurable progress toward unmet goals in past 60 days: transfer goals met. Pt continues to require PT to meet gait goals.   Focus of care for next 60 days for each discipline ordered: PT  Skin integrity/wound status: intact.  Estimated date when home care services will end 4/12/24  SDOH changes/barriers (i.e. Caregiver availability, social isolation, environment, income, transportation access, food insecurity etc.)none  Need for MSW referral?N  Plan for next visit thera ex, transfer training, balance and gait training.

## 2024-03-20 ENCOUNTER — HOME CARE VISIT (OUTPATIENT)
Dept: HOME HEALTH SERVICES | Facility: HOME HEALTHCARE | Age: 84
End: 2024-03-20
Payer: MEDICARE

## 2024-03-20 VITALS
SYSTOLIC BLOOD PRESSURE: 155 MMHG | DIASTOLIC BLOOD PRESSURE: 92 MMHG | OXYGEN SATURATION: 96 % | TEMPERATURE: 98.3 F | HEART RATE: 77 BPM

## 2024-03-20 PROCEDURE — G0153 HHCP-SVS OF S/L PATH,EA 15MN: HCPCS

## 2024-03-20 NOTE — HOME HEALTH
THE FOLLOWING INSTRUCTIONS WERE REVIEWED UPON DISCHARGE:    Keep your medical appointments.    Call your doctor if you develop a new or worsening symptom.    Continue to take the medications prescribed by your doctor. A medication list is attached. Be sure to keep them informed of all medications you take including over the counter herbal, vitamins/minerals and the ones you take only when needed.    Follow the diet as ordered by your doctor.    Discharge Summary/Summary of Care Provided: At SOC, pt exhibited immediate memory loss and short term memory loss; difficulty solving problems; occasional substitutions of words or neologisms in conversational speech. With speech therapy intervention, and use of learned attention and memory strategies, pt revealed short term memory within normal limits. Pt's ability to solve everyday problems, has also improved to within functional limits.  Patient received home health for diagnosis: Primary Dx: Encounter for other orthopedic aftercare [Z47.89]    Current level of functional ability: Moderate    Living arrangements: Pt lives with his wife who is able to assist with some ADLs and meal prep. [employer] built a metal ramp so that patient can leave and enter his home in his w/c.    Progress towards goals and/or Were all goals met? All goals were met.

## 2024-03-21 ENCOUNTER — HOSPITAL ENCOUNTER (OUTPATIENT)
Dept: ONCOLOGY | Facility: HOSPITAL | Age: 84
Discharge: HOME | End: 2024-03-21
Admitting: INTERNAL MEDICINE
Payer: MEDICARE

## 2024-03-21 ENCOUNTER — HOME CARE VISIT (OUTPATIENT)
Dept: HOME HEALTH SERVICES | Facility: HOME HEALTHCARE | Age: 84
End: 2024-03-21
Payer: MEDICARE

## 2024-03-21 VITALS
OXYGEN SATURATION: 98 % | TEMPERATURE: 98.4 F | HEART RATE: 50 BPM | RESPIRATION RATE: 18 BRPM | SYSTOLIC BLOOD PRESSURE: 118 MMHG | DIASTOLIC BLOOD PRESSURE: 60 MMHG

## 2024-03-21 VITALS
TEMPERATURE: 97.5 F | SYSTOLIC BLOOD PRESSURE: 134 MMHG | RESPIRATION RATE: 14 BRPM | HEART RATE: 82 BPM | OXYGEN SATURATION: 96 % | DIASTOLIC BLOOD PRESSURE: 70 MMHG

## 2024-03-21 VITALS
OXYGEN SATURATION: 96 % | HEIGHT: 73 IN | BODY MASS INDEX: 25.05 KG/M2 | HEART RATE: 73 BPM | TEMPERATURE: 97.8 F | WEIGHT: 189 LBS | RESPIRATION RATE: 14 BRPM | SYSTOLIC BLOOD PRESSURE: 132 MMHG | DIASTOLIC BLOOD PRESSURE: 74 MMHG

## 2024-03-21 DIAGNOSIS — K90.9 MALABSORPTION OF IRON: Primary | ICD-10-CM

## 2024-03-21 DIAGNOSIS — D64.89 ANEMIA DUE TO OTHER CAUSE, NOT CLASSIFIED: ICD-10-CM

## 2024-03-21 PROCEDURE — G0157 HHC PT ASSISTANT EA 15: HCPCS

## 2024-03-21 PROCEDURE — 25810000003 SODIUM CHLORIDE 0.9 % SOLUTION: Performed by: INTERNAL MEDICINE

## 2024-03-21 PROCEDURE — 25010000002 IRON SUCROSE PER 1 MG: Performed by: INTERNAL MEDICINE

## 2024-03-21 PROCEDURE — 96365 THER/PROPH/DIAG IV INF INIT: CPT

## 2024-03-21 PROCEDURE — 96366 THER/PROPH/DIAG IV INF ADDON: CPT

## 2024-03-21 RX ORDER — SODIUM CHLORIDE 9 MG/ML
20 INJECTION, SOLUTION INTRAVENOUS ONCE
Status: COMPLETED | OUTPATIENT
Start: 2024-03-21 | End: 2024-03-21

## 2024-03-21 RX ADMIN — SODIUM CHLORIDE 20 ML/HR: 9 INJECTION, SOLUTION INTRAVENOUS at 13:29

## 2024-03-21 RX ADMIN — IRON SUCROSE 300 MG: 20 INJECTION, SOLUTION INTRAVENOUS at 13:29

## 2024-03-22 ENCOUNTER — HOME CARE VISIT (OUTPATIENT)
Dept: HOME HEALTH SERVICES | Facility: HOME HEALTHCARE | Age: 84
End: 2024-03-22
Payer: MEDICARE

## 2024-03-22 VITALS
TEMPERATURE: 97.7 F | HEART RATE: 82 BPM | RESPIRATION RATE: 15 BRPM | OXYGEN SATURATION: 96 % | DIASTOLIC BLOOD PRESSURE: 76 MMHG | SYSTOLIC BLOOD PRESSURE: 144 MMHG

## 2024-03-22 PROCEDURE — G0157 HHC PT ASSISTANT EA 15: HCPCS

## 2024-03-22 PROCEDURE — 88305 TISSUE EXAM BY PATHOLOGIST: CPT | Performed by: OTOLARYNGOLOGY

## 2024-03-22 NOTE — HOME HEALTH
pt sitting up and prepared for PT session-   pt has an apt with dermatology today to have basal cell areas removed from his face.   pt reports no changes from yesterdays visit and is motivated to participate.    pt reports no med changes or complications       pt today, during gait trg,  exhibited a lob due to LLE giving and needing assistance to prevent falling.     pt was educated on the importance of proper walker placement and position to the walker as well as proper sequencing to assist if this occurs.    pt was compliant throughout PT session,  still needing rest periods as well as cues to properly perform ther ex  pt was very fatigued to end PT session and reported he needed to rest.            plan for next session-  cont PT with gait trg,  transfer trg and balance trg all with education as needed.

## 2024-03-22 NOTE — HOME HEALTH
pt sitting up in his wc on arrival,  feeling fair and is motivated to participate and improve.   pt reports he was seen by the surgeon earlier this week and reports satisfactory progress and is to return in 3 months.    xrays were satisfactory     pt remains highly motivated,   participated well but needed close assistance with all activities     pt was challenged to ambulate extended distance and fatigued needing to sit with c/o fatigue.     pt was cued to properly deep plb for energy conservation with mild dyspnea.      pt continues to show improvement in the ability to transfer and ambulate.   but with contined limitations.    indoor gait trg due to weather conditions.      performed the following but with cues needed to perform correctly   standing b tke, heel lifts, hip flex, x 15 ea with cga/min assistance   seated le restorator x 6 mins s  seated b laq's, ap's x 20, hip abd/add and flex/ext x 15 ea with cues given for neutral hip position and lumbar stab, with blue   standing l/r lateral wt shifts x 1 min x 2   standing alt shoulder flex x 15  seated b shoulder forward flex x 10 x 2       pt amb indoors with the RW and cga with postural cues x 250 ft with cues to improve hip flexion, posture and lumbar stab       plan for next session-  cont PT with gait trg, hep review,  balance trg

## 2024-03-25 ENCOUNTER — LAB REQUISITION (OUTPATIENT)
Dept: LAB | Facility: HOSPITAL | Age: 84
End: 2024-03-25
Payer: MEDICARE

## 2024-03-25 ENCOUNTER — HOME CARE VISIT (OUTPATIENT)
Dept: HOME HEALTH SERVICES | Facility: HOME HEALTHCARE | Age: 84
End: 2024-03-25
Payer: MEDICARE

## 2024-03-25 VITALS
OXYGEN SATURATION: 95 % | DIASTOLIC BLOOD PRESSURE: 70 MMHG | SYSTOLIC BLOOD PRESSURE: 134 MMHG | HEART RATE: 97 BPM | RESPIRATION RATE: 15 BRPM | TEMPERATURE: 98.2 F

## 2024-03-25 DIAGNOSIS — D48.2 NEOPLASM OF UNCERTAIN BEHAVIOR OF PERIPHERAL NERVES AND AUTONOMIC NERVOUS SYSTEM: ICD-10-CM

## 2024-03-25 PROCEDURE — G0157 HHC PT ASSISTANT EA 15: HCPCS

## 2024-03-25 NOTE — TELEPHONE ENCOUNTER
Rx Refill Note  Requested Prescriptions     Signed Prescriptions Disp Refills    metoprolol tartrate (LOPRESSOR) 25 MG tablet 180 tablet 3     Sig: Take 1 tablet by mouth 2 (Two) Times a Day. Indications: High Blood Pressure Disorder     Authorizing Provider: CLINT BOWLING     Ordering User: LINA JULES      Last office visit with prescribing clinician: 12/11/2023   Last telemedicine visit with prescribing clinician: Visit date not found   Next office visit with prescribing clinician: 6/25/2024                         Would you like a call back once the refill request has been completed: [] Yes [] No    If the office needs to give you a call back, can they leave a voicemail: [] Yes [] No    Lina Jules MA  03/25/24, 09:05 EDT

## 2024-03-26 LAB
LAB AP CASE REPORT: NORMAL
PATH REPORT.FINAL DX SPEC: NORMAL
PATH REPORT.GROSS SPEC: NORMAL

## 2024-03-26 NOTE — HOME HEALTH
pt sitting up and and is prepared for PT session- feeling fair but admits limited endurance.     pt has been attempting hep review but with limitations.      pt denies any falls and no med changes reported today.      pt was compliant throughout,  but with weakness and limitations- however continues to show improvement in the ability to transfer, stand and ambulate.   pt was minimally unsteady upon standing,   but transfer ability has improved and with less struggle to get up from the wc.  pt was cued to properly deep plb for energy conservation but no noted dyspnea.      pt ambulated outdoors today, continues to display improving level of ind up/down the ramp and outdoor gait tolerance continues to improve as well        plan for next session-  cont PT with gait trg, balance trg and transfer education, pre's all with advancement as able

## 2024-03-27 ENCOUNTER — HOME CARE VISIT (OUTPATIENT)
Dept: HOME HEALTH SERVICES | Facility: HOME HEALTHCARE | Age: 84
End: 2024-03-27
Payer: MEDICARE

## 2024-03-27 VITALS
SYSTOLIC BLOOD PRESSURE: 136 MMHG | HEART RATE: 84 BPM | DIASTOLIC BLOOD PRESSURE: 70 MMHG | TEMPERATURE: 98.4 F | OXYGEN SATURATION: 97 %

## 2024-03-27 PROCEDURE — G0151 HHCP-SERV OF PT,EA 15 MIN: HCPCS

## 2024-03-28 NOTE — HOME HEALTH
No new issues voiced.   Skill/education provided: thera ex,  balance and gait training.   Patient/caregiver response: tolerated with improved ability to perform step management.  Plan for next visit: thera ex, balance and gait training.

## 2024-03-29 ENCOUNTER — HOME CARE VISIT (OUTPATIENT)
Dept: HOME HEALTH SERVICES | Facility: HOME HEALTHCARE | Age: 84
End: 2024-03-29
Payer: MEDICARE

## 2024-03-29 VITALS
RESPIRATION RATE: 16 BRPM | TEMPERATURE: 98.1 F | DIASTOLIC BLOOD PRESSURE: 70 MMHG | SYSTOLIC BLOOD PRESSURE: 134 MMHG | OXYGEN SATURATION: 96 % | HEART RATE: 83 BPM

## 2024-03-29 PROCEDURE — G0157 HHC PT ASSISTANT EA 15: HCPCS

## 2024-03-29 NOTE — HOME HEALTH
"pt reporting he feels fair today, improving and motivated to continue to advance.  pt denies any falls today and no med changes reported.    pt states he is improving with PT intervention at this time,   but remains concerned regarding continued weakness and functional limitations.     pt was compliant,  participated well but with limited strength and endurance.       pt had 3 episodes of LEs \"giving during gait trg with assistance needed. pt was minimally unsteady upon standing but was able to self correct with use of the walker.  pt continues to require instruction to properly perform ther ex,  mostly to perform with correct dequan and to avoid compensatory movement.  pt was minimally unsteady upon standing but was able to self correct.         plan for next session-  cont PT with gait trg outdoors as able,   hep review with advancement-   transfer trg as needed with pre/education as needed"

## 2024-04-01 ENCOUNTER — HOME CARE VISIT (OUTPATIENT)
Dept: HOME HEALTH SERVICES | Facility: HOME HEALTHCARE | Age: 84
End: 2024-04-01
Payer: MEDICARE

## 2024-04-01 ENCOUNTER — TELEPHONE (OUTPATIENT)
Dept: FAMILY MEDICINE CLINIC | Facility: CLINIC | Age: 84
End: 2024-04-01

## 2024-04-01 PROCEDURE — G0157 HHC PT ASSISTANT EA 15: HCPCS

## 2024-04-01 NOTE — TELEPHONE ENCOUNTER
Caller: nash soto     Relationship: [unfilled]     Best call back number:     [phone]        What is your medical concern? PATIENTS SPOUSE CALLING STATING THAT HE HAS BEEN HAVING PAIN IN HIS RIGHT SHOULDER. SHE WOULD LIKE TO KNOW IF HE SHOULD SEE A SPECIALIST. SHE WOULD LIKE TO GET A REFERRAL IF THAT IS WHAT HE NEEDS TO DO.     How long has this issue been going on? 2 WEEKS    Is your provider already aware of this issue? N/A    Have you been treated for this issue? N/A

## 2024-04-02 ENCOUNTER — HOME CARE VISIT (OUTPATIENT)
Dept: HOME HEALTH SERVICES | Facility: HOME HEALTHCARE | Age: 84
End: 2024-04-02
Payer: MEDICARE

## 2024-04-02 ENCOUNTER — OFFICE VISIT (OUTPATIENT)
Dept: FAMILY MEDICINE CLINIC | Facility: CLINIC | Age: 84
End: 2024-04-02
Payer: MEDICARE

## 2024-04-02 VITALS
OXYGEN SATURATION: 97 % | RESPIRATION RATE: 16 BRPM | HEART RATE: 76 BPM | HEIGHT: 73 IN | DIASTOLIC BLOOD PRESSURE: 84 MMHG | SYSTOLIC BLOOD PRESSURE: 142 MMHG | BODY MASS INDEX: 24.6 KG/M2 | WEIGHT: 185.6 LBS

## 2024-04-02 VITALS
TEMPERATURE: 96.9 F | HEART RATE: 78 BPM | DIASTOLIC BLOOD PRESSURE: 80 MMHG | OXYGEN SATURATION: 99 % | SYSTOLIC BLOOD PRESSURE: 124 MMHG

## 2024-04-02 VITALS
DIASTOLIC BLOOD PRESSURE: 80 MMHG | SYSTOLIC BLOOD PRESSURE: 140 MMHG | OXYGEN SATURATION: 98 % | HEART RATE: 83 BPM | TEMPERATURE: 97.7 F

## 2024-04-02 DIAGNOSIS — M25.511 ACUTE PAIN OF RIGHT SHOULDER: Primary | ICD-10-CM

## 2024-04-02 PROCEDURE — G0151 HHCP-SERV OF PT,EA 15 MIN: HCPCS

## 2024-04-02 PROCEDURE — 99213 OFFICE O/P EST LOW 20 MIN: CPT | Performed by: INTERNAL MEDICINE

## 2024-04-02 RX ORDER — AZELASTINE 1 MG/ML
2 SPRAY, METERED NASAL 2 TIMES DAILY
Qty: 30 ML | Refills: 3 | Status: SHIPPED | OUTPATIENT
Start: 2024-04-02

## 2024-04-02 NOTE — HOME HEALTH
Pt reports richelle BERGER today for L shoulder pain due to using UE to pull his wc up ramp.    Skill/education provided: thera ex, balance and gait training.   Patient/caregiver response: tolerated with reports of fatigue.   Plan for next visit: thera ex, balance and gait training.

## 2024-04-02 NOTE — PATIENT INSTRUCTIONS
Shoulder pain  - Over-the-counter medications including acetaminophen as needed  - Activity modification  - Heat/ice as needed  - Consult physical therapy  - Follow up with ortho as scheduled    Over the counter hydrocortisone cream for rash

## 2024-04-02 NOTE — HOME HEALTH
Routine Visit Note:   Patient was in the living room and said come in.   Patient rated his pain as 3/10.    Skill/education provided: Patient was instructed in therapeutic exercises, safe transfers and ambulation with an appropriate assist device.    Patient/caregiver response: Patient/caregiver understood instructions and performed exercises well.    Plan for next visit:  Advance gait and strengthening exercise as patient pain and tolerance allows.

## 2024-04-02 NOTE — PROGRESS NOTES
Chief Complaint  Shoulder Pain    HPI:    Campbell Alex presents to [facility]    Patient has a wheelchair and recently has been trying to help going up a ramp and noticed pain in the right shoulder afterwards. He first noticed the pain about two weeks ago. He notices pain, especially in the morning. Pain described as an intermittent, non-radiating, sharp pain of the anterolateral right shoulder. Pain worse with certain movements, especially with abduction and overhead movements and improves with rest. Patient has been trying OTC medications, activity modifications. Denies heat/ice massage, stretching, PT. Denies fever, chills, nausea, vomiting. Denies numbness, tingling, weakness, saddle anesthesia, urinary/fecal incontinence, focal sensory/motor deficit. Denies recent trauma, bending/twisting, injury, or overuse other than using wheel chair as above. Denies prior or recent imaging of the shoulder. Denies prior physical therapy, corticosteroid injections, surgeries, or procedure of the right shoulder.     Review of Systems:  ROS negative unless otherwise noted in HPI above.    Past Medical History:   Diagnosis Date    Allergic 1970    Anemia n0t sure    Anxiety     Arthritis     Benign prostatic hyperplasia 1980    Cancer     Cataract 1990    CHF (congestive heart failure) stint    Cholelithiasis 1958    Coronary artery disease     Erectile dysfunction 2020    HL (hearing loss) 1995    Hyperlipidemia     Hypertension     Osteopenia 1980    Prostatism     Renal insufficiency 1940    Suspected COVID-19 virus infection 09/07/2021    Urinary tract infection     Visual impairment 1948         Current Outpatient Medications:     acetaminophen (Tylenol) 325 MG tablet, Take 2 tablets by mouth Every 4 (Four) Hours As Needed for Fever or Mild Pain. Indications: Fever, Pain, Disp: , Rfl:     apixaban (ELIQUIS) 5 MG tablet tablet, Take 1 tablet by mouth 2 (Two) Times a Day. Indications:  history of DVT/PE, Disp: , Rfl:     aspirin (aspirin) 81 MG EC tablet, Take 1 tablet by mouth Daily. Indications: Disease involving Lipid Deposits in the Arteries, Disp: , Rfl:     atorvastatin (LIPITOR) 20 MG tablet, Take 1 tablet by mouth Every Night. Indications: High Amount of Fats in the Blood, Disp: , Rfl:     azelastine (ASTELIN) 0.1 % nasal spray, 2 sprays into the nostril(s) as directed by provider 2 (Two) Times a Day. Use in each nostril as directed, Disp: 30 mL, Rfl: 3    Calcium Carbonate 1500 (600 Ca) MG tablet, Take 1 tablet by mouth Daily. Indications: Low Amount of Calcium in the Blood, Disp: , Rfl:     Fiber powder, Take 1 dose by mouth Daily As Needed (constipation). Indications: constipation, Disp: , Rfl:     Iron, Ferrous Sulfate, 325 (65 Fe) MG tablet, Take 1 tablet by mouth Daily. Indications: Anemia From Inadequate Iron in the Body, Iron Deficiency, Disp: , Rfl:     isosorbide mononitrate (IMDUR) 30 MG 24 hr tablet, Take 1 tablet by mouth Daily. Indications: Stable Angina Pectoris, Disp: , Rfl:     metoprolol tartrate (LOPRESSOR) 25 MG tablet, Take 1 tablet by mouth 2 (Two) Times a Day. Indications: High Blood Pressure Disorder, Disp: 180 tablet, Rfl: 3    pantoprazole (PROTONIX) 40 MG EC tablet, Take 1 tablet by mouth Daily. Indications: Gastroesophageal Reflux Disease, Disp: 90 tablet, Rfl: 1    polyethylene glycol (MIRALAX) 17 g packet, Take 17 g by mouth 2 (Two) Times a Day As Needed (constipation). Indications: Constipation, Disp: , Rfl:     tamsulosin (FLOMAX) 0.4 MG capsule 24 hr capsule, Take 1 capsule by mouth 2 (Two) Times a Day. Indications: Benign Enlargement of Prostate, Disp: , Rfl:     saccharomyces boulardii (FLORASTOR) 250 MG capsule, Take 1 capsule by mouth 2 (Two) Times a Day. Indications: supplement (Patient not taking: Reported on 4/2/2024), Disp: , Rfl:     Social History     Socioeconomic History    Marital status:    Tobacco Use    Smoking status: Never      "Passive exposure: Past    Smokeless tobacco: Never    Tobacco comments:     1-2/day   Vaping Use    Vaping status: Never Used   Substance and Sexual Activity    Alcohol use: Never     Comment: socially    Drug use: Never    Sexual activity: Not Currently     Partners: Female     Birth control/protection: None        Objective   Vital Signs:  /84   Pulse 76   Resp 16   Ht 185.4 cm (73\")   Wt 84.2 kg (185 lb 9.6 oz)   SpO2 97%   BMI 24.49 kg/m²   Estimated body mass index is 24.49 kg/m² as calculated from the following:    Height as of this encounter: 185.4 cm (73\").    Weight as of this encounter: 84.2 kg (185 lb 9.6 oz).    Physical Exam:  General: Well-appearing patient, no apparent distress  Cardiac: Regular rate and rhythm, normal S1/S2, no murmur, rubs or gallops, no lower extremity edema  Lungs: Clear to auscultation bilaterally, no crackles or wheezes  Skin: No significant rashes or lesions  MSK: Grossly normal tone and strength, 5 out of 5 strength in upper extremities bilaterally; decreased range of motion of the right shoulder especially with abduction and overhead movements, anterolateral and posterior lateral right shoulder pain with internal and external rotation  Neuro: Alert and oriented x3, CN II-XII grossly intact, normal sensation of upper extremities bilaterally  Psych: Appropriate mood and affect    Assessment and Plan:    (M25.437) Acute pain of right shoulder -  Assessment: Patient with recent acute right shoulder pain that occurred after attempting to push himself up wheelchair ramp in wheelchair.  Exam notable for decreased range of motion, especially with abduction and overhead movements.  Suspect component of rotator cuff tear with possible biceps tendinitis.  Discussed conservative treatments outlined as below, including physical therapy.  Patient planning on following up with orthopedics for considerations of injections, advanced imaging if appropriate and not responding to " conservative treatments.  Plan:   - XR Shoulder 2+ View Right  - Over-the-counter medications including acetaminophen as needed  - Activity modification  - Heat/ice as needed  - Ambulatory Referral to Physical Therapy Evaluate and treat  - Ambulatory Referral to Orthopedic Surgery     Patient was given instructions and counseling regarding his condition or for health maintenance advice. Please see specific information pulled into the AVS if appropriate.       Dr Randal Hancock   Internal Medicine Physician  [facility]  [address], [address]  [place] [address]

## 2024-04-03 ENCOUNTER — HOME CARE VISIT (OUTPATIENT)
Dept: HOME HEALTH SERVICES | Facility: HOME HEALTHCARE | Age: 84
End: 2024-04-03
Payer: MEDICARE

## 2024-04-03 VITALS
OXYGEN SATURATION: 96 % | HEART RATE: 90 BPM | SYSTOLIC BLOOD PRESSURE: 120 MMHG | DIASTOLIC BLOOD PRESSURE: 70 MMHG | TEMPERATURE: 98.2 F

## 2024-04-03 PROCEDURE — G0151 HHCP-SERV OF PT,EA 15 MIN: HCPCS

## 2024-04-03 NOTE — HOME HEALTH
Must be completed and at least every 30 days.   Clinical condition of patient at initial or last assessment:   I with bed mobility and transfers with use of wheeled walker for support. CGA for ambulation x 200 ft with use of wheeled walker on even and uneven surfaces. Pt continues to use wc as main mobility device for safety.   Current clinical condition of patient:   I with bed mobility and transfers with use of wheeled walker for support. CGA/SBA for ambulation x 200 ft with use of wheeled walker on even and uneven surfaces. min assist for step management. Pt continues to use wc as main mobility device for safety.   Overall progress towards measurable treatment goals:   Pt with significant improvement towards goals.  Effectiveness of current plan:   Pt with improved mobility.   Plan for continuing or discontinuing service:   PT for 3 more PT visits.   Changes to goals or care plan update to be completed in guideline section and communicated to MD:   PETROS  Statement of expectation of continued progress toward goals:   Pt with good prognosis of meeting goals.   Why is it necessary for therapy to continue?   Skilled PT required to improve safe mobility with use of wheeled walker and step management.   Skill/education provided: thera ex, balance and gait training.   Patient/caregiver response: tolerated with improved tech.   Plan for next visit: thera ex, balance and gait training.

## 2024-04-04 ENCOUNTER — OFFICE (OUTPATIENT)
Dept: URBAN - METROPOLITAN AREA CLINIC 64 | Facility: CLINIC | Age: 84
End: 2024-04-04
Payer: COMMERCIAL

## 2024-04-04 VITALS
DIASTOLIC BLOOD PRESSURE: 74 MMHG | HEART RATE: 82 BPM | HEIGHT: 72 IN | WEIGHT: 186 LBS | SYSTOLIC BLOOD PRESSURE: 133 MMHG

## 2024-04-04 DIAGNOSIS — K92.1 MELENA: ICD-10-CM

## 2024-04-04 DIAGNOSIS — Z79.01 LONG TERM (CURRENT) USE OF ANTICOAGULANTS: ICD-10-CM

## 2024-04-04 DIAGNOSIS — D50.9 IRON DEFICIENCY ANEMIA, UNSPECIFIED: ICD-10-CM

## 2024-04-04 PROCEDURE — 99213 OFFICE O/P EST LOW 20 MIN: CPT | Performed by: NURSE PRACTITIONER

## 2024-04-05 ENCOUNTER — TELEPHONE (OUTPATIENT)
Dept: CARDIOLOGY | Facility: CLINIC | Age: 84
End: 2024-04-05
Payer: MEDICARE

## 2024-04-05 ENCOUNTER — TELEPHONE (OUTPATIENT)
Dept: FAMILY MEDICINE CLINIC | Facility: CLINIC | Age: 84
End: 2024-04-05

## 2024-04-05 NOTE — TELEPHONE ENCOUNTER
FACILITY: [facility]  DR: Felix Hobson  PHONE: [phone]  FAX: [phone]  PROCEDURE: EGD  SCHEDULED: tbd  MEDS TO HOLD: Eliquis and ASA   1-2 days prior to surgery      Placed on Dr Staples's desk

## 2024-04-08 ENCOUNTER — HOME CARE VISIT (OUTPATIENT)
Dept: HOME HEALTH SERVICES | Facility: HOME HEALTHCARE | Age: 84
End: 2024-04-08
Payer: MEDICARE

## 2024-04-08 VITALS
HEART RATE: 88 BPM | TEMPERATURE: 98.2 F | OXYGEN SATURATION: 95 % | DIASTOLIC BLOOD PRESSURE: 70 MMHG | SYSTOLIC BLOOD PRESSURE: 128 MMHG

## 2024-04-08 PROCEDURE — G0151 HHCP-SERV OF PT,EA 15 MIN: HCPCS

## 2024-04-08 NOTE — HOME HEALTH
Pt states shoulder better some but still hurts with movement. Pt has an appt with orthopedist.     Skill/education provided: thera ex, balance and gait training.     Patient/caregiver response: tolerated with reports of fatigue.     Plan for next visit: thera ex, balance and gait training.

## 2024-04-09 ENCOUNTER — TELEPHONE (OUTPATIENT)
Dept: FAMILY MEDICINE CLINIC | Facility: CLINIC | Age: 84
End: 2024-04-09
Payer: MEDICARE

## 2024-04-09 ENCOUNTER — HOME CARE VISIT (OUTPATIENT)
Dept: HOME HEALTH SERVICES | Facility: HOME HEALTHCARE | Age: 84
End: 2024-04-09
Payer: MEDICARE

## 2024-04-09 NOTE — TELEPHONE ENCOUNTER
PT UNABLE TO SEE SELECTED ORTHO UNTIL JUNE, REQUESTS REFERRAL TO PROVIDER HE CAN SEE SOONER. PLEASE ADVISE: DIFFERENT REFERRAL, SHOULD PT CALL AND CHECK WITH OTHER PROVIDERS?

## 2024-04-10 ENCOUNTER — HOME CARE VISIT (OUTPATIENT)
Dept: HOME HEALTH SERVICES | Facility: HOME HEALTHCARE | Age: 84
End: 2024-04-10
Payer: MEDICARE

## 2024-04-10 VITALS
OXYGEN SATURATION: 95 % | HEART RATE: 75 BPM | DIASTOLIC BLOOD PRESSURE: 70 MMHG | RESPIRATION RATE: 15 BRPM | TEMPERATURE: 98.3 F | SYSTOLIC BLOOD PRESSURE: 128 MMHG

## 2024-04-10 PROCEDURE — G0157 HHC PT ASSISTANT EA 15: HCPCS

## 2024-04-11 NOTE — HOME HEALTH
"pt sitting up and is prepared for PT session,    feeling fair but with weakness and functional limitations.      pt reporting he continues to perform PT hep and is advancing as tolerated   no med changes or complications reported today     pt was compliant throughout PT session but struggled at times with weakeness, evident during hep review and gait trg.  pt was slightly challenged on the steps up/down into the garage needing close assistance and cues to properly sequence with LEs and to non reciprocate for safety.  pt was educated on proper form with hep, improved but not yet ind.    pt was minimally unsteady upon standing but better able to self correct.    pt still has a slight tendency display weakness mild \"buckling\" in the LEs           plan for next session- cont PT with gait trg-,    step trg,   balance and wb activities"

## 2024-04-12 ENCOUNTER — HOME CARE VISIT (OUTPATIENT)
Dept: HOME HEALTH SERVICES | Facility: HOME HEALTHCARE | Age: 84
End: 2024-04-12
Payer: MEDICARE

## 2024-04-12 VITALS
OXYGEN SATURATION: 98 % | TEMPERATURE: 98.5 F | SYSTOLIC BLOOD PRESSURE: 110 MMHG | DIASTOLIC BLOOD PRESSURE: 60 MMHG | HEART RATE: 76 BPM

## 2024-04-12 PROCEDURE — G0151 HHCP-SERV OF PT,EA 15 MIN: HCPCS

## 2024-04-12 NOTE — HOME HEALTH
Must be completed and at least every 30 days.   Clinical condition of patient at initial or last assessment:   I with bed mobility and transfers with use of wheeled walker for support. CGA/SBA for ambulation x 200 ft with use of wheeled walker on even and uneven surfaces. min assist for step management. Pt continues to use wc as main mobility device for safety.   Current clinical condition of patient:   I with bed mobility and transfers with use of wheeled walker for support. CGA/SBA for ambulation x 200 ft with use of wheeled walker on even and uneven surfaces. min assist for step management. Pt continues to use wc as main mobility device for safety.   Overall progress towards measurable treatment goals:   Pt making improvement towards goals but had a set back due to injury to R shoulder from pulling self up on ramp using wc affecting progress.    Effectiveness of current plan:   Pt with improved mobility,  Plan for continuing or discontinuing service:   PT for 9 more PT visits.   Changes to goals or care plan update to be completed in guideline section and communicated to MD:   Added 9 more PT visits.  Statement of expectation of continued progress toward goals:   Pt with good prognosis of meeting goals.   Why is it necessary for therapy to continue?   Skilled PT required to improve safe mobility with use of wheeled walker and step management.   Skill/education provided: thera ex, balance and gait training.   Patient/caregiver response: tolerated with improved tech.   Plan for next visit: thera ex, balance and gait training.

## 2024-04-15 ENCOUNTER — HOME CARE VISIT (OUTPATIENT)
Dept: HOME HEALTH SERVICES | Facility: HOME HEALTHCARE | Age: 84
End: 2024-04-15
Payer: MEDICARE

## 2024-04-15 ENCOUNTER — TELEPHONE (OUTPATIENT)
Dept: FAMILY MEDICINE CLINIC | Facility: CLINIC | Age: 84
End: 2024-04-15
Payer: MEDICARE

## 2024-04-15 VITALS
HEART RATE: 75 BPM | SYSTOLIC BLOOD PRESSURE: 126 MMHG | TEMPERATURE: 97.5 F | OXYGEN SATURATION: 96 % | DIASTOLIC BLOOD PRESSURE: 70 MMHG

## 2024-04-15 PROCEDURE — G0151 HHCP-SERV OF PT,EA 15 MIN: HCPCS

## 2024-04-15 NOTE — HOME HEALTH
Pt states he did a lot of walking over the weekend and knees are sore. Requested to defer ex today.  Skill/education provided: balance and gait training.   Patient/caregiver response: tolerated with reports of fatigue.   Plan for next visit: thera ex, balance and gait training.

## 2024-04-16 DIAGNOSIS — M25.511 ACUTE PAIN OF RIGHT SHOULDER: ICD-10-CM

## 2024-04-17 ENCOUNTER — HOME CARE VISIT (OUTPATIENT)
Dept: HOME HEALTH SERVICES | Facility: HOME HEALTHCARE | Age: 84
End: 2024-04-17
Payer: MEDICARE

## 2024-04-17 VITALS
HEART RATE: 82 BPM | DIASTOLIC BLOOD PRESSURE: 68 MMHG | TEMPERATURE: 98 F | SYSTOLIC BLOOD PRESSURE: 124 MMHG | OXYGEN SATURATION: 98 %

## 2024-04-17 PROCEDURE — G0151 HHCP-SERV OF PT,EA 15 MIN: HCPCS

## 2024-04-17 NOTE — HOME HEALTH
No new issues voiced.   Skill/education provided: thera ex, balance and gait training.   Patient/caregiver response: tolerated with reports of fatigue.   Plan for next visit: thera ex, balance and gait training.

## 2024-04-18 ENCOUNTER — OFFICE VISIT (OUTPATIENT)
Dept: ORTHOPEDIC SURGERY | Facility: CLINIC | Age: 84
End: 2024-04-18
Payer: MEDICARE

## 2024-04-18 VITALS — HEIGHT: 73 IN | WEIGHT: 180 LBS | BODY MASS INDEX: 23.86 KG/M2 | HEART RATE: 80 BPM | OXYGEN SATURATION: 97 %

## 2024-04-18 DIAGNOSIS — M25.511 RIGHT SHOULDER PAIN, UNSPECIFIED CHRONICITY: Primary | ICD-10-CM

## 2024-04-18 RX ORDER — TRIAMCINOLONE ACETONIDE 40 MG/ML
80 INJECTION, SUSPENSION INTRA-ARTICULAR; INTRAMUSCULAR ONCE
Status: COMPLETED | OUTPATIENT
Start: 2024-04-18 | End: 2024-04-18

## 2024-04-18 RX ADMIN — TRIAMCINOLONE ACETONIDE 80 MG: 40 INJECTION, SUSPENSION INTRA-ARTICULAR; INTRAMUSCULAR at 16:05

## 2024-04-18 NOTE — PROGRESS NOTES
Patient ID: Campbell Alex is a 84 y.o. male.    Chief Complaint:    Chief Complaint   Patient presents with    Right Shoulder - Pain, Initial Evaluation     Pain 3-4        HPI:  This is an 84-year-old gentleman with progressively increasing pain in the right shoulder.  He denies injury has pain over the bicep groove impinging area difficulty especially after sleeping on it at night it does tend to get better during the day with Tylenol and or lotion no prior shoulder surgery  Past Medical History:   Diagnosis Date    Allergic 1970    Anemia n0t sure    Anxiety     Arthritis     Benign prostatic hyperplasia 1980    Cancer     Cataract 1990    CHF (congestive heart failure) stint    Cholelithiasis 1958    Coronary artery disease     Erectile dysfunction 2020    HL (hearing loss) 1995    Hyperlipidemia     Hypertension     Osteopenia 1980    Prostatism     Renal insufficiency 1940    Suspected COVID-19 virus infection 09/07/2021    Urinary tract infection     Visual impairment 1948       Past Surgical History:   Procedure Laterality Date    ANKLE SURGERY      CARDIAC CATHETERIZATION  2020    COLONOSCOPY      CORONARY STENT PLACEMENT      EYE SURGERY  1960    NOSE SURGERY      ROTATOR CUFF REPAIR      SKIN CANCER EXCISION      L shoulder    SPINE SURGERY      UMBILICAL HERNIA REPAIR      UMBILICAL HERNIA REPAIR         Family History   Problem Relation Age of Onset    Arthritis Mother     Cancer Mother         Quita ALEX    Hypertension Brother     Hyperlipidemia Brother     Kidney disease Brother         QUITA    Diabetes Brother           Social History     Occupational History    Not on file   Tobacco Use    Smoking status: Never     Passive exposure: Past    Smokeless tobacco: Never    Tobacco comments:     1-2/day   Vaping Use    Vaping status: Never Used   Substance and Sexual Activity    Alcohol use: Never     Comment: socially    Drug use: Never    Sexual activity: Not Currently     Partners: Female      "Birth control/protection: None      Review of Systems   Cardiovascular:  Negative for chest pain.   Musculoskeletal:  Positive for arthralgias.       Objective:    Pulse 80   Ht 185.4 cm (73\")   Wt 81.6 kg (180 lb)   SpO2 97%   BMI 23.75 kg/m²     Physical Examination:  Right shoulder demonstrates intact skin moderate pain over the bicep groove passive elevation 170 abduction 150 external rotation 50 internal rotation L4 with mild pain and weakness on Speed Sarpy and supraspinatus testing, belly press and liftoff are 4/5.  Sensory and motor exam are intact all distributions. Radial pulse is palpable and capillary refill is less than two seconds to all digits.    Imaging:  Prior x-rays demonstrate mild degenerative joint disease    Assessment:  Right shoulder pain    Plan:  Options discussed, I recommend injection after today's evaluation. Risks and benefits of the injection were discussed. Under sterile technique and after timeout and verbal consent I injected 80 mg of Kenalog and 2 mL of 1% Lidocaine in the shoulder and subacromial space. It was well tolerated.  If no better MRI otherwise see me as needed      Procedures         Disclaimer: Part of this note may be an electronic transcription/translation of spoken language to printed text using the Dragon Dictation System  "

## 2024-04-19 ENCOUNTER — HOME CARE VISIT (OUTPATIENT)
Dept: HOME HEALTH SERVICES | Facility: HOME HEALTHCARE | Age: 84
End: 2024-04-19
Payer: MEDICARE

## 2024-04-19 VITALS
TEMPERATURE: 98.2 F | HEART RATE: 55 BPM | DIASTOLIC BLOOD PRESSURE: 64 MMHG | OXYGEN SATURATION: 98 % | SYSTOLIC BLOOD PRESSURE: 118 MMHG

## 2024-04-19 PROCEDURE — G0151 HHCP-SERV OF PT,EA 15 MIN: HCPCS

## 2024-04-23 ENCOUNTER — HOME CARE VISIT (OUTPATIENT)
Dept: HOME HEALTH SERVICES | Facility: HOME HEALTHCARE | Age: 84
End: 2024-04-23
Payer: MEDICARE

## 2024-04-23 VITALS
OXYGEN SATURATION: 97 % | DIASTOLIC BLOOD PRESSURE: 70 MMHG | HEART RATE: 84 BPM | SYSTOLIC BLOOD PRESSURE: 116 MMHG | TEMPERATURE: 98.2 F

## 2024-04-23 PROCEDURE — G0151 HHCP-SERV OF PT,EA 15 MIN: HCPCS

## 2024-04-23 NOTE — HOME HEALTH
No new issues voiced. Pt reports R shoulder feels better.   Skill/education provided: thera ex, balance and gait training.   Patient/caregiver response: tolerated with improved ability to perform steps.   Plan for next visit: thera ex, balance and gait training.

## 2024-04-24 ENCOUNTER — HOME CARE VISIT (OUTPATIENT)
Dept: HOME HEALTH SERVICES | Facility: HOME HEALTHCARE | Age: 84
End: 2024-04-24
Payer: MEDICARE

## 2024-04-24 VITALS
SYSTOLIC BLOOD PRESSURE: 118 MMHG | TEMPERATURE: 98.3 F | HEART RATE: 81 BPM | OXYGEN SATURATION: 96 % | DIASTOLIC BLOOD PRESSURE: 78 MMHG

## 2024-04-24 PROCEDURE — G0151 HHCP-SERV OF PT,EA 15 MIN: HCPCS

## 2024-04-24 NOTE — HOME HEALTH
No new issues voiced.   Skill/education provided: thera ex, balance and gait training.   Patient/caregiver response: tolerated with improved ability to stand unsupported.  Plan for next visit: thera ex, balance and gait training.

## 2024-04-25 ENCOUNTER — HOME CARE VISIT (OUTPATIENT)
Dept: HOME HEALTH SERVICES | Facility: HOME HEALTHCARE | Age: 84
End: 2024-04-25
Payer: MEDICARE

## 2024-04-25 VITALS
HEART RATE: 87 BPM | OXYGEN SATURATION: 97 % | DIASTOLIC BLOOD PRESSURE: 82 MMHG | TEMPERATURE: 98.6 F | SYSTOLIC BLOOD PRESSURE: 138 MMHG

## 2024-04-25 PROCEDURE — G0151 HHCP-SERV OF PT,EA 15 MIN: HCPCS

## 2024-04-25 NOTE — HOME HEALTH
No new issues voiced.   Skill/education provided: thera ex, balance and gait training.   Patient/caregiver response: tolerated with improved ability to stand unsupported and manage steps.  Plan for next visit: thera ex, balance and gait training.

## 2024-04-26 ENCOUNTER — OFFICE VISIT (OUTPATIENT)
Dept: FAMILY MEDICINE CLINIC | Facility: CLINIC | Age: 84
End: 2024-04-26
Payer: MEDICARE

## 2024-04-26 VITALS
DIASTOLIC BLOOD PRESSURE: 82 MMHG | RESPIRATION RATE: 18 BRPM | WEIGHT: 175.6 LBS | BODY MASS INDEX: 23.27 KG/M2 | OXYGEN SATURATION: 100 % | SYSTOLIC BLOOD PRESSURE: 138 MMHG | HEIGHT: 73 IN | HEART RATE: 94 BPM

## 2024-04-26 DIAGNOSIS — G89.29 CHRONIC BILATERAL LOW BACK PAIN WITH LEFT-SIDED SCIATICA: Primary | ICD-10-CM

## 2024-04-26 DIAGNOSIS — R29.898 BILATERAL LEG WEAKNESS: ICD-10-CM

## 2024-04-26 DIAGNOSIS — D50.9 IRON DEFICIENCY ANEMIA, UNSPECIFIED IRON DEFICIENCY ANEMIA TYPE: ICD-10-CM

## 2024-04-26 DIAGNOSIS — M25.511 ACUTE PAIN OF RIGHT SHOULDER: ICD-10-CM

## 2024-04-26 DIAGNOSIS — Z98.890 STATUS POST LUMBAR LAMINECTOMY: ICD-10-CM

## 2024-04-26 DIAGNOSIS — M54.42 CHRONIC BILATERAL LOW BACK PAIN WITH LEFT-SIDED SCIATICA: Primary | ICD-10-CM

## 2024-04-26 PROCEDURE — 1160F RVW MEDS BY RX/DR IN RCRD: CPT | Performed by: STUDENT IN AN ORGANIZED HEALTH CARE EDUCATION/TRAINING PROGRAM

## 2024-04-26 PROCEDURE — 1159F MED LIST DOCD IN RCRD: CPT | Performed by: STUDENT IN AN ORGANIZED HEALTH CARE EDUCATION/TRAINING PROGRAM

## 2024-04-26 PROCEDURE — 99214 OFFICE O/P EST MOD 30 MIN: CPT | Performed by: STUDENT IN AN ORGANIZED HEALTH CARE EDUCATION/TRAINING PROGRAM

## 2024-04-26 NOTE — PROGRESS NOTES
"Chief Complaint  Chief Complaint   Patient presents with    Hyperlipidemia     3 month follow up     Subjective        Campbell Alex is a 84 y.o. male who presents to [facility].    History of Present Illness  Here for close f/u.  Currently dealing with R shoulder pain.    Recently saw Dr. Mendoza and received steroid injection.  Shoulder is some better.  Thinks it was related to wheelchair use.    He is seeing Dr. Templeton for iron deficiency anemia.  He received 2 iron transfusions within the last couple months.  He is also seeing GI, plan to have EGD in late May to look for source of potential bleed.  He is on eliquis 5 mg bid.    Earlier this year he had lumbar laminectomy and is doing well.  He was in wheelchair and considerable pain when I saw him.  The pain is improved but he is still having some weakness.  He is walking with use of a walker.  He is also doing PT.  Home PT is recommending outpatient physical therapy so that he will have access to exercise machines.    Objective   /82   Pulse 94   Resp 18   Ht 185.4 cm (72.99\")   Wt 79.7 kg (175 lb 9.6 oz)   SpO2 100%   BMI 23.17 kg/m²     Estimated body mass index is 23.17 kg/m² as calculated from the following:    Height as of this encounter: 185.4 cm (72.99\").    Weight as of this encounter: 79.7 kg (175 lb 9.6 oz).     Physical Exam   GEN: In no acute distress, non toxic appearing  NEURO: AAO to person, place, and time. CN 2-12 intact grossly.   PSYCH: Affect normal, insight fair      Result Review :              Assessment and Plan     Diagnoses and all orders for this visit:    1. Chronic bilateral low back pain with left-sided sciatica (Primary)  2. Status post lumbar laminectomy  3. Bilateral leg weakness  Seeing great improvement status post lumbar laminectomy.  No longer in wheelchair, now using walker regularly.  He has reached potential of home physical therapy and is a good candidate for outpatient physical " therapy for further strengthening.  Refer to outpatient physical therapy.  Ultimately he would like to get back to driving but will need some further strength prior to doing this.  I also discussed when he does think about driving again to start slow, and low risk situation.  -     Ambulatory Referral to Physical Therapy Evaluate and treat    4. Acute pain of right shoulder  Recent steroid injection was helpful.  Possibly related to wheelchair use, should see continued improvement.    5. Iron deficiency anemia, unspecified iron deficiency anemia type  Continue every other day iron supplement.  Continue follow-up with hematology as well as GI.       Follow Up     Return in about 5 months (around 9/26/2024) for Medicare Wellness.

## 2024-04-29 ENCOUNTER — HOME CARE VISIT (OUTPATIENT)
Dept: HOME HEALTH SERVICES | Facility: HOME HEALTHCARE | Age: 84
End: 2024-04-29
Payer: MEDICARE

## 2024-04-29 VITALS
HEART RATE: 68 BPM | DIASTOLIC BLOOD PRESSURE: 80 MMHG | SYSTOLIC BLOOD PRESSURE: 128 MMHG | TEMPERATURE: 98.5 F | OXYGEN SATURATION: 98 %

## 2024-04-29 PROCEDURE — G0151 HHCP-SERV OF PT,EA 15 MIN: HCPCS

## 2024-04-29 NOTE — HOME HEALTH
No new issues voiced.   Skill/education provided: thera ex, balance and gait training.   Patient/caregiver response: tolerated with improved ability manage steps.   Plan for next visit: thera ex, balance and gait training.

## 2024-05-01 ENCOUNTER — HOME CARE VISIT (OUTPATIENT)
Dept: HOME HEALTH SERVICES | Facility: HOME HEALTHCARE | Age: 84
End: 2024-05-01
Payer: MEDICARE

## 2024-05-01 VITALS
TEMPERATURE: 98.2 F | DIASTOLIC BLOOD PRESSURE: 70 MMHG | HEART RATE: 81 BPM | SYSTOLIC BLOOD PRESSURE: 130 MMHG | OXYGEN SATURATION: 98 %

## 2024-05-01 PROCEDURE — G0151 HHCP-SERV OF PT,EA 15 MIN: HCPCS

## 2024-05-01 NOTE — HOME HEALTH
Pt reporting soreness to L thigh.  Skill/education provided: thera ex, balance and gait training.   Patient/caregiver response: tolerated with minimal soreness to L LE. Decreased intensity of ex.   Plan for next visit: thera ex, balance and gait training.

## 2024-05-03 ENCOUNTER — HOME CARE VISIT (OUTPATIENT)
Dept: HOME HEALTH SERVICES | Facility: HOME HEALTHCARE | Age: 84
End: 2024-05-03
Payer: MEDICARE

## 2024-05-03 VITALS
DIASTOLIC BLOOD PRESSURE: 70 MMHG | SYSTOLIC BLOOD PRESSURE: 138 MMHG | HEART RATE: 80 BPM | OXYGEN SATURATION: 97 % | TEMPERATURE: 98.1 F

## 2024-05-03 PROCEDURE — G0151 HHCP-SERV OF PT,EA 15 MIN: HCPCS

## 2024-05-06 ENCOUNTER — TELEPHONE (OUTPATIENT)
Dept: ONCOLOGY | Facility: CLINIC | Age: 84
End: 2024-05-06

## 2024-05-06 NOTE — TELEPHONE ENCOUNTER
Caller: nash soto    Relationship: Emergency Contact    Best call back number: [phone]    What is the best time to reach you: ANYTIME    Who are you requesting to speak with (clinical staff, provider,  specific staff member): CLINICAL     Do you know the name of the person who called: NASH     What was the call regarding: PATIENT IS SCHEDULED FOR A SCOPE WITH [facility]STR[facility] ON 5/223/2024. HIS APPT WITH DR. RIOS IS ON 5/21/2024, DOES DR. RIOS STILL WANT HIM TO COME IN OR WAIT TILL AFTER 5/23/2024?    CALL TO ADVISE

## 2024-05-07 ENCOUNTER — TREATMENT (OUTPATIENT)
Dept: PHYSICAL THERAPY | Facility: CLINIC | Age: 84
End: 2024-05-07
Payer: MEDICARE

## 2024-05-07 DIAGNOSIS — R29.898 BILATERAL LEG WEAKNESS: ICD-10-CM

## 2024-05-07 DIAGNOSIS — M54.50 LOW BACK PAIN, UNSPECIFIED BACK PAIN LATERALITY, UNSPECIFIED CHRONICITY, UNSPECIFIED WHETHER SCIATICA PRESENT: Primary | ICD-10-CM

## 2024-05-07 DIAGNOSIS — Z98.890 HISTORY OF LUMBAR LAMINECTOMY: ICD-10-CM

## 2024-05-07 PROCEDURE — 97163 PT EVAL HIGH COMPLEX 45 MIN: CPT | Performed by: PHYSICAL THERAPIST

## 2024-05-09 ENCOUNTER — TREATMENT (OUTPATIENT)
Dept: PHYSICAL THERAPY | Facility: CLINIC | Age: 84
End: 2024-05-09
Payer: MEDICARE

## 2024-05-09 DIAGNOSIS — R29.898 BILATERAL LEG WEAKNESS: ICD-10-CM

## 2024-05-09 DIAGNOSIS — Z98.890 HISTORY OF LUMBAR LAMINECTOMY: ICD-10-CM

## 2024-05-09 DIAGNOSIS — M54.50 LOW BACK PAIN, UNSPECIFIED BACK PAIN LATERALITY, UNSPECIFIED CHRONICITY, UNSPECIFIED WHETHER SCIATICA PRESENT: Primary | ICD-10-CM

## 2024-05-09 PROCEDURE — 97110 THERAPEUTIC EXERCISES: CPT | Performed by: PHYSICAL THERAPIST

## 2024-05-13 ENCOUNTER — TREATMENT (OUTPATIENT)
Dept: PHYSICAL THERAPY | Facility: CLINIC | Age: 84
End: 2024-05-13
Payer: MEDICARE

## 2024-05-13 DIAGNOSIS — R29.898 BILATERAL LEG WEAKNESS: ICD-10-CM

## 2024-05-13 DIAGNOSIS — Z98.890 HISTORY OF LUMBAR LAMINECTOMY: ICD-10-CM

## 2024-05-13 DIAGNOSIS — M54.50 LOW BACK PAIN, UNSPECIFIED BACK PAIN LATERALITY, UNSPECIFIED CHRONICITY, UNSPECIFIED WHETHER SCIATICA PRESENT: Primary | ICD-10-CM

## 2024-05-13 PROCEDURE — 97110 THERAPEUTIC EXERCISES: CPT | Performed by: PHYSICAL THERAPIST

## 2024-05-13 PROCEDURE — 97112 NEUROMUSCULAR REEDUCATION: CPT | Performed by: PHYSICAL THERAPIST

## 2024-05-13 PROCEDURE — 97530 THERAPEUTIC ACTIVITIES: CPT | Performed by: PHYSICAL THERAPIST

## 2024-05-13 NOTE — PROGRESS NOTES
Physical Therapy Daily Treatment Note  [facility] Physical Therapy  [address] [place] [address]     Patient: Campbell Alex   : 1940   Referring practitioner: Rocky Lozoya DO  Date of initial visit: Type: THERAPY  Noted: 2024   Today's date: 2024   Patient seen for 3 visits    Visit Diagnoses:    ICD-10-CM ICD-9-CM   1. Low back pain, unspecified back pain laterality, unspecified chronicity, unspecified whether sciatica present  M54.50 724.2   2. History of lumbar laminectomy  Z98.890 V45.89   3. Bilateral leg weakness  R29.898 729.89       Subjective   Pt reports: No new complaints. Mobility continues to be limited.      Objective     See Exercise, Manual, and Modality Logs for complete treatment.     Patient Education: HEP    Assessment/Plan Fatigues easily. Will continue working on strength, endurance, and mobility.      Progress per Plan of Care            Timed:         Manual Therapy:         mins  44748;     Therapeutic Exercise:    20     mins  75960;     Neuromuscular Agustina:    10    mins  30941;    Therapeutic Activity:     10     mins  55208;     Gait Training:           mins  13202;     Ultrasound:          mins  17192;    Ionto                                   mins   71539  Self Care                            mins   21827    Un-Timed:  Electrical Stimulation:         mins  58943 ( );  Traction          mins 36073  Low Eval          Mins  70471  Mod Eval          Mins  45482  High Eval                            Mins  23419  Re-Eval                               mins  83313    Timed Treatment:   40   mins   Total Treatment:     40   mins      Daniel Joyner, PT, DPT, OCS  [place] license: [id number]  Physical Therapist

## 2024-05-17 ENCOUNTER — TELEPHONE (OUTPATIENT)
Dept: PHYSICAL THERAPY | Facility: CLINIC | Age: 84
End: 2024-05-17

## 2024-05-17 ENCOUNTER — TREATMENT (OUTPATIENT)
Dept: PHYSICAL THERAPY | Facility: CLINIC | Age: 84
End: 2024-05-17
Payer: MEDICARE

## 2024-05-17 DIAGNOSIS — M54.50 LOW BACK PAIN, UNSPECIFIED BACK PAIN LATERALITY, UNSPECIFIED CHRONICITY, UNSPECIFIED WHETHER SCIATICA PRESENT: Primary | ICD-10-CM

## 2024-05-17 DIAGNOSIS — Z98.890 HISTORY OF LUMBAR LAMINECTOMY: ICD-10-CM

## 2024-05-17 DIAGNOSIS — R29.898 BILATERAL LEG WEAKNESS: ICD-10-CM

## 2024-05-17 PROCEDURE — 97112 NEUROMUSCULAR REEDUCATION: CPT | Performed by: PHYSICAL THERAPIST

## 2024-05-17 PROCEDURE — 97530 THERAPEUTIC ACTIVITIES: CPT | Performed by: PHYSICAL THERAPIST

## 2024-05-17 PROCEDURE — 97110 THERAPEUTIC EXERCISES: CPT | Performed by: PHYSICAL THERAPIST

## 2024-05-17 NOTE — PROGRESS NOTES
Physical Therapy Daily Treatment Note  [facility] Physical Therapy  [address] [place] [address]     Patient: Campbell Alex   : 1940   Referring practitioner: Rocky Lozoya DO  Date of initial visit: Type: THERAPY  Noted: 2024   Today's date: 2024   Patient seen for 4 visits    Visit Diagnoses:    ICD-10-CM ICD-9-CM   1. Low back pain, unspecified back pain laterality, unspecified chronicity, unspecified whether sciatica present  M54.50 724.2   2. History of lumbar laminectomy  Z98.890 V45.89   3. Bilateral leg weakness  R29.898 729.89       Subjective   Pt reports: Feeling stronger. No recent falls.      Objective     See Exercise, Manual, and Modality Logs for complete treatment.     Patient Education: HEP    Assessment/Plan Fatigued post visit. Endurance and strength are limited.      Progress per Plan of Care            Timed:         Manual Therapy:         mins  29065;     Therapeutic Exercise:    15     mins  68648;     Neuromuscular Agustina:    15    mins  16256;    Therapeutic Activity:     10    mins  36823;     Gait Training:           mins  70046;     Ultrasound:          mins  21336;    Ionto                                   mins   77385  Self Care                            mins   51871    Un-Timed:  Electrical Stimulation:         mins  20334 ( );  Traction          mins 17094  Low Eval          Mins  29066  Mod Eval          Mins  23378  High Eval                            Mins  77131  Re-Eval                               mins  38916    Timed Treatment:40  mins   Total Treatment:     40  mins      Daniel Joyner, PT, DPT, OCS  [place] license: [id number]  Physical Therapist

## 2024-05-17 NOTE — TELEPHONE ENCOUNTER
PATIENT ACCIDENTALLY TOOK A BELT HOME.  PATIENT'S DAUGHTER , SIMBA SOTELO BROUGHT  IT BACK BUT THE DOOR WAS LOCKED AND GOT NO ANSWER WHEN SHE TRIED TO CALL THE OFFICE.  YOU CAN CALL HER [phone]

## 2024-05-20 ENCOUNTER — TREATMENT (OUTPATIENT)
Dept: PHYSICAL THERAPY | Facility: CLINIC | Age: 84
End: 2024-05-20
Payer: MEDICARE

## 2024-05-20 DIAGNOSIS — M54.50 LOW BACK PAIN, UNSPECIFIED BACK PAIN LATERALITY, UNSPECIFIED CHRONICITY, UNSPECIFIED WHETHER SCIATICA PRESENT: Primary | ICD-10-CM

## 2024-05-20 DIAGNOSIS — Z98.890 HISTORY OF LUMBAR LAMINECTOMY: ICD-10-CM

## 2024-05-20 DIAGNOSIS — R29.898 BILATERAL LEG WEAKNESS: ICD-10-CM

## 2024-05-20 PROCEDURE — 97112 NEUROMUSCULAR REEDUCATION: CPT | Performed by: PHYSICAL THERAPIST

## 2024-05-20 PROCEDURE — 97110 THERAPEUTIC EXERCISES: CPT | Performed by: PHYSICAL THERAPIST

## 2024-05-20 NOTE — PROGRESS NOTES
Physical Therapy Daily Treatment Note  [facility] Physical Therapy  [address] [email or web address] [address]     Patient: Campbell Alex   : 1940   Referring practitioner: Rocky Lozoya DO  Date of initial visit: Type: THERAPY  Noted: 2024   Today's date: 2024   Patient seen for 5 visits    Visit Diagnoses:    ICD-10-CM ICD-9-CM   1. Low back pain, unspecified back pain laterality, unspecified chronicity, unspecified whether sciatica present  M54.50 724.2   2. History of lumbar laminectomy  Z98.890 V45.89   3. Bilateral leg weakness  R29.898 729.89       Subjective   Pt reports: No new complaints. Feels stronger.       Objective     See Exercise, Manual, and Modality Logs for complete treatment.     Patient Education: HEP    Assessment/Plan Fatigued post visit.       Progress per Plan of Care            Timed:         Manual Therapy:         mins  27709;     Therapeutic Exercise:    15     mins  86915;     Neuromuscular Agustina:    15    mins  11284;    Therapeutic Activity:          mins  43994;     Gait Training:           mins  36846;     Ultrasound:          mins  16265;    Ionto                                   mins   17272  Self Care                            mins   79935    Un-Timed:  Electrical Stimulation:         mins  87620 ( );  Traction          mins 04212  Low Eval          Mins  05647  Mod Eval          Mins  11834  High Eval                            Mins  43578  Re-Eval                               mins  72208    Timed Treatment:   30   mins   Total Treatment:     30   mins      Daniel Joyner, PT, DPT, OCS  [place] license: [id number]  Physical Therapist

## 2024-05-23 ENCOUNTER — ON CAMPUS - OUTPATIENT (OUTPATIENT)
Dept: URBAN - METROPOLITAN AREA HOSPITAL 2 | Facility: HOSPITAL | Age: 84
End: 2024-05-23
Payer: COMMERCIAL

## 2024-05-23 VITALS
OXYGEN SATURATION: 100 % | DIASTOLIC BLOOD PRESSURE: 76 MMHG | DIASTOLIC BLOOD PRESSURE: 74 MMHG | SYSTOLIC BLOOD PRESSURE: 151 MMHG | HEIGHT: 72 IN | HEART RATE: 62 BPM | SYSTOLIC BLOOD PRESSURE: 163 MMHG | RESPIRATION RATE: 14 BRPM | DIASTOLIC BLOOD PRESSURE: 73 MMHG | RESPIRATION RATE: 15 BRPM | SYSTOLIC BLOOD PRESSURE: 144 MMHG | SYSTOLIC BLOOD PRESSURE: 126 MMHG | SYSTOLIC BLOOD PRESSURE: 132 MMHG | DIASTOLIC BLOOD PRESSURE: 82 MMHG | WEIGHT: 176 LBS | HEART RATE: 68 BPM | HEART RATE: 59 BPM | OXYGEN SATURATION: 99 % | DIASTOLIC BLOOD PRESSURE: 77 MMHG | TEMPERATURE: 98.2 F | HEART RATE: 58 BPM | DIASTOLIC BLOOD PRESSURE: 85 MMHG | OXYGEN SATURATION: 98 % | RESPIRATION RATE: 13 BRPM | HEART RATE: 65 BPM | RESPIRATION RATE: 16 BRPM | SYSTOLIC BLOOD PRESSURE: 162 MMHG

## 2024-05-23 DIAGNOSIS — K92.1 MELENA: ICD-10-CM

## 2024-05-23 DIAGNOSIS — K44.9 DIAPHRAGMATIC HERNIA WITHOUT OBSTRUCTION OR GANGRENE: ICD-10-CM

## 2024-05-23 DIAGNOSIS — D50.9 IRON DEFICIENCY ANEMIA, UNSPECIFIED: ICD-10-CM

## 2024-05-23 PROCEDURE — 43235 EGD DIAGNOSTIC BRUSH WASH: CPT | Performed by: INTERNAL MEDICINE

## 2024-05-24 ENCOUNTER — TREATMENT (OUTPATIENT)
Dept: PHYSICAL THERAPY | Facility: CLINIC | Age: 84
End: 2024-05-24
Payer: MEDICARE

## 2024-05-24 DIAGNOSIS — M54.50 LOW BACK PAIN, UNSPECIFIED BACK PAIN LATERALITY, UNSPECIFIED CHRONICITY, UNSPECIFIED WHETHER SCIATICA PRESENT: Primary | ICD-10-CM

## 2024-05-24 DIAGNOSIS — Z98.890 HISTORY OF LUMBAR LAMINECTOMY: ICD-10-CM

## 2024-05-24 DIAGNOSIS — R29.898 BILATERAL LEG WEAKNESS: ICD-10-CM

## 2024-05-24 PROCEDURE — 97112 NEUROMUSCULAR REEDUCATION: CPT | Performed by: PHYSICAL THERAPIST

## 2024-05-24 PROCEDURE — 97530 THERAPEUTIC ACTIVITIES: CPT | Performed by: PHYSICAL THERAPIST

## 2024-05-24 PROCEDURE — 97110 THERAPEUTIC EXERCISES: CPT | Performed by: PHYSICAL THERAPIST

## 2024-05-24 NOTE — PROGRESS NOTES
Physical Therapy Daily Treatment Note  [facility] Physical Therapy  [address] [place] [address]     Patient: Campbell Alex   : 1940   Referring practitioner: Rocky Lozoya DO  Date of initial visit: Type: THERAPY  Noted: 2024   Today's date: 2024   Patient seen for 6 visits    Visit Diagnoses:    ICD-10-CM ICD-9-CM   1. Low back pain, unspecified back pain laterality, unspecified chronicity, unspecified whether sciatica present  M54.50 724.2   2. History of lumbar laminectomy  Z98.890 V45.89   3. Bilateral leg weakness  R29.898 729.89       Subjective   Pt reports: No new complaints. Feeling a little stronger. HEP going well.      Objective     See Exercise, Manual, and Modality Logs for complete treatment.     Patient Education: HEP    Assessment/Plan Gait velocity with FWW grossly improved. Still limited by strength and endurance deficits.      Progress per Plan of Care            Timed:         Manual Therapy:         mins  25327;     Therapeutic Exercise:    15     mins  80564;     Neuromuscular Agustina:    15    mins  04194;    Therapeutic Activity:     8     mins  00958;     Gait Training:           mins  34828;     Ultrasound:          mins  30366;    Ionto                                   mins   27026  Self Care                            mins   51259    Un-Timed:  Electrical Stimulation:         mins  05354 ( );  Traction          mins 55555  Low Eval          Mins  22813  Mod Eval          Mins  45356  High Eval                            Mins  64033  Re-Eval                               mins  89758    Timed Treatment:   38   mins   Total Treatment:     38   mins      Daniel Joyner, PT, DPT, OCS  [place] license: [id number]  Physical Therapist

## 2024-05-28 ENCOUNTER — TELEPHONE (OUTPATIENT)
Dept: ONCOLOGY | Facility: CLINIC | Age: 84
End: 2024-05-28

## 2024-05-28 ENCOUNTER — TREATMENT (OUTPATIENT)
Dept: PHYSICAL THERAPY | Facility: CLINIC | Age: 84
End: 2024-05-28
Payer: MEDICARE

## 2024-05-28 DIAGNOSIS — M54.50 LOW BACK PAIN, UNSPECIFIED BACK PAIN LATERALITY, UNSPECIFIED CHRONICITY, UNSPECIFIED WHETHER SCIATICA PRESENT: Primary | ICD-10-CM

## 2024-05-28 DIAGNOSIS — Z98.890 HISTORY OF LUMBAR LAMINECTOMY: ICD-10-CM

## 2024-05-28 DIAGNOSIS — R29.898 BILATERAL LEG WEAKNESS: ICD-10-CM

## 2024-05-28 PROCEDURE — 97110 THERAPEUTIC EXERCISES: CPT | Performed by: PHYSICAL THERAPIST

## 2024-05-28 PROCEDURE — 97112 NEUROMUSCULAR REEDUCATION: CPT | Performed by: PHYSICAL THERAPIST

## 2024-05-28 NOTE — PROGRESS NOTES
Physical Therapy Daily Treatment Note  [facility] Physical Therapy  [address] [place] [address]     Patient: Campbell Alex   : 1940   Referring practitioner: Rocky Lozoya DO  Date of initial visit: Type: THERAPY  Noted: 2024   Today's date: 2024   Patient seen for 7 visits    Visit Diagnoses:    ICD-10-CM ICD-9-CM   1. Low back pain, unspecified back pain laterality, unspecified chronicity, unspecified whether sciatica present  M54.50 724.2   2. History of lumbar laminectomy  Z98.890 V45.89   3. Bilateral leg weakness  R29.898 729.89       Subjective   Pt reports: No new complaints, still limited with standing tolerance and strength in general. Feels HEP is going well.      Objective     See Exercise, Manual, and Modality Logs for complete treatment.     Patient Education: HEP    Assessment/Plan Fatigued post visit. Tolerating treatment well.      Progress per Plan of Care            Timed:         Manual Therapy:         mins  04639;     Therapeutic Exercise:    15     mins  04341;     Neuromuscular Agustina:    15    mins  65173;    Therapeutic Activity:          mins  19298;     Gait Training:           mins  66371;     Ultrasound:          mins  58097;    Ionto                                   mins   90180  Self Care                            mins   90532    Un-Timed:  Electrical Stimulation:         mins  46524 ( );  Traction          mins 50059  Low Eval          Mins  53861  Mod Eval          Mins  92316  High Eval                            Mins  79673  Re-Eval                               mins  61030    Timed Treatment:   30   mins   Total Treatment:     30   mins      Daniel Joyner, PT, DPT, OCS  [place] license: [id number]  Physical Therapist

## 2024-05-28 NOTE — TELEPHONE ENCOUNTER
"  Caller: Campbell Alex \"Zuleyma\"    Relationship: Self    Best call back number: [phone]    Who are you requesting to speak with (clinical staff, provider,  specific staff member): scheduling    Do you know the name of the person who called: patient    What was the call regarding: pt advises that he has an appt conflict 05/30 and needs to be rescheduled   "

## 2024-05-30 ENCOUNTER — OFFICE (OUTPATIENT)
Dept: URBAN - METROPOLITAN AREA PATHOLOGY 19 | Facility: PATHOLOGY | Age: 84
End: 2024-05-30
Payer: COMMERCIAL

## 2024-05-30 ENCOUNTER — ON CAMPUS - OUTPATIENT (OUTPATIENT)
Dept: URBAN - METROPOLITAN AREA HOSPITAL 2 | Facility: HOSPITAL | Age: 84
End: 2024-05-30
Payer: COMMERCIAL

## 2024-05-30 VITALS
SYSTOLIC BLOOD PRESSURE: 106 MMHG | RESPIRATION RATE: 18 BRPM | SYSTOLIC BLOOD PRESSURE: 98 MMHG | SYSTOLIC BLOOD PRESSURE: 104 MMHG | HEART RATE: 64 BPM | OXYGEN SATURATION: 99 % | WEIGHT: 176 LBS | HEART RATE: 65 BPM | DIASTOLIC BLOOD PRESSURE: 67 MMHG | HEART RATE: 70 BPM | OXYGEN SATURATION: 100 % | HEART RATE: 63 BPM | DIASTOLIC BLOOD PRESSURE: 71 MMHG | SYSTOLIC BLOOD PRESSURE: 121 MMHG | SYSTOLIC BLOOD PRESSURE: 100 MMHG | SYSTOLIC BLOOD PRESSURE: 123 MMHG | HEART RATE: 66 BPM | SYSTOLIC BLOOD PRESSURE: 108 MMHG | DIASTOLIC BLOOD PRESSURE: 85 MMHG | HEART RATE: 87 BPM | RESPIRATION RATE: 19 BRPM | DIASTOLIC BLOOD PRESSURE: 60 MMHG | TEMPERATURE: 97.6 F | HEART RATE: 67 BPM | OXYGEN SATURATION: 96 % | OXYGEN SATURATION: 97 % | SYSTOLIC BLOOD PRESSURE: 102 MMHG | DIASTOLIC BLOOD PRESSURE: 55 MMHG | DIASTOLIC BLOOD PRESSURE: 56 MMHG | SYSTOLIC BLOOD PRESSURE: 137 MMHG | RESPIRATION RATE: 17 BRPM | HEIGHT: 72 IN | HEART RATE: 69 BPM | DIASTOLIC BLOOD PRESSURE: 65 MMHG | DIASTOLIC BLOOD PRESSURE: 61 MMHG

## 2024-05-30 DIAGNOSIS — D12.5 BENIGN NEOPLASM OF SIGMOID COLON: ICD-10-CM

## 2024-05-30 DIAGNOSIS — D50.0 IRON DEFICIENCY ANEMIA SECONDARY TO BLOOD LOSS (CHRONIC): ICD-10-CM

## 2024-05-30 DIAGNOSIS — K63.89 OTHER SPECIFIED DISEASES OF INTESTINE: ICD-10-CM

## 2024-05-30 DIAGNOSIS — K57.30 DIVERTICULOSIS OF LARGE INTESTINE WITHOUT PERFORATION OR ABS: ICD-10-CM

## 2024-05-30 PROBLEM — K63.5 POLYP OF COLON: Status: ACTIVE | Noted: 2024-05-30

## 2024-05-30 LAB
GI HISTOLOGY: A. SIGMOID COLON: (no result)
GI HISTOLOGY: PDF REPORT: (no result)

## 2024-05-30 PROCEDURE — 45385 COLONOSCOPY W/LESION REMOVAL: CPT | Performed by: INTERNAL MEDICINE

## 2024-05-30 PROCEDURE — 88305 TISSUE EXAM BY PATHOLOGIST: CPT | Mod: 26 | Performed by: PATHOLOGY

## 2024-05-30 RX ADMIN — Medication 50 ML: at 10:25

## 2024-05-30 RX ADMIN — ONDANSETRON HYDROCHLORIDE 4 MG: 8 TABLET, FILM COATED ORAL at 10:25

## 2024-06-03 ENCOUNTER — TREATMENT (OUTPATIENT)
Dept: PHYSICAL THERAPY | Facility: CLINIC | Age: 84
End: 2024-06-03
Payer: MEDICARE

## 2024-06-03 DIAGNOSIS — Z98.890 HISTORY OF LUMBAR LAMINECTOMY: ICD-10-CM

## 2024-06-03 DIAGNOSIS — R29.898 BILATERAL LEG WEAKNESS: ICD-10-CM

## 2024-06-03 DIAGNOSIS — M54.50 LOW BACK PAIN, UNSPECIFIED BACK PAIN LATERALITY, UNSPECIFIED CHRONICITY, UNSPECIFIED WHETHER SCIATICA PRESENT: Primary | ICD-10-CM

## 2024-06-03 PROCEDURE — 97110 THERAPEUTIC EXERCISES: CPT | Performed by: PHYSICAL THERAPIST

## 2024-06-03 PROCEDURE — 97112 NEUROMUSCULAR REEDUCATION: CPT | Performed by: PHYSICAL THERAPIST

## 2024-06-03 NOTE — PROGRESS NOTES
Physical Therapy Daily Treatment Note  [facility] Physical Therapy  [address] [place] [address]     Patient: Campbell Alex   : 1940   Referring practitioner: Rocky Lozoya DO  Date of initial visit: Type: THERAPY  Noted: 2024   Today's date: 6/3/2024   Patient seen for 8 visits    Visit Diagnoses:    ICD-10-CM ICD-9-CM   1. Low back pain, unspecified back pain laterality, unspecified chronicity, unspecified whether sciatica present  M54.50 724.2   2. History of lumbar laminectomy  Z98.890 V45.89   3. Bilateral leg weakness  R29.898 729.89       Subjective   Pt reports: Getting over a URI, feeling under the weather today. No new complaints otherwise.      Objective     See Exercise, Manual, and Modality Logs for complete treatment.     Patient Education: HEP    Assessment/Plan Fatigued post visit, rx limited by fatigue.      Progress per Plan of Care            Timed:         Manual Therapy:         mins  23214;     Therapeutic Exercise:    15     mins  59291;     Neuromuscular Agustina:    8    mins  55202;    Therapeutic Activity:          mins  61604;     Gait Training:           mins  16080;     Ultrasound:          mins  36123;    Ionto                                   mins   47315  Self Care                            mins   09462    Un-Timed:  Electrical Stimulation:         mins  52153 ( );  Traction          mins 39725  Low Eval          Mins  16616  Mod Eval          Mins  94118  High Eval                            Mins  82538  Re-Eval                               mins  43177    Timed Treatment:   23     mins   Total Treatment:     23   mins      Daniel Joyner, PT, DPT, OCS  [place] license: [id number]  Physical Therapist

## 2024-06-05 ENCOUNTER — TELEPHONE (OUTPATIENT)
Dept: FAMILY MEDICINE CLINIC | Facility: CLINIC | Age: 84
End: 2024-06-05
Payer: MEDICARE

## 2024-06-05 NOTE — TELEPHONE ENCOUNTER
"PT'S WIFE CALLED TO ADVISE PT HAD FALLEN OUT OF BED AND FIRE DEPT HAD COME TO HELP HIM UP. PT REFUSED ED, DOES NOT FEEL LIKE SEEING PCP. PT HAS TEMP .4. PT'S WIFE ADVISED SHE IS TAKING ANTIBIOTIC AND STEROID AND FEELS BETTER BUT NOT RECOVERED, STATES SHE AND PT HAVE \"WHATEVER IS GOING AROUND.\" OFFERED APPOINTMENT TOMORROW. PT WILL WAIT AND SEE.  "

## 2024-06-06 ENCOUNTER — OFFICE VISIT (OUTPATIENT)
Dept: FAMILY MEDICINE CLINIC | Facility: CLINIC | Age: 84
End: 2024-06-06
Payer: MEDICARE

## 2024-06-06 VITALS
OXYGEN SATURATION: 97 % | HEART RATE: 77 BPM | WEIGHT: 174.4 LBS | HEIGHT: 73 IN | DIASTOLIC BLOOD PRESSURE: 66 MMHG | SYSTOLIC BLOOD PRESSURE: 128 MMHG | RESPIRATION RATE: 16 BRPM | BODY MASS INDEX: 23.11 KG/M2 | TEMPERATURE: 99.5 F

## 2024-06-06 DIAGNOSIS — K90.9 MALABSORPTION OF IRON: ICD-10-CM

## 2024-06-06 DIAGNOSIS — J06.9 UPPER RESPIRATORY TRACT INFECTION, UNSPECIFIED TYPE: Primary | ICD-10-CM

## 2024-06-06 DIAGNOSIS — D50.9 IRON DEFICIENCY ANEMIA, UNSPECIFIED IRON DEFICIENCY ANEMIA TYPE: ICD-10-CM

## 2024-06-06 PROCEDURE — 1125F AMNT PAIN NOTED PAIN PRSNT: CPT | Performed by: INTERNAL MEDICINE

## 2024-06-06 PROCEDURE — 99214 OFFICE O/P EST MOD 30 MIN: CPT | Performed by: INTERNAL MEDICINE

## 2024-06-06 NOTE — PROGRESS NOTES
Chief Complaint  Fall, Fatigue, Fever, and Cough    HPI:    Campbell Alex presents to [facility]    Patient is a 84-year-old male with a history of hypertension, hyperlipidemia, paroxysmal A-fib, CAD status post stent placement, colon polyps, nephrolithiasis, osteoarthritis presenting for evaluation of cold like symptoms.     Patient started with runny nose, congestion, and cough that started about a week ago. He noticed that symptoms started after he had colonosocpy last week. He felt really cold while during the procedure. Patient has also had headache, sore throat, and fatigue. Denies sinus pain/pressure, ear pain pressure, lymphadenopathy, myalgias and fatigue. He has had a little fever.   Denies fever, chills, nausea, vomiting. Recent sick include wife with similar symptoms. Patient has been trying over the counter Tylenol. Denies history of asthma, bronchitis, recurrent pneumonia, or tobacco use. Overall feeling better.     Patient with a history of iron deficiency anemia for which she follows with hematology.  Status post prior iron infusions.  Currently on Eliquis for atrial fibrillation.  Patient underwent colonoscopy 5/30/2024 without significant source of bleeding.  Patient reportedly to follow-up with GI for consideration of PillCam followed to identify potential bleeding source    Review of Systems:  ROS negative unless otherwise noted in HPI above.    Past Medical History:   Diagnosis Date    Allergic 1970    Anemia n0t sure    Anxiety     Arthritis     Benign prostatic hyperplasia 1980    Cancer     Cataract 1990    CHF (congestive heart failure) stint    Cholelithiasis 1958    Coronary artery disease     Erectile dysfunction 2020    HL (hearing loss) 1995    Hyperlipidemia     Hypertension     Osteopenia 1980    Prostatism     Renal insufficiency 1940    Suspected COVID-19 virus infection 09/07/2021    Urinary tract infection     Visual impairment 1948          Current Outpatient Medications:     acetaminophen (Tylenol) 325 MG tablet, Take 2 tablets by mouth Every 4 (Four) Hours As Needed for Fever or Mild Pain. Indications: Fever, Pain, Disp: , Rfl:     apixaban (ELIQUIS) 5 MG tablet tablet, Take 1 tablet by mouth 2 (Two) Times a Day. Indications: history of DVT/PE, Disp: , Rfl:     aspirin (aspirin) 81 MG EC tablet, Take 1 tablet by mouth Daily. Indications: Disease involving Lipid Deposits in the Arteries, Disp: , Rfl:     atorvastatin (LIPITOR) 20 MG tablet, Take 1 tablet by mouth Every Night. Indications: High Amount of Fats in the Blood, Disp: , Rfl:     azelastine (ASTELIN) 0.1 % nasal spray, 2 sprays into the nostril(s) as directed by provider 2 (Two) Times a Day. Use in each nostril as directed  Indications: Perennial Allergic Rhinitis, Disp: 30 mL, Rfl: 3    Calcium Carbonate 1500 (600 Ca) MG tablet, Take 1 tablet by mouth Daily. Indications: Low Amount of Calcium in the Blood, Disp: , Rfl:     FERROUS SULFATE PO, once daily, Disp: , Rfl:     Fiber powder, Take 1 dose by mouth Daily As Needed (constipation). Indications: constipation, Disp: , Rfl:     Iron, Ferrous Sulfate, 325 (65 Fe) MG tablet, Take 1 tablet by mouth Daily. Indications: Anemia From Inadequate Iron in the Body, Iron Deficiency, Disp: , Rfl:     isosorbide mononitrate (IMDUR) 30 MG 24 hr tablet, Take 1 tablet by mouth Daily. Indications: Stable Angina Pectoris, Disp: , Rfl:     metoprolol tartrate (LOPRESSOR) 25 MG tablet, Take 1 tablet by mouth 2 (Two) Times a Day. Indications: High Blood Pressure Disorder, Disp: 180 tablet, Rfl: 3    pantoprazole (PROTONIX) 40 MG EC tablet, Take 1 tablet by mouth Daily. Indications: Gastroesophageal Reflux Disease, Disp: 90 tablet, Rfl: 1    polyethylene glycol (MIRALAX) 17 g packet, Take 17 g by mouth 2 (Two) Times a Day As Needed (constipation). Indications: Constipation, Disp: , Rfl:     saccharomyces boulardii (FLORASTOR) 250 MG capsule, Take 1  "capsule by mouth 2 (Two) Times a Day. Indications: supplement, Disp: , Rfl:     tamsulosin (FLOMAX) 0.4 MG capsule 24 hr capsule, Take 1 capsule by mouth 2 (Two) Times a Day. Indications: Benign Enlargement of Prostate, Disp: , Rfl:     Social History     Socioeconomic History    Marital status:    Tobacco Use    Smoking status: Never     Passive exposure: Past    Smokeless tobacco: Never    Tobacco comments:     1-2/day   Vaping Use    Vaping status: Never Used   Substance and Sexual Activity    Alcohol use: Never     Comment: socially    Drug use: Never    Sexual activity: Not Currently     Partners: Female     Birth control/protection: None        Objective   Vital Signs:  /66   Pulse 77   Temp 99.5 °F (37.5 °C) (Oral)   Resp 16   Ht 185.4 cm (72.99\")   Wt 79.1 kg (174 lb 6.4 oz)   SpO2 97%   BMI 23.02 kg/m²   Estimated body mass index is 23.02 kg/m² as calculated from the following:    Height as of this encounter: 185.4 cm (72.99\").    Weight as of this encounter: 79.1 kg (174 lb 6.4 oz).    Physical Exam:  General: Tired-appearing patient, no apparent distress  HEENT: No posterior pharynx erythema, no tonsillar erythema or exudates, normal external auditory canals, TM normal without bulging or erythema, small effusion behind TM bilaterally  Neck: No cervical lymphadenopathy  Cardiac: Regular rate and rhythm, normal S1/S2, no murmur, rubs or gallops, no lower extremity edema  Lungs: Clear to auscultation bilaterally, no crackles or wheezes  Abdomen: Soft, non-tender, no guarding or rebound tenderness, no hepatosplenomegaly  Skin: No significant rashes or lesions  MSK: Grossly normal tone and strength  Neuro: Alert and oriented x3, CN II-XII grossly intact  Psych: Appropriate mood and affect    Assessment and Plan:    (J06.9) Upper respiratory tract infection, unspecified  Assessment: Patient present with symptoms consistent with URI. Most likely viral in nature based on timing, exposure, and " symptoms.  Patient overall improving.  No current evidence of superimposed bacterial infection requiring antibiotics. Discussed symptomatic treatment, typical clinical course of illness, as well as signs and symptoms which would prompt reevaluation.   Plan:  - COVID, flu testing deferred given duration of ongoing symptoms  - Stay well hydrated, get plenty of rest  - Symptomatic treatment including OTC nasal decongestant, cough suppressant, Tylenol as needed  - Hold on antibiotics and imaging for now  - Follow up if new/worsening symptoms     (D50.9) Iron deficiency anemia, unspecified iron deficiency anemia type  (K90.9) Malabsorption of iron  Assessment: Follows with hematology and GI.  Recent endoscopy without evidence of bleeding source.  Remains on anticoagulation.  Blood counts have improved with IV iron infusions.  Plan:  -Follow-up with GI as scheduled  -IV infusions as scheduled      Patient was given instructions and counseling regarding his condition or for health maintenance advice. Please see specific information pulled into the AVS if appropriate.       Dr Randal Hancock   Internal Medicine Physician  [facility]  [address], [address]  [place] [address]

## 2024-06-06 NOTE — PATIENT INSTRUCTIONS
Viral URI:  Plan:  - Stay well hydrated, get plenty of rest  - Symptomatic treatment including nasal decongestant, cough suppressant, Tylenol as needed  - Hold on antibiotics and imaging for now  - Follow up if new/worsening symptoms

## 2024-06-10 NOTE — PROGRESS NOTES
HEMATOLOGY ONCOLOGY OUTPATIENT FOLLOWUP       Patient name: Campbell Alex  : 1940  MRN: [id number]  Primary Care Physician: Rocky Lozoya DO  Referring Physician: No ref. provider found  Reason For Consult: anemia      History of Present Illness:  Patient is a 84 y.o. male who was referred for anemia.  Patient has had Hemoccult positive stool no other signs of bleeding he does complain of ongoing fatigue.  He has medical history of renal insufficiency, coronary artery disease, CHF among other comorbid conditions    24 Hb 11, hct 33.2, MCV 90.5, platelets 188, WBC 5.24 SPEP was unremarkable with no monoclonal protein.  Patient has been on oral iron    2024 iron saturation 17, TIBC 226, ferritin of 311, normal haptoglobin, folate, reticulocyte count, normal LDH normal vitamin B12 levels    3/2024 - venofer 300 x2  2024 - EGD/colonoscopy - hiatal hernia, diverticulosis, colon polyp.  24 - Hb 11.9, iron sat 16,     Patient has not had any EGD colonoscopy recently    Subjective:  Patient seen for follow up. Has pill CAM study planned.       Past Medical History:   Diagnosis Date    Allergic 1970    Anemia n0t sure    Anxiety     Arthritis     Benign prostatic hyperplasia     Cancer     Cataract     CHF (congestive heart failure) stint    Cholelithiasis     Coronary artery disease     Erectile dysfunction     HL (hearing loss)     Hyperlipidemia     Hypertension     Osteopenia     Prostatism     Renal insufficiency     Suspected COVID-19 virus infection 2021    Urinary tract infection     Visual impairment        Past Surgical History:   Procedure Laterality Date    ANKLE SURGERY      CARDIAC CATHETERIZATION      COLONOSCOPY      CORONARY STENT PLACEMENT      EYE SURGERY      NOSE SURGERY      ROTATOR CUFF REPAIR      SKIN CANCER EXCISION      L shoulder    SPINE SURGERY      UMBILICAL HERNIA REPAIR      UMBILICAL  HERNIA REPAIR           Current Outpatient Medications:     acetaminophen (Tylenol) 325 MG tablet, Take 2 tablets by mouth Every 4 (Four) Hours As Needed for Fever or Mild Pain. Indications: Fever, Pain, Disp: , Rfl:     atorvastatin (LIPITOR) 20 MG tablet, Take 1 tablet by mouth Every Night. Indications: High Amount of Fats in the Blood, Disp: , Rfl:     azelastine (ASTELIN) 0.1 % nasal spray, 2 sprays into the nostril(s) as directed by provider 2 (Two) Times a Day. Use in each nostril as directed  Indications: Perennial Allergic Rhinitis, Disp: 30 mL, Rfl: 3    Calcium Carbonate 1500 (600 Ca) MG tablet, Take 1 tablet by mouth Daily. Indications: Low Amount of Calcium in the Blood, Disp: , Rfl:     FERROUS SULFATE PO, once daily, Disp: , Rfl:     Fiber powder, Take 1 dose by mouth Daily As Needed (constipation). Indications: constipation, Disp: , Rfl:     Iron, Ferrous Sulfate, 325 (65 Fe) MG tablet, Take 1 tablet by mouth Daily. Indications: Anemia From Inadequate Iron in the Body, Iron Deficiency, Disp: , Rfl:     isosorbide mononitrate (IMDUR) 30 MG 24 hr tablet, Take 1 tablet by mouth Daily. Indications: Stable Angina Pectoris, Disp: , Rfl:     metoprolol tartrate (LOPRESSOR) 25 MG tablet, Take 1 tablet by mouth 2 (Two) Times a Day. Indications: High Blood Pressure Disorder, Disp: 180 tablet, Rfl: 3    pantoprazole (PROTONIX) 40 MG EC tablet, Take 1 tablet by mouth Daily. Indications: Gastroesophageal Reflux Disease, Disp: 90 tablet, Rfl: 1    polyethylene glycol (MIRALAX) 17 g packet, Take 17 g by mouth 2 (Two) Times a Day As Needed (constipation). Indications: Constipation, Disp: , Rfl:     saccharomyces boulardii (FLORASTOR) 250 MG capsule, Take 1 capsule by mouth 2 (Two) Times a Day. Indications: supplement, Disp: , Rfl:     tamsulosin (FLOMAX) 0.4 MG capsule 24 hr capsule, Take 1 capsule by mouth 2 (Two) Times a Day. Indications: Benign Enlargement of Prostate, Disp: , Rfl:     apixaban (ELIQUIS) 5 MG  tablet tablet, Take 1 tablet by mouth 2 (Two) Times a Day. Indications: history of DVT/PE (Patient not taking: Reported on 6/18/2024), Disp: , Rfl:     aspirin (aspirin) 81 MG EC tablet, Take 1 tablet by mouth Daily. Indications: Disease involving Lipid Deposits in the Arteries (Patient not taking: Reported on 6/18/2024), Disp: , Rfl:     Allergies   Allergen Reactions    Codeine GI Intolerance    Iodine Hives    Nabumetone Unknown - High Severity    Propoxyphene Hives    Zoloft [Sertraline] GI Intolerance       Family History   Problem Relation Age of Onset    Arthritis Mother     Cancer Mother         Kj RAEMILEE    Hypertension Brother     Hyperlipidemia Brother     Kidney disease Brother         KJ    Diabetes Brother        Cancer-related family history includes Cancer in his mother.      Social History     Tobacco Use    Smoking status: Never     Passive exposure: Past    Smokeless tobacco: Never    Tobacco comments:     1-2/day   Vaping Use    Vaping status: Never Used   Substance Use Topics    Alcohol use: Never     Comment: socially    Drug use: Never     Social History     Social History Narrative    Not on file       ROS:   Review of Systems   Constitutional:  Positive for fatigue. Negative for fever.   HENT:  Negative for congestion and nosebleeds.    Eyes:  Negative for pain.   Respiratory:  Negative for cough and shortness of breath.    Cardiovascular:  Negative for chest pain.   Gastrointestinal:  Negative for abdominal pain, blood in stool, diarrhea, nausea and vomiting.   Endocrine: Negative for cold intolerance and heat intolerance.   Genitourinary:  Negative for difficulty urinating.   Musculoskeletal:  Negative for arthralgias.   Skin:  Negative for rash.   Neurological:  Negative for dizziness and headaches.   Hematological:  Does not bruise/bleed easily.   Psychiatric/Behavioral:  Negative for behavioral problems.          Objective:    Vital Signs:  Vitals:    06/18/24 0829   BP: 146/74  "  Pulse: 82   Resp: 18   Temp: 98 °F (36.7 °C)   SpO2: 97%   Weight: 80.9 kg (178 lb 6.4 oz)   Height: 185.4 cm (73\")   PainSc: 0-No pain       Body mass index is 23.54 kg/m².    ECOG  (0) Fully active, able to carry on all predisease performance without restriction    Physical Exam:   Physical Exam  Constitutional:       Appearance: Normal appearance.   HENT:      Head: Normocephalic and atraumatic.   Eyes:      Pupils: Pupils are equal, round, and reactive to light.   Cardiovascular:      Rate and Rhythm: Normal rate and regular rhythm.      Pulses: Normal pulses.      Heart sounds: No murmur heard.  Pulmonary:      Effort: Pulmonary effort is normal.      Breath sounds: Normal breath sounds.   Abdominal:      General: There is no distension.      Palpations: Abdomen is soft. There is no mass.      Tenderness: There is no abdominal tenderness.   Musculoskeletal:         General: Normal range of motion.      Cervical back: Normal range of motion and neck supple.   Skin:     General: Skin is warm.   Neurological:      General: No focal deficit present.      Mental Status: He is alert.   Psychiatric:         Mood and Affect: Mood normal.         Lab Results - Last 18 Months   Lab Units 06/18/24  0818 02/20/24  0945 12/29/23  1548   WBC 10*3/mm3 8.45 5.24 8.70   HEMOGLOBIN g/dL 11.9* 11.0* 11.6*   HEMATOCRIT % 36.9* 33.2* 34.6*   PLATELETS 10*3/mm3 329 188 245   MCV fL 91.8 90.5 90.3     Lab Results - Last 18 Months   Lab Units 12/29/23  1548 11/21/23  1804 11/09/23  1220 09/07/23  1248   SODIUM mmol/L 135* 136 134* 138   POTASSIUM mmol/L 3.7 4.9 4.6 4.0   CHLORIDE mmol/L 101 100 98 105   CO2 mmol/L 24.0 24.0 26.5 25.4   BUN mg/dL 19 22 18 18   CREATININE mg/dL 0.80 0.78 0.69* 0.67*   CALCIUM mg/dL 9.0 10.0 8.8 9.0   BILIRUBIN mg/dL  --  0.4 0.3 0.5   ALK PHOS U/L  --  99 113 56   ALT (SGPT) U/L  --  10 12 13   AST (SGOT) U/L  --  22 15 15   GLUCOSE mg/dL 118* 111* 129* 112*       Lab Results   Component Value Date    " "GLUCOSE 118 (H) 12/29/2023    BUN 19 12/29/2023    CREATININE 0.80 12/29/2023    EGFRIFNONA 73 12/17/2021    BCR 23.8 12/29/2023    K 3.7 12/29/2023    CO2 24.0 12/29/2023    CALCIUM 9.0 12/29/2023    PROTENTOTREF 6.0 11/09/2023    ALBUMIN 4.0 11/21/2023    LABIL2 1.1 11/09/2023    AST 22 11/21/2023    ALT 10 11/21/2023       Lab Results - Last 18 Months   Lab Units 12/29/23  1548   INR  1.05   APTT seconds 31.3*       Lab Results   Component Value Date    IRON 39 (L) 02/20/2024    TIBC 226 (L) 02/20/2024    FERRITIN 311.00 02/20/2024       Lab Results   Component Value Date    FOLATE 6.39 02/20/2024       No results found for: \"OCCULTBLD\"    Lab Results   Component Value Date    RETICCTPCT 0.81 02/20/2024     Lab Results   Component Value Date    KJGBMHEJ40 580 02/20/2024     No results found for: \"SPEP\", \"UPEP\"  LDH   Date Value Ref Range Status   02/20/2024 180 135 - 225 U/L Final     Lab Results   Component Value Date    SEDRATE 9 12/17/2021     Lab Results   Component Value Date    HAPTOGLOBIN 140 02/20/2024     Lab Results   Component Value Date    PTT 31.3 (H) 12/29/2023    INR 1.05 12/29/2023     No results found for: \"\"  No results found for: \"CEA\"  No components found for: \"CA-19-9\"  Lab Results   Component Value Date    PSA 1.180 01/20/2020     Lab Results   Component Value Date    SEDRATE 9 12/17/2021          Assessment & Plan     Patient is a 84-year-old male with normocytic anemia, iron deficiency    Normocytic anemia  Patient has been on oral iron continues to have mildly low iron saturation ferritin is normal  Iron deficiency is playing a role in his anemia I have recommended to infusions of Venofer 300 mg given the fact that he has been on oral iron and his iron saturation still seems to be slightly low  His retic count does not seem to be appropriately elevated based on his hemoglobin so there is likely some underlying bone marrow depression as well  He does not have hemolysis based on normal " haptoglobin, LDH and reticulocyte count    Was given venofer and hb improved some to 11.9 iron sat still at 16, continue oral iron .     We had a long discussion about bone marrow biopsy vs monitoring. Given that hb is improved at this point no bone marrow biopsy. Continue oral iron .   Repeat labs and follow up in 4 months. Pill CAM study pending.       Thank you very much for providing the opportunity to participate in this patient’s care. Please do not hesitate to call if there are any other questions.  F/u in 4 months with [facility] iron studies week prior

## 2024-06-11 ENCOUNTER — OFFICE (OUTPATIENT)
Dept: URBAN - METROPOLITAN AREA CLINIC 64 | Facility: CLINIC | Age: 84
End: 2024-06-11
Payer: COMMERCIAL

## 2024-06-11 ENCOUNTER — TELEPHONE (OUTPATIENT)
Dept: PHYSICAL THERAPY | Facility: CLINIC | Age: 84
End: 2024-06-11

## 2024-06-11 VITALS
SYSTOLIC BLOOD PRESSURE: 117 MMHG | HEIGHT: 72 IN | HEART RATE: 64 BPM | DIASTOLIC BLOOD PRESSURE: 65 MMHG | WEIGHT: 176 LBS

## 2024-06-11 DIAGNOSIS — K92.1 MELENA: ICD-10-CM

## 2024-06-11 DIAGNOSIS — K59.00 CONSTIPATION, UNSPECIFIED: ICD-10-CM

## 2024-06-11 DIAGNOSIS — R63.4 ABNORMAL WEIGHT LOSS: ICD-10-CM

## 2024-06-11 DIAGNOSIS — R53.83 OTHER FATIGUE: ICD-10-CM

## 2024-06-11 DIAGNOSIS — D50.0 IRON DEFICIENCY ANEMIA SECONDARY TO BLOOD LOSS (CHRONIC): ICD-10-CM

## 2024-06-11 DIAGNOSIS — Z79.01 LONG TERM (CURRENT) USE OF ANTICOAGULANTS: ICD-10-CM

## 2024-06-11 PROCEDURE — 99214 OFFICE O/P EST MOD 30 MIN: CPT

## 2024-06-13 ENCOUNTER — TELEPHONE (OUTPATIENT)
Dept: CARDIOLOGY | Facility: CLINIC | Age: 84
End: 2024-06-13
Payer: MEDICARE

## 2024-06-13 NOTE — TELEPHONE ENCOUNTER
Patient scheduled for procedure with [facility]  6/19. Asking if he needs to hold eliquis. I advised patient to contact [facility] and have them fax over request.     There is request from [facility] in chart from April for EGD. I am going to re-fax that to [facility]. Patient was not sure what the name of procedure was so I am assuming its the EDG.

## 2024-06-18 ENCOUNTER — LAB (OUTPATIENT)
Dept: LAB | Facility: HOSPITAL | Age: 84
End: 2024-06-18
Payer: MEDICARE

## 2024-06-18 ENCOUNTER — OFFICE VISIT (OUTPATIENT)
Dept: ONCOLOGY | Facility: CLINIC | Age: 84
End: 2024-06-18
Payer: MEDICARE

## 2024-06-18 VITALS
WEIGHT: 178.4 LBS | SYSTOLIC BLOOD PRESSURE: 146 MMHG | HEIGHT: 73 IN | RESPIRATION RATE: 18 BRPM | TEMPERATURE: 98 F | HEART RATE: 82 BPM | OXYGEN SATURATION: 97 % | BODY MASS INDEX: 23.64 KG/M2 | DIASTOLIC BLOOD PRESSURE: 74 MMHG

## 2024-06-18 DIAGNOSIS — K90.9 MALABSORPTION OF IRON: Primary | ICD-10-CM

## 2024-06-18 DIAGNOSIS — D64.89 ANEMIA DUE TO OTHER CAUSE, NOT CLASSIFIED: ICD-10-CM

## 2024-06-18 DIAGNOSIS — D50.9 IRON DEFICIENCY ANEMIA, UNSPECIFIED IRON DEFICIENCY ANEMIA TYPE: Primary | ICD-10-CM

## 2024-06-18 LAB
HOLD SPECIMEN: NORMAL
HOLD SPECIMEN: NORMAL
IRON 24H UR-MRATE: 39 MCG/DL (ref 59–158)
IRON SATN MFR SERPL: 16 % (ref 20–50)
TIBC SERPL-MCNC: 249 MCG/DL (ref 298–536)
TRANSFERRIN SERPL-MCNC: 167 MG/DL (ref 200–360)

## 2024-06-18 PROCEDURE — 84466 ASSAY OF TRANSFERRIN: CPT | Performed by: INTERNAL MEDICINE

## 2024-06-18 PROCEDURE — 1126F AMNT PAIN NOTED NONE PRSNT: CPT | Performed by: INTERNAL MEDICINE

## 2024-06-18 PROCEDURE — 99214 OFFICE O/P EST MOD 30 MIN: CPT | Performed by: INTERNAL MEDICINE

## 2024-06-18 PROCEDURE — G2211 COMPLEX E/M VISIT ADD ON: HCPCS | Performed by: INTERNAL MEDICINE

## 2024-06-18 PROCEDURE — 36415 COLL VENOUS BLD VENIPUNCTURE: CPT

## 2024-06-18 PROCEDURE — 83540 ASSAY OF IRON: CPT | Performed by: INTERNAL MEDICINE

## 2024-06-19 ENCOUNTER — OFFICE (OUTPATIENT)
Dept: URBAN - METROPOLITAN AREA CLINIC 64 | Facility: CLINIC | Age: 84
End: 2024-06-19
Payer: COMMERCIAL

## 2024-06-19 DIAGNOSIS — D50.0 IRON DEFICIENCY ANEMIA SECONDARY TO BLOOD LOSS (CHRONIC): ICD-10-CM

## 2024-06-19 DIAGNOSIS — Z98.890 OTHER SPECIFIED POSTPROCEDURAL STATES: ICD-10-CM

## 2024-06-19 PROCEDURE — 91110 GI TRC IMG INTRAL ESOPH-ILE: CPT | Performed by: INTERNAL MEDICINE

## 2024-06-20 ENCOUNTER — TREATMENT (OUTPATIENT)
Dept: PHYSICAL THERAPY | Facility: CLINIC | Age: 84
End: 2024-06-20
Payer: MEDICARE

## 2024-06-20 DIAGNOSIS — M54.50 LOW BACK PAIN, UNSPECIFIED BACK PAIN LATERALITY, UNSPECIFIED CHRONICITY, UNSPECIFIED WHETHER SCIATICA PRESENT: Primary | ICD-10-CM

## 2024-06-20 DIAGNOSIS — Z98.890 HISTORY OF LUMBAR LAMINECTOMY: ICD-10-CM

## 2024-06-20 DIAGNOSIS — R29.898 BILATERAL LEG WEAKNESS: ICD-10-CM

## 2024-06-21 NOTE — PROGRESS NOTES
Encounter Date:06/25/2024  Last seen 12/11/2023      Patient ID: Campbell Alex is a 84 y.o. male.      Chief Complaint:  Status post stent  Atrial fibrillation  Hypertension  Dyslipidemia  Anticoagulation management        History of Present Illness     Since I have last seen, the patient has been without any chest discomfort ,shortness of breath, palpitations, dizziness or syncope.  Denies having any headache ,abdominal pain ,nausea, vomiting , diarrhea constipation, loss of weight or loss of appetite.  Denies having any excessive bruising ,hematuria or blood in the stool.    Review of all systems negative except as indicated.    Reviewed ROS.  Assessment and Plan         ///////////////////  History  ===========   - Atrial fibrillation  Has converted to sinus rhythm.  Patient is in sinus rhythm now.     -Anticoagulation-on Eliquis      -status post right coronary artery stent placement (2005 ).      Cardiac catheterization 03/10/2017 revealed normal left ventricular function.  Normal left main coronary artery. 50% lad distal to diagonal branch.  Ostial diagonal branch has 80-90% disease (moderate size vessel) RCA has 30-40% plaquing.  RCA stent was   patent.  One of the PDA branches had 70% disease at its ostium.     Echocardiogram 8/26/2023      Structurally and functionally normal cardiac valves except for mild tricuspid regurgitation.  No evidence for pulmonary hypertension is present..  Left ventricular size and contractility is normal with ejection fraction of 60%.  Stress Cardiolite test is negative for myocardial ischemia 08/01/2016.     -palpitations improved.  Holter monitor 01/18/2016 showed occasional premature atrial and ventricular contractions.     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       -allergy to darvon codeine Relafen and IVP dye  ===============   Plan  =============   Atrial fibrillation  Has converted to sinus rhythm.-8/25/2023  EKG 9/12/2023-sinus rhythm  EKG 6/25/2024-sinus rhythm.     Anticoagulation  Patient is on Eliquis.  Observe for toxic effects.     -status post right coronary artery stent placement (2005 )  Patient is not having any angina pectoris or congestive heart failure.     Hypertension-  well-controlled.  125/66  Patient blood pressure is running normal at home.  Maintain blood pressure log.     Dyslipidemia-continue atorvastatin    Patient was educated regarding A-fib anticoagulation and beta-blocker therapy.  Several questions and concerns were addressed.     medications were reviewed and updated.     Continue metoprolol to 25 mg twice daily.     Further plan will depend on patient's progress.     Reviewed and updated-6/25/2024.  /////////////////////////////              Diagnosis Plan   1. Status post coronary artery stent placement        2. Essential hypertension        3. Mixed hyperlipidemia        4. Coronary artery disease involving native coronary artery of native heart without angina pectoris        LAB RESULTS (LAST 7 DAYS)    CBC          BMP        CMP         BNP        TROPONIN        CoAg        Creatinine Clearance  CrCl cannot be calculated (Patient's most recent lab result is older than the maximum 30 days allowed.).    ABG        Radiology  No radiology results for the last day                The following portions of the patient's history were reviewed and updated as appropriate: allergies, current medications, past family history, past medical history, past social history, past surgical history, and problem list.    Review of Systems   Constitutional: Negative for malaise/fatigue.   Cardiovascular:  Negative for chest pain, leg swelling, palpitations and syncope.   Respiratory:  Negative for shortness of breath.    Skin:   Negative for rash.   Gastrointestinal:  Negative for nausea and vomiting.   Neurological:  Negative for dizziness, light-headedness and numbness.   All other systems reviewed and are negative.        Current Outpatient Medications:     acetaminophen (Tylenol) 325 MG tablet, Take 2 tablets by mouth Every 4 (Four) Hours As Needed for Fever or Mild Pain. Indications: Fever, Pain, Disp: , Rfl:     apixaban (ELIQUIS) 5 MG tablet tablet, Take 1 tablet by mouth 2 (Two) Times a Day., Disp: , Rfl:     aspirin (aspirin) 81 MG EC tablet, Take 1 tablet by mouth Daily., Disp: , Rfl:     atorvastatin (LIPITOR) 20 MG tablet, Take 1 tablet by mouth Every Night. Indications: High Amount of Fats in the Blood, Disp: , Rfl:     azelastine (ASTELIN) 0.1 % nasal spray, 2 sprays into the nostril(s) as directed by provider 2 (Two) Times a Day. Use in each nostril as directed  Indications: Perennial Allergic Rhinitis, Disp: 30 mL, Rfl: 3    Calcium Carbonate 1500 (600 Ca) MG tablet, Take 1 tablet by mouth Daily. Indications: Low Amount of Calcium in the Blood, Disp: , Rfl:     FERROUS SULFATE PO, EVERY OTHER DAY, Disp: , Rfl:     Fiber powder, Take 1 dose by mouth Daily As Needed (constipation). Indications: constipation, Disp: , Rfl:     isosorbide mononitrate (IMDUR) 30 MG 24 hr tablet, Take 1 tablet by mouth Daily. Indications: Stable Angina Pectoris, Disp: , Rfl:     pantoprazole (PROTONIX) 40 MG EC tablet, Take 1 tablet by mouth Daily. Indications: Gastroesophageal Reflux Disease, Disp: 90 tablet, Rfl: 1    polyethylene glycol (MIRALAX) 17 g packet, Take 17 g by mouth 2 (Two) Times a Day As Needed (constipation). Indications: Constipation, Disp: , Rfl:     saccharomyces boulardii (FLORASTOR) 250 MG capsule, Take 1 capsule by mouth Daily. Indications: supplement, Disp: , Rfl:     tamsulosin (FLOMAX) 0.4 MG capsule 24 hr capsule, Take 1 capsule by mouth 2 (Two) Times a Day. TAKES 1 TAB IN MORNING AND 1/2 TAB AT NIGHT   Indications:  Benign Enlargement of Prostate, Disp: , Rfl:     metoprolol tartrate (LOPRESSOR) 25 MG tablet, Take 1 tablet by mouth 2 (Two) Times a Day. Indications: High Blood Pressure Disorder, Disp: 180 tablet, Rfl: 3    Allergies   Allergen Reactions    Codeine GI Intolerance    Iodine Hives    Nabumetone Unknown - High Severity    Propoxyphene Hives    Zoloft [Sertraline] GI Intolerance       Family History   Problem Relation Age of Onset    Arthritis Mother     Cancer Mother         Kj VICTORIA    Hypertension Brother     Hyperlipidemia Brother     Kidney disease Brother         KJ    Diabetes Brother        Past Surgical History:   Procedure Laterality Date    ANKLE SURGERY      CARDIAC CATHETERIZATION  2020    COLONOSCOPY      CORONARY STENT PLACEMENT      EYE SURGERY  1960    NOSE SURGERY      ROTATOR CUFF REPAIR      SKIN CANCER EXCISION      L shoulder    SPINAL FUSION  01/2024    SPINE SURGERY      UMBILICAL HERNIA REPAIR      UMBILICAL HERNIA REPAIR         Past Medical History:   Diagnosis Date    Allergic 1970    Anemia n0t sure    Anxiety     Arthritis     Benign prostatic hyperplasia 1980    Cancer     Cataract 1990    CHF (congestive heart failure) stint    Cholelithiasis 1958    Coronary artery disease     Erectile dysfunction 2020    HL (hearing loss) 1995    Hyperlipidemia     Hypertension     Osteopenia 1980    Prostatism     Renal insufficiency 1940    Suspected COVID-19 virus infection 09/07/2021    Urinary tract infection     Visual impairment 1948       Family History   Problem Relation Age of Onset    Arthritis Mother     Cancer Mother         Kj VICTORIA    Hypertension Brother     Hyperlipidemia Brother     Kidney disease Brother         KJ    Diabetes Brother        Social History     Socioeconomic History    Marital status:    Tobacco Use    Smoking status: Never     Passive exposure: Past    Smokeless tobacco: Never    Tobacco comments:     1-2/day   Vaping Use    Vaping status: Never  "Used   Substance and Sexual Activity    Alcohol use: Never     Comment: socially    Drug use: Never    Sexual activity: Not Currently     Partners: Female     Birth control/protection: None           ECG 12 Lead    Date/Time: 6/25/2024 9:48 AM  Performed by: Vilma Staples MD    Authorized by: Vilma Staples MD  Comparison: compared with previous ECG   Similar to previous ECG  Comparison to previous ECG: Normal sinus rhythm normal ECG 76/min normal axis normal intervals no ectopy no significant change from previous EKG.        Objective:       Physical Exam    /66 (BP Location: Right arm, Patient Position: Sitting, Cuff Size: Adult)   Pulse 76   Ht 185.4 cm (73\")   Wt 81.2 kg (179 lb)   SpO2 98% Comment: RA  BMI 23.62 kg/m²   The patient is alert, oriented and in no distress.    Vital signs as noted above.    Head and neck revealed no carotid bruits or jugular venous distension.  No thyromegaly or lymphadenopathy is present.    Lungs clear.  No wheezing.  Breath sounds are normal bilaterally.    Heart normal first and second heart sounds.  No murmur..  No pericardial rub is present.  No gallop is present.    Abdomen soft and nontender.  No organomegaly is present.    Extremities revealed good peripheral pulses without any pedal edema.    Skin warm and dry.    Musculoskeletal system is grossly normal.    CNS grossly normal.    Reviewed and updated.        "

## 2024-06-21 NOTE — PROGRESS NOTES
Physical Therapy Daily Treatment Note  [facility]  [address] [place] [address]     Patient: Campbell Alex   : 1940   Referring practitioner: Rocky Lozoya DO  Date of initial visit: Type: THERAPY  Noted: 2024   Today's date: 2024   Patient seen for 9 visits    Visit Diagnoses:    ICD-10-CM ICD-9-CM   1. Low back pain, unspecified back pain laterality, unspecified chronicity, unspecified whether sciatica present  M54.50 724.2   2. History of lumbar laminectomy  Z98.890 V45.89   3. Bilateral leg weakness  R29.898 729.89       Subjective   Pt reports: Pt had some GI tests last week, negative. Reports more fatigue lately. Using rolling wx consistently.      Objective     See Exercise, Manual, and Modality Logs for complete treatment.     Patient Education: HEP    Assessment/Plan Fatigued post visit. Will continue with graded activity. Reassess next visit.      Progress per Plan of Care            Timed:         Manual Therapy:         mins  83803;     Therapeutic Exercise:    15    mins  00079;     Neuromuscular Agustina:    15    mins  88504;    Therapeutic Activity:       10   mins  07136;     Gait Training:           mins  29448;     Ultrasound:          mins  37317;    Ionto                                   mins   75974  Self Care                            mins   96310    Un-Timed:  Electrical Stimulation:         mins  30763 ( );  Traction          mins 03906  Low Eval          Mins  72364  Mod Eval          Mins  54599  High Eval                            Mins  96248  Re-Eval                               mins  38380    Timed Treatment:   40   mins   Total Treatment:     40   mins      Daniel Joyner PT, DPT, OCS  [place] license: [id number]  Physical Therapist

## 2024-06-24 ENCOUNTER — TREATMENT (OUTPATIENT)
Dept: PHYSICAL THERAPY | Facility: CLINIC | Age: 84
End: 2024-06-24
Payer: MEDICARE

## 2024-06-24 DIAGNOSIS — M54.50 LOW BACK PAIN, UNSPECIFIED BACK PAIN LATERALITY, UNSPECIFIED CHRONICITY, UNSPECIFIED WHETHER SCIATICA PRESENT: Primary | ICD-10-CM

## 2024-06-24 DIAGNOSIS — Z98.890 HISTORY OF LUMBAR LAMINECTOMY: ICD-10-CM

## 2024-06-24 DIAGNOSIS — R29.898 BILATERAL LEG WEAKNESS: ICD-10-CM

## 2024-06-24 PROCEDURE — 97110 THERAPEUTIC EXERCISES: CPT | Performed by: PHYSICAL THERAPIST

## 2024-06-24 PROCEDURE — 97112 NEUROMUSCULAR REEDUCATION: CPT | Performed by: PHYSICAL THERAPIST

## 2024-06-24 NOTE — PROGRESS NOTES
Physical Therapy Daily Treatment Note  [facility]  [address] [address]     Patient: Campbell Alex   : 1940   Referring practitioner: Rocky Lozoya DO  Date of initial visit: Type: THERAPY  Noted: 2024   Today's date: 2024   Patient seen for 10 visits    Visit Diagnoses:    ICD-10-CM ICD-9-CM   1. Low back pain, unspecified back pain laterality, unspecified chronicity, unspecified whether sciatica present  M54.50 724.2   2. History of lumbar laminectomy  Z98.890 V45.89   3. Bilateral leg weakness  R29.898 729.89       Subjective   Pt reports: Feels endurance and strength improving. HEP going well.      Objective     See Exercise, Manual, and Modality Logs for complete treatment.     Patient Education: HEP    Assessment/Plan Fatigued post visit. Tolerating treatment well.      Progress per Plan of Care            Timed:         Manual Therapy:         mins  21842;     Therapeutic Exercise:    15     mins  08919;     Neuromuscular Agustina:    15    mins  78733;    Therapeutic Activity:          mins  25613;     Gait Training:           mins  42332;     Ultrasound:          mins  57969;    Ionto                                   mins   17555  Self Care                            mins   52640    Un-Timed:  Electrical Stimulation:         mins  96311 ( );  Traction          mins 36112  Low Eval          Mins  96276  Mod Eval          Mins  46797  High Eval                            Mins  91414  Re-Eval                               mins  92289    Timed Treatment:   30   mins   Total Treatment:     30   mins      Daniel Joyner, PT, DPT, OCS  [place] license: [id number]  Physical Therapist

## 2024-06-25 ENCOUNTER — OFFICE VISIT (OUTPATIENT)
Dept: CARDIOLOGY | Facility: CLINIC | Age: 84
End: 2024-06-25
Payer: MEDICARE

## 2024-06-25 VITALS
DIASTOLIC BLOOD PRESSURE: 66 MMHG | WEIGHT: 179 LBS | SYSTOLIC BLOOD PRESSURE: 125 MMHG | HEART RATE: 76 BPM | HEIGHT: 73 IN | BODY MASS INDEX: 23.72 KG/M2 | OXYGEN SATURATION: 98 %

## 2024-06-25 DIAGNOSIS — I25.10 CORONARY ARTERY DISEASE INVOLVING NATIVE CORONARY ARTERY OF NATIVE HEART WITHOUT ANGINA PECTORIS: ICD-10-CM

## 2024-06-25 DIAGNOSIS — E78.2 MIXED HYPERLIPIDEMIA: ICD-10-CM

## 2024-06-25 DIAGNOSIS — Z95.5 STATUS POST CORONARY ARTERY STENT PLACEMENT: Primary | ICD-10-CM

## 2024-06-25 DIAGNOSIS — I10 ESSENTIAL HYPERTENSION: ICD-10-CM

## 2024-06-25 PROCEDURE — 1159F MED LIST DOCD IN RCRD: CPT | Performed by: INTERNAL MEDICINE

## 2024-06-25 PROCEDURE — 99214 OFFICE O/P EST MOD 30 MIN: CPT | Performed by: INTERNAL MEDICINE

## 2024-06-25 PROCEDURE — 93000 ELECTROCARDIOGRAM COMPLETE: CPT | Performed by: INTERNAL MEDICINE

## 2024-06-25 PROCEDURE — 1160F RVW MEDS BY RX/DR IN RCRD: CPT | Performed by: INTERNAL MEDICINE

## 2024-06-26 ENCOUNTER — TREATMENT (OUTPATIENT)
Dept: PHYSICAL THERAPY | Facility: CLINIC | Age: 84
End: 2024-06-26
Payer: MEDICARE

## 2024-06-26 DIAGNOSIS — R29.898 BILATERAL LEG WEAKNESS: ICD-10-CM

## 2024-06-26 DIAGNOSIS — M54.50 LOW BACK PAIN, UNSPECIFIED BACK PAIN LATERALITY, UNSPECIFIED CHRONICITY, UNSPECIFIED WHETHER SCIATICA PRESENT: Primary | ICD-10-CM

## 2024-06-26 DIAGNOSIS — Z98.890 HISTORY OF LUMBAR LAMINECTOMY: ICD-10-CM

## 2024-06-26 NOTE — PROGRESS NOTES
Re-Assessment / Re-Certification        Patient: Campbell Alex   : 1940  Diagnosis/ICD-10 Code:  Low back pain, unspecified back pain laterality, unspecified chronicity, unspecified whether sciatica present [M54.50]  Referring practitioner: Rocky Lozoya DO  Date of Initial Visit: Type: THERAPY  Noted: 2024  Today's Date: 2024  Patient seen for 11 sessions      Subjective:     Subjective Questionnaire: Oswestry: 42%  LEFS 50%  Clinical Progress: improved  Home Program Compliance: Yes  Treatment has included: therapeutic exercise, neuromuscular re-education, therapeutic activity, and gait training    Subjective Rates worst LBP at 0/10. Primarily limited by LE weakness, difficulty with transfers, and limited activity tolerance. Pt feels this is improving with therapy but still not at his prior level of function.  Objective   Strength/Myotome Testing      Lumbar      Right   Normal strength     Left Hip   Planes of Motion   Flexion: 3     Left Knee   Flexion: 5  Extension: 5     Left Ankle/Foot   Dorsiflexion: 5  Plantar flexion: 5  Great toe extension: 5     Timed up and go: 19.2 seconds  (24 seconds at IE), uses walker and UE assist with transfer  Five times sit to stand 19.8 seconds, uses UE assist    Assessment/Plan  Pt is making progress re: pain symptoms, TUG time, L hip flexion strength. However, he is still limited in these areas. Recommend continuing with PT evaluation and treatment to address his remaining impairments.     Short term goals, 1 week: Tolerate HEP progression. met  Voice compliance with activity modification. met  Report improvement in symptoms. met     Long term goals, 6 weeks: Improve ZAK score to 30%.progressing  Improve LEFS to 70%. Not met  Improve timed up and go to 20 seconds. met  Improve five times sit to stand to 17 seconds. Not met  4+/5 strength or better throughout. progressing  Symptoms to be centralized. met  Independent with HEP. Progressing    Continue  POC BIW for 5 weeks, primarily working on strength, activity tolerance, and functional mobility.    Daniel Joyner, PT, DPT, OCS  [place] license: [id number]  Physical Therapist      Timed:         Manual Therapy:         mins  01288;     Therapeutic Exercise:    15     mins  25084;     Neuromuscular Agustina:    15    mins  16281;    Therapeutic Activity:     10     mins  80635;     Gait Training:           mins  06091;     Ultrasound:          mins  63515;    Ionto                                   mins   40476  Self Care                            mins   38952    Un-Timed:  Electrical Stimulation:         mins  17416 ( );  Traction          mins 82939  Low Eval          Mins  10418  Mod Eval          Mins  63743  High Eval                            Mins  43091  Re-Eval                               mins  31234      Timed Treatment:   40   mins   Total Treatment:     40   mins

## 2024-07-03 ENCOUNTER — TREATMENT (OUTPATIENT)
Dept: PHYSICAL THERAPY | Facility: CLINIC | Age: 84
End: 2024-07-03
Payer: MEDICARE

## 2024-07-03 DIAGNOSIS — Z98.890 HISTORY OF LUMBAR LAMINECTOMY: ICD-10-CM

## 2024-07-03 DIAGNOSIS — R29.898 BILATERAL LEG WEAKNESS: ICD-10-CM

## 2024-07-03 DIAGNOSIS — M54.50 LOW BACK PAIN, UNSPECIFIED BACK PAIN LATERALITY, UNSPECIFIED CHRONICITY, UNSPECIFIED WHETHER SCIATICA PRESENT: Primary | ICD-10-CM

## 2024-07-05 NOTE — PROGRESS NOTES
Physical Therapy Daily Treatment Note  [facility]  [address] [place] [address]     Patient: Campbell Alex   : 1940   Referring practitioner: Rocky Lozoya DO  Date of initial visit: Type: THERAPY  Noted: 2024   Today's date: 2024   Patient seen for 12 visits    Visit Diagnoses:    ICD-10-CM ICD-9-CM   1. Low back pain, unspecified back pain laterality, unspecified chronicity, unspecified whether sciatica present  M54.50 724.2   2. History of lumbar laminectomy  Z98.890 V45.89   3. Bilateral leg weakness  R29.898 729.89       Subjective   Pt reports: No new complaints. HEP going well.      Objective     See Exercise, Manual, and Modality Logs for complete treatment.     Patient Education: HEP    Assessment/Plan Fatigued post visit. Balance limited.      Progress per Plan of Care            Timed:         Manual Therapy:         mins  06676;     Therapeutic Exercise:    15     mins  73219;     Neuromuscular Agustina:    15    mins  28047;    Therapeutic Activity:          mins  17297;     Gait Training:           mins  10538;     Ultrasound:          mins  08157;    Ionto                                   mins   54920  Self Care                            mins   94661    Un-Timed:  Electrical Stimulation:         mins  55934 ( );  Traction          mins 32570  Low Eval          Mins  45510  Mod Eval          Mins  71353  High Eval                            Mins  43347  Re-Eval                               mins  96285    Timed Treatment:   30   mins   Total Treatment:     30   mins      Daniel Joyner, PT, DPT, OCS  [place] license: [id number]  Physical Therapist

## 2024-07-08 ENCOUNTER — TREATMENT (OUTPATIENT)
Dept: PHYSICAL THERAPY | Facility: CLINIC | Age: 84
End: 2024-07-08
Payer: MEDICARE

## 2024-07-08 DIAGNOSIS — M54.50 LOW BACK PAIN, UNSPECIFIED BACK PAIN LATERALITY, UNSPECIFIED CHRONICITY, UNSPECIFIED WHETHER SCIATICA PRESENT: Primary | ICD-10-CM

## 2024-07-08 DIAGNOSIS — Z98.890 HISTORY OF LUMBAR LAMINECTOMY: ICD-10-CM

## 2024-07-08 DIAGNOSIS — R29.898 BILATERAL LEG WEAKNESS: ICD-10-CM

## 2024-07-10 ENCOUNTER — TREATMENT (OUTPATIENT)
Dept: PHYSICAL THERAPY | Facility: CLINIC | Age: 84
End: 2024-07-10
Payer: MEDICARE

## 2024-07-10 DIAGNOSIS — M54.50 LOW BACK PAIN, UNSPECIFIED BACK PAIN LATERALITY, UNSPECIFIED CHRONICITY, UNSPECIFIED WHETHER SCIATICA PRESENT: Primary | ICD-10-CM

## 2024-07-10 DIAGNOSIS — Z98.890 HISTORY OF LUMBAR LAMINECTOMY: ICD-10-CM

## 2024-07-10 DIAGNOSIS — R29.898 BILATERAL LEG WEAKNESS: ICD-10-CM

## 2024-07-11 NOTE — PROGRESS NOTES
Physical Therapy Daily Treatment Note  [facility]  [address] [place] [address]     Patient: Campbell Alex   : 1940   Referring practitioner: Rocky Lozoya DO  Date of initial visit: Type: THERAPY  Noted: 2024   Today's date: 2024   Patient seen for 14 visits    Visit Diagnoses:    ICD-10-CM ICD-9-CM   1. Low back pain, unspecified back pain laterality, unspecified chronicity, unspecified whether sciatica present  M54.50 724.2   2. History of lumbar laminectomy  Z98.890 V45.89   3. Bilateral leg weakness  R29.898 729.89       Subjective   Pt reports: LE continue to feel weak. Using rolling wx for mobility.      Objective     See Exercise, Manual, and Modality Logs for complete treatment.     Patient Education: HEP    Assessment/Plan Fatigues post visit.      Progress per Plan of Care            Timed:         Manual Therapy:         mins  64645;     Therapeutic Exercise:    15     mins  54358;     Neuromuscular Agustina:    15   mins  94744;    Therapeutic Activity:          mins  99354;     Gait Training:           mins  12066;     Ultrasound:          mins  86286;    Ionto                                   mins   47927  Self Care                            mins   10695    Un-Timed:  Electrical Stimulation:         mins  33937 ( );  Traction          mins 64745  Low Eval          Mins  69117  Mod Eval          Mins  32766  High Eval                            Mins  05099  Re-Eval                               mins  50138    Timed Treatment:   30   mins   Total Treatment:     30   mins      Daniel Joyner, PT, DPT, OCS  [place] license: [id number]  Physical Therapist

## 2024-07-15 ENCOUNTER — TREATMENT (OUTPATIENT)
Dept: PHYSICAL THERAPY | Facility: CLINIC | Age: 84
End: 2024-07-15
Payer: MEDICARE

## 2024-07-15 DIAGNOSIS — Z98.890 HISTORY OF LUMBAR LAMINECTOMY: ICD-10-CM

## 2024-07-15 DIAGNOSIS — M54.50 LOW BACK PAIN, UNSPECIFIED BACK PAIN LATERALITY, UNSPECIFIED CHRONICITY, UNSPECIFIED WHETHER SCIATICA PRESENT: Primary | ICD-10-CM

## 2024-07-15 DIAGNOSIS — R29.898 BILATERAL LEG WEAKNESS: ICD-10-CM

## 2024-07-18 ENCOUNTER — TREATMENT (OUTPATIENT)
Dept: PHYSICAL THERAPY | Facility: CLINIC | Age: 84
End: 2024-07-18
Payer: MEDICARE

## 2024-07-18 DIAGNOSIS — Z98.890 HISTORY OF LUMBAR LAMINECTOMY: ICD-10-CM

## 2024-07-18 DIAGNOSIS — R29.898 BILATERAL LEG WEAKNESS: ICD-10-CM

## 2024-07-18 DIAGNOSIS — M54.50 LOW BACK PAIN, UNSPECIFIED BACK PAIN LATERALITY, UNSPECIFIED CHRONICITY, UNSPECIFIED WHETHER SCIATICA PRESENT: Primary | ICD-10-CM

## 2024-07-18 NOTE — PROGRESS NOTES
Physical Therapy Daily Treatment Note  [facility]  [address] [place] [address]     Patient: Campbell Alex   : 1940   Referring practitioner: Rocky Lozoya DO  Date of initial visit: Type: THERAPY  Noted: 2024   Today's date: 2024   Patient seen for 16 visits    Visit Diagnoses:    ICD-10-CM ICD-9-CM   1. Low back pain, unspecified back pain laterality, unspecified chronicity, unspecified whether sciatica present  M54.50 724.2   2. History of lumbar laminectomy  Z98.890 V45.89   3. Bilateral leg weakness  R29.898 729.89       Subjective   Pt reports: Feeling a little weaker today, relates this to not sleeping well last night.      Objective     See Exercise, Manual, and Modality Logs for complete treatment.     Patient Education: HEP    Assessment/Plan Fatigued post visit.      Progress per Plan of Care            Timed:         Manual Therapy:         mins  29111;     Therapeutic Exercise:    15     mins  88992;     Neuromuscular Agustina:    15    mins  13820;    Therapeutic Activity:          mins  92186;     Gait Training:           mins  94566;     Ultrasound:          mins  75466;    Ionto                                   mins   64319  Self Care                            mins   03537    Un-Timed:  Electrical Stimulation:         mins  39048 ( );  Traction          mins 51958  Low Eval          Mins  68358  Mod Eval          Mins  05773  High Eval                            Mins  99249  Re-Eval                               mins  03326    Timed Treatment:   30   mins   Total Treatment:     30   mins      Daniel Joyner, PT, DPT, OCS  [place] license: [id number]  Physical Therapist

## 2024-07-22 ENCOUNTER — TREATMENT (OUTPATIENT)
Dept: PHYSICAL THERAPY | Facility: CLINIC | Age: 84
End: 2024-07-22
Payer: MEDICARE

## 2024-07-22 DIAGNOSIS — R29.898 BILATERAL LEG WEAKNESS: ICD-10-CM

## 2024-07-22 DIAGNOSIS — Z98.890 HISTORY OF LUMBAR LAMINECTOMY: ICD-10-CM

## 2024-07-22 DIAGNOSIS — M54.50 LOW BACK PAIN, UNSPECIFIED BACK PAIN LATERALITY, UNSPECIFIED CHRONICITY, UNSPECIFIED WHETHER SCIATICA PRESENT: Primary | ICD-10-CM

## 2024-07-22 PROCEDURE — 97110 THERAPEUTIC EXERCISES: CPT | Performed by: PHYSICAL THERAPIST

## 2024-07-22 PROCEDURE — 97112 NEUROMUSCULAR REEDUCATION: CPT | Performed by: PHYSICAL THERAPIST

## 2024-07-23 NOTE — PROGRESS NOTES
Physical Therapy Daily Treatment Note  [facility]  [address] [place] [address]     Patient: Campbell Alex   : 1940   Referring practitioner: Rocky Lozoya DO  Date of initial visit: Type: THERAPY  Noted: 2024   Today's date: 2024   Patient seen for 17 visits    Visit Diagnoses:    ICD-10-CM ICD-9-CM   1. Low back pain, unspecified back pain laterality, unspecified chronicity, unspecified whether sciatica present  M54.50 724.2   2. History of lumbar laminectomy  Z98.890 V45.89   3. Bilateral leg weakness  R29.898 729.89       Subjective   Pt reports: Less difficulty with transfers. HEP going well.      Objective     See Exercise, Manual, and Modality Logs for complete treatment.     Patient Education: HEP    Assessment/Plan Progressing well, less difficulty with transferring sit to stand.      Progress per Plan of Care            Timed:         Manual Therapy:         mins  87294;     Therapeutic Exercise:    15     mins  65564;     Neuromuscular Agustina:    15    mins  87126;    Therapeutic Activity:          mins  77258;     Gait Training:           mins  33150;     Ultrasound:          mins  32451;    Ionto                                   mins   57642  Self Care                            mins   94032    Un-Timed:  Electrical Stimulation:         mins  01099 ( );  Traction          mins 83397  Low Eval          Mins  87125  Mod Eval          Mins  04668  High Eval                            Mins  20130  Re-Eval                               mins  67919    Timed Treatment:   30   mins   Total Treatment:     30   mins      Daniel Joyner, PT, DPT, OCS  [place] license: [id number]  Physical Therapist

## 2024-07-24 ENCOUNTER — TREATMENT (OUTPATIENT)
Dept: PHYSICAL THERAPY | Facility: CLINIC | Age: 84
End: 2024-07-24
Payer: MEDICARE

## 2024-07-24 DIAGNOSIS — Z98.890 HISTORY OF LUMBAR LAMINECTOMY: ICD-10-CM

## 2024-07-24 DIAGNOSIS — R29.898 BILATERAL LEG WEAKNESS: ICD-10-CM

## 2024-07-24 DIAGNOSIS — M54.50 LOW BACK PAIN, UNSPECIFIED BACK PAIN LATERALITY, UNSPECIFIED CHRONICITY, UNSPECIFIED WHETHER SCIATICA PRESENT: Primary | ICD-10-CM

## 2024-07-24 NOTE — PROGRESS NOTES
Physical Therapy Daily Treatment Note  [facility]  [address] [place] [address]     Patient: Campbell Alex   : 1940   Referring practitioner: Rocky Lozoya DO  Date of initial visit: Type: THERAPY  Noted: 2024   Today's date: 2024   Patient seen for 18 visits    Visit Diagnoses:    ICD-10-CM ICD-9-CM   1. Low back pain, unspecified back pain laterality, unspecified chronicity, unspecified whether sciatica present  M54.50 724.2   2. History of lumbar laminectomy  Z98.890 V45.89   3. Bilateral leg weakness  R29.898 729.89       Subjective   Pt reports: No new complaints vs last visit. HEP going well.      Objective     See Exercise, Manual, and Modality Logs for complete treatment.     Patient Education: HEP    Assessment/Plan Fatigued post visit, tolerating treatment well.      Progress per Plan of Care            Timed:         Manual Therapy:         mins  51034;     Therapeutic Exercise:    15     mins  54171;     Neuromuscular Agustina:    15    mins  08058;    Therapeutic Activity:          mins  71819;     Gait Training:           mins  48301;     Ultrasound:          mins  38055;    Ionto                                   mins   88331  Self Care                            mins   63601    Un-Timed:  Electrical Stimulation:         mins  48239 ( );  Traction          mins 97473  Low Eval          Mins  51311  Mod Eval          Mins  43185  High Eval                            Mins  50577  Re-Eval                               mins  70452    Timed Treatment:   30   mins   Total Treatment:     30   mins      Daniel Joyner, PT, DPT, OCS  [place] license: [id number]  Physical Therapist

## 2024-07-31 ENCOUNTER — TELEPHONE (OUTPATIENT)
Dept: CARDIOLOGY | Facility: CLINIC | Age: 84
End: 2024-07-31
Payer: MEDICARE

## 2024-07-31 ENCOUNTER — TREATMENT (OUTPATIENT)
Dept: PHYSICAL THERAPY | Facility: CLINIC | Age: 84
End: 2024-07-31
Payer: MEDICARE

## 2024-07-31 DIAGNOSIS — R29.898 BILATERAL LEG WEAKNESS: ICD-10-CM

## 2024-07-31 DIAGNOSIS — M54.50 LOW BACK PAIN, UNSPECIFIED BACK PAIN LATERALITY, UNSPECIFIED CHRONICITY, UNSPECIFIED WHETHER SCIATICA PRESENT: Primary | ICD-10-CM

## 2024-07-31 DIAGNOSIS — Z98.890 HISTORY OF LUMBAR LAMINECTOMY: ICD-10-CM

## 2024-07-31 NOTE — TELEPHONE ENCOUNTER
FACILITY: [facility]  DR: Toi Mcmahan  PHONE: [phone]  FAX: [phone]  PROCEDURE: [employer]rolift   SCHEDULED: TBD  MEDS TO HOLD: Eliquis for 3 days prior      Paperwork will be placed on Dr Staples's desk for review

## 2024-07-31 NOTE — PROGRESS NOTES
Physical Therapy Daily Treatment Note  [facility]  [address] [place] [address]     Patient: Campbell Alex   : 1940   Referring practitioner: Rocky Lozoya DO  Date of initial visit: Type: THERAPY  Noted: 2024   Today's date: 2024   Patient seen for 19 visits    Visit Diagnoses:    ICD-10-CM ICD-9-CM   1. Low back pain, unspecified back pain laterality, unspecified chronicity, unspecified whether sciatica present  M54.50 724.2   2. History of lumbar laminectomy  Z98.890 V45.89   3. Bilateral leg weakness  R29.898 729.89       Subjective   Pt reports: No new complaints. HEP going well. Less difficulty with standing activities.      Objective     See Exercise, Manual, and Modality Logs for complete treatment.     Patient Education: HEP    Assessment/Plan Fatigued post visit. Less difficulty with sit to stand.      Progress per Plan of Care            Timed:         Manual Therapy:         mins  20114;     Therapeutic Exercise:    15     mins  00522;     Neuromuscular Agustina:    15    mins  23797;    Therapeutic Activity:          mins  54822;     Gait Training:           mins  97181;     Ultrasound:          mins  98519;    Ionto                                   mins   94458  Self Care                            mins   12962    Un-Timed:  Electrical Stimulation:         mins  40203 ( );  Traction          mins 28579  Low Eval          Mins  30834  Mod Eval          Mins  76894  High Eval                            Mins  11830  Re-Eval                               mins  32364    Timed Treatment:   30   mins   Total Treatment:     30   mins      Daniel Joyner, PT, DPT, OCS  [place] license: [id number]  Physical Therapist

## 2024-08-02 ENCOUNTER — TREATMENT (OUTPATIENT)
Dept: PHYSICAL THERAPY | Facility: CLINIC | Age: 84
End: 2024-08-02
Payer: MEDICARE

## 2024-08-02 DIAGNOSIS — R29.898 BILATERAL LEG WEAKNESS: ICD-10-CM

## 2024-08-02 DIAGNOSIS — Z98.890 HISTORY OF LUMBAR LAMINECTOMY: ICD-10-CM

## 2024-08-02 DIAGNOSIS — M54.50 LOW BACK PAIN, UNSPECIFIED BACK PAIN LATERALITY, UNSPECIFIED CHRONICITY, UNSPECIFIED WHETHER SCIATICA PRESENT: Primary | ICD-10-CM

## 2024-08-02 NOTE — PROGRESS NOTES
Physical Therapy Daily Treatment Note  [facility]  [address] [place] [address]     Patient: Campbell Alex   : 1940   Referring practitioner: Rocky Lozoya DO  Date of initial visit: Type: THERAPY  Noted: 2024   Today's date: 2024   Patient seen for 20 visits    Visit Diagnoses:    ICD-10-CM ICD-9-CM   1. Low back pain, unspecified back pain laterality, unspecified chronicity, unspecified whether sciatica present  M54.50 724.2   2. History of lumbar laminectomy  Z98.890 V45.89   3. Bilateral leg weakness  R29.898 729.89       Subjective   Pt reports: Notes some GI soreness today. HEP going well.      Objective     See Exercise, Manual, and Modality Logs for complete treatment.     Patient Education: HEP    Assessment/Plan Fatigued post visit, reassess next week.      Progress per Plan of Care            Timed:         Manual Therapy:         mins  89540;     Therapeutic Exercise:    15     mins  17540;     Neuromuscular Agustina:    15    mins  06747;    Therapeutic Activity:          mins  90707;     Gait Training:           mins  66948;     Ultrasound:          mins  45411;    Ionto                                   mins   26584  Self Care                            mins   59933    Un-Timed:  Electrical Stimulation:         mins  27570 ( );  Traction          mins 55948  Low Eval          Mins  82095  Mod Eval          Mins  99190  High Eval                            Mins  61266  Re-Eval                               mins  74340    Timed Treatment:   30   mins   Total Treatment:     30   mins      Daniel Joyner, PT, DPT, OCS  [place] license: [id number]  Physical Therapist

## 2024-08-05 ENCOUNTER — TREATMENT (OUTPATIENT)
Dept: PHYSICAL THERAPY | Facility: CLINIC | Age: 84
End: 2024-08-05
Payer: MEDICARE

## 2024-08-05 DIAGNOSIS — R29.898 BILATERAL LEG WEAKNESS: ICD-10-CM

## 2024-08-05 DIAGNOSIS — M54.50 LOW BACK PAIN, UNSPECIFIED BACK PAIN LATERALITY, UNSPECIFIED CHRONICITY, UNSPECIFIED WHETHER SCIATICA PRESENT: Primary | ICD-10-CM

## 2024-08-05 DIAGNOSIS — Z98.890 HISTORY OF LUMBAR LAMINECTOMY: ICD-10-CM

## 2024-08-06 NOTE — PROGRESS NOTES
Physical Therapy Daily Treatment Note  [facility]  [address] [place] [address]     Patient: Campbell Alex   : 1940   Referring practitioner: Rocky Lozoya DO  Date of initial visit: Type: THERAPY  Noted: 2024   Today's date: 2024   Patient seen for 21 visits    Visit Diagnoses:    ICD-10-CM ICD-9-CM   1. Low back pain, unspecified back pain laterality, unspecified chronicity, unspecified whether sciatica present  M54.50 724.2   2. History of lumbar laminectomy  Z98.890 V45.89   3. Bilateral leg weakness  R29.898 729.89       Subjective   Pt reports: Feeling under the weather over the weekend. Was unable to get out of his [employer] without help yesterday.      Objective     See Exercise, Manual, and Modality Logs for complete treatment.     Patient Education: HEP    Assessment/Plan Pt with regression over the weekend, limited rx intensity today.      Progress per Plan of Care            Timed:         Manual Therapy:         mins  30218;     Therapeutic Exercise:    15     mins  97716;     Neuromuscular Agustina:    15    mins  51194;    Therapeutic Activity:          mins  78366;     Gait Training:           mins  62377;     Ultrasound:          mins  52948;    Ionto                                   mins   30600  Self Care                            mins   57243    Un-Timed:  Electrical Stimulation:         mins  53169 ( );  Traction          mins 89971  Low Eval          Mins  07676  Mod Eval          Mins  47134  High Eval                            Mins  81243  Re-Eval                               mins  78592    Timed Treatment:   30   mins   Total Treatment:     30   mins      Daniel Joyner, PT, DPT, OCS  [place] license: [id number]  Physical Therapist

## 2024-08-08 ENCOUNTER — TREATMENT (OUTPATIENT)
Dept: PHYSICAL THERAPY | Facility: CLINIC | Age: 84
End: 2024-08-08
Payer: MEDICARE

## 2024-08-08 DIAGNOSIS — R29.898 BILATERAL LEG WEAKNESS: ICD-10-CM

## 2024-08-08 DIAGNOSIS — M54.50 LOW BACK PAIN, UNSPECIFIED BACK PAIN LATERALITY, UNSPECIFIED CHRONICITY, UNSPECIFIED WHETHER SCIATICA PRESENT: Primary | ICD-10-CM

## 2024-08-08 DIAGNOSIS — Z98.890 HISTORY OF LUMBAR LAMINECTOMY: ICD-10-CM

## 2024-08-09 NOTE — PROGRESS NOTES
Physical Therapy Daily Treatment Note  [facility]  [address] [address]     Patient: Campbell Alex   : 1940   Referring practitioner: Rocky Lozoya DO  Date of initial visit: Type: THERAPY  Noted: 2024   Today's date: 2024   Patient seen for 22 visits    Visit Diagnoses:    ICD-10-CM ICD-9-CM   1. Low back pain, unspecified back pain laterality, unspecified chronicity, unspecified whether sciatica present  M54.50 724.2   2. History of lumbar laminectomy  Z98.890 V45.89   3. Bilateral leg weakness  R29.898 729.89       Subjective   Pt reports: Again feeling under the weather today. HEP going well.      Objective     See Exercise, Manual, and Modality Logs for complete treatment.     Patient Education: HEP    Assessment/Plan Pt fatigued post visit, continue as tolerated.      Progress per Plan of Care            Timed:         Manual Therapy:         mins  16815;     Therapeutic Exercise:    15     mins  73492;     Neuromuscular Agustina:    15    mins  04041;    Therapeutic Activity:          mins  30071;     Gait Training:           mins  41348;     Ultrasound:          mins  66537;    Ionto                                   mins   86859  Self Care                            mins   43041    Un-Timed:  Electrical Stimulation:         mins  09013 ( );  Traction          mins 87998  Low Eval          Mins  08560  Mod Eval          Mins  13329  High Eval                            Mins  67614  Re-Eval                               mins  84522    Timed Treatment:   30   mins   Total Treatment:     30   mins      Daniel Joyner, PT, DPT, OCS  [place] license: [id number]  Physical Therapist

## 2024-08-13 ENCOUNTER — TREATMENT (OUTPATIENT)
Dept: PHYSICAL THERAPY | Facility: CLINIC | Age: 84
End: 2024-08-13
Payer: MEDICARE

## 2024-08-13 DIAGNOSIS — Z98.890 HISTORY OF LUMBAR LAMINECTOMY: ICD-10-CM

## 2024-08-13 DIAGNOSIS — M54.50 LOW BACK PAIN, UNSPECIFIED BACK PAIN LATERALITY, UNSPECIFIED CHRONICITY, UNSPECIFIED WHETHER SCIATICA PRESENT: Primary | ICD-10-CM

## 2024-08-13 DIAGNOSIS — R29.898 BILATERAL LEG WEAKNESS: ICD-10-CM

## 2024-08-13 PROCEDURE — 97110 THERAPEUTIC EXERCISES: CPT | Performed by: PHYSICAL THERAPIST

## 2024-08-13 PROCEDURE — 97530 THERAPEUTIC ACTIVITIES: CPT | Performed by: PHYSICAL THERAPIST

## 2024-08-13 PROCEDURE — 97112 NEUROMUSCULAR REEDUCATION: CPT | Performed by: PHYSICAL THERAPIST

## 2024-08-13 NOTE — PROGRESS NOTES
Physical Therapy Daily Treatment Note  [facility]  [address] [place] [address]     Patient: Campbell Alex   : 1940   Referring practitioner: Rocky Lozoya DO  Date of initial visit: Type: THERAPY  Noted: 2024   Today's date: 2024   Patient seen for 23 visits    Visit Diagnoses:    ICD-10-CM ICD-9-CM   1. Low back pain, unspecified back pain laterality, unspecified chronicity, unspecified whether sciatica present  M54.50 724.2   2. History of lumbar laminectomy  Z98.890 V45.89   3. Bilateral leg weakness  R29.898 729.89       Subjective   Pt reports: Able to go on a short walk through his neighborhood this weekend.       Objective     See Exercise, Manual, and Modality Logs for complete treatment.     Patient Education: HEP    Assessment/Plan Progressing well.      Progress per Plan of Care            Timed:         Manual Therapy:         mins  99790;     Therapeutic Exercise:    15     mins  68587;     Neuromuscular Agustina:   15   mins  84316;    Therapeutic Activity:    8      mins  87640;     Gait Training:           mins  78595;     Ultrasound:          mins  53971;    Ionto                                   mins   93306  Self Care                            mins   91174    Un-Timed:  Electrical Stimulation:         mins  73906 ( );  Traction          mins 26049  Low Eval          Mins  56491  Mod Eval          Mins  69638  High Eval                            Mins  53624  Re-Eval                               mins  57250    Timed Treatment:   38   mins   Total Treatment:     38   mins      Daniel Joyner, PT, DPT, OCS  [place] license: [id number]  Physical Therapist

## 2024-08-16 ENCOUNTER — TREATMENT (OUTPATIENT)
Dept: PHYSICAL THERAPY | Facility: CLINIC | Age: 84
End: 2024-08-16
Payer: MEDICARE

## 2024-08-16 DIAGNOSIS — R29.898 BILATERAL LEG WEAKNESS: ICD-10-CM

## 2024-08-16 DIAGNOSIS — Z98.890 HISTORY OF LUMBAR LAMINECTOMY: ICD-10-CM

## 2024-08-16 DIAGNOSIS — M54.50 LOW BACK PAIN, UNSPECIFIED BACK PAIN LATERALITY, UNSPECIFIED CHRONICITY, UNSPECIFIED WHETHER SCIATICA PRESENT: Primary | ICD-10-CM

## 2024-08-16 PROCEDURE — 97110 THERAPEUTIC EXERCISES: CPT | Performed by: PHYSICAL THERAPIST

## 2024-08-16 PROCEDURE — 97530 THERAPEUTIC ACTIVITIES: CPT | Performed by: PHYSICAL THERAPIST

## 2024-08-16 PROCEDURE — 97112 NEUROMUSCULAR REEDUCATION: CPT | Performed by: PHYSICAL THERAPIST

## 2024-08-16 NOTE — PROGRESS NOTES
Physical Therapy Daily Treatment Note  [facility]  [address] [place] [address]     Patient: Campbell Alex   : 1940   Referring practitioner: Rocky Lozoya DO  Date of initial visit: Type: THERAPY  Noted: 2024   Today's date: 2024   Patient seen for 24 visits    Visit Diagnoses:    ICD-10-CM ICD-9-CM   1. Low back pain, unspecified back pain laterality, unspecified chronicity, unspecified whether sciatica present  M54.50 724.2   2. History of lumbar laminectomy  Z98.890 V45.89   3. Bilateral leg weakness  R29.898 729.89       Subjective   Pt reports: Pt reports some GI upset today and does not want to do reassessment. HEP going well.       Objective     See Exercise, Manual, and Modality Logs for complete treatment.     Patient Education: HEP    Assessment/Plan Fatigued post visit, reassess next week.      Progress per Plan of Care            Timed:         Manual Therapy:         mins  19810;     Therapeutic Exercise:    15     mins  15450;     Neuromuscular Agustina:    15 mins  97332;    Therapeutic Activity:      10    mins  98565;     Gait Training:           mins  26985;     Ultrasound:          mins  98099;    Ionto                                   mins   13755  Self Care                            mins   99758    Un-Timed:  Electrical Stimulation:         mins  21871 ( );  Traction          mins 09589  Low Eval          Mins  32317  Mod Eval          Mins  37214  High Eval                            Mins  08997  Re-Eval                               mins  42553    Timed Treatment:   40   mins   Total Treatment:     40   mins      Daniel Joyner, PT, DPT, OCS  [place] license: [id number]  Physical Therapist

## 2024-08-20 ENCOUNTER — TREATMENT (OUTPATIENT)
Dept: PHYSICAL THERAPY | Facility: CLINIC | Age: 84
End: 2024-08-20
Payer: MEDICARE

## 2024-08-20 DIAGNOSIS — Z98.890 HISTORY OF LUMBAR LAMINECTOMY: ICD-10-CM

## 2024-08-20 DIAGNOSIS — R29.898 BILATERAL LEG WEAKNESS: ICD-10-CM

## 2024-08-20 DIAGNOSIS — M54.50 LOW BACK PAIN, UNSPECIFIED BACK PAIN LATERALITY, UNSPECIFIED CHRONICITY, UNSPECIFIED WHETHER SCIATICA PRESENT: Primary | ICD-10-CM

## 2024-08-20 PROCEDURE — 97112 NEUROMUSCULAR REEDUCATION: CPT | Performed by: PHYSICAL THERAPIST

## 2024-08-20 PROCEDURE — 97110 THERAPEUTIC EXERCISES: CPT | Performed by: PHYSICAL THERAPIST

## 2024-08-20 PROCEDURE — 97530 THERAPEUTIC ACTIVITIES: CPT | Performed by: PHYSICAL THERAPIST

## 2024-08-20 NOTE — PROGRESS NOTES
Physical Therapy Daily Treatment Note  [facility] Therapy  [address] [place] [address]     Patient: Campbell Alex   : 1940   Referring practitioner: Rocky Lozoya DO  Date of initial visit: Type: THERAPY  Noted: 2024   Today's date: 2024   Patient seen for 25 visits    Visit Diagnoses:    ICD-10-CM ICD-9-CM   1. Low back pain, unspecified back pain laterality, unspecified chronicity, unspecified whether sciatica present  M54.50 724.2   2. History of lumbar laminectomy  Z98.890 V45.89   3. Bilateral leg weakness  R29.898 729.89       Subjective   Pt reports: Feeling a little stronger vs last week. HEP going well.      Objective     See Exercise, Manual, and Modality Logs for complete treatment.     Patient Education: HEP    Assessment/Plan Good response to rx, fatigued post visit.      Progress per Plan of Care            Timed:         Manual Therapy:         mins  38864;     Therapeutic Exercise:    15     mins  49104;     Neuromuscular Agustina:    15    mins  88329;    Therapeutic Activity:     10     mins  56324;     Gait Training:           mins  67039;     Ultrasound:          mins  67275;    Ionto                                   mins   26755  Self Care                            mins   84422    Un-Timed:  Electrical Stimulation:         mins  81751 ( );  Traction          mins 05456  Low Eval          Mins  87678  Mod Eval          Mins  92038  High Eval                            Mins  94304  Re-Eval                               mins  91082    Timed Treatment:   40   mins   Total Treatment:     40   mins      Daniel Joyner, PT, DPT, OCS  [place] license: [id number]  Physical Therapist

## 2024-08-23 ENCOUNTER — TREATMENT (OUTPATIENT)
Dept: PHYSICAL THERAPY | Facility: CLINIC | Age: 84
End: 2024-08-23
Payer: MEDICARE

## 2024-08-23 DIAGNOSIS — Z98.890 HISTORY OF LUMBAR LAMINECTOMY: ICD-10-CM

## 2024-08-23 DIAGNOSIS — R29.898 BILATERAL LEG WEAKNESS: ICD-10-CM

## 2024-08-23 DIAGNOSIS — M54.50 LOW BACK PAIN, UNSPECIFIED BACK PAIN LATERALITY, UNSPECIFIED CHRONICITY, UNSPECIFIED WHETHER SCIATICA PRESENT: Primary | ICD-10-CM

## 2024-08-23 PROCEDURE — 97112 NEUROMUSCULAR REEDUCATION: CPT | Performed by: PHYSICAL THERAPIST

## 2024-08-23 PROCEDURE — 97530 THERAPEUTIC ACTIVITIES: CPT | Performed by: PHYSICAL THERAPIST

## 2024-08-23 PROCEDURE — 97110 THERAPEUTIC EXERCISES: CPT | Performed by: PHYSICAL THERAPIST

## 2024-08-23 NOTE — PROGRESS NOTES
Physical Therapy Daily Treatment Note  [facility]  [address] [place] [address]     Patient: Campbell Alex   : 1940   Referring practitioner: Rocyk Lozoya DO  Date of initial visit: Type: THERAPY  Noted: 2024   Today's date: 2024   Patient seen for 26 visits    Visit Diagnoses:    ICD-10-CM ICD-9-CM   1. Low back pain, unspecified back pain laterality, unspecified chronicity, unspecified whether sciatica present  M54.50 724.2   2. History of lumbar laminectomy  Z98.890 V45.89   3. Bilateral leg weakness  R29.898 729.89       Subjective   Pt reports: Therapy to be put on hold for a few weeks, pt is having a pelvic procedure. HEP going well.      Objective     See Exercise, Manual, and Modality Logs for complete treatment.     Patient Education: HEP    Assessment/Plan Therapy on hold for 3-4 weeks, pt to follow up to reassess after this.      Progress per Plan of Care            Timed:         Manual Therapy:         mins  22745;     Therapeutic Exercise:    15     mins  11467;     Neuromuscular Agustina:    15    mins  63234;    Therapeutic Activity:       10   mins  90555;     Gait Training:           mins  29372;     Ultrasound:          mins  27652;    Ionto                                   mins   59905  Self Care                            mins   99130    Un-Timed:  Electrical Stimulation:         mins  54950 ( );  Traction          mins 24918  Low Eval          Mins  45736  Mod Eval          Mins  13078  High Eval                            Mins  62085  Re-Eval                               mins  12251    Timed Treatment:   40   mins   Total Treatment:     40   mins      Daniel Joyner PT, DPT, OCS  [place] license: [id number]  Physical Therapist

## 2024-09-03 ENCOUNTER — TREATMENT (OUTPATIENT)
Dept: PHYSICAL THERAPY | Facility: CLINIC | Age: 84
End: 2024-09-03
Payer: MEDICARE

## 2024-09-03 DIAGNOSIS — R29.898 BILATERAL LEG WEAKNESS: ICD-10-CM

## 2024-09-03 DIAGNOSIS — M54.50 LOW BACK PAIN, UNSPECIFIED BACK PAIN LATERALITY, UNSPECIFIED CHRONICITY, UNSPECIFIED WHETHER SCIATICA PRESENT: Primary | ICD-10-CM

## 2024-09-03 DIAGNOSIS — Z98.890 HISTORY OF LUMBAR LAMINECTOMY: ICD-10-CM

## 2024-09-03 PROCEDURE — 97110 THERAPEUTIC EXERCISES: CPT | Performed by: PHYSICAL THERAPIST

## 2024-09-03 PROCEDURE — 97112 NEUROMUSCULAR REEDUCATION: CPT | Performed by: PHYSICAL THERAPIST

## 2024-09-03 NOTE — PROGRESS NOTES
Physical Therapy Daily Treatment Note  [facility]  [address] [employer] [address]     Patient: Campbell Alex   : 1940   Referring practitioner: Rocky Lozoya DO  Date of initial visit: Type: THERAPY  Noted: 2024   Today's date: 9/3/2024   Patient seen for 27 visits    Visit Diagnoses:    ICD-10-CM ICD-9-CM   1. Low back pain, unspecified back pain laterality, unspecified chronicity, unspecified whether sciatica present  M54.50 724.2   2. History of lumbar laminectomy  Z98.890 V45.89   3. Bilateral leg weakness  R29.898 729.89       Subjective   Pt reports: Pt's procedure went well, feeling a little weak today. Pt wants to do reassessment later this week. HEP going well.      Objective     See Exercise, Manual, and Modality Logs for complete treatment.     Patient Education: HEP    Assessment/Plan Rx tolerance decreased vs last visit, reassess next visit.      Progress per Plan of Care            Timed:         Manual Therapy:         mins  04643;     Therapeutic Exercise:    15     mins  43464;     Neuromuscular Agustina:    15    mins  26371;    Therapeutic Activity:          mins  81905;     Gait Training:           mins  96823;     Ultrasound:          mins  17920;    Ionto                                   mins   10549  Self Care                            mins   17965    Un-Timed:  Electrical Stimulation:         mins  04381 ( );  Traction          mins 21088  Low Eval          Mins  22180  Mod Eval          Mins  34705  High Eval                            Mins  15626  Re-Eval                               mins  99622    Timed Treatment:   30   mins   Total Treatment:     30   mins      Daniel Joyner, PT, DPT, OCS  [place] license: [id number]  Physical Therapist

## 2024-09-06 ENCOUNTER — TREATMENT (OUTPATIENT)
Dept: PHYSICAL THERAPY | Facility: CLINIC | Age: 84
End: 2024-09-06
Payer: MEDICARE

## 2024-09-06 DIAGNOSIS — Z98.890 HISTORY OF LUMBAR LAMINECTOMY: ICD-10-CM

## 2024-09-06 DIAGNOSIS — M54.50 LOW BACK PAIN, UNSPECIFIED BACK PAIN LATERALITY, UNSPECIFIED CHRONICITY, UNSPECIFIED WHETHER SCIATICA PRESENT: Primary | ICD-10-CM

## 2024-09-06 DIAGNOSIS — R29.898 BILATERAL LEG WEAKNESS: ICD-10-CM

## 2024-09-06 PROCEDURE — 97112 NEUROMUSCULAR REEDUCATION: CPT | Performed by: PHYSICAL THERAPIST

## 2024-09-06 PROCEDURE — 97116 GAIT TRAINING THERAPY: CPT | Performed by: PHYSICAL THERAPIST

## 2024-09-06 PROCEDURE — 97110 THERAPEUTIC EXERCISES: CPT | Performed by: PHYSICAL THERAPIST

## 2024-09-06 NOTE — LETTER
Re-Assessment / Re-Certification        Patient: Campbell Alex   : 1940  Diagnosis/ICD-10 Code:  Low back pain, unspecified back pain laterality, unspecified chronicity, unspecified whether sciatica present [M54.50]  Referring practitioner: Rocky Lozoya DO  Date of Initial Visit: Type: THERAPY  Noted: 2024  Today's Date: 2024  Patient seen for 28 sessions      Subjective:     Subjective Questionnaire: Oswestry: 20%  LEFS 75%  Clinical Progress: improved  Home Program Compliance: Yes  Treatment has included: therapeutic exercise, neuromuscular re-education, therapeutic activity, and gait training    Subjective Rates worst LBP at 0/10. Still primarily limited by LE weakness and limited endurance. Pt had an unrelated procedure 2-3 weeks ago and he feels this has caused him to regress somewhat.   Objective   Strength/Myotome Testing      Lumbar      Right   Normal strength     Left Hip   Planes of Motion   Flexion: 3+     Left Knee   Flexion: 5  Extension: 5     Left Ankle/Foot   Dorsiflexion: 5  Plantar flexion: 5  Great toe extension: 5     Timed up and go: 19.0 seconds  (24 seconds at IE), uses walker and UE assist with transfer  Five times sit to stand 19.4 seconds, uses UE assist    Assessment/Plan  Pt has made progress vs last progress note but has made recent regression after an unrelated procedure.     Short term goals, 1 week: Tolerate HEP progression. met  Voice compliance with activity modification. met  Report improvement in symptoms. met     Long term goals, 6 weeks: Improve ZAK score to 30%.met  Improve LEFS to 70%. met  Improve timed up and go to 20 seconds. met  Improve five times sit to stand to 17 seconds. Not met  4+/5 strength or better throughout. progressing  Symptoms to be centralized. met  Independent with HEP. Progressing    Continue POC BIW for 5 weeks, primarily working on strength, activity tolerance, and functional mobility.    Daniel Joyner, PT, DPT, OCS  [place] license:  [id number]  Physical Therapist      Timed:         Manual Therapy:         mins  55993;     Therapeutic Exercise:    15     mins  40576;     Neuromuscular Agustina:    15    mins  30218;    Therapeutic Activity:     15    mins  86691;     Gait Training:           mins  27554;     Ultrasound:          mins  40748;    Ionto                                   mins   76484  Self Care                            mins   97587    Un-Timed:  Electrical Stimulation:         mins  49506 ( );  Traction          mins 45412  Low Eval          Mins  98260  Mod Eval          Mins  50688  High Eval                            Mins  09206  Re-Eval                               mins  12596      Timed Treatment:   45   mins   Total Treatment:     45   mins

## 2024-09-06 NOTE — PROGRESS NOTES
Re-Assessment / Re-Certification        Patient: Campbell Alex   : 1940  Diagnosis/ICD-10 Code:  Low back pain, unspecified back pain laterality, unspecified chronicity, unspecified whether sciatica present [M54.50]  Referring practitioner: Rocky Lozoya DO  Date of Initial Visit: Type: THERAPY  Noted: 2024  Today's Date: 2024  Patient seen for 28 sessions      Subjective:     Subjective Questionnaire: Oswestry: 20%  LEFS 75%  Clinical Progress: improved  Home Program Compliance: Yes  Treatment has included: therapeutic exercise, neuromuscular re-education, therapeutic activity, and gait training    Subjective Rates worst LBP at 0/10. Still primarily limited by LE weakness and limited endurance. Pt had an unrelated procedure 2-3 weeks ago and he feels this has caused him to regress somewhat.   Objective   Strength/Myotome Testing      Lumbar      Right   Normal strength     Left Hip   Planes of Motion   Flexion: 3+     Left Knee   Flexion: 5  Extension: 5     Left Ankle/Foot   Dorsiflexion: 5  Plantar flexion: 5  Great toe extension: 5     Timed up and go: 19.0 seconds  (24 seconds at IE), uses walker and UE assist with transfer  Five times sit to stand 19.4 seconds, uses UE assist    Assessment/Plan  Pt has made progress vs last progress note but has made recent regression after an unrelated procedure.     Short term goals, 1 week: Tolerate HEP progression. met  Voice compliance with activity modification. met  Report improvement in symptoms. met     Long term goals, 6 weeks: Improve ZAK score to 30%.met  Improve LEFS to 70%. met  Improve timed up and go to 20 seconds. met  Improve five times sit to stand to 17 seconds. Not met  4+/5 strength or better throughout. progressing  Symptoms to be centralized. met  Independent with HEP. Progressing    Continue POC BIW for 5 weeks, primarily working on strength, activity tolerance, and functional mobility.    Daniel Joyner, PT, DPT, OCS  [place] license:  [id number]  Physical Therapist      Timed:         Manual Therapy:         mins  23592;     Therapeutic Exercise:    15     mins  52015;     Neuromuscular Agustina:    15    mins  99241;    Therapeutic Activity:     15    mins  83435;     Gait Training:           mins  16852;     Ultrasound:          mins  36429;    Ionto                                   mins   41078  Self Care                            mins   24560    Un-Timed:  Electrical Stimulation:         mins  26417 ( );  Traction          mins 52640  Low Eval          Mins  72737  Mod Eval          Mins  21122  High Eval                            Mins  06131  Re-Eval                               mins  52014      Timed Treatment:   45   mins   Total Treatment:     45   mins

## 2024-09-10 ENCOUNTER — TREATMENT (OUTPATIENT)
Dept: PHYSICAL THERAPY | Facility: CLINIC | Age: 84
End: 2024-09-10
Payer: MEDICARE

## 2024-09-10 DIAGNOSIS — R29.898 BILATERAL LEG WEAKNESS: ICD-10-CM

## 2024-09-10 DIAGNOSIS — M54.50 LOW BACK PAIN, UNSPECIFIED BACK PAIN LATERALITY, UNSPECIFIED CHRONICITY, UNSPECIFIED WHETHER SCIATICA PRESENT: Primary | ICD-10-CM

## 2024-09-10 DIAGNOSIS — Z98.890 HISTORY OF LUMBAR LAMINECTOMY: ICD-10-CM

## 2024-09-10 PROCEDURE — 97530 THERAPEUTIC ACTIVITIES: CPT | Performed by: PHYSICAL THERAPIST

## 2024-09-10 PROCEDURE — 97112 NEUROMUSCULAR REEDUCATION: CPT | Performed by: PHYSICAL THERAPIST

## 2024-09-10 PROCEDURE — 97110 THERAPEUTIC EXERCISES: CPT | Performed by: PHYSICAL THERAPIST

## 2024-09-10 NOTE — PROGRESS NOTES
Physical Therapy Daily Treatment Note  [facility] Physical Therapy  [address] [place] [address]     Patient: Campbell Alex   : 1940   Referring practitioner: Rocky Lozoya DO  Date of initial visit: Type: THERAPY  Noted: 2024   Today's date: 9/10/2024   Patient seen for 29 visits    Visit Diagnoses:    ICD-10-CM ICD-9-CM   1. Low back pain, unspecified back pain laterality, unspecified chronicity, unspecified whether sciatica present  M54.50 724.2   2. History of lumbar laminectomy  Z98.890 V45.89   3. Bilateral leg weakness  R29.898 729.89       Subjective   Pt reports: Feeling a little stronger. Went on errands for 1-2 hours yesterday, fatigued afterward.      Objective     See Exercise, Manual, and Modality Logs for complete treatment.     Patient Education: HEP    Assessment/Plan Progressing well vs last week.      Progress per Plan of Care            Timed:         Manual Therapy:         mins  81852;     Therapeutic Exercise:    15     mins  25779;     Neuromuscular Agustnia:    15   mins  83989;    Therapeutic Activity:       15   mins  77851;     Gait Training:           mins  65859;     Ultrasound:          mins  22997;    Ionto                                   mins   51913  Self Care                            mins   87814    Un-Timed:  Electrical Stimulation:         mins  53255 ( );  Traction          mins 51746  Low Eval          Mins  18814  Mod Eval          Mins  12394  High Eval                            Mins  47533  Re-Eval                               mins  56565    Timed Treatment:   45   mins   Total Treatment:     45   mins      Daniel Joyner, PT, DPT, OCS  [place] license: [id number]  Physical Therapist

## 2024-09-13 ENCOUNTER — TREATMENT (OUTPATIENT)
Dept: PHYSICAL THERAPY | Facility: CLINIC | Age: 84
End: 2024-09-13
Payer: MEDICARE

## 2024-09-13 DIAGNOSIS — R29.898 BILATERAL LEG WEAKNESS: ICD-10-CM

## 2024-09-13 DIAGNOSIS — M54.50 LOW BACK PAIN, UNSPECIFIED BACK PAIN LATERALITY, UNSPECIFIED CHRONICITY, UNSPECIFIED WHETHER SCIATICA PRESENT: Primary | ICD-10-CM

## 2024-09-13 DIAGNOSIS — Z98.890 HISTORY OF LUMBAR LAMINECTOMY: ICD-10-CM

## 2024-09-13 PROCEDURE — 97110 THERAPEUTIC EXERCISES: CPT | Performed by: PHYSICAL THERAPIST

## 2024-09-13 PROCEDURE — 97112 NEUROMUSCULAR REEDUCATION: CPT | Performed by: PHYSICAL THERAPIST

## 2024-09-13 PROCEDURE — 97530 THERAPEUTIC ACTIVITIES: CPT | Performed by: PHYSICAL THERAPIST

## 2024-09-13 NOTE — PROGRESS NOTES
Physical Therapy Daily Treatment Note  [facility]  [address] [place] [address]     Patient: Campbell Alex   : 1940   Referring practitioner: Rocky Lozoya DO  Date of initial visit: Type: THERAPY  Noted: 2024   Today's date: 2024   Patient seen for 30 visits    Visit Diagnoses:    ICD-10-CM ICD-9-CM   1. Low back pain, unspecified back pain laterality, unspecified chronicity, unspecified whether sciatica present  M54.50 724.2   2. Bilateral leg weakness  R29.898 729.89   3. History of lumbar laminectomy  Z98.890 V45.89       Subjective   Pt reports: Pt was out late last night, fatigued this AM.       Objective     See Exercise, Manual, and Modality Logs for complete treatment.     Patient Education: HEP    Assessment/Plan Good response to rx.      Progress per Plan of Care            Timed:         Manual Therapy:         mins  77998;     Therapeutic Exercise:    15     mins  54851;     Neuromuscular Agustina:    15    mins  40993;    Therapeutic Activity:      15    mins  89492;     Gait Training:           mins  82317;     Ultrasound:          mins  76699;    Ionto                                   mins   66842  Self Care                            mins   14319    Un-Timed:  Electrical Stimulation:         mins  26724 ( );  Traction          mins 23987  Low Eval          Mins  63534  Mod Eval          Mins  38962  High Eval                            Mins  05938  Re-Eval                               mins  45652    Timed Treatment:   45   mins   Total Treatment:     45   mins      Daniel Joyner, PT, DPT, OCS  [place] license: [id number]  Physical Therapist

## 2024-09-17 ENCOUNTER — TREATMENT (OUTPATIENT)
Dept: PHYSICAL THERAPY | Facility: CLINIC | Age: 84
End: 2024-09-17
Payer: MEDICARE

## 2024-09-17 DIAGNOSIS — R29.898 BILATERAL LEG WEAKNESS: ICD-10-CM

## 2024-09-17 DIAGNOSIS — M54.50 LOW BACK PAIN, UNSPECIFIED BACK PAIN LATERALITY, UNSPECIFIED CHRONICITY, UNSPECIFIED WHETHER SCIATICA PRESENT: Primary | ICD-10-CM

## 2024-09-17 DIAGNOSIS — Z98.890 HISTORY OF LUMBAR LAMINECTOMY: ICD-10-CM

## 2024-09-17 PROCEDURE — 97112 NEUROMUSCULAR REEDUCATION: CPT | Performed by: PHYSICAL THERAPIST

## 2024-09-17 PROCEDURE — 97110 THERAPEUTIC EXERCISES: CPT | Performed by: PHYSICAL THERAPIST

## 2024-09-20 ENCOUNTER — TREATMENT (OUTPATIENT)
Dept: PHYSICAL THERAPY | Facility: CLINIC | Age: 84
End: 2024-09-20
Payer: MEDICARE

## 2024-09-20 DIAGNOSIS — M54.50 LOW BACK PAIN, UNSPECIFIED BACK PAIN LATERALITY, UNSPECIFIED CHRONICITY, UNSPECIFIED WHETHER SCIATICA PRESENT: Primary | ICD-10-CM

## 2024-09-20 DIAGNOSIS — Z98.890 HISTORY OF LUMBAR LAMINECTOMY: ICD-10-CM

## 2024-09-20 DIAGNOSIS — R29.898 BILATERAL LEG WEAKNESS: ICD-10-CM

## 2024-09-20 PROCEDURE — 97110 THERAPEUTIC EXERCISES: CPT | Performed by: PHYSICAL THERAPIST

## 2024-09-20 PROCEDURE — 97112 NEUROMUSCULAR REEDUCATION: CPT | Performed by: PHYSICAL THERAPIST

## 2024-09-24 ENCOUNTER — TREATMENT (OUTPATIENT)
Dept: PHYSICAL THERAPY | Facility: CLINIC | Age: 84
End: 2024-09-24
Payer: MEDICARE

## 2024-09-24 DIAGNOSIS — M54.50 LOW BACK PAIN, UNSPECIFIED BACK PAIN LATERALITY, UNSPECIFIED CHRONICITY, UNSPECIFIED WHETHER SCIATICA PRESENT: Primary | ICD-10-CM

## 2024-09-24 DIAGNOSIS — R29.898 BILATERAL LEG WEAKNESS: ICD-10-CM

## 2024-09-24 DIAGNOSIS — Z98.890 HISTORY OF LUMBAR LAMINECTOMY: ICD-10-CM

## 2024-09-24 PROCEDURE — 97112 NEUROMUSCULAR REEDUCATION: CPT | Performed by: PHYSICAL THERAPIST

## 2024-09-24 PROCEDURE — 97110 THERAPEUTIC EXERCISES: CPT | Performed by: PHYSICAL THERAPIST

## 2024-09-30 ENCOUNTER — OFFICE VISIT (OUTPATIENT)
Dept: FAMILY MEDICINE CLINIC | Facility: CLINIC | Age: 84
End: 2024-09-30
Payer: MEDICARE

## 2024-09-30 VITALS
WEIGHT: 187 LBS | OXYGEN SATURATION: 98 % | RESPIRATION RATE: 18 BRPM | HEIGHT: 73 IN | SYSTOLIC BLOOD PRESSURE: 129 MMHG | BODY MASS INDEX: 24.78 KG/M2 | HEART RATE: 103 BPM | DIASTOLIC BLOOD PRESSURE: 74 MMHG

## 2024-09-30 DIAGNOSIS — M19.041 PRIMARY OSTEOARTHRITIS OF BOTH HANDS: ICD-10-CM

## 2024-09-30 DIAGNOSIS — I10 PRIMARY HYPERTENSION: ICD-10-CM

## 2024-09-30 DIAGNOSIS — R19.7 DIARRHEA, UNSPECIFIED TYPE: ICD-10-CM

## 2024-09-30 DIAGNOSIS — Z00.00 MEDICARE ANNUAL WELLNESS VISIT, SUBSEQUENT: Primary | ICD-10-CM

## 2024-09-30 DIAGNOSIS — M19.042 PRIMARY OSTEOARTHRITIS OF BOTH HANDS: ICD-10-CM

## 2024-09-30 DIAGNOSIS — I25.10 CORONARY ARTERY DISEASE INVOLVING NATIVE CORONARY ARTERY OF NATIVE HEART WITHOUT ANGINA PECTORIS: Chronic | ICD-10-CM

## 2024-09-30 DIAGNOSIS — I48.0 PAROXYSMAL ATRIAL FIBRILLATION: ICD-10-CM

## 2024-09-30 DIAGNOSIS — Z23 NEED FOR VACCINATION: ICD-10-CM

## 2024-09-30 DIAGNOSIS — E78.2 MIXED HYPERLIPIDEMIA: Chronic | ICD-10-CM

## 2024-09-30 PROCEDURE — 90662 IIV NO PRSV INCREASED AG IM: CPT | Performed by: STUDENT IN AN ORGANIZED HEALTH CARE EDUCATION/TRAINING PROGRAM

## 2024-09-30 PROCEDURE — 1125F AMNT PAIN NOTED PAIN PRSNT: CPT | Performed by: STUDENT IN AN ORGANIZED HEALTH CARE EDUCATION/TRAINING PROGRAM

## 2024-09-30 PROCEDURE — G0439 PPPS, SUBSEQ VISIT: HCPCS | Performed by: STUDENT IN AN ORGANIZED HEALTH CARE EDUCATION/TRAINING PROGRAM

## 2024-09-30 PROCEDURE — 1170F FXNL STATUS ASSESSED: CPT | Performed by: STUDENT IN AN ORGANIZED HEALTH CARE EDUCATION/TRAINING PROGRAM

## 2024-09-30 PROCEDURE — G0008 ADMIN INFLUENZA VIRUS VAC: HCPCS | Performed by: STUDENT IN AN ORGANIZED HEALTH CARE EDUCATION/TRAINING PROGRAM

## 2024-09-30 PROCEDURE — 99213 OFFICE O/P EST LOW 20 MIN: CPT | Performed by: STUDENT IN AN ORGANIZED HEALTH CARE EDUCATION/TRAINING PROGRAM

## 2024-09-30 NOTE — PROGRESS NOTES
Subjective   The ABCs of the Annual Wellness Visit  Medicare Wellness Visit      Campbell Alex is a 84 y.o. patient who presents for a Medicare Wellness Visit.    The following portions of the patient's history were reviewed and   updated as appropriate: allergies, current medications, past family history, past medical history, past social history, past surgical history, and problem list.    Compared to one year ago, the patient's physical   health is the same.  Compared to one year ago, the patient's mental   health is the same.    Recent Hospitalizations:  He was admitted within the past 365 days at [facility] for spinal surgery.      Current Medical Providers:  Patient Care Team:  Rocky Lozoya DO as PCP - General (Family Medicine)  Vilma Staples MD as Consulting Physician (Cardiology)  Eliazar Mohan MD as Consulting Physician (Hematology and Oncology)  Toi Mcmahan MD as Consulting Physician (Urology)    Outpatient Medications Prior to Visit   Medication Sig Dispense Refill    acetaminophen (Tylenol) 325 MG tablet Take 2 tablets by mouth Every 4 (Four) Hours As Needed for Fever or Mild Pain. Indications: Fever, Pain      apixaban (ELIQUIS) 5 MG tablet tablet Take 1 tablet by mouth 2 (Two) Times a Day. Indications: history of DVT/PE      aspirin (aspirin) 81 MG EC tablet Take 1 tablet by mouth Daily. Indications: Disease involving Lipid Deposits in the Arteries      atorvastatin (LIPITOR) 20 MG tablet Take 1 tablet by mouth Every Night. Indications: High Amount of Fats in the Blood      azelastine (ASTELIN) 0.1 % nasal spray 2 sprays into the nostril(s) as directed by provider 2 (Two) Times a Day. Use in each nostril as directed  Indications: Perennial Allergic Rhinitis 30 mL 3    Calcium Carbonate 1500 (600 Ca) MG tablet Take 1 tablet by mouth Daily. Indications: Low Amount of Calcium in the Blood      FERROUS SULFATE PO EVERY OTHER DAY      Fiber powder Take 1 dose by mouth Daily  As Needed (constipation). Indications: constipation      isosorbide mononitrate (IMDUR) 30 MG 24 hr tablet Take 1 tablet by mouth Daily. Indications: Stable Angina Pectoris      metoprolol tartrate (LOPRESSOR) 25 MG tablet Take 1 tablet by mouth 2 (Two) Times a Day. Indications: High Blood Pressure Disorder 180 tablet 3    pantoprazole (PROTONIX) 40 MG EC tablet Take 1 tablet by mouth Daily. Indications: Gastroesophageal Reflux Disease 90 tablet 1    tamsulosin (FLOMAX) 0.4 MG capsule 24 hr capsule Take 1 capsule by mouth 2 (Two) Times a Day. TAKES 1 TAB IN MORNING AND 1/2 TAB AT NIGHT   Indications: Benign Enlargement of Prostate      polyethylene glycol (MIRALAX) 17 g packet Take 17 g by mouth 2 (Two) Times a Day As Needed (constipation). Indications: Constipation      saccharomyces boulardii (FLORASTOR) 250 MG capsule Take 1 capsule by mouth Daily. Indications: supplement (Patient not taking: Reported on 9/30/2024)       No facility-administered medications prior to visit.     No opioid medication identified on active medication list. I have reviewed chart for other potential  high risk medication/s and harmful drug interactions in the elderly.      Aspirin is on active medication list. Aspirin use is indicated based on review of current medical condition/s. Pros and cons of this therapy have been discussed today. Benefits of this medication outweigh potential harm.  Patient has been encouraged to continue taking this medication.  .      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  ACP discussion was held with the patient during this visit. Patient has an advance directive in EMR which is still valid.             Objective   Vitals:    24 1038   BP: 129/74   Pulse: 103   Resp: 18   SpO2: 98%   Weight: 84.8 kg (187 lb)   Height: 185.4 cm (73\")   PainSc: 10-Worst pain ever   PainLoc: Hand       Estimated body mass index is 24.67 kg/m² as calculated from the following:    Height as of this encounter: 185.4 cm (73\").    Weight as of this encounter: 84.8 kg (187 lb).    BMI is within normal parameters. No other follow-up for BMI required.       Does the patient have evidence of cognitive impairment? No                                                                                                Health  Risk Assessment    Smoking Status:  Social History     Tobacco Use   Smoking Status Never    Passive exposure: Past   Smokeless Tobacco Never   Tobacco Comments    1-2/day     Alcohol Consumption:  Social History     Substance and Sexual Activity   Alcohol Use Never    Comment: socially       Fall Risk Screen  STEADI Fall Risk Assessment was completed, and patient is at MODERATE risk for falls. Assessment completed on:2024    Depression Screenin/30/2024    10:44 AM   PHQ-2/PHQ-9 Depression Screening   Little Interest or Pleasure in Doing Things 0-->not at all   Feeling Down, Depressed or Hopeless 0-->not at all   PHQ-9: Brief Depression Severity Measure Score 0     Health Habits and Functional and Cognitive Screenin/30/2024    10:45 AM   Functional & Cognitive Status   Do you have difficulty preparing food and eating? Yes   Do you have difficulty bathing yourself, getting dressed or grooming yourself? Yes   Do you have difficulty using the toilet? Yes   Do you have difficulty moving " around from place to place? Yes   Do you have trouble with steps or getting out of a bed or a chair? Yes   Current Diet Well Balanced Diet   Dental Exam Up to date   Eye Exam Up to date   Exercise (times per week) 2 times per week   Current Exercises Include Other   Do you need help using the phone?  No   Are you deaf or do you have serious difficulty hearing?  Yes   Do you need help to go to places out of walking distance? Yes   Do you need help shopping? Yes   Do you need help preparing meals?  Yes   Do you need help with housework?  Yes   Do you need help with laundry? Yes   Do you need help taking your medications? Yes   Do you need help managing money? Yes   Do you ever drive or ride in a car without wearing a seat belt? No   Have you felt unusual stress, anger or loneliness in the last month? No   Who do you live with? Spouse   If you need help, do you have trouble finding someone available to you? No   Have you been bothered in the last four weeks by sexual problems? No   Do you have difficulty concentrating, remembering or making decisions? No           Age-appropriate Screening Schedule:  Refer to the list below for future screening recommendations based on patient's age, sex and/or medical conditions. Orders for these recommended tests are listed in the plan section. The patient has been provided with a written plan.    Health Maintenance List  Health Maintenance   Topic Date Due    TDAP/TD VACCINES (1 - Tdap) Never done    RSV Vaccine - Adults (1 - 1-dose 60+ series) Never done    DXA SCAN  03/02/2022    Pneumococcal Vaccine 65+ (2 of 2 - PPSV23 or PCV20) 01/17/2023    LIPID PANEL  09/06/2023    URINE MICROALBUMIN  09/06/2023    HEMOGLOBIN A1C  12/14/2023    COVID-19 Vaccine (8 - 2023-24 season) 09/01/2024    DIABETIC EYE EXAM  02/01/2025    ANNUAL WELLNESS VISIT  09/30/2025    INFLUENZA VACCINE  Completed    ZOSTER VACCINE  Discontinued                                                                       "                                                                          CMS Preventative Services Quick Reference  Risk Factors Identified During Encounter  Immunizations Discussed/Encouraged: Influenza    The above risks/problems have been discussed with the patient.  Pertinent information has been shared with the patient in the After Visit Summary.  An After Visit Summary and PPPS were made available to the patient.    Follow Up:   Next Medicare Wellness visit to be scheduled in 1 year.         Additional E&M Note during same encounter follows:  Patient has additional, significant, and separately identifiable condition(s)/problem(s) that require work above and beyond the Medicare Wellness Visit     Chief Complaint  Medicare Wellness-subsequent    Subjective   HPI  Zuleyma is also being seen today for additional medical problem/s.  He feels like his strength is improving.    Appetite is good.    Had a pill cam study that showed no GI bleed.    Back pain is minimal.      His BM's are irregular, 1-2x / wk.  Occasionally he will have uncontrollable diarrhea and have an accident.  This usually happens if it has been 3-4 days w/o a BM.      He is having a lot of pain in his hands 2/2 arthritis.    He uses topical diclofenac w/ little benefit.      Patient has been erroneously marked as diabetic. Based on the available clinical information, he does not have diabetes and should therefore be excluded from diabetic health maintenance and quality measures for the remainder of the reporting period.    Objective   Vital Signs:  /74   Pulse 103   Resp 18   Ht 185.4 cm (73\")   Wt 84.8 kg (187 lb)   SpO2 98%   BMI 24.67 kg/m²     GEN: In no acute distress, non toxic appearing  HEENT: Pupils equal and reactive to light, sclera clear. Mucous membranes moist. Oropharynx without erythema or exudate. No cervical or submandibular lymphadenopathy.  CV: Regular rate and rhythm, no murmurs, 2+ peripheral pulses, No extremity " edema.   RESP: Lungs clear to auscultation anteriorly and posteriorly in all lung fields bilaterally.  HANDS: Knotty finger joints.  No overlying redness.    NEURO: AAO to person, place, and time. CN 2-12 intact grossly.   PSYCH: Affect normal, insight fair    Assessment and Plan               Medicare annual wellness visit, subsequent  Blood pressure at goal today.  Check blood work as below.  Continue follow-up with specialists.  Continue working with physical therapy for balance/strengthening.  Continue current medication regimen.  Encouraged healthy diet with plenty of fruits, vegetables, protein, water.  Next wellness in 1 year, follow-up 4 months for chronic conditions.  Primary hypertension  Blood pressure at goal today, continue metoprolol tartrate 25 mg twice daily.  Mixed hyperlipidemia  Continue atorvastatin 20 mg daily.  Paroxysmal atrial fibrillation  Continue metoprolol tartrate 25 mg twice daily, Eliquis 5 mg twice daily.  Coronary artery disease involving native coronary artery of native heart without angina pectoris  See above, keep follow-up with cardiology.  Need for vaccination  Flu vaccine today.  Primary osteoarthritis of both hands  Continue topical diclofenac as needed, recommend oral turmeric 1000 mg daily for anti-inflammatory.  Tylenol arthritis as needed.  Avoid NSAIDs with heart history as above.  Diarrhea, unspecified type  Since his uncontrolled episodes tend to happen after a few days without bowel movement suspect that his issue may be more constipation.  Recommended quarter capful or half capful of MiraLAX every other day so that he is having a soft, easy to pass bowel movement daily or every other day.  Keep me updated.    Orders Placed This Encounter   Procedures    Fluzone High-Dose 65+yrs (9515-8169)    Comprehensive Metabolic Panel     Standing Status:   Future     Standing Expiration Date:   9/30/2025     Order Specific Question:   Release to patient     Answer:   Routine  Release [[id number]]    Hemoglobin A1c     Standing Status:   Future     Standing Expiration Date:   9/30/2025     Order Specific Question:   Release to patient     Answer:   Routine Release [[id number]]    Lipid Panel     Standing Status:   Future     Standing Expiration Date:   9/30/2025     Order Specific Question:   Release to patient     Answer:   Routine Release [[id number]]    TSH     Standing Status:   Future     Standing Expiration Date:   9/30/2025     Order Specific Question:   Release to patient     Answer:   Routine Release [[id number]]    Vitamin B12     Standing Status:   Future     Standing Expiration Date:   9/30/2025     Order Specific Question:   Release to patient     Answer:   Routine Release [[id number]]         Follow Up   Return in about 4 months (around 1/30/2025) for chronic conditions f/u.  Patient was given instructions and counseling regarding his condition or for health maintenance advice. Please see specific information pulled into the AVS if appropriate.

## 2024-09-30 NOTE — ASSESSMENT & PLAN NOTE
Continue topical diclofenac as needed, recommend oral turmeric 1000 mg daily for anti-inflammatory.  Tylenol arthritis as needed.  Avoid NSAIDs with heart history as above.

## 2024-10-01 ENCOUNTER — TREATMENT (OUTPATIENT)
Dept: PHYSICAL THERAPY | Facility: CLINIC | Age: 84
End: 2024-10-01
Payer: MEDICARE

## 2024-10-01 DIAGNOSIS — M54.50 LOW BACK PAIN, UNSPECIFIED BACK PAIN LATERALITY, UNSPECIFIED CHRONICITY, UNSPECIFIED WHETHER SCIATICA PRESENT: Primary | ICD-10-CM

## 2024-10-01 DIAGNOSIS — Z98.890 HISTORY OF LUMBAR LAMINECTOMY: ICD-10-CM

## 2024-10-01 DIAGNOSIS — R29.898 BILATERAL LEG WEAKNESS: ICD-10-CM

## 2024-10-01 PROCEDURE — 97112 NEUROMUSCULAR REEDUCATION: CPT | Performed by: PHYSICAL THERAPIST

## 2024-10-01 PROCEDURE — 97110 THERAPEUTIC EXERCISES: CPT | Performed by: PHYSICAL THERAPIST

## 2024-10-01 NOTE — PROGRESS NOTES
Physical Therapy Daily Treatment Note  [facility] Therapy  [address] [place] [address]     Patient: Campbell Alex   : 1940   Referring practitioner: Rocky Lozoya DO  Date of initial visit: Type: THERAPY  Noted: 2024   Today's date: 10/1/2024   Patient seen for 34 visits    Visit Diagnoses:    ICD-10-CM ICD-9-CM   1. Low back pain, unspecified back pain laterality, unspecified chronicity, unspecified whether sciatica present  M54.50 724.2   2. Bilateral leg weakness  R29.898 729.89   3. History of lumbar laminectomy  Z98.890 V45.89       Subjective   Pt reports: Feeling relatively weak today. Feels his balance needs to continue to improve.      Objective     See Exercise, Manual, and Modality Logs for complete treatment.     Patient Education: HEP    Assessment/Plan More difficulty balancing with narrow MASSIMO. Fatigued post visit.      Progress per Plan of Care            Timed:         Manual Therapy:         mins  70657;     Therapeutic Exercise:    15     mins  31506;     Neuromuscular Agustina:    23    mins  36624;    Therapeutic Activity:      2    mins  73440;     Gait Training:           mins  51763;     Ultrasound:          mins  44394;    Ionto                                   mins   78745  Self Care                            mins   08499    Un-Timed:  Electrical Stimulation:         mins  37218 ( );  Traction          mins 14102  Low Eval          Mins  00038  Mod Eval          Mins  90921  High Eval                            Mins  19702  Re-Eval                               mins  98643    Timed Treatment:   40   mins   Total Treatment:     40   mins      Daniel Joyner, PT, DPT, OCS  [place] license: [id number]  Physical Therapist

## 2024-10-03 ENCOUNTER — TELEPHONE (OUTPATIENT)
Dept: ONCOLOGY | Facility: CLINIC | Age: 84
End: 2024-10-03

## 2024-10-04 ENCOUNTER — TREATMENT (OUTPATIENT)
Dept: PHYSICAL THERAPY | Facility: CLINIC | Age: 84
End: 2024-10-04
Payer: MEDICARE

## 2024-10-04 DIAGNOSIS — M54.50 LOW BACK PAIN, UNSPECIFIED BACK PAIN LATERALITY, UNSPECIFIED CHRONICITY, UNSPECIFIED WHETHER SCIATICA PRESENT: Primary | ICD-10-CM

## 2024-10-04 DIAGNOSIS — R29.898 BILATERAL LEG WEAKNESS: ICD-10-CM

## 2024-10-04 DIAGNOSIS — Z98.890 HISTORY OF LUMBAR LAMINECTOMY: ICD-10-CM

## 2024-10-04 PROCEDURE — 97112 NEUROMUSCULAR REEDUCATION: CPT | Performed by: PHYSICAL THERAPIST

## 2024-10-04 PROCEDURE — 97110 THERAPEUTIC EXERCISES: CPT | Performed by: PHYSICAL THERAPIST

## 2024-10-04 NOTE — PROGRESS NOTES
Physical Therapy Daily Treatment Note  [facility] Therapy  [address] [place] [address]     Patient: Campbell Alex   : 1940   Referring practitioner: Rocky Lozoya DO  Date of initial visit: Type: THERAPY  Noted: 2024   Today's date: 10/4/2024   Patient seen for 35 visits    Visit Diagnoses:    ICD-10-CM ICD-9-CM   1. Low back pain, unspecified back pain laterality, unspecified chronicity, unspecified whether sciatica present  M54.50 724.2   2. Bilateral leg weakness  R29.898 729.89   3. History of lumbar laminectomy  Z98.890 V45.89       Subjective   Pt reports: No new complaints vs last visit. LBP improving.      Objective     See Exercise, Manual, and Modality Logs for complete treatment.     Patient Education: HEP    Assessment/Plan Progressing well, fatigued post visit.      Progress per Plan of Care            Timed:         Manual Therapy:         mins  74276;     Therapeutic Exercise:    15     mins  47114;     Neuromuscular Agustina:    25    mins  99931;    Therapeutic Activity:          mins  56136;     Gait Training:           mins  21797;     Ultrasound:          mins  09216;    Ionto                                   mins   25466  Self Care                            mins   84668    Un-Timed:  Electrical Stimulation:         mins  53343 ( );  Traction          mins 75046  Low Eval          Mins  73574  Mod Eval          Mins  31680  High Eval                            Mins  26878  Re-Eval                               mins  31013    Timed Treatment:   40   mins   Total Treatment:     40   mins      Daniel oJyner, PT, DPT, OCS  [place] license: [id number]  Physical Therapist

## 2024-10-09 ENCOUNTER — TREATMENT (OUTPATIENT)
Dept: PHYSICAL THERAPY | Facility: CLINIC | Age: 84
End: 2024-10-09
Payer: MEDICARE

## 2024-10-09 DIAGNOSIS — M54.50 LOW BACK PAIN, UNSPECIFIED BACK PAIN LATERALITY, UNSPECIFIED CHRONICITY, UNSPECIFIED WHETHER SCIATICA PRESENT: Primary | ICD-10-CM

## 2024-10-09 DIAGNOSIS — R29.898 BILATERAL LEG WEAKNESS: ICD-10-CM

## 2024-10-09 DIAGNOSIS — Z98.890 HISTORY OF LUMBAR LAMINECTOMY: ICD-10-CM

## 2024-10-09 PROCEDURE — 97110 THERAPEUTIC EXERCISES: CPT | Performed by: PHYSICAL THERAPIST

## 2024-10-09 PROCEDURE — 97112 NEUROMUSCULAR REEDUCATION: CPT | Performed by: PHYSICAL THERAPIST

## 2024-10-09 NOTE — PROGRESS NOTES
Physical Therapy Daily Treatment Note  [facility]  [address] [place] [address]     Patient: Campbell Alex   : 1940   Referring practitioner: Rocky Lozoya DO  Date of initial visit: Type: THERAPY  Noted: 2024   Today's date: 10/9/2024   Patient seen for 36 visits    Visit Diagnoses:    ICD-10-CM ICD-9-CM   1. Low back pain, unspecified back pain laterality, unspecified chronicity, unspecified whether sciatica present  M54.50 724.2   2. Bilateral leg weakness  R29.898 729.89   3. History of lumbar laminectomy  Z98.890 V45.89       Subjective   Pt reports: Wants to progress away from rolling wx, feels it is allowing him to slouch. HEP going well, feeling stronger this week.      Objective     See Exercise, Manual, and Modality Logs for complete treatment.     Patient Education: HEP    Assessment/Plan Walker is sized for pt. Fatigued post visit, flexed posture improves with TC and VC.      Progress per Plan of Care            Timed:         Manual Therapy:         mins  77557;     Therapeutic Exercise:    15     mins  17268;     Neuromuscular Agustina:    30    mins  37005;    Therapeutic Activity:          mins  02492;     Gait Training:           mins  89856;     Ultrasound:          mins  68053;    Ionto                                   mins   05641  Self Care                            mins   38972    Un-Timed:  Electrical Stimulation:         mins  37388 ( );  Traction          mins 06010  Low Eval          Mins  30657  Mod Eval          Mins  97035  High Eval                            Mins  80674  Re-Eval                               mins  07211    Timed Treatment:   45   mins   Total Treatment:     45   mins      Daniel Joyner, PT, DPT, OCS  [place] license: [id number]  Physical Therapist

## 2024-10-11 ENCOUNTER — TREATMENT (OUTPATIENT)
Dept: PHYSICAL THERAPY | Facility: CLINIC | Age: 84
End: 2024-10-11
Payer: MEDICARE

## 2024-10-11 DIAGNOSIS — M54.50 LOW BACK PAIN, UNSPECIFIED BACK PAIN LATERALITY, UNSPECIFIED CHRONICITY, UNSPECIFIED WHETHER SCIATICA PRESENT: Primary | ICD-10-CM

## 2024-10-11 DIAGNOSIS — Z98.890 HISTORY OF LUMBAR LAMINECTOMY: ICD-10-CM

## 2024-10-11 DIAGNOSIS — R29.898 BILATERAL LEG WEAKNESS: ICD-10-CM

## 2024-10-11 PROCEDURE — 97112 NEUROMUSCULAR REEDUCATION: CPT | Performed by: PHYSICAL THERAPIST

## 2024-10-11 PROCEDURE — 97110 THERAPEUTIC EXERCISES: CPT | Performed by: PHYSICAL THERAPIST

## 2024-10-11 NOTE — PROGRESS NOTES
Physical Therapy Daily Treatment Note  [facility] Therapy  [address] [place] [address]     Patient: Campbell Alex   : 1940   Referring practitioner: Rocky Lozoya DO  Date of initial visit: Type: THERAPY  Noted: 2024   Today's date: 10/11/2024   Patient seen for 37 visits    Visit Diagnoses:    ICD-10-CM ICD-9-CM   1. Low back pain, unspecified back pain laterality, unspecified chronicity, unspecified whether sciatica present  M54.50 724.2   2. Bilateral leg weakness  R29.898 729.89   3. History of lumbar laminectomy  Z98.890 V45.89       Subjective   Pt reports: No new complaints. Mobility remains limited.      Objective     See Exercise, Manual, and Modality Logs for complete treatment.     Patient Education: HEP    Assessment/Plan Tolerated rx progression, working on weight shift while stepping without UE support.      Progress per Plan of Care            Timed:         Manual Therapy:         mins  67610;     Therapeutic Exercise:    15     mins  65945;     Neuromuscular Agustina:    30    mins  90308;    Therapeutic Activity:          mins  34934;     Gait Training:           mins  19293;     Ultrasound:          mins  69235;    Ionto                                   mins   21250  Self Care                            mins   07615    Un-Timed:  Electrical Stimulation:         mins  92263 ( );  Traction          mins 48001  Low Eval          Mins  35860  Mod Eval          Mins  51085  High Eval                            Mins  54555  Re-Eval                               mins  16812    Timed Treatment:   45   mins   Total Treatment:     45   mins      Daniel Joyner, PT, DPT, OCS  [place] license: [id number]  Physical Therapist

## 2024-10-14 ENCOUNTER — TREATMENT (OUTPATIENT)
Dept: PHYSICAL THERAPY | Facility: CLINIC | Age: 84
End: 2024-10-14
Payer: MEDICARE

## 2024-10-14 ENCOUNTER — TELEPHONE (OUTPATIENT)
Dept: FAMILY MEDICINE CLINIC | Facility: CLINIC | Age: 84
End: 2024-10-14
Payer: MEDICARE

## 2024-10-14 DIAGNOSIS — M54.50 LOW BACK PAIN, UNSPECIFIED BACK PAIN LATERALITY, UNSPECIFIED CHRONICITY, UNSPECIFIED WHETHER SCIATICA PRESENT: Primary | ICD-10-CM

## 2024-10-14 DIAGNOSIS — Z98.890 HISTORY OF LUMBAR LAMINECTOMY: ICD-10-CM

## 2024-10-14 DIAGNOSIS — K59.00 CONSTIPATION, UNSPECIFIED CONSTIPATION TYPE: Primary | ICD-10-CM

## 2024-10-14 DIAGNOSIS — R29.898 BILATERAL LEG WEAKNESS: ICD-10-CM

## 2024-10-14 PROCEDURE — 97112 NEUROMUSCULAR REEDUCATION: CPT | Performed by: PHYSICAL THERAPIST

## 2024-10-14 PROCEDURE — 97110 THERAPEUTIC EXERCISES: CPT | Performed by: PHYSICAL THERAPIST

## 2024-10-14 PROCEDURE — 97116 GAIT TRAINING THERAPY: CPT | Performed by: PHYSICAL THERAPIST

## 2024-10-14 NOTE — TELEPHONE ENCOUNTER
Caller: nash soto    Relationship: Emergency Contact    Best call back number: [phone]     What is the medical concern/diagnosis: SEVERE CONSTIPATION AND SOMETIMES DIARRHEA ( ONGOING ONE YEAR )     What specialty or service is being requested: GASTROENTEROLOGY     What is the provider, practice or medical service name: NA      Any additional details: PLEASE CALL AND ADVISE.

## 2024-10-14 NOTE — PROGRESS NOTES
Physical Therapy Daily Treatment Note  [facility] Physical Therapy  [address]  [place] [address]     Patient: Campbell Alex   : 1940   Referring practitioner: Rocky Lozoya DO  Date of initial visit: Type: THERAPY  Noted: 2024   Today's date: 10/14/2024   Patient seen for 38 visits    Visit Diagnoses:    ICD-10-CM ICD-9-CM   1. Low back pain, unspecified back pain laterality, unspecified chronicity, unspecified whether sciatica present  M54.50 724.2   2. Bilateral leg weakness  R29.898 729.89   3. History of lumbar laminectomy  Z98.890 V45.89       Subjective   Pt reports: Did not sleep well last night, feeling a little under the weather per pt. HEP going well.      Objective     See Exercise, Manual, and Modality Logs for complete treatment.     Patient Education: HEP    Assessment/Plan Fatigued post visit, feels his mobility is slowly improving.      Progress per Plan of Care            Timed:         Manual Therapy:         mins  63877;     Therapeutic Exercise:    15     mins  81402;     Neuromuscular Agustina:    15    mins  42198;    Therapeutic Activity:          mins  80647;     Gait Training:      10     mins  69351;     Ultrasound:          mins  93520;    Ionto                                   mins   74720  Self Care                            mins   92120    Un-Timed:  Electrical Stimulation:         mins  84465 ( );  Traction          mins 03326  Low Eval          Mins  75606  Mod Eval          Mins  07468  High Eval                            Mins  59041  Re-Eval                               mins  61929    Timed Treatment:   40   mins   Total Treatment:     40   mins      Daniel Joyner, PT, DPT, OCS  [place] license: [id number]  Physical Therapist

## 2024-10-18 ENCOUNTER — TREATMENT (OUTPATIENT)
Dept: PHYSICAL THERAPY | Facility: CLINIC | Age: 84
End: 2024-10-18
Payer: MEDICARE

## 2024-10-18 DIAGNOSIS — R29.898 BILATERAL LEG WEAKNESS: ICD-10-CM

## 2024-10-18 DIAGNOSIS — Z98.890 HISTORY OF LUMBAR LAMINECTOMY: ICD-10-CM

## 2024-10-18 DIAGNOSIS — M54.50 LOW BACK PAIN, UNSPECIFIED BACK PAIN LATERALITY, UNSPECIFIED CHRONICITY, UNSPECIFIED WHETHER SCIATICA PRESENT: Primary | ICD-10-CM

## 2024-10-18 PROCEDURE — 97110 THERAPEUTIC EXERCISES: CPT | Performed by: PHYSICAL THERAPIST

## 2024-10-18 PROCEDURE — 97530 THERAPEUTIC ACTIVITIES: CPT | Performed by: PHYSICAL THERAPIST

## 2024-10-18 PROCEDURE — 97112 NEUROMUSCULAR REEDUCATION: CPT | Performed by: PHYSICAL THERAPIST

## 2024-10-18 NOTE — PROGRESS NOTES
Physical Therapy Daily Treatment Note  [facility] Physical Therapy  [address] [place] [address]     Patient: Campbell Alex   : 1940   Referring practitioner: Rocky Lozoya DO  Date of initial visit: Type: THERAPY  Noted: 2024   Today's date: 10/18/2024   Patient seen for 39 visits    Visit Diagnoses:    ICD-10-CM ICD-9-CM   1. Low back pain, unspecified back pain laterality, unspecified chronicity, unspecified whether sciatica present  M54.50 724.2   2. Bilateral leg weakness  R29.898 729.89   3. History of lumbar laminectomy  Z98.890 V45.89       Subjective   Pt reports: No new complaints vs last visit. Pt was able to climb one flight of stairs at home using one rail with modified approach. Reports significant fatigue afterward. HEP going well.       Objective     See Exercise, Manual, and Modality Logs for complete treatment.     Patient Education: HEP    Assessment/Plan Progressing well with stair climbing. Fatigued post visit.      Progress per Plan of Care            Timed:         Manual Therapy:         mins  52179;     Therapeutic Exercise:    15    mins  68105;     Neuromuscular Agustina:    15    mins  63586;    Therapeutic Activity:         10 mins  73595;     Gait Training:           mins  02205;     Ultrasound:          mins  22753;    Ionto                                   mins   75391  Self Care                            mins   78300    Un-Timed:  Electrical Stimulation:         mins  29222 ( );  Traction          mins 02430  Low Eval          Mins  48797  Mod Eval          Mins  30944  High Eval                            Mins  00323  Re-Eval                               mins  10365    Timed Treatment:   40   mins   Total Treatment:     40   mins      Daniel Joyner PT, DPT, OCS  [place] license: [id number]  Physical Therapist

## 2024-10-21 ENCOUNTER — TREATMENT (OUTPATIENT)
Dept: PHYSICAL THERAPY | Facility: CLINIC | Age: 84
End: 2024-10-21
Payer: MEDICARE

## 2024-10-21 DIAGNOSIS — R29.898 BILATERAL LEG WEAKNESS: ICD-10-CM

## 2024-10-21 DIAGNOSIS — Z98.890 HISTORY OF LUMBAR LAMINECTOMY: ICD-10-CM

## 2024-10-21 DIAGNOSIS — M54.50 LOW BACK PAIN, UNSPECIFIED BACK PAIN LATERALITY, UNSPECIFIED CHRONICITY, UNSPECIFIED WHETHER SCIATICA PRESENT: Primary | ICD-10-CM

## 2024-10-21 PROCEDURE — 97110 THERAPEUTIC EXERCISES: CPT | Performed by: PHYSICAL THERAPIST

## 2024-10-21 PROCEDURE — 97112 NEUROMUSCULAR REEDUCATION: CPT | Performed by: PHYSICAL THERAPIST

## 2024-10-21 NOTE — PROGRESS NOTES
Physical Therapy Daily Treatment Note  [facility] Physical Therapy  [address] [place] [address]     Patient: Campbell Alex   : 1940   Referring practitioner: Rocky Lozoya DO  Date of initial visit: Type: THERAPY  Noted: 2024   Today's date: 10/21/2024   Patient seen for 40 visits    Visit Diagnoses:    ICD-10-CM ICD-9-CM   1. Low back pain, unspecified back pain laterality, unspecified chronicity, unspecified whether sciatica present  M54.50 724.2   2. Bilateral leg weakness  R29.898 729.89   3. History of lumbar laminectomy  Z98.890 V45.89       Subjective   Pt reports: Continuing to feel stronger. HEP going well. Using RW consistently.      Objective     See Exercise, Manual, and Modality Logs for complete treatment.     Patient Education: HEP    Assessment/Plan Fatigued post visit. Needs cues to stay in erect posture.      Progress per Plan of Care            Timed:         Manual Therapy:         mins  68236;     Therapeutic Exercise:    30     mins  49500;     Neuromuscular Agustina:    15    mins  63527;    Therapeutic Activity:          mins  52278;     Gait Training:           mins  62191;     Ultrasound:          mins  04113;    Ionto                                   mins   46712  Self Care                            mins   16000    Un-Timed:  Electrical Stimulation:         mins  11750 ( );  Traction          mins 16544  Low Eval          Mins  06015  Mod Eval          Mins  53776  High Eval                            Mins  34771  Re-Eval                               mins  24065    Timed Treatment:   45   mins   Total Treatment:     45   mins      Daniel Joyner, PT, DPT, OCS  [place] license: [id number]  Physical Therapist

## 2024-10-23 ENCOUNTER — LAB (OUTPATIENT)
Dept: LAB | Facility: HOSPITAL | Age: 84
End: 2024-10-23
Payer: MEDICARE

## 2024-10-23 DIAGNOSIS — D50.9 IRON DEFICIENCY ANEMIA, UNSPECIFIED IRON DEFICIENCY ANEMIA TYPE: Primary | ICD-10-CM

## 2024-10-23 LAB
BASOPHILS # BLD AUTO: 0.04 10*3/MM3 (ref 0–0.2)
BASOPHILS NFR BLD AUTO: 0.6 % (ref 0–1.5)
DEPRECATED RDW RBC AUTO: 47.2 FL (ref 37–54)
EOSINOPHIL # BLD AUTO: 0.31 10*3/MM3 (ref 0–0.4)
EOSINOPHIL NFR BLD AUTO: 4.9 % (ref 0.3–6.2)
ERYTHROCYTE [DISTWIDTH] IN BLOOD BY AUTOMATED COUNT: 14.5 % (ref 12.3–15.4)
FERRITIN SERPL-MCNC: 63.2 NG/ML (ref 30–400)
HCT VFR BLD AUTO: 35.7 % (ref 37.5–51)
HGB BLD-MCNC: 11.8 G/DL (ref 13–17.7)
IRON 24H UR-MRATE: 80 MCG/DL (ref 59–158)
IRON SATN MFR SERPL: 28 % (ref 20–50)
LYMPHOCYTES # BLD AUTO: 1.63 10*3/MM3 (ref 0.7–3.1)
LYMPHOCYTES NFR BLD AUTO: 26 % (ref 19.6–45.3)
MCH RBC QN AUTO: 30.9 PG (ref 26.6–33)
MCHC RBC AUTO-ENTMCNC: 33.1 G/DL (ref 31.5–35.7)
MCV RBC AUTO: 93.5 FL (ref 79–97)
MONOCYTES # BLD AUTO: 0.8 10*3/MM3 (ref 0.1–0.9)
MONOCYTES NFR BLD AUTO: 12.7 % (ref 5–12)
NEUTROPHILS NFR BLD AUTO: 3.5 10*3/MM3 (ref 1.7–7)
NEUTROPHILS NFR BLD AUTO: 55.8 % (ref 42.7–76)
PLATELET # BLD AUTO: 186 10*3/MM3 (ref 140–450)
PMV BLD AUTO: 10.1 FL (ref 6–12)
RBC # BLD AUTO: 3.82 10*6/MM3 (ref 4.14–5.8)
TIBC SERPL-MCNC: 289 MCG/DL (ref 298–536)
TRANSFERRIN SERPL-MCNC: 194 MG/DL (ref 200–360)
WBC NRBC COR # BLD AUTO: 6.28 10*3/MM3 (ref 3.4–10.8)

## 2024-10-23 PROCEDURE — 83540 ASSAY OF IRON: CPT | Performed by: STUDENT IN AN ORGANIZED HEALTH CARE EDUCATION/TRAINING PROGRAM

## 2024-10-23 PROCEDURE — 36415 COLL VENOUS BLD VENIPUNCTURE: CPT

## 2024-10-23 PROCEDURE — 85025 COMPLETE CBC W/AUTO DIFF WBC: CPT

## 2024-10-23 PROCEDURE — 84466 ASSAY OF TRANSFERRIN: CPT | Performed by: STUDENT IN AN ORGANIZED HEALTH CARE EDUCATION/TRAINING PROGRAM

## 2024-10-23 PROCEDURE — 82728 ASSAY OF FERRITIN: CPT | Performed by: STUDENT IN AN ORGANIZED HEALTH CARE EDUCATION/TRAINING PROGRAM

## 2024-10-25 ENCOUNTER — TREATMENT (OUTPATIENT)
Dept: PHYSICAL THERAPY | Facility: CLINIC | Age: 84
End: 2024-10-25
Payer: MEDICARE

## 2024-10-25 DIAGNOSIS — R29.898 BILATERAL LEG WEAKNESS: ICD-10-CM

## 2024-10-25 DIAGNOSIS — M54.50 LOW BACK PAIN, UNSPECIFIED BACK PAIN LATERALITY, UNSPECIFIED CHRONICITY, UNSPECIFIED WHETHER SCIATICA PRESENT: Primary | ICD-10-CM

## 2024-10-25 DIAGNOSIS — Z98.890 HISTORY OF LUMBAR LAMINECTOMY: ICD-10-CM

## 2024-10-25 NOTE — PROGRESS NOTES
Physical Therapy Daily Treatment Note  [facility] Physical Therapy  [address]  [place] [address]     Patient: Campbell Alex   : 1940   Referring practitioner: Rocky Lozoya DO  Date of initial visit: Type: THERAPY  Noted: 2024   Today's date: 10/25/2024   Patient seen for 41 visits    Visit Diagnoses:    ICD-10-CM ICD-9-CM   1. Low back pain, unspecified back pain laterality, unspecified chronicity, unspecified whether sciatica present  M54.50 724.2   2. Bilateral leg weakness  R29.898 729.89   3. History of lumbar laminectomy  Z98.890 V45.89       Subjective   Pt reports: No new complaints vs last visit. Feels strength and mobility are improving. HEP going well.       Objective     See Exercise, Manual, and Modality Logs for complete treatment.     Patient Education: HEP    Assessment/Plan Progressing well. Reassess next week.      Progress per Plan of Care            Timed:         Manual Therapy:         mins  39809;     Therapeutic Exercise:    25     mins  57623;     Neuromuscular Agustina:    15    mins  17949;    Therapeutic Activity:          mins  62700;     Gait Training:           mins  07180;     Ultrasound:          mins  98696;    Ionto                                   mins   09199  Self Care                            mins   28989    Un-Timed:  Electrical Stimulation:         mins  95990 ( );  Traction          mins 09606  Low Eval          Mins  27744  Mod Eval          Mins  20727  High Eval                            Mins  98881  Re-Eval                               mins  63651    Timed Treatment:   40   mins   Total Treatment:     40   mins      Daniel Joyner, PT, DPT, OCS  [place] license: [id number]  Physical Therapist

## 2024-10-29 ENCOUNTER — TREATMENT (OUTPATIENT)
Dept: PHYSICAL THERAPY | Facility: CLINIC | Age: 84
End: 2024-10-29
Payer: MEDICARE

## 2024-10-29 DIAGNOSIS — M54.50 LOW BACK PAIN, UNSPECIFIED BACK PAIN LATERALITY, UNSPECIFIED CHRONICITY, UNSPECIFIED WHETHER SCIATICA PRESENT: Primary | ICD-10-CM

## 2024-10-29 DIAGNOSIS — R29.898 BILATERAL LEG WEAKNESS: ICD-10-CM

## 2024-10-29 DIAGNOSIS — Z98.890 HISTORY OF LUMBAR LAMINECTOMY: ICD-10-CM

## 2024-10-29 NOTE — PROGRESS NOTES
Physical Therapy Daily Treatment Note  [facility] Physical Therapy  [address] [address]     Patient: Campbell Alex   : 1940   Referring practitioner: Rocky Lozoya DO  Date of initial visit: Type: THERAPY  Noted: 2024   Today's date: 10/29/2024   Patient seen for 42 visits    Visit Diagnoses:    ICD-10-CM ICD-9-CM   1. Low back pain, unspecified back pain laterality, unspecified chronicity, unspecified whether sciatica present  M54.50 724.2   2. Bilateral leg weakness  R29.898 729.89   3. History of lumbar laminectomy  Z98.890 V45.89       Subjective   Pt reports: Able to climb 10 stairs before needing to rest. Feels his activity tolerance is improving.      Objective     See Exercise, Manual, and Modality Logs for complete treatment.     Patient Education: HEP    Assessment/Plan Progressing well.       Progress per Plan of Care            Timed:         Manual Therapy:         mins  34419;     Therapeutic Exercise:    30     mins  78298;     Neuromuscular Agustina:    10    mins  35751;    Therapeutic Activity:          mins  85277;     Gait Training:           mins  00232;     Ultrasound:          mins  80048;    Ionto                                   mins   97696  Self Care                            mins   96069    Un-Timed:  Electrical Stimulation:         mins  35133 ( );  Traction          mins 30423  Low Eval          Mins  44360  Mod Eval          Mins  11912  High Eval                            Mins  45579  Re-Eval                               mins  76891    Timed Treatment:   40   mins   Total Treatment:     40   mins      Daniel Joyner, PT, DPT, OCS  [place] license: [id number]  Physical Therapist

## 2024-10-29 NOTE — PROGRESS NOTES
HEMATOLOGY ONCOLOGY OUTPATIENT FOLLOWUP       Patient name: Campebll Alex  : 1940  MRN: [id number]  Primary Care Physician: Rocky Lozoya DO  Referring Physician: No ref. provider found  Reason For Consult: anemia      History of Present Illness:  Patient is a 84 y.o. male who was referred for anemia.  Patient has had Hemoccult positive stool no other signs of bleeding he does complain of ongoing fatigue.  He has medical history of renal insufficiency, coronary artery disease, CHF among other comorbid conditions    24 Hb 11, hct 33.2, MCV 90.5, platelets 188, WBC 5.24 SPEP was unremarkable with no monoclonal protein.  Patient has been on oral iron    2024 iron saturation 17, TIBC 226, ferritin of 311, normal haptoglobin, folate, reticulocyte count, normal LDH normal vitamin B12 levels    3/2024 - venofer 300 x2  2024 - EGD/colonoscopy - hiatal hernia, diverticulosis, colon polyp.  24 - Hb 11.9, iron sat 16,     Patient has not had any EGD colonoscopy recently    Subjective:  10/30/2024: Patient seen today for follow-up.  He was previously being seen by Dr. Templeton in our practice.  He has underlying history of iron deficiency anemia for unclear etiology.  He had received IV iron supplementation in 2024.      Patient reported feeling well at this time.  Denied any acute complaints.  Reported that he is planned for a PillCam study at [facility] to further evaluate for suspected GI bleed/AVMs.  He is currently on oral iron supplementation, reported good compliance and tolerance.  Also taking aspirin and Eliquis for history of heart disease and A-fib.      Past Medical History:   Diagnosis Date    Allergic 1970    Anemia n0t sure    Anxiety     Arthritis     Benign prostatic hyperplasia     Cancer     Cataract     CHF (congestive heart failure) stint    Cholelithiasis     Coronary artery disease     Erectile dysfunction     HL  (hearing loss) 1995    Hyperlipidemia     Hypertension     Osteopenia 1980    Prostatism     Renal insufficiency 1940    Suspected COVID-19 virus infection 09/07/2021    Urinary tract infection     Visual impairment 1948       Past Surgical History:   Procedure Laterality Date    ANKLE SURGERY      CARDIAC CATHETERIZATION  2020    COLONOSCOPY      CORONARY STENT PLACEMENT      EYE SURGERY  1960    NOSE SURGERY      ROTATOR CUFF REPAIR      SKIN CANCER EXCISION      L shoulder    SPINAL FUSION  01/2024    SPINE SURGERY      UMBILICAL HERNIA REPAIR      UMBILICAL HERNIA REPAIR           Current Outpatient Medications:     acetaminophen (Tylenol) 325 MG tablet, Take 2 tablets by mouth Every 4 (Four) Hours As Needed for Fever or Mild Pain. Indications: Fever, Pain, Disp: , Rfl:     apixaban (ELIQUIS) 5 MG tablet tablet, Take 1 tablet by mouth 2 (Two) Times a Day. Indications: history of DVT/PE, Disp: , Rfl:     aspirin (aspirin) 81 MG EC tablet, Take 1 tablet by mouth Daily. Indications: Disease involving Lipid Deposits in the Arteries, Disp: , Rfl:     atorvastatin (LIPITOR) 20 MG tablet, Take 1 tablet by mouth Every Night. Indications: High Amount of Fats in the Blood, Disp: , Rfl:     azelastine (ASTELIN) 0.1 % nasal spray, 2 sprays into the nostril(s) as directed by provider 2 (Two) Times a Day. Use in each nostril as directed  Indications: Perennial Allergic Rhinitis, Disp: 30 mL, Rfl: 3    Calcium Carbonate 1500 (600 Ca) MG tablet, Take 1 tablet by mouth Daily. Indications: Low Amount of Calcium in the Blood, Disp: , Rfl:     FERROUS SULFATE PO, EVERY OTHER DAY, Disp: , Rfl:     Fiber powder, Take 1 dose by mouth Daily As Needed (constipation). Indications: constipation, Disp: , Rfl:     isosorbide mononitrate (IMDUR) 30 MG 24 hr tablet, Take 1 tablet by mouth Daily. Indications: Stable Angina Pectoris, Disp: , Rfl:     metoprolol tartrate (LOPRESSOR) 25 MG tablet, Take 1 tablet by mouth 2 (Two) Times a Day.  Indications: High Blood Pressure Disorder, Disp: 180 tablet, Rfl: 3    pantoprazole (PROTONIX) 40 MG EC tablet, Take 1 tablet by mouth Daily. Indications: Gastroesophageal Reflux Disease, Disp: 90 tablet, Rfl: 1    tamsulosin (FLOMAX) 0.4 MG capsule 24 hr capsule, Take 1 capsule by mouth 2 (Two) Times a Day. TAKES 1 TAB IN MORNING AND 1/2 TAB AT NIGHT   Indications: Benign Enlargement of Prostate, Disp: , Rfl:     Allergies   Allergen Reactions    Codeine GI Intolerance    Iodine Hives    Nabumetone Unknown - High Severity    Propoxyphene Hives    Zoloft [Sertraline] GI Intolerance       Family History   Problem Relation Age of Onset    Arthritis Mother     Cancer Mother         Kj RAEMILEE    Hypertension Brother     Hyperlipidemia Brother     Kidney disease Brother         KJ    Diabetes Brother        Cancer-related family history includes Cancer in his mother.      Social History     Tobacco Use    Smoking status: Never     Passive exposure: Past    Smokeless tobacco: Never    Tobacco comments:     1-2/day   Vaping Use    Vaping status: Never Used   Substance Use Topics    Alcohol use: Never     Comment: socially    Drug use: Never     Social History     Social History Narrative    Not on file       ROS:   Review of Systems   Constitutional:  Positive for fatigue. Negative for fever.   HENT:  Negative for congestion and nosebleeds.    Eyes:  Negative for pain.   Respiratory:  Negative for cough and shortness of breath.    Cardiovascular:  Negative for chest pain.   Gastrointestinal:  Negative for abdominal pain, blood in stool, diarrhea, nausea and vomiting.   Endocrine: Negative for cold intolerance and heat intolerance.   Genitourinary:  Negative for difficulty urinating.   Musculoskeletal:  Negative for arthralgias.   Skin:  Negative for rash.   Neurological:  Negative for dizziness and headaches.   Hematological:  Does not bruise/bleed easily.   Psychiatric/Behavioral:  Negative for behavioral problems.   "        Objective:    Vital Signs:  Vitals:    10/30/24 1409   BP: 179/92   Pulse: 71   Temp: 98.2 °F (36.8 °C)   TempSrc: Oral   SpO2: 97%   Weight: 84.9 kg (187 lb 3.2 oz)   Height: 185.4 cm (72.99\")   PainSc: 0-No pain         Body mass index is 24.7 kg/m².    ECOG  (0) Fully active, able to carry on all predisease performance without restriction    Physical Exam:   Physical Exam  Constitutional:       Appearance: Normal appearance.   HENT:      Head: Normocephalic and atraumatic.   Eyes:      Pupils: Pupils are equal, round, and reactive to light.   Cardiovascular:      Rate and Rhythm: Normal rate and regular rhythm.      Pulses: Normal pulses.      Heart sounds: No murmur heard.  Pulmonary:      Effort: Pulmonary effort is normal.      Breath sounds: Normal breath sounds.   Abdominal:      General: There is no distension.      Palpations: Abdomen is soft. There is no mass.      Tenderness: There is no abdominal tenderness.   Musculoskeletal:         General: Normal range of motion.      Cervical back: Normal range of motion and neck supple.   Skin:     General: Skin is warm.   Neurological:      General: No focal deficit present.      Mental Status: He is alert.   Psychiatric:         Mood and Affect: Mood normal.         Lab Results - Last 18 Months   Lab Units 10/23/24  1418 06/18/24  0818 02/20/24  0945   WBC 10*3/mm3 6.28 8.45 5.24   HEMOGLOBIN g/dL 11.8* 11.9* 11.0*   HEMATOCRIT % 35.7* 36.9* 33.2*   PLATELETS 10*3/mm3 186 329 188   MCV fL 93.5 91.8 90.5     Lab Results - Last 18 Months   Lab Units 12/29/23  1548 11/21/23  1804 11/09/23  1220 09/07/23  1248   SODIUM mmol/L 135* 136 134* 138   POTASSIUM mmol/L 3.7 4.9 4.6 4.0   CHLORIDE mmol/L 101 100 98 105   CO2 mmol/L 24.0 24.0 26.5 25.4   BUN mg/dL 19 22 18 18   CREATININE mg/dL 0.80 0.78 0.69* 0.67*   CALCIUM mg/dL 9.0 10.0 8.8 9.0   BILIRUBIN mg/dL  --  0.4 0.3 0.5   ALK PHOS U/L  --  99 113 56   ALT (SGPT) U/L  --  10 12 13   AST (SGOT) U/L  --  22 " "15 15   GLUCOSE mg/dL 118* 111* 129* 112*       Lab Results   Component Value Date    GLUCOSE 118 (H) 12/29/2023    BUN 19 12/29/2023    CREATININE 0.80 12/29/2023    EGFRIFNONA 73 12/17/2021    BCR 23.8 12/29/2023    K 3.7 12/29/2023    CO2 24.0 12/29/2023    CALCIUM 9.0 12/29/2023    PROTENTOTREF 6.0 11/09/2023    ALBUMIN 4.0 11/21/2023    LABIL2 1.1 11/09/2023    AST 22 11/21/2023    ALT 10 11/21/2023       Lab Results - Last 18 Months   Lab Units 12/29/23  1548   INR  1.05   APTT seconds 31.3*       Lab Results   Component Value Date    IRON 80 10/23/2024    TIBC 289 (L) 10/23/2024    FERRITIN 63.20 10/23/2024       Lab Results   Component Value Date    FOLATE 6.39 02/20/2024       No results found for: \"OCCULTBLD\"    Lab Results   Component Value Date    RETICCTPCT 0.81 02/20/2024     Lab Results   Component Value Date    PYNEIZXL74 580 02/20/2024     No results found for: \"SPEP\", \"UPEP\"  LDH   Date Value Ref Range Status   02/20/2024 180 135 - 225 U/L Final     Lab Results   Component Value Date    SEDRATE 9 12/17/2021     Lab Results   Component Value Date    HAPTOGLOBIN 140 02/20/2024     Lab Results   Component Value Date    PTT 31.3 (H) 12/29/2023    INR 1.05 12/29/2023     No results found for: \"\"  No results found for: \"CEA\"  No components found for: \"CA-19-9\"  Lab Results   Component Value Date    PSA 1.180 01/20/2020     Lab Results   Component Value Date    SEDRATE 9 12/17/2021          Assessment & Plan     Patient is a 84-year-old male with normocytic anemia, iron deficiency    Normocytic anemia  Patient has been on oral iron continues to have mildly low iron saturation ferritin is normal  Iron deficiency is playing a role in his anemia I have recommended to infusions of Venofer 300 mg given the fact that he has been on oral iron and his iron saturation still seems to be slightly low  His retic count does not seem to be appropriately elevated based on his hemoglobin so there is likely some " underlying bone marrow depression as well  He does not have hemolysis based on normal haptoglobin, LDH and reticulocyte count  Patient received 3 doses of IV Venofer in February 2024.  -Repeat CBC showed persistent normocytic anemia with hemoglobin 11.8.  Iron profile not consistent with iron deficiency at this time.  -Advised patient to continue oral iron supplementation.  -Reviewed the need for bone marrow biopsy vs monitoring. Given that hb is stable at this point no bone marrow biopsy.     3-month follow-up with repeat labs, sooner as needed.      Thank you very much for providing the opportunity to participate in this patient’s care. Please do not hesitate to call if there are any other questions.  F/u in 4 months with [facility] iron studies week prior

## 2024-10-30 ENCOUNTER — OFFICE VISIT (OUTPATIENT)
Dept: ONCOLOGY | Facility: CLINIC | Age: 84
End: 2024-10-30
Payer: MEDICARE

## 2024-10-30 VITALS
WEIGHT: 187.2 LBS | HEART RATE: 71 BPM | DIASTOLIC BLOOD PRESSURE: 92 MMHG | BODY MASS INDEX: 24.81 KG/M2 | OXYGEN SATURATION: 97 % | SYSTOLIC BLOOD PRESSURE: 179 MMHG | HEIGHT: 73 IN | TEMPERATURE: 98.2 F

## 2024-10-30 DIAGNOSIS — D50.9 IRON DEFICIENCY ANEMIA, UNSPECIFIED IRON DEFICIENCY ANEMIA TYPE: Primary | ICD-10-CM

## 2024-10-30 DIAGNOSIS — K90.9 MALABSORPTION OF IRON: ICD-10-CM

## 2024-11-01 ENCOUNTER — TREATMENT (OUTPATIENT)
Dept: PHYSICAL THERAPY | Facility: CLINIC | Age: 84
End: 2024-11-01
Payer: MEDICARE

## 2024-11-01 DIAGNOSIS — M54.50 LOW BACK PAIN, UNSPECIFIED BACK PAIN LATERALITY, UNSPECIFIED CHRONICITY, UNSPECIFIED WHETHER SCIATICA PRESENT: Primary | ICD-10-CM

## 2024-11-01 DIAGNOSIS — Z74.09 MOBILITY IMPAIRED: ICD-10-CM

## 2024-11-01 DIAGNOSIS — Z98.890 HISTORY OF LUMBAR LAMINECTOMY: ICD-10-CM

## 2024-11-01 DIAGNOSIS — R29.898 BILATERAL LEG WEAKNESS: ICD-10-CM

## 2024-11-01 PROCEDURE — 97530 THERAPEUTIC ACTIVITIES: CPT | Performed by: PHYSICAL THERAPIST

## 2024-11-01 PROCEDURE — 97110 THERAPEUTIC EXERCISES: CPT | Performed by: PHYSICAL THERAPIST

## 2024-11-01 PROCEDURE — 97112 NEUROMUSCULAR REEDUCATION: CPT | Performed by: PHYSICAL THERAPIST

## 2024-11-01 NOTE — PROGRESS NOTES
Re-Assessment / Re-Certification        Patient: Campbell Alex   : 1940  Diagnosis/ICD-10 Code:  Low back pain, unspecified back pain laterality, unspecified chronicity, unspecified whether sciatica present [M54.50]  Referring practitioner: Rocky Lozoya DO  Date of Initial Visit: Type: THERAPY  Noted: 2024  Today's Date: 2024  Patient seen for 43 sessions      Subjective:     Subjective Questionnaire: Oswestry: 20%  LEFS 75%  Clinical Progress: Unchanged  Home Program Compliance: Yes  Treatment has included: therapeutic exercise, neuromuscular re-education, therapeutic activity, and gait training    Subjective Rates worst LBP at 0/10. Still primarily limited by LE weakness and limited endurance. Pt denies any recent falls and is now able to climb 10 steps independently. Pt is still using a rolling wx for mobility.  Objective   Strength/Myotome Testing      Lumbar      Right   Normal strength     Left Hip   Planes of Motion   Flexion: 4-     Left Knee   Flexion: 5  Extension: 5     Left Ankle/Foot   Dorsiflexion: 5  Plantar flexion: 5  Great toe extension: 5     Timed up and go: 19.0 seconds  (24 seconds at IE), uses walker and UE assist with transfer  Five times sit to stand 14.8 seconds, uses UE assist    Assessment/Plan  Pt has made progress vs last progress note re: LE strength, ability to climb stairs, and ability to transfer sit to/from stand. He reports no episodes of falling lately. Recommend continuing with PT evaluation and treatment to address his remaining impairments and mitigate his fall risk. Skilled therapy is required for assistance and safety with balance activities, transfer training, and gait training.     Short term goals, 1 week: Tolerate HEP progression. met  Voice compliance with activity modification. met  Report improvement in symptoms. met     Long term goals, 6 weeks: Improve ZAK score to 30%.met  Improve LEFS to 70%. met  Improve timed up and go to 20 seconds.  met  Improve five times sit to stand to 17 seconds with UE assist and no foam pads. Met  New LTG: Improve five times sit to stand to 17 seconds with one foam pad and no UE assist.  4+/5 strength or better throughout to allow safe ambulation and stair climbing.. progressing  Symptoms to be centralized. met  Independent with HEP. Progressing    Continue POC BIW for 5 weeks, primarily working on strength, activity tolerance, and functional mobility, as well as balance, standing tolerance, and stair climbing.    Daniel Joyner, PT, DPT, OCS  [place] license: [id number]  Physical Therapist      Timed:         Manual Therapy:         mins  47524;     Therapeutic Exercise:    15     mins  06502;     Neuromuscular Agustina:    23   mins  94315;    Therapeutic Activity:     15    mins  18651;     Gait Training:           mins  71098;     Ultrasound:          mins  85530;    Ionto                                   mins   52818  Self Care                            mins   41274    Un-Timed:  Electrical Stimulation:         mins  09905 ( );  Traction          mins 53561  Low Eval          Mins  72313  Mod Eval          Mins  20798  High Eval                            Mins  84372  Re-Eval                               mins  97664      Timed Treatment:   53   mins   Total Treatment:    53   mins

## 2024-11-05 ENCOUNTER — TREATMENT (OUTPATIENT)
Dept: PHYSICAL THERAPY | Facility: CLINIC | Age: 84
End: 2024-11-05
Payer: MEDICARE

## 2024-11-05 DIAGNOSIS — Z74.09 MOBILITY IMPAIRED: ICD-10-CM

## 2024-11-05 DIAGNOSIS — R29.898 BILATERAL LEG WEAKNESS: ICD-10-CM

## 2024-11-05 DIAGNOSIS — Z98.890 HISTORY OF LUMBAR LAMINECTOMY: ICD-10-CM

## 2024-11-05 DIAGNOSIS — M54.50 LOW BACK PAIN, UNSPECIFIED BACK PAIN LATERALITY, UNSPECIFIED CHRONICITY, UNSPECIFIED WHETHER SCIATICA PRESENT: Primary | ICD-10-CM

## 2024-11-05 PROCEDURE — 97112 NEUROMUSCULAR REEDUCATION: CPT | Performed by: PHYSICAL THERAPIST

## 2024-11-05 PROCEDURE — 97110 THERAPEUTIC EXERCISES: CPT | Performed by: PHYSICAL THERAPIST

## 2024-11-05 NOTE — PROGRESS NOTES
Physical Therapy Daily Treatment Note  [facility] Physical Therapy  [address] [place] [address]     Patient: Campbell Alex   : 1940   Referring practitioner: Rocky Lozoya DO  Date of initial visit: Type: THERAPY  Noted: 2024   Today's date: 2024   Patient seen for 44 visits    Visit Diagnoses:    ICD-10-CM ICD-9-CM   1. Low back pain, unspecified back pain laterality, unspecified chronicity, unspecified whether sciatica present  M54.50 724.2   2. Bilateral leg weakness  R29.898 729.89   3. History of lumbar laminectomy  Z98.890 V45.89   4. Mobility impaired  Z74.09 799.89       Subjective   Pt reports: Having LBP when walking without AD. HEP going well.      Objective     See Exercise, Manual, and Modality Logs for complete treatment.     Patient Education: HEP    Assessment/Plan Fatigued post visit. Posture is flexed when unsupported.      Progress per Plan of Care            Timed:         Manual Therapy:         mins  32401;     Therapeutic Exercise:    15     mins  42438;     Neuromuscular Agustina:    30    mins  67715;    Therapeutic Activity:          mins  02854;     Gait Training:           mins  96713;     Ultrasound:          mins  54083;    Ionto                                   mins   31303  Self Care                            mins   05287    Un-Timed:  Electrical Stimulation:         mins  92806 ( );  Traction          mins 94937  Low Eval          Mins  02396  Mod Eval          Mins  66063  High Eval                            Mins  83943  Re-Eval                               mins  74478    Timed Treatment:   45   mins   Total Treatment:     45   mins      Daniel Joyner, PT, DPT, OCS  [place] license: [id number]  Physical Therapist

## 2024-11-07 ENCOUNTER — TREATMENT (OUTPATIENT)
Dept: PHYSICAL THERAPY | Facility: CLINIC | Age: 84
End: 2024-11-07
Payer: MEDICARE

## 2024-11-07 DIAGNOSIS — Z98.890 HISTORY OF LUMBAR LAMINECTOMY: ICD-10-CM

## 2024-11-07 DIAGNOSIS — M54.50 LOW BACK PAIN, UNSPECIFIED BACK PAIN LATERALITY, UNSPECIFIED CHRONICITY, UNSPECIFIED WHETHER SCIATICA PRESENT: Primary | ICD-10-CM

## 2024-11-07 DIAGNOSIS — Z74.09 MOBILITY IMPAIRED: ICD-10-CM

## 2024-11-07 DIAGNOSIS — R29.898 BILATERAL LEG WEAKNESS: ICD-10-CM

## 2024-11-12 ENCOUNTER — TREATMENT (OUTPATIENT)
Dept: PHYSICAL THERAPY | Facility: CLINIC | Age: 84
End: 2024-11-12
Payer: MEDICARE

## 2024-11-12 DIAGNOSIS — M54.50 LOW BACK PAIN, UNSPECIFIED BACK PAIN LATERALITY, UNSPECIFIED CHRONICITY, UNSPECIFIED WHETHER SCIATICA PRESENT: Primary | ICD-10-CM

## 2024-11-12 DIAGNOSIS — R29.898 BILATERAL LEG WEAKNESS: ICD-10-CM

## 2024-11-12 DIAGNOSIS — Z74.09 MOBILITY IMPAIRED: ICD-10-CM

## 2024-11-12 DIAGNOSIS — Z98.890 HISTORY OF LUMBAR LAMINECTOMY: ICD-10-CM

## 2024-11-12 PROCEDURE — 97112 NEUROMUSCULAR REEDUCATION: CPT | Performed by: PHYSICAL THERAPIST

## 2024-11-12 PROCEDURE — 97110 THERAPEUTIC EXERCISES: CPT | Performed by: PHYSICAL THERAPIST

## 2024-11-12 NOTE — PROGRESS NOTES
Physical Therapy Daily Treatment Note  [facility] Physical Therapy  [address] [place] [address]     Patient: Campebll Alex   : 1940   Referring practitioner: Rocky Lozoya DO  Date of initial visit: Type: THERAPY  Noted: 2024   Today's date: 2024   Patient seen for 46 visits    Visit Diagnoses:    ICD-10-CM ICD-9-CM   1. Low back pain, unspecified back pain laterality, unspecified chronicity, unspecified whether sciatica present  M54.50 724.2   2. Bilateral leg weakness  R29.898 729.89   3. History of lumbar laminectomy  Z98.890 V45.89   4. Mobility impaired  Z74.09 799.89       Subjective   Pt reports: No new complaints vs  last visit. HEP going well.      Objective     See Exercise, Manual, and Modality Logs for complete treatment.     Patient Education: HEP    Assessment/Plan Fatigued post visit, needs cues for standing posture.      Progress per Plan of Care            Timed:         Manual Therapy:         mins  89495;     Therapeutic Exercise:    30     mins  93820;     Neuromuscular Agustina:    10    mins  29084;    Therapeutic Activity:          mins  67416;     Gait Training:           mins  63843;     Ultrasound:          mins  77799;    Ionto                                   mins   17312  Self Care                            mins   62713    Un-Timed:  Electrical Stimulation:         mins  35779 ( );  Traction          mins 18763  Low Eval          Mins  91436  Mod Eval          Mins  61273  High Eval                            Mins  98440  Re-Eval                               mins  06596    Timed Treatment:   40   mins   Total Treatment:     40   mins      Daniel Joyner, PT, DPT, OCS  [place] license: [id number]  Physical Therapist

## 2024-11-14 ENCOUNTER — TREATMENT (OUTPATIENT)
Dept: PHYSICAL THERAPY | Facility: CLINIC | Age: 84
End: 2024-11-14
Payer: MEDICARE

## 2024-11-14 DIAGNOSIS — Z74.09 MOBILITY IMPAIRED: ICD-10-CM

## 2024-11-14 DIAGNOSIS — R29.898 BILATERAL LEG WEAKNESS: ICD-10-CM

## 2024-11-14 DIAGNOSIS — Z98.890 HISTORY OF LUMBAR LAMINECTOMY: ICD-10-CM

## 2024-11-14 DIAGNOSIS — M54.50 LOW BACK PAIN, UNSPECIFIED BACK PAIN LATERALITY, UNSPECIFIED CHRONICITY, UNSPECIFIED WHETHER SCIATICA PRESENT: Primary | ICD-10-CM

## 2024-11-14 NOTE — PROGRESS NOTES
Physical Therapy Daily Treatment Note  [facility] Physical Therapy  [address] [place] [address]     Patient: Campbell Alex   : 1940   Referring practitioner: Rocky Lozoya DO  Date of initial visit: Type: THERAPY  Noted: 2024   Today's date: 2024   Patient seen for 47 visits    Visit Diagnoses:    ICD-10-CM ICD-9-CM   1. Low back pain, unspecified back pain laterality, unspecified chronicity, unspecified whether sciatica present  M54.50 724.2   2. Bilateral leg weakness  R29.898 729.89   3. History of lumbar laminectomy  Z98.890 V45.89   4. Mobility impaired  Z74.09 799.89       Subjective   Pt reports: Less difficulty with stair ambulation. Having difficulty with standing activities. HEP going well.      Objective     See Exercise, Manual, and Modality Logs for complete treatment.     Patient Education: HEP, home safety    Assessment/Plan Fatigued post visit, hip flexion strength grossly limited.      Progress per Plan of Care            Timed:         Manual Therapy:         mins  81681;     Therapeutic Exercise:    15     mins  86748;     Neuromuscular Agustina:    15    mins  93554;    Therapeutic Activity:          mins  31079;     Gait Training:      15     mins  72466;     Ultrasound:          mins  64921;    Ionto                                   mins   20720  Self Care                            mins   93471    Un-Timed:  Electrical Stimulation:         mins  26451 ( );  Traction          mins 91465  Low Eval          Mins  32086  Mod Eval          Mins  23435  High Eval                            Mins  27652  Re-Eval                               mins  50808    Timed Treatment:   45   mins   Total Treatment:     45   mins      Daniel Joyner PT, DPT, OCS  [place] license: [id number]  Physical Therapist

## 2024-11-19 ENCOUNTER — TREATMENT (OUTPATIENT)
Dept: PHYSICAL THERAPY | Facility: CLINIC | Age: 84
End: 2024-11-19
Payer: MEDICARE

## 2024-11-19 DIAGNOSIS — Z74.09 MOBILITY IMPAIRED: ICD-10-CM

## 2024-11-19 DIAGNOSIS — M54.50 LOW BACK PAIN, UNSPECIFIED BACK PAIN LATERALITY, UNSPECIFIED CHRONICITY, UNSPECIFIED WHETHER SCIATICA PRESENT: Primary | ICD-10-CM

## 2024-11-19 DIAGNOSIS — R29.898 BILATERAL LEG WEAKNESS: ICD-10-CM

## 2024-11-19 DIAGNOSIS — Z98.890 HISTORY OF LUMBAR LAMINECTOMY: ICD-10-CM

## 2024-11-21 ENCOUNTER — TREATMENT (OUTPATIENT)
Dept: PHYSICAL THERAPY | Facility: CLINIC | Age: 84
End: 2024-11-21
Payer: MEDICARE

## 2024-11-21 DIAGNOSIS — Z74.09 MOBILITY IMPAIRED: ICD-10-CM

## 2024-11-21 DIAGNOSIS — R29.898 BILATERAL LEG WEAKNESS: ICD-10-CM

## 2024-11-21 DIAGNOSIS — Z98.890 HISTORY OF LUMBAR LAMINECTOMY: ICD-10-CM

## 2024-11-21 DIAGNOSIS — M54.50 LOW BACK PAIN, UNSPECIFIED BACK PAIN LATERALITY, UNSPECIFIED CHRONICITY, UNSPECIFIED WHETHER SCIATICA PRESENT: Primary | ICD-10-CM

## 2024-11-21 NOTE — PROGRESS NOTES
Physical Therapy Daily Progress Note      Patient: Campbell Alex   : 1940  Diagnosis/ICD-10 Code:  Low back pain, unspecified back pain laterality, unspecified chronicity, unspecified whether sciatica present [M54.50]  Referring practitioner: Rocky Lozoya DO  Date of Initial Visit: Type: THERAPY  Noted: 2024  Today's Date: 2024  Patient seen for 49 sessions             Subjective Pt was busy this morning and forgot about his original appointment time.  Nothing new to report.  No recent falls.    Objective   See Exercise, Manual, and Modality Logs for complete treatment.       Assessment/Plan  Balance and upright posture deficits present today.  Good effort overall.    Progress per Plan of Care           Timed:          Therapeutic Exercise:    15     mins  89264;     Neuromuscular Agustina:    15    mins  14744;    Therapeutic Activity:     10     mins  74345;         Timed Treatment:   40   mins   Total Treatment:     40   mins        Sergey Lakhani PTA  Physical Therapist Assistant

## 2024-11-25 ENCOUNTER — TREATMENT (OUTPATIENT)
Dept: PHYSICAL THERAPY | Facility: CLINIC | Age: 84
End: 2024-11-25
Payer: MEDICARE

## 2024-11-25 DIAGNOSIS — M54.50 LOW BACK PAIN, UNSPECIFIED BACK PAIN LATERALITY, UNSPECIFIED CHRONICITY, UNSPECIFIED WHETHER SCIATICA PRESENT: Primary | ICD-10-CM

## 2024-11-25 DIAGNOSIS — Z98.890 HISTORY OF LUMBAR LAMINECTOMY: ICD-10-CM

## 2024-11-25 DIAGNOSIS — R29.898 BILATERAL LEG WEAKNESS: ICD-10-CM

## 2024-11-25 DIAGNOSIS — Z74.09 MOBILITY IMPAIRED: ICD-10-CM

## 2024-11-25 PROCEDURE — 97112 NEUROMUSCULAR REEDUCATION: CPT | Performed by: PHYSICAL THERAPIST

## 2024-11-25 PROCEDURE — 97530 THERAPEUTIC ACTIVITIES: CPT | Performed by: PHYSICAL THERAPIST

## 2024-11-25 PROCEDURE — 97110 THERAPEUTIC EXERCISES: CPT | Performed by: PHYSICAL THERAPIST

## 2024-11-27 ENCOUNTER — TREATMENT (OUTPATIENT)
Dept: PHYSICAL THERAPY | Facility: CLINIC | Age: 84
End: 2024-11-27
Payer: MEDICARE

## 2024-11-27 DIAGNOSIS — Z98.890 HISTORY OF LUMBAR LAMINECTOMY: ICD-10-CM

## 2024-11-27 DIAGNOSIS — M54.50 LOW BACK PAIN, UNSPECIFIED BACK PAIN LATERALITY, UNSPECIFIED CHRONICITY, UNSPECIFIED WHETHER SCIATICA PRESENT: Primary | ICD-10-CM

## 2024-11-27 DIAGNOSIS — R29.898 BILATERAL LEG WEAKNESS: ICD-10-CM

## 2024-11-27 DIAGNOSIS — Z74.09 MOBILITY IMPAIRED: ICD-10-CM

## 2024-11-27 NOTE — PROGRESS NOTES
Physical Therapy Daily Treatment Note      Patient: Campbell Alex   : 1940  Referring practitioner: Rocky Lozoya DO  Date of Initial Visit: Type: THERAPY  Noted: 2024  Today's Date: 2024  Patient seen for 51 sessions       Visit Diagnoses:    ICD-10-CM ICD-9-CM   1. Low back pain, unspecified back pain laterality, unspecified chronicity, unspecified whether sciatica present  M54.50 724.2   2. Bilateral leg weakness  R29.898 729.89   3. Mobility impaired  Z74.09 799.89   4. History of lumbar laminectomy  Z98.890 V45.89       Subjective R hip stiff and sore after shopping yesterday. HEP going well.    Objective   See Exercise, Manual, and Modality Logs for complete treatment.       Assessment/Plan Hip stiffness and soreness improved post visit.      Timed:         Manual Therapy:         mins  61047;     Therapeutic Exercise:    15     mins  53925;     Neuromuscular Agustina:    15    mins  23777;    Therapeutic Activity:     15     mins  99864;     Gait Training:           mins  05068;     Ultrasound:          mins  56696;    Ionto                                   mins   43458  Self Care                            mins   10767      Un-Timed:  Electrical Stimulation:         mins  23533 ( );  Dry Needling          mins self-pay  Traction          mins 57831  Canalith Repos         mins 88311    Timed Treatment:   45   mins   Total Treatment:     45   mins      Daniel Joyner PT, PT, DPT, OCS  [place] license: [id number]

## 2024-12-03 ENCOUNTER — TREATMENT (OUTPATIENT)
Dept: PHYSICAL THERAPY | Facility: CLINIC | Age: 84
End: 2024-12-03
Payer: MEDICARE

## 2024-12-03 DIAGNOSIS — Z74.09 MOBILITY IMPAIRED: ICD-10-CM

## 2024-12-03 DIAGNOSIS — R29.898 BILATERAL LEG WEAKNESS: ICD-10-CM

## 2024-12-03 DIAGNOSIS — M54.50 LOW BACK PAIN, UNSPECIFIED BACK PAIN LATERALITY, UNSPECIFIED CHRONICITY, UNSPECIFIED WHETHER SCIATICA PRESENT: Primary | ICD-10-CM

## 2024-12-03 DIAGNOSIS — Z98.890 HISTORY OF LUMBAR LAMINECTOMY: ICD-10-CM

## 2024-12-03 PROCEDURE — 97110 THERAPEUTIC EXERCISES: CPT | Performed by: PHYSICAL THERAPIST

## 2024-12-03 PROCEDURE — 97530 THERAPEUTIC ACTIVITIES: CPT | Performed by: PHYSICAL THERAPIST

## 2024-12-03 PROCEDURE — 97112 NEUROMUSCULAR REEDUCATION: CPT | Performed by: PHYSICAL THERAPIST

## 2024-12-03 NOTE — PROGRESS NOTES
Physical Therapy Daily Treatment Note      Patient: Campbell Alex   : 1940  Referring practitioner: Rocky Lozoya DO  Date of Initial Visit: Type: THERAPY  Noted: 2024  Today's Date: 12/3/2024  Patient seen for 52 sessions       Visit Diagnoses:    ICD-10-CM ICD-9-CM   1. Low back pain, unspecified back pain laterality, unspecified chronicity, unspecified whether sciatica present  M54.50 724.2   2. Bilateral leg weakness  R29.898 729.89   3. Mobility impaired  Z74.09 799.89   4. History of lumbar laminectomy  Z98.890 V45.89       Subjective Less difficulty climbing stairs this week. No falls recently.    Objective   See Exercise, Manual, and Modality Logs for complete treatment.       Assessment/Plan Good treatment tolerance today.      Timed:         Manual Therapy:         mins  64486;     Therapeutic Exercise:    15     mins  97346;     Neuromuscular Agustina:    15    mins  75863;    Therapeutic Activity:     10     mins  17889;     Gait Training:           mins  76159;     Ultrasound:          mins  77305;    Ionto                                   mins   79757  Self Care                            mins   45186      Un-Timed:  Electrical Stimulation:         mins  35671 ( );  Dry Needling          mins self-pay  Traction          mins 72362  Canalith Repos         mins 93712    Timed Treatment:   40   mins   Total Treatment:     40   mins      Daniel Joyner PT, PT, DPT, OCS  [place] license: [id number]

## 2024-12-05 ENCOUNTER — TREATMENT (OUTPATIENT)
Dept: PHYSICAL THERAPY | Facility: CLINIC | Age: 84
End: 2024-12-05
Payer: MEDICARE

## 2024-12-05 DIAGNOSIS — M54.50 LOW BACK PAIN, UNSPECIFIED BACK PAIN LATERALITY, UNSPECIFIED CHRONICITY, UNSPECIFIED WHETHER SCIATICA PRESENT: Primary | ICD-10-CM

## 2024-12-05 DIAGNOSIS — Z98.890 HISTORY OF LUMBAR LAMINECTOMY: ICD-10-CM

## 2024-12-05 DIAGNOSIS — Z74.09 MOBILITY IMPAIRED: ICD-10-CM

## 2024-12-05 DIAGNOSIS — R29.898 BILATERAL LEG WEAKNESS: ICD-10-CM

## 2024-12-05 NOTE — PROGRESS NOTES
Re-Assessment / Re-Certification        Patient: Campbell Alex   : 1940  Diagnosis/ICD-10 Code:  Low back pain, unspecified back pain laterality, unspecified chronicity, unspecified whether sciatica present [M54.50]  Referring practitioner: Rocky Lozoya DO  Date of Initial Visit: Type: THERAPY  Noted: 2024  Today's Date: 2024  Patient seen for 53 sessions      Subjective:     Subjective Questionnaire: Oswestry: 20%  LEFS 75%  Clinical Progress: Unchanged  Home Program Compliance: Yes  Treatment has included: therapeutic exercise, neuromuscular re-education, therapeutic activity, and gait training    Subjective Rates worst LBP at 0/10. Still primarily limited by LE weakness and limited endurance. Pt denies any recent falls and is now able to climb 10 steps independently and descend 5 step independently. Pt is still using a rolling wx for mobility.  Objective   Strength/Myotome Testing      Lumbar      Right   Normal strength     Left Hip   Planes of Motion   Flexion: 4-     Left Knee   Flexion: 5  Extension: 5     Left Ankle/Foot   Dorsiflexion: 5  Plantar flexion: 5  Great toe extension: 5     Timed up and go: 19.0 seconds  (24 seconds at IE), uses walker and UE assist with transfer  Five times sit to stand 20.5 seconds, without UE assist on one foam pad    Assessment/Plan  Pt is making progress re: his ability to descend stairs and with his performance measures. Recommend continuing with PT evaluation and traetment.      Short term goals, 1 week: Tolerate HEP progression. met  Voice compliance with activity modification. met  Report improvement in symptoms. met     Long term goals, 6 weeks: Improve ZAK score to 30%.met  Improve LEFS to 70%. met  Improve timed up and go to 20 seconds. progressing  Improve five times sit to stand to 17 seconds with UE assist and no foam pads. Met  New LTG: Improve five times sit to stand to 17 seconds with one foam pad and no UE assist. progressing  4+/5  strength or better throughout to allow safe ambulation and stair climbing.. progressing  Symptoms to be centralized. met  Independent with HEP. Progressing    Continue POC BIW for 5 weeks, primarily working on strength, activity tolerance, and functional mobility, as well as balance, standing tolerance, and stair climbing.  30 visits per POC, 90 day certification period     Daniel Joyner, PT, DPT, OCS  [place] license: [id number]  Physical Therapist      Timed:         Manual Therapy:         mins  05802;     Therapeutic Exercise:    15     mins  82503;     Neuromuscular Agustina:    15   mins  62860;    Therapeutic Activity:     15    mins  34678;     Gait Training:           mins  65905;     Ultrasound:          mins  88789;    Ionto                                   mins   75923  Self Care                            mins   72018    Un-Timed:  Electrical Stimulation:         mins  66135 ( );  Traction          mins 46482  Low Eval          Mins  88226  Mod Eval          Mins  05238  High Eval                            Mins  65587  Re-Eval                               mins  36753      Timed Treatment:   45  mins   Total Treatment:    45  mins    Certification Period: 12/5/2024 to 3/5/25  I certify that the therapy services are furnished while this patient is under my care.  The services outlined above are required for this patient and will be reviewed every 90 days.                PHYSICIAN: _____________________________                          Rocky Lozoya DO

## 2024-12-10 ENCOUNTER — TREATMENT (OUTPATIENT)
Dept: PHYSICAL THERAPY | Facility: CLINIC | Age: 84
End: 2024-12-10
Payer: MEDICARE

## 2024-12-10 DIAGNOSIS — M54.50 LOW BACK PAIN, UNSPECIFIED BACK PAIN LATERALITY, UNSPECIFIED CHRONICITY, UNSPECIFIED WHETHER SCIATICA PRESENT: Primary | ICD-10-CM

## 2024-12-10 DIAGNOSIS — Z98.890 HISTORY OF LUMBAR LAMINECTOMY: ICD-10-CM

## 2024-12-10 DIAGNOSIS — Z74.09 MOBILITY IMPAIRED: ICD-10-CM

## 2024-12-10 DIAGNOSIS — R29.898 BILATERAL LEG WEAKNESS: ICD-10-CM

## 2024-12-10 PROCEDURE — 97530 THERAPEUTIC ACTIVITIES: CPT | Performed by: PHYSICAL THERAPIST

## 2024-12-10 PROCEDURE — 97112 NEUROMUSCULAR REEDUCATION: CPT | Performed by: PHYSICAL THERAPIST

## 2024-12-10 PROCEDURE — 97110 THERAPEUTIC EXERCISES: CPT | Performed by: PHYSICAL THERAPIST

## 2024-12-10 NOTE — PROGRESS NOTES
Physical Therapy Daily Treatment Note      Patient: Campbell Alex   : 1940  Referring practitioner: Rocky Lozoya DO  Date of Initial Visit: Type: THERAPY  Noted: 2024  Today's Date: 12/10/2024  Patient seen for 54 sessions       Visit Diagnoses:    ICD-10-CM ICD-9-CM   1. Low back pain, unspecified back pain laterality, unspecified chronicity, unspecified whether sciatica present  M54.50 724.2   2. Bilateral leg weakness  R29.898 729.89   3. Mobility impaired  Z74.09 799.89   4. History of lumbar laminectomy  Z98.890 V45.89       Subjective Was able to go downstairs to his basement yesterday. No falls.    Objective   See Exercise, Manual, and Modality Logs for complete treatment.       Assessment/Plan Fatigued post visit. Good response to rx. Pt is to use his rolling wx for mobility.      Timed:         Manual Therapy:         mins  04335;     Therapeutic Exercise:    15     mins  38445;     Neuromuscular Agustina:    15    mins  74197;    Therapeutic Activity:     15     mins  07588;     Gait Training:           mins  34058;     Ultrasound:          mins  92385;    Ionto                                   mins   67135  Self Care                            mins   89289      Un-Timed:  Electrical Stimulation:         mins  67307 ( );  Dry Needling          mins self-pay  Traction          mins 96476  Canalith Repos         mins 90371    Timed Treatment:   45   mins   Total Treatment:     45   mins      Daniel Joyner PT, PT, DPT, OCS  [place] license: [id number]

## 2024-12-12 ENCOUNTER — TREATMENT (OUTPATIENT)
Dept: PHYSICAL THERAPY | Facility: CLINIC | Age: 84
End: 2024-12-12
Payer: MEDICARE

## 2024-12-12 DIAGNOSIS — M54.50 LOW BACK PAIN, UNSPECIFIED BACK PAIN LATERALITY, UNSPECIFIED CHRONICITY, UNSPECIFIED WHETHER SCIATICA PRESENT: Primary | ICD-10-CM

## 2024-12-12 DIAGNOSIS — Z74.09 MOBILITY IMPAIRED: ICD-10-CM

## 2024-12-12 DIAGNOSIS — R29.898 BILATERAL LEG WEAKNESS: ICD-10-CM

## 2024-12-12 DIAGNOSIS — Z98.890 HISTORY OF LUMBAR LAMINECTOMY: ICD-10-CM

## 2024-12-12 NOTE — PROGRESS NOTES
Physical Therapy Daily Treatment Note      Patient: Campbell Alex   : 1940  Referring practitioner: Rocky Lozoya DO  Date of Initial Visit: Type: THERAPY  Noted: 2024  Today's Date: 2024  Patient seen for 55 sessions       Visit Diagnoses:    ICD-10-CM ICD-9-CM   1. Low back pain, unspecified back pain laterality, unspecified chronicity, unspecified whether sciatica present  M54.50 724.2   2. Bilateral leg weakness  R29.898 729.89   3. Mobility impaired  Z74.09 799.89   4. History of lumbar laminectomy  Z98.890 V45.89       Subjective Pt feels he is slowly getting stronger. HEP going well.    Objective   See Exercise, Manual, and Modality Logs for complete treatment.       Assessment/Plan Good response to rx. Fatigued post visit.      Timed:         Manual Therapy:         mins  23198;     Therapeutic Exercise:    15     mins  83269;     Neuromuscular Agsutina:    15    mins  77806;    Therapeutic Activity:     10     mins  98330;     Gait Training:           mins  31041;     Ultrasound:          mins  07889;    Ionto                                   mins   51584  Self Care                            mins   80420      Un-Timed:  Electrical Stimulation:         mins  49786 ( );  Dry Needling          mins self-pay  Traction          mins 45077  Canalith Repos         mins 89713    Timed Treatment:   40   mins   Total Treatment:     40   mins      Daniel Joyner PT, PT, DPT, OCS  [place] license: [id number]   [id number]

## 2024-12-17 ENCOUNTER — TREATMENT (OUTPATIENT)
Dept: PHYSICAL THERAPY | Facility: CLINIC | Age: 84
End: 2024-12-17
Payer: MEDICARE

## 2024-12-17 DIAGNOSIS — M54.50 LOW BACK PAIN, UNSPECIFIED BACK PAIN LATERALITY, UNSPECIFIED CHRONICITY, UNSPECIFIED WHETHER SCIATICA PRESENT: Primary | ICD-10-CM

## 2024-12-17 PROCEDURE — 97112 NEUROMUSCULAR REEDUCATION: CPT | Performed by: PHYSICAL THERAPIST

## 2024-12-17 PROCEDURE — 97530 THERAPEUTIC ACTIVITIES: CPT | Performed by: PHYSICAL THERAPIST

## 2024-12-17 PROCEDURE — 97110 THERAPEUTIC EXERCISES: CPT | Performed by: PHYSICAL THERAPIST

## 2024-12-17 NOTE — PROGRESS NOTES
Physical Therapy Daily Treatment Note      Patient: Campbell Alex   : 1940  Referring practitioner: Rocky Lozoya DO  Date of Initial Visit: Type: THERAPY  Noted: 2024  Today's Date: 2024  Patient seen for 56 sessions       Visit Diagnoses:    ICD-10-CM ICD-9-CM   1. Low back pain, unspecified back pain laterality, unspecified chronicity, unspecified whether sciatica present  M54.50 724.2       Subjective  Less difficulty with ambulating stairs. HEP going well.    Objective   See Exercise, Manual, and Modality Logs for complete treatment.       Assessment/Plan Good response to rx.      Timed:         Manual Therapy:         mins  13444;     Therapeutic Exercise:    15     mins  87923;     Neuromuscular Agustina:    15    mins  51617;    Therapeutic Activity:     10     mins  91512;     Gait Training:           mins  94127;     Ultrasound:          mins  61616;    Ionto                                   mins   29667  Self Care                            mins   13111      Un-Timed:  Electrical Stimulation:         mins  18704 ( );  Dry Needling          mins self-pay  Traction          mins 85519  Canalith Repos         mins 74282    Timed Treatment:   40   mins   Total Treatment:     40   mins      Daniel Joyner, PT, PT, DPT, OCS  [place] license: [id number]

## 2024-12-19 ENCOUNTER — TREATMENT (OUTPATIENT)
Dept: PHYSICAL THERAPY | Facility: CLINIC | Age: 84
End: 2024-12-19
Payer: MEDICARE

## 2024-12-19 DIAGNOSIS — Z74.09 MOBILITY IMPAIRED: ICD-10-CM

## 2024-12-19 DIAGNOSIS — Z98.890 HISTORY OF LUMBAR LAMINECTOMY: ICD-10-CM

## 2024-12-19 DIAGNOSIS — R29.898 BILATERAL LEG WEAKNESS: ICD-10-CM

## 2024-12-19 DIAGNOSIS — M54.50 LOW BACK PAIN, UNSPECIFIED BACK PAIN LATERALITY, UNSPECIFIED CHRONICITY, UNSPECIFIED WHETHER SCIATICA PRESENT: Primary | ICD-10-CM

## 2024-12-19 NOTE — PROGRESS NOTES
Physical Therapy Daily Treatment Note      Patient: Campbell Alex   : 1940  Referring practitioner: Rocky Lozoya DO  Date of Initial Visit: Type: THERAPY  Noted: 2024  Today's Date: 2024  Patient seen for 57 sessions       Visit Diagnoses:    ICD-10-CM ICD-9-CM   1. Low back pain, unspecified back pain laterality, unspecified chronicity, unspecified whether sciatica present  M54.50 724.2   2. Bilateral leg weakness  R29.898 729.89   3. Mobility impaired  Z74.09 799.89   4. History of lumbar laminectomy  Z98.890 V45.89       Subjective Feeling under the weather today, he was out late last night. HEP going well.    Objective   See Exercise, Manual, and Modality Logs for complete treatment.       Assessment/Plan Balance and mobility continue to be limited.       Timed:         Manual Therapy:         mins  05658;     Therapeutic Exercise:    15     mins  49500;     Neuromuscular Agustina:    15    mins  85514;    Therapeutic Activity:     10     mins  22821;     Gait Training:           mins  75852;     Ultrasound:          mins  29192;    Ionto                                   mins   47717  Self Care                            mins   75758      Un-Timed:  Electrical Stimulation:         mins  18937 ( );  Dry Needling          mins self-pay  Traction          mins 93103  Canalith Repos         mins 44422    Timed Treatment:   40   mins   Total Treatment:     40   mins      Daniel Joyner PT, PT, DPT, OCS  [place] license: [id number]

## 2024-12-23 ENCOUNTER — TREATMENT (OUTPATIENT)
Dept: PHYSICAL THERAPY | Facility: CLINIC | Age: 84
End: 2024-12-23
Payer: MEDICARE

## 2024-12-23 DIAGNOSIS — R29.898 BILATERAL LEG WEAKNESS: ICD-10-CM

## 2024-12-23 DIAGNOSIS — Z98.890 HISTORY OF LUMBAR LAMINECTOMY: ICD-10-CM

## 2024-12-23 DIAGNOSIS — Z74.09 MOBILITY IMPAIRED: ICD-10-CM

## 2024-12-23 DIAGNOSIS — M54.50 LOW BACK PAIN, UNSPECIFIED BACK PAIN LATERALITY, UNSPECIFIED CHRONICITY, UNSPECIFIED WHETHER SCIATICA PRESENT: Primary | ICD-10-CM

## 2024-12-23 PROCEDURE — 97110 THERAPEUTIC EXERCISES: CPT | Performed by: PHYSICAL THERAPIST

## 2024-12-23 PROCEDURE — 97530 THERAPEUTIC ACTIVITIES: CPT | Performed by: PHYSICAL THERAPIST

## 2024-12-23 PROCEDURE — 97112 NEUROMUSCULAR REEDUCATION: CPT | Performed by: PHYSICAL THERAPIST

## 2024-12-23 NOTE — PROGRESS NOTES
Physical Therapy Daily Treatment Note      Patient: Campbell Alex   : 1940  Referring practitioner: Rocky Lozoya DO  Date of Initial Visit: Type: THERAPY  Noted: 2024  Today's Date: 2024  Patient seen for 58 sessions       Visit Diagnoses:    ICD-10-CM ICD-9-CM   1. Low back pain, unspecified back pain laterality, unspecified chronicity, unspecified whether sciatica present  M54.50 724.2   2. Bilateral leg weakness  R29.898 729.89   3. History of lumbar laminectomy  Z98.890 V45.89   4. Mobility impaired  Z74.09 799.89       Subjective No new complaints vs last visit. Pt feels therapy is helping to maintain his mobility and has helped him avoid falling at home.    Objective   See Exercise, Manual, and Modality Logs for complete treatment.       Assessment/Plan Good response to rx. Fatigued post visit. Continue therapy to help mitigate fall risk.      Timed:         Manual Therapy:         mins  20950;     Therapeutic Exercise:    15     mins  64557;     Neuromuscular Agustina:    15    mins  78711;    Therapeutic Activity:     15     mins  29243;     Gait Training:           mins  27002;     Ultrasound:          mins  18495;    Ionto                                   mins   56611  Self Care                            mins   44172      Un-Timed:  Electrical Stimulation:         mins  15330 ( );  Dry Needling          mins self-pay  Traction          mins 23776  Canalith Repos         mins 96573    Timed Treatment:   45   mins   Total Treatment:     45   mins      Daniel Joyner, PT, PT, DPT, OCS  [place] license: [id number]

## 2024-12-26 ENCOUNTER — TREATMENT (OUTPATIENT)
Dept: PHYSICAL THERAPY | Facility: CLINIC | Age: 84
End: 2024-12-26
Payer: MEDICARE

## 2024-12-26 DIAGNOSIS — R29.898 BILATERAL LEG WEAKNESS: Primary | ICD-10-CM

## 2024-12-26 DIAGNOSIS — Z74.09 MOBILITY IMPAIRED: ICD-10-CM

## 2024-12-26 DIAGNOSIS — M54.50 LOW BACK PAIN, UNSPECIFIED BACK PAIN LATERALITY, UNSPECIFIED CHRONICITY, UNSPECIFIED WHETHER SCIATICA PRESENT: ICD-10-CM

## 2024-12-26 DIAGNOSIS — Z98.890 HISTORY OF LUMBAR LAMINECTOMY: ICD-10-CM

## 2024-12-26 NOTE — PROGRESS NOTES
Addended by: MINERVA JOSÉ on: 12/26/2024 10:27 AM     Modules accepted: Orders     Physical Therapy Daily Treatment Note  [facility] Physical Therapy  [address] [place] [address]     Patient: Campbell Alex   : 1940   Referring practitioner: Rocky Lozoya DO  Date of initial visit: Type: THERAPY  Noted: 2024   Today's date: 2024   Patient seen for 45 visits    Visit Diagnoses:    ICD-10-CM ICD-9-CM   1. Low back pain, unspecified back pain laterality, unspecified chronicity, unspecified whether sciatica present  M54.50 724.2   2. Bilateral leg weakness  R29.898 729.89   3. History of lumbar laminectomy  Z98.890 V45.89   4. Mobility impaired  Z74.09 799.89       Subjective   Pt reports: L hip flexion strength limited. HEP going well.       Objective     See Exercise, Manual, and Modality Logs for complete treatment.     Patient Education: HEP    Assessment/Plan Fatigued post visit but tolerates treatment well.      Progress per Plan of Care            Timed:         Manual Therapy:         mins  04538;     Therapeutic Exercise:    15     mins  04125;     Neuromuscular Agustina:    15    mins  63386;    Therapeutic Activity:          mins  78437;     Gait Training:      10     mins  75665;     Ultrasound:          mins  26676;    Ionto                                   mins   63040  Self Care                            mins   23994    Un-Timed:  Electrical Stimulation:         mins  70089 ( );  Traction          mins 01444  Low Eval          Mins  54810  Mod Eval          Mins  83348  High Eval                            Mins  04684  Re-Eval                               mins  69708    Timed Treatment:   40   mins   Total Treatment:     40   mins      Daniel Joyner, PT, DPT, OCS  [place] license: [id number]  Physical Therapist

## 2024-12-26 NOTE — PROGRESS NOTES
Physical Therapy Daily Treatment Note  [facility] Physical Therapy  [address] [place] [address]     Patient: Campbell Alex   : 1940   Referring practitioner: Rocky Lozoya DO  Date of initial visit: Type: THERAPY  Noted: 2024   Today's date: 2024   Patient seen for 59 visits    Visit Diagnoses:    ICD-10-CM ICD-9-CM   1. Bilateral leg weakness  R29.898 729.89   2. History of lumbar laminectomy  Z98.890 V45.89   3. Mobility impaired  Z74.09 799.89   4. Low back pain, unspecified back pain laterality, unspecified chronicity, unspecified whether sciatica present  M54.50 724.2       Subjective   Pt reports: Having LBP when walking without AD. HEP going well.      Objective     See Exercise, Manual, and Modality Logs for complete treatment.     Patient Education: HEP    Assessment/Plan Fatigued post visit. Posture is flexed when unsupported.      Progress per Plan of Care            Timed:         Manual Therapy:         mins  76286;     Therapeutic Exercise:    15     mins  38065;     Neuromuscular Agustina:    30    mins  60421;    Therapeutic Activity:          mins  69593;     Gait Training:           mins  77154;     Ultrasound:          mins  95699;    Ionto                                   mins   25546  Self Care                            mins   17277    Un-Timed:  Electrical Stimulation:         mins  25823 ( );  Traction          mins 36384  Low Eval          Mins  23546  Mod Eval          Mins  53817  High Eval                            Mins  54478  Re-Eval                               mins  86276    Timed Treatment:   45   mins   Total Treatment:     45   mins      Daniel Joyner, PT, DPT, OCS  [place] license: [id number]  Physical Therapist

## 2024-12-30 ENCOUNTER — TREATMENT (OUTPATIENT)
Dept: PHYSICAL THERAPY | Facility: CLINIC | Age: 84
End: 2024-12-30
Payer: MEDICARE

## 2024-12-30 DIAGNOSIS — R29.898 BILATERAL LEG WEAKNESS: Primary | ICD-10-CM

## 2024-12-30 DIAGNOSIS — M54.50 LOW BACK PAIN, UNSPECIFIED BACK PAIN LATERALITY, UNSPECIFIED CHRONICITY, UNSPECIFIED WHETHER SCIATICA PRESENT: ICD-10-CM

## 2024-12-30 DIAGNOSIS — Z98.890 HISTORY OF LUMBAR LAMINECTOMY: ICD-10-CM

## 2024-12-30 DIAGNOSIS — Z74.09 MOBILITY IMPAIRED: ICD-10-CM

## 2024-12-30 PROCEDURE — 97110 THERAPEUTIC EXERCISES: CPT | Performed by: PHYSICAL THERAPIST

## 2024-12-30 PROCEDURE — 97112 NEUROMUSCULAR REEDUCATION: CPT | Performed by: PHYSICAL THERAPIST

## 2024-12-30 NOTE — PROGRESS NOTES
Physical Therapy Daily Treatment Note      Patient: Campbell Alex   : 1940  Referring practitioner: Rocky Lozoya DO  Date of Initial Visit: Type: THERAPY  Noted: 2024  Today's Date: 2024  Patient seen for 60 sessions       Visit Diagnoses:    ICD-10-CM ICD-9-CM   1. Bilateral leg weakness  R29.898 729.89   2. History of lumbar laminectomy  Z98.890 V45.89   3. Mobility impaired  Z74.09 799.89   4. Low back pain, unspecified back pain laterality, unspecified chronicity, unspecified whether sciatica present  M54.50 724.2       Subjective No new complaints vs last visit. Feels therapy is helping with his mobility.    Objective   See Exercise, Manual, and Modality Logs for complete treatment.       Assessment/Plan Fatigued post visit.      Timed:         Manual Therapy:         mins  69045;     Therapeutic Exercise:    15     mins  04920;     Neuromuscular Agustina:    15    mins  49752;    Therapeutic Activity:          mins  68549;     Gait Training:           mins  77959;     Ultrasound:          mins  21559;    Ionto                                   mins   43420  Self Care                            mins   97456      Un-Timed:  Electrical Stimulation:         mins  42125 ( );  Dry Needling          mins self-pay  Traction          mins 17029  Canalith Repos         mins 54130    Timed Treatment:   30   mins   Total Treatment:     30   mins      Daniel Joyner PT, PT, DPT, OCS  [place] license: [id number]

## 2025-01-02 ENCOUNTER — TREATMENT (OUTPATIENT)
Dept: PHYSICAL THERAPY | Facility: CLINIC | Age: 85
End: 2025-01-02
Payer: MEDICARE

## 2025-01-02 DIAGNOSIS — Z74.09 MOBILITY IMPAIRED: ICD-10-CM

## 2025-01-02 DIAGNOSIS — R29.898 BILATERAL LEG WEAKNESS: Primary | ICD-10-CM

## 2025-01-02 DIAGNOSIS — Z98.890 HISTORY OF LUMBAR LAMINECTOMY: ICD-10-CM

## 2025-01-02 DIAGNOSIS — M54.50 LOW BACK PAIN, UNSPECIFIED BACK PAIN LATERALITY, UNSPECIFIED CHRONICITY, UNSPECIFIED WHETHER SCIATICA PRESENT: ICD-10-CM

## 2025-01-02 PROCEDURE — 97110 THERAPEUTIC EXERCISES: CPT | Performed by: PHYSICAL THERAPIST

## 2025-01-02 PROCEDURE — 97112 NEUROMUSCULAR REEDUCATION: CPT | Performed by: PHYSICAL THERAPIST

## 2025-01-02 NOTE — PROGRESS NOTES
Physical Therapy Daily Treatment Note      Patient: Campbell Alex   : 1940  Referring practitioner: Rocky Lozoya DO  Date of Initial Visit: Type: THERAPY  Noted: 2024  Today's Date: 2025  Patient seen for 61 sessions       Visit Diagnoses:    ICD-10-CM ICD-9-CM   1. Bilateral leg weakness  R29.898 729.89   2. History of lumbar laminectomy  Z98.890 V45.89   3. Mobility impaired  Z74.09 799.89   4. Low back pain, unspecified back pain laterality, unspecified chronicity, unspecified whether sciatica present  M54.50 724.2       Subjective No new complaints vs last visit.HEP going well. No recent falls.    Objective   See Exercise, Manual, and Modality Logs for complete treatment.       Assessment/Plan LE fatigue post visit. Wide MASSIMO with ambulation.      Timed:         Manual Therapy:        mins  59341;     Therapeutic Exercise:    30     mins  23894;     Neuromuscular Agustina:       8 mins  66650;    Therapeutic Activity:          mins  88698;     Gait Training:           mins  38439;     Ultrasound:          mins  74178;    Ionto                                   mins   58320  Self Care                            mins   56280      Un-Timed:  Electrical Stimulation:         mins  21250 ( );  Dry Needling          mins self-pay  Traction          mins 10972  Canalith Repos         mins 18397    Timed Treatment:   38   mins   Total Treatment:     38   mins      Daniel Joyner PT, PT, DPT, OCS  [place] license: [id number]

## 2025-01-03 ENCOUNTER — TELEPHONE (OUTPATIENT)
Dept: ONCOLOGY | Facility: CLINIC | Age: 85
End: 2025-01-03
Payer: MEDICARE

## 2025-01-09 ENCOUNTER — TREATMENT (OUTPATIENT)
Dept: PHYSICAL THERAPY | Facility: CLINIC | Age: 85
End: 2025-01-09
Payer: MEDICARE

## 2025-01-09 DIAGNOSIS — Z98.890 HISTORY OF LUMBAR LAMINECTOMY: ICD-10-CM

## 2025-01-09 DIAGNOSIS — R29.898 BILATERAL LEG WEAKNESS: Primary | ICD-10-CM

## 2025-01-09 DIAGNOSIS — Z74.09 MOBILITY IMPAIRED: ICD-10-CM

## 2025-01-09 DIAGNOSIS — M54.50 LOW BACK PAIN, UNSPECIFIED BACK PAIN LATERALITY, UNSPECIFIED CHRONICITY, UNSPECIFIED WHETHER SCIATICA PRESENT: ICD-10-CM

## 2025-01-09 PROCEDURE — 97530 THERAPEUTIC ACTIVITIES: CPT | Performed by: PHYSICAL THERAPIST

## 2025-01-09 PROCEDURE — 97112 NEUROMUSCULAR REEDUCATION: CPT | Performed by: PHYSICAL THERAPIST

## 2025-01-09 PROCEDURE — 97110 THERAPEUTIC EXERCISES: CPT | Performed by: PHYSICAL THERAPIST

## 2025-01-09 NOTE — PROGRESS NOTES
Physical Therapy Daily Treatment Note      Patient: Campbell Alex   : 1940  Referring practitioner: Rocky Lozoya DO  Date of Initial Visit: Type: THERAPY  Noted: 2024  Today's Date: 2025  Patient seen for 62 sessions       Visit Diagnoses:    ICD-10-CM ICD-9-CM   1. Bilateral leg weakness  R29.898 729.89   2. History of lumbar laminectomy  Z98.890 V45.89   3. Mobility impaired  Z74.09 799.89   4. Low back pain, unspecified back pain laterality, unspecified chronicity, unspecified whether sciatica present  M54.50 724.2       Subjective  Less difficulty with ambulating stairs. HEP going well.     Objective   See Exercise, Manual, and Modality Logs for complete treatment.       Assessment/Plan Good response to rx.      Timed:         Manual Therapy:         mins  59262;     Therapeutic Exercise:    15     mins  10926;     Neuromuscular Agustina:    15    mins  85918;    Therapeutic Activity:     10     mins  71261;     Gait Training:           mins  96320;     Ultrasound:          mins  14018;    Ionto                                   mins   70380  Self Care                            mins   60792      Un-Timed:  Electrical Stimulation:         mins  94181 ( );  Dry Needling          mins self-pay  Traction          mins 43414  Canalith Repos         mins 15322    Timed Treatment:   40   mins   Total Treatment:     40   mins      Daniel Joyner PT, PT, DPT, OCS  [place] license: [id number]

## 2025-01-10 ENCOUNTER — TELEPHONE (OUTPATIENT)
Dept: ONCOLOGY | Facility: CLINIC | Age: 85
End: 2025-01-10
Payer: MEDICARE

## 2025-01-10 NOTE — TELEPHONE ENCOUNTER
Pt returned my call, LVM on my phone saying he could r/s his appt to 2/5.  Those changes were made.

## 2025-01-13 DIAGNOSIS — N40.1 BENIGN PROSTATIC HYPERPLASIA WITH URINARY FREQUENCY: Primary | ICD-10-CM

## 2025-01-13 DIAGNOSIS — R35.0 BENIGN PROSTATIC HYPERPLASIA WITH URINARY FREQUENCY: Primary | ICD-10-CM

## 2025-01-13 DIAGNOSIS — K59.00 CONSTIPATION, UNSPECIFIED CONSTIPATION TYPE: ICD-10-CM

## 2025-01-13 RX ORDER — TAMSULOSIN HYDROCHLORIDE 0.4 MG/1
CAPSULE ORAL
Qty: 60 CAPSULE | Refills: 0 | Status: SHIPPED | OUTPATIENT
Start: 2025-01-13 | End: 2025-01-15 | Stop reason: SDUPTHER

## 2025-01-14 ENCOUNTER — TREATMENT (OUTPATIENT)
Dept: PHYSICAL THERAPY | Facility: CLINIC | Age: 85
End: 2025-01-14
Payer: MEDICARE

## 2025-01-14 DIAGNOSIS — Z98.890 HISTORY OF LUMBAR LAMINECTOMY: ICD-10-CM

## 2025-01-14 DIAGNOSIS — R29.898 BILATERAL LEG WEAKNESS: Primary | ICD-10-CM

## 2025-01-14 DIAGNOSIS — Z74.09 MOBILITY IMPAIRED: ICD-10-CM

## 2025-01-14 DIAGNOSIS — M54.50 LOW BACK PAIN, UNSPECIFIED BACK PAIN LATERALITY, UNSPECIFIED CHRONICITY, UNSPECIFIED WHETHER SCIATICA PRESENT: ICD-10-CM

## 2025-01-14 PROCEDURE — 97110 THERAPEUTIC EXERCISES: CPT | Performed by: PHYSICAL THERAPIST

## 2025-01-14 PROCEDURE — 97112 NEUROMUSCULAR REEDUCATION: CPT | Performed by: PHYSICAL THERAPIST

## 2025-01-14 PROCEDURE — 97530 THERAPEUTIC ACTIVITIES: CPT | Performed by: PHYSICAL THERAPIST

## 2025-01-14 NOTE — PROGRESS NOTES
Physical Therapy Daily Treatment Note      Patient: Campbell Alex   : 1940  Referring practitioner: Rocky Lozoya DO  Date of Initial Visit: Type: THERAPY  Noted: 2024  Today's Date: 2025  Patient seen for 63 sessions       Visit Diagnoses:    ICD-10-CM ICD-9-CM   1. Bilateral leg weakness  R29.898 729.89   2. History of lumbar laminectomy  Z98.890 V45.89   3. Mobility impaired  Z74.09 799.89   4. Low back pain, unspecified back pain laterality, unspecified chronicity, unspecified whether sciatica present  M54.50 724.2       Subjective No new complaints vs last visit. Using rolling wx today.    Objective   See Exercise, Manual, and Modality Logs for complete treatment.       Assessment/Plan Balance and mobility continue to be limited.       Timed:         Manual Therapy:         mins  36995;     Therapeutic Exercise:    15     mins  84846;     Neuromuscular Agustina:    15    mins  90621;    Therapeutic Activity:     10     mins  45374;     Gait Training:           mins  57400;     Ultrasound:          mins  00103;    Ionto                                   mins   40849  Self Care                            mins   94555      Un-Timed:  Electrical Stimulation:         mins  18891 ( );  Dry Needling          mins self-pay  Traction          mins 21400  Canalith Repos         mins 71121    Timed Treatment:   40   mins   Total Treatment:     40   mins      Daniel Joyner PT, PT, DPT, OCS  [place] license: [id number]

## 2025-01-15 DIAGNOSIS — N40.1 BENIGN PROSTATIC HYPERPLASIA WITH URINARY FREQUENCY: ICD-10-CM

## 2025-01-15 DIAGNOSIS — R35.0 BENIGN PROSTATIC HYPERPLASIA WITH URINARY FREQUENCY: ICD-10-CM

## 2025-01-15 RX ORDER — TAMSULOSIN HYDROCHLORIDE 0.4 MG/1
CAPSULE ORAL
Qty: 60 CAPSULE | Refills: 3 | Status: SHIPPED | OUTPATIENT
Start: 2025-01-15

## 2025-01-16 ENCOUNTER — TREATMENT (OUTPATIENT)
Dept: PHYSICAL THERAPY | Facility: CLINIC | Age: 85
End: 2025-01-16
Payer: MEDICARE

## 2025-01-16 DIAGNOSIS — Z98.890 HISTORY OF LUMBAR LAMINECTOMY: ICD-10-CM

## 2025-01-16 DIAGNOSIS — Z74.09 MOBILITY IMPAIRED: ICD-10-CM

## 2025-01-16 DIAGNOSIS — R29.898 BILATERAL LEG WEAKNESS: Primary | ICD-10-CM

## 2025-01-16 DIAGNOSIS — M54.50 LOW BACK PAIN, UNSPECIFIED BACK PAIN LATERALITY, UNSPECIFIED CHRONICITY, UNSPECIFIED WHETHER SCIATICA PRESENT: ICD-10-CM

## 2025-01-16 PROCEDURE — 97112 NEUROMUSCULAR REEDUCATION: CPT | Performed by: PHYSICAL THERAPIST

## 2025-01-16 PROCEDURE — 97110 THERAPEUTIC EXERCISES: CPT | Performed by: PHYSICAL THERAPIST

## 2025-01-16 PROCEDURE — 97530 THERAPEUTIC ACTIVITIES: CPT | Performed by: PHYSICAL THERAPIST

## 2025-01-16 NOTE — PROGRESS NOTES
Re-Assessment / Re-Certification        Patient: Campbell Alex   : 1940  Diagnosis/ICD-10 Code:  Bilateral leg weakness [R29.898]  Referring practitioner: Rocky Lozoya DO  Date of Initial Visit: Type: THERAPY  Noted: 2024  Today's Date: 2025  Patient seen for 64 sessions      Subjective:     Subjective Questionnaire: Oswestry: 20%  LEFS 75%  Clinical Progress: Unchanged  Home Program Compliance: Yes  Treatment has included: therapeutic exercise, neuromuscular re-education, therapeutic activity, and gait training    Subjective Rates worst LBP at 0/10. Still primarily limited by LE weakness and limited endurance. Pt denies any recent falls and is now able to climb 10 steps independently and descend 5 step independently. Pt is still using a rolling wx for mobility.  Objective   Strength/Myotome Testing      Lumbar      Right   Normal strength     Left Hip   Planes of Motion   Flexion: 4-     Left Knee   Flexion: 5  Extension: 5     Left Ankle/Foot   Dorsiflexion: 5  Plantar flexion: 5  Great toe extension: 5     Timed up and go: 18.0 seconds  (24 seconds at IE), uses walker and UE assist with transfer  Five times sit to stand 20.2 seconds, without UE assist on one foam pad    Assessment/Plan  Pt is making progress re: his ability to descend stairs and with his performance measures. Recommend continuing with PT evaluation and traetment.      Short term goals, 1 week: Tolerate HEP progression. met  Voice compliance with activity modification. met  Report improvement in symptoms. met     Long term goals, 6 weeks: Improve ZAK score to 30%.met  Improve LEFS to 70%. met  Improve timed up and go to 20 seconds. progressing  Improve five times sit to stand to 17 seconds with UE assist and no foam pads. Met  New LTG: Improve five times sit to stand to 17 seconds with one foam pad and no UE assist. progressing  4+/5 strength or better throughout to allow safe ambulation and stair climbing..  progressing  Symptoms to be centralized. met  Independent with HEP. Progressing    Continue POC BIW for 5 weeks, primarily working on strength, activity tolerance, and functional mobility, as well as balance, standing tolerance, and stair climbing.  30 visits per POC, 90 day certification period     Daniel Joyner, PT, DPT, OCS  [place] license: [id number]  Physical Therapist      Timed:         Manual Therapy:         mins  01850;     Therapeutic Exercise:    15     mins  35693;     Neuromuscular Agustina:    15   mins  26200;    Therapeutic Activity:     15    mins  02615;     Gait Training:           mins  33918;     Ultrasound:          mins  91512;    Ionto                                   mins   52815  Self Care                            mins   49991    Un-Timed:  Electrical Stimulation:         mins  57450 ( );  Traction          mins 87322  Low Eval          Mins  05818  Mod Eval          Mins  11407  High Eval                            Mins  42568  Re-Eval                               mins  58288      Timed Treatment:   45  mins   Total Treatment:    45  mins    Certification Period: 12/5/2024 to 3/5/25  I certify that the therapy services are furnished while this patient is under my care.  The services outlined above are required for this patient and will be reviewed every 90 days.                PHYSICIAN: _____________________________                          Rocky Lozoya DO

## 2025-01-21 ENCOUNTER — TREATMENT (OUTPATIENT)
Dept: PHYSICAL THERAPY | Facility: CLINIC | Age: 85
End: 2025-01-21
Payer: MEDICARE

## 2025-01-21 DIAGNOSIS — M54.50 LOW BACK PAIN, UNSPECIFIED BACK PAIN LATERALITY, UNSPECIFIED CHRONICITY, UNSPECIFIED WHETHER SCIATICA PRESENT: ICD-10-CM

## 2025-01-21 DIAGNOSIS — Z74.09 MOBILITY IMPAIRED: Primary | ICD-10-CM

## 2025-01-21 DIAGNOSIS — Z98.890 HISTORY OF LUMBAR LAMINECTOMY: ICD-10-CM

## 2025-01-21 DIAGNOSIS — R29.898 BILATERAL LEG WEAKNESS: ICD-10-CM

## 2025-01-21 PROCEDURE — 97112 NEUROMUSCULAR REEDUCATION: CPT | Performed by: PHYSICAL THERAPIST

## 2025-01-21 PROCEDURE — 97110 THERAPEUTIC EXERCISES: CPT | Performed by: PHYSICAL THERAPIST

## 2025-01-21 PROCEDURE — 97530 THERAPEUTIC ACTIVITIES: CPT | Performed by: PHYSICAL THERAPIST

## 2025-01-21 NOTE — PROGRESS NOTES
Physical Therapy Daily Treatment Note      Patient: Campbell Alex   : 1940  Referring practitioner: Rocky Lozoya DO  Date of Initial Visit: Type: THERAPY  Noted: 2024  Today's Date: 2025  Patient seen for 65 sessions       Visit Diagnoses:    ICD-10-CM ICD-9-CM   1. Mobility impaired  Z74.09 799.89   2. History of lumbar laminectomy  Z98.890 V45.89   3. Low back pain, unspecified back pain laterality, unspecified chronicity, unspecified whether sciatica present  M54.50 724.2   4. Bilateral leg weakness  R29.898 729.89       Subjective Household ambulation improving. HEP going well.    Objective   See Exercise, Manual, and Modality Logs for complete treatment.       Assessment/Plan Good response to rx. Tolerated standing activities.      Timed:         Manual Therapy:         mins  59147;     Therapeutic Exercise:    15     mins  43592;     Neuromuscular Agustina:    15    mins  56550;    Therapeutic Activity:     15     mins  94431;     Gait Training:           mins  61397;     Ultrasound:          mins  97260;    Ionto                                   mins   10484  Self Care                            mins   59823      Un-Timed:  Electrical Stimulation:         mins  53366 ( );  Dry Needling          mins self-pay  Traction          mins 72680  Canalith Repos         mins 18227    Timed Treatment:   45   mins   Total Treatment:     45   mins      Daniel Joyner PT, PT, DPT, OCS  [place] license: [id number]

## 2025-01-23 ENCOUNTER — TREATMENT (OUTPATIENT)
Dept: PHYSICAL THERAPY | Facility: CLINIC | Age: 85
End: 2025-01-23
Payer: MEDICARE

## 2025-01-23 DIAGNOSIS — Z74.09 MOBILITY IMPAIRED: Primary | ICD-10-CM

## 2025-01-23 DIAGNOSIS — Z98.890 HISTORY OF LUMBAR LAMINECTOMY: ICD-10-CM

## 2025-01-23 DIAGNOSIS — R29.898 BILATERAL LEG WEAKNESS: ICD-10-CM

## 2025-01-23 DIAGNOSIS — M54.50 LOW BACK PAIN, UNSPECIFIED BACK PAIN LATERALITY, UNSPECIFIED CHRONICITY, UNSPECIFIED WHETHER SCIATICA PRESENT: ICD-10-CM

## 2025-01-23 PROCEDURE — 97112 NEUROMUSCULAR REEDUCATION: CPT | Performed by: PHYSICAL THERAPIST

## 2025-01-23 PROCEDURE — 97110 THERAPEUTIC EXERCISES: CPT | Performed by: PHYSICAL THERAPIST

## 2025-01-23 PROCEDURE — 97530 THERAPEUTIC ACTIVITIES: CPT | Performed by: PHYSICAL THERAPIST

## 2025-01-23 NOTE — PROGRESS NOTES
Physical Therapy Daily Treatment Note      Patient: Campbell Alex   : 1940  Referring practitioner: Rocky Lozoya DO  Date of Initial Visit: Type: THERAPY  Noted: 2024  Today's Date: 2025  Patient seen for 66 sessions       Visit Diagnoses:    ICD-10-CM ICD-9-CM   1. Mobility impaired  Z74.09 799.89   2. History of lumbar laminectomy  Z98.890 V45.89   3. Low back pain, unspecified back pain laterality, unspecified chronicity, unspecified whether sciatica present  M54.50 724.2   4. Bilateral leg weakness  R29.898 729.89       Subjective No new complaints. Pt feels therapy is helping with his mobility and activity tolerance.    Objective   See Exercise, Manual, and Modality Logs for complete treatment.       Assessment/Plan Good response to rx, tolerating treatment.      Timed:         Manual Therapy:         mins  62159;     Therapeutic Exercise:    15     mins  45667;     Neuromuscular Agustina:    15    mins  92313;    Therapeutic Activity:     10     mins  28614;     Gait Training:           mins  72045;     Ultrasound:          mins  38938;    Ionto                                   mins   54518  Self Care                            mins   73205      Un-Timed:  Electrical Stimulation:         mins  07953 ( );  Dry Needling          mins self-pay  Traction          mins 61523  Canalith Repos         mins 96620    Timed Treatment:   40   mins   Total Treatment:     40   mins      Daniel Joyner PT, PT, DPT, OCS  [place] license: [id number]

## 2025-01-28 ENCOUNTER — TREATMENT (OUTPATIENT)
Dept: PHYSICAL THERAPY | Facility: CLINIC | Age: 85
End: 2025-01-28
Payer: MEDICARE

## 2025-01-28 DIAGNOSIS — Z98.890 HISTORY OF LUMBAR LAMINECTOMY: ICD-10-CM

## 2025-01-28 DIAGNOSIS — M54.50 LOW BACK PAIN, UNSPECIFIED BACK PAIN LATERALITY, UNSPECIFIED CHRONICITY, UNSPECIFIED WHETHER SCIATICA PRESENT: ICD-10-CM

## 2025-01-28 DIAGNOSIS — Z74.09 MOBILITY IMPAIRED: Primary | ICD-10-CM

## 2025-01-28 DIAGNOSIS — R29.898 BILATERAL LEG WEAKNESS: ICD-10-CM

## 2025-01-28 PROCEDURE — 97112 NEUROMUSCULAR REEDUCATION: CPT | Performed by: PHYSICAL THERAPIST

## 2025-01-28 PROCEDURE — 97530 THERAPEUTIC ACTIVITIES: CPT | Performed by: PHYSICAL THERAPIST

## 2025-01-28 PROCEDURE — 97110 THERAPEUTIC EXERCISES: CPT | Performed by: PHYSICAL THERAPIST

## 2025-01-28 NOTE — PROGRESS NOTES
Physical Therapy Daily Treatment Note      Patient: Campbell Alex   : 1940  Referring practitioner: Rocky Lozoya DO  Date of Initial Visit: Type: THERAPY  Noted: 2024  Today's Date: 2025  Patient seen for 67 sessions       Visit Diagnoses:    ICD-10-CM ICD-9-CM   1. Mobility impaired  Z74.09 799.89   2. History of lumbar laminectomy  Z98.890 V45.89   3. Low back pain, unspecified back pain laterality, unspecified chronicity, unspecified whether sciatica present  M54.50 724.2   4. Bilateral leg weakness  R29.898 729.89       Subjective Pt able to walk with walker to get lunch while his car was getting worked on. Fatigued and sore afterward.     Objective   See Exercise, Manual, and Modality Logs for complete treatment.       Assessment/Plan Progressing well.      Timed:         Manual Therapy:         mins  12346;     Therapeutic Exercise:    22     mins  77462;     Neuromuscular Agustina:     8   mins  65982;    Therapeutic Activity:     8     mins  41796;     Gait Training:           mins  82384;     Ultrasound:          mins  55103;    Ionto                                   mins   60343  Self Care                            mins   44228      Un-Timed:  Electrical Stimulation:         mins  40103 ( );  Dry Needling          mins self-pay  Traction          mins 18919  Canalith Repos         mins 36264    Timed Treatment:   38   mins   Total Treatment:     38   mins      Daniel Joyner PT, PT, DPT, OCS  [place] license: [id number]

## 2025-01-30 ENCOUNTER — TREATMENT (OUTPATIENT)
Dept: PHYSICAL THERAPY | Facility: CLINIC | Age: 85
End: 2025-01-30
Payer: MEDICARE

## 2025-01-30 ENCOUNTER — LAB (OUTPATIENT)
Dept: LAB | Facility: HOSPITAL | Age: 85
End: 2025-01-30
Payer: MEDICARE

## 2025-01-30 DIAGNOSIS — D50.9 IRON DEFICIENCY ANEMIA, UNSPECIFIED IRON DEFICIENCY ANEMIA TYPE: ICD-10-CM

## 2025-01-30 DIAGNOSIS — M54.50 LOW BACK PAIN, UNSPECIFIED BACK PAIN LATERALITY, UNSPECIFIED CHRONICITY, UNSPECIFIED WHETHER SCIATICA PRESENT: ICD-10-CM

## 2025-01-30 DIAGNOSIS — R29.898 BILATERAL LEG WEAKNESS: ICD-10-CM

## 2025-01-30 DIAGNOSIS — Z98.890 HISTORY OF LUMBAR LAMINECTOMY: ICD-10-CM

## 2025-01-30 DIAGNOSIS — Z74.09 MOBILITY IMPAIRED: Primary | ICD-10-CM

## 2025-01-30 LAB
ALBUMIN SERPL-MCNC: 4.4 G/DL (ref 3.5–5.2)
ALBUMIN/GLOB SERPL: 1.9 G/DL
ALP SERPL-CCNC: 52 U/L (ref 39–117)
ALT SERPL W P-5'-P-CCNC: 9 U/L (ref 1–41)
ANION GAP SERPL CALCULATED.3IONS-SCNC: 7.8 MMOL/L (ref 5–15)
AST SERPL-CCNC: 22 U/L (ref 1–40)
BASOPHILS # BLD AUTO: 0.04 10*3/MM3 (ref 0–0.2)
BASOPHILS NFR BLD AUTO: 0.6 % (ref 0–1.5)
BILIRUB SERPL-MCNC: 0.3 MG/DL (ref 0–1.2)
BUN SERPL-MCNC: 26 MG/DL (ref 8–23)
BUN/CREAT SERPL: 23.6 (ref 7–25)
CALCIUM SPEC-SCNC: 9.4 MG/DL (ref 8.6–10.5)
CHLORIDE SERPL-SCNC: 101 MMOL/L (ref 98–107)
CO2 SERPL-SCNC: 28.2 MMOL/L (ref 22–29)
CREAT SERPL-MCNC: 1.1 MG/DL (ref 0.76–1.27)
DEPRECATED RDW RBC AUTO: 44.8 FL (ref 37–54)
EGFRCR SERPLBLD CKD-EPI 2021: 65 ML/MIN/1.73
EOSINOPHIL # BLD AUTO: 0.49 10*3/MM3 (ref 0–0.4)
EOSINOPHIL NFR BLD AUTO: 7.4 % (ref 0.3–6.2)
ERYTHROCYTE [DISTWIDTH] IN BLOOD BY AUTOMATED COUNT: 13.5 % (ref 12.3–15.4)
FERRITIN SERPL-MCNC: 51.6 NG/ML (ref 30–400)
FOLATE SERPL-MCNC: >20 NG/ML (ref 4.78–24.2)
GLOBULIN UR ELPH-MCNC: 2.3 GM/DL
GLUCOSE SERPL-MCNC: 126 MG/DL (ref 65–99)
HCT VFR BLD AUTO: 35.6 % (ref 37.5–51)
HGB BLD-MCNC: 11.7 G/DL (ref 13–17.7)
IRON 24H UR-MRATE: 71 MCG/DL (ref 59–158)
IRON SATN MFR SERPL: 23 % (ref 20–50)
LYMPHOCYTES # BLD AUTO: 1.85 10*3/MM3 (ref 0.7–3.1)
LYMPHOCYTES NFR BLD AUTO: 27.9 % (ref 19.6–45.3)
MCH RBC QN AUTO: 31.3 PG (ref 26.6–33)
MCHC RBC AUTO-ENTMCNC: 32.9 G/DL (ref 31.5–35.7)
MCV RBC AUTO: 95.2 FL (ref 79–97)
MONOCYTES # BLD AUTO: 0.75 10*3/MM3 (ref 0.1–0.9)
MONOCYTES NFR BLD AUTO: 11.3 % (ref 5–12)
NEUTROPHILS NFR BLD AUTO: 3.5 10*3/MM3 (ref 1.7–7)
NEUTROPHILS NFR BLD AUTO: 52.8 % (ref 42.7–76)
PLATELET # BLD AUTO: 188 10*3/MM3 (ref 140–450)
PMV BLD AUTO: 9.8 FL (ref 6–12)
POTASSIUM SERPL-SCNC: 4.4 MMOL/L (ref 3.5–5.2)
PROT SERPL-MCNC: 6.7 G/DL (ref 6–8.5)
RBC # BLD AUTO: 3.74 10*6/MM3 (ref 4.14–5.8)
RETICS # AUTO: 0.04 10*6/MM3 (ref 0.02–0.13)
RETICS/RBC NFR AUTO: 1.16 % (ref 0.7–1.9)
SODIUM SERPL-SCNC: 137 MMOL/L (ref 136–145)
TIBC SERPL-MCNC: 308 MCG/DL (ref 298–536)
TRANSFERRIN SERPL-MCNC: 207 MG/DL (ref 200–360)
VIT B12 BLD-MCNC: 874 PG/ML (ref 211–946)
WBC NRBC COR # BLD AUTO: 6.63 10*3/MM3 (ref 3.4–10.8)

## 2025-01-30 PROCEDURE — 97112 NEUROMUSCULAR REEDUCATION: CPT | Performed by: PHYSICAL THERAPIST

## 2025-01-30 PROCEDURE — 82607 VITAMIN B-12: CPT | Performed by: STUDENT IN AN ORGANIZED HEALTH CARE EDUCATION/TRAINING PROGRAM

## 2025-01-30 PROCEDURE — 84466 ASSAY OF TRANSFERRIN: CPT | Performed by: STUDENT IN AN ORGANIZED HEALTH CARE EDUCATION/TRAINING PROGRAM

## 2025-01-30 PROCEDURE — 82746 ASSAY OF FOLIC ACID SERUM: CPT | Performed by: STUDENT IN AN ORGANIZED HEALTH CARE EDUCATION/TRAINING PROGRAM

## 2025-01-30 PROCEDURE — 97530 THERAPEUTIC ACTIVITIES: CPT | Performed by: PHYSICAL THERAPIST

## 2025-01-30 PROCEDURE — 36415 COLL VENOUS BLD VENIPUNCTURE: CPT

## 2025-01-30 PROCEDURE — 82728 ASSAY OF FERRITIN: CPT | Performed by: STUDENT IN AN ORGANIZED HEALTH CARE EDUCATION/TRAINING PROGRAM

## 2025-01-30 PROCEDURE — 83540 ASSAY OF IRON: CPT | Performed by: STUDENT IN AN ORGANIZED HEALTH CARE EDUCATION/TRAINING PROGRAM

## 2025-01-30 PROCEDURE — 85025 COMPLETE CBC W/AUTO DIFF WBC: CPT

## 2025-01-30 PROCEDURE — 80053 COMPREHEN METABOLIC PANEL: CPT | Performed by: STUDENT IN AN ORGANIZED HEALTH CARE EDUCATION/TRAINING PROGRAM

## 2025-01-30 PROCEDURE — 85045 AUTOMATED RETICULOCYTE COUNT: CPT | Performed by: STUDENT IN AN ORGANIZED HEALTH CARE EDUCATION/TRAINING PROGRAM

## 2025-01-30 PROCEDURE — 97110 THERAPEUTIC EXERCISES: CPT | Performed by: PHYSICAL THERAPIST

## 2025-01-30 NOTE — PROGRESS NOTES
Physical Therapy Daily Treatment Note      Patient: Campbell Alex   : 1940  Referring practitioner: Rocky Lozoya DO  Date of Initial Visit: No linked episodes  Today's Date: 2025  Patient seen for Visit count could not be calculated. Make sure you are using a visit which is associated with an episode. sessions       Visit Diagnoses:    ICD-10-CM ICD-9-CM   1. Mobility impaired  Z74.09 799.89   2. History of lumbar laminectomy  Z98.890 V45.89   3. Low back pain, unspecified back pain laterality, unspecified chronicity, unspecified whether sciatica present  M54.50 724.2   4. Bilateral leg weakness  R29.898 729.89       Subjective Did not sleep well last night, LEs fatigued.    Objective   See Exercise, Manual, and Modality Logs for complete treatment.       Assessment/Plan Tolerated visit well but he is fatigued post visit.      Timed:         Manual Therapy:         mins  45355;     Therapeutic Exercise:    15     mins  03060;     Neuromuscular Agustina:    15    mins  01220;    Therapeutic Activity:     10     mins  90685;     Gait Training:           mins  83362;     Ultrasound:          mins  43114;    Ionto                                   mins   41130  Self Care                            mins   41640      Un-Timed:  Electrical Stimulation:         mins  92806 ( );  Dry Needling          mins self-pay  Traction          mins 56420  Canalith Repos         mins 04686    Timed Treatment:   40   mins   Total Treatment:     40   mins      Daniel Joyner, PT, PT, DPT, OCS  [place] license: [id number]

## 2025-01-31 ENCOUNTER — OFFICE VISIT (OUTPATIENT)
Dept: FAMILY MEDICINE CLINIC | Facility: CLINIC | Age: 85
End: 2025-01-31
Payer: MEDICARE

## 2025-01-31 VITALS
HEIGHT: 73 IN | DIASTOLIC BLOOD PRESSURE: 73 MMHG | WEIGHT: 191.6 LBS | OXYGEN SATURATION: 97 % | SYSTOLIC BLOOD PRESSURE: 126 MMHG | RESPIRATION RATE: 18 BRPM | HEART RATE: 69 BPM | BODY MASS INDEX: 25.39 KG/M2

## 2025-01-31 DIAGNOSIS — I48.0 PAROXYSMAL ATRIAL FIBRILLATION: ICD-10-CM

## 2025-01-31 DIAGNOSIS — M19.042 PRIMARY OSTEOARTHRITIS OF BOTH HANDS: ICD-10-CM

## 2025-01-31 DIAGNOSIS — I10 PRIMARY HYPERTENSION: Primary | ICD-10-CM

## 2025-01-31 DIAGNOSIS — M79.642 CHRONIC HAND PAIN, LEFT: ICD-10-CM

## 2025-01-31 DIAGNOSIS — E78.2 MIXED HYPERLIPIDEMIA: ICD-10-CM

## 2025-01-31 DIAGNOSIS — G89.29 CHRONIC HAND PAIN, LEFT: ICD-10-CM

## 2025-01-31 DIAGNOSIS — M19.041 PRIMARY OSTEOARTHRITIS OF BOTH HANDS: ICD-10-CM

## 2025-01-31 PROCEDURE — 1160F RVW MEDS BY RX/DR IN RCRD: CPT | Performed by: STUDENT IN AN ORGANIZED HEALTH CARE EDUCATION/TRAINING PROGRAM

## 2025-01-31 PROCEDURE — G2211 COMPLEX E/M VISIT ADD ON: HCPCS | Performed by: STUDENT IN AN ORGANIZED HEALTH CARE EDUCATION/TRAINING PROGRAM

## 2025-01-31 PROCEDURE — 99214 OFFICE O/P EST MOD 30 MIN: CPT | Performed by: STUDENT IN AN ORGANIZED HEALTH CARE EDUCATION/TRAINING PROGRAM

## 2025-01-31 PROCEDURE — 1159F MED LIST DOCD IN RCRD: CPT | Performed by: STUDENT IN AN ORGANIZED HEALTH CARE EDUCATION/TRAINING PROGRAM

## 2025-01-31 PROCEDURE — 1126F AMNT PAIN NOTED NONE PRSNT: CPT | Performed by: STUDENT IN AN ORGANIZED HEALTH CARE EDUCATION/TRAINING PROGRAM

## 2025-01-31 NOTE — PROGRESS NOTES
"Chief Complaint  Chief Complaint   Patient presents with    Follow-up     4 month follow up     Subjective        Campbell Alex is a 85 y.o. male who presents to [facility].    History of Present Illness  Here for f/u of chronic conditions.   HTN on metoprolol tartrate 25 mg bid.  HLD on atorvastatin 20 mg daily.  PAF on eliquis 5 mg bid, metoprolol as above.    His main concern today is he has a lot of pain in his L hand related to arthritis.    He has knotty joints scattered both hands.  L pinky / outside of hand is where pain is the worst.    He takes 500 mg tylenol bid.  He has tried topical voltaren in the past which helps temporarily.      Patient has been erroneously marked as diabetic. Based on the available clinical information, he does not have diabetes and should therefore be excluded from diabetic health maintenance and quality measures for the remainder of the reporting period.    Objective   /73   Pulse 69   Resp 18   Ht 185.4 cm (72.99\")   Wt 86.9 kg (191 lb 9.6 oz)   SpO2 97%   BMI 25.29 kg/m²     Estimated body mass index is 25.29 kg/m² as calculated from the following:    Height as of this encounter: 185.4 cm (72.99\").    Weight as of this encounter: 86.9 kg (191 lb 9.6 oz).     Physical Exam   GEN: In no acute distress, non toxic appearing  HEENT: Pupils equal and reactive to light, sclera clear. Mucous membranes moist.  MSK: Nodular joints on bilateral hands.  + ttp.  No erythema.    NEURO: AAO to person, place, and time. CN 2-12 intact grossly.   PSYCH: Affect normal, insight fair     Result Review :              Assessment and Plan     Diagnoses and all orders for this visit:    1. Primary hypertension (Primary)  BP at goal today.  Continue metoprolol succinate 25 mg bid.    2. Mixed hyperlipidemia  Continue atorvastatin 20 mg daily.  Check lipids yearly.    3. Paroxysmal atrial fibrillation  Continue eliquis 5 mg bid, metoprolol as above.  Keep f/u " with cardiology.    4. Chronic hand pain, left  5. Primary osteoarthritis of both hands  Increase tylenol to 500 mg tid - qid.  Apply topical diclofenac at least twice a day.  Unable to take oral NSAIDs d/t eliquis use.  If no improvement w/ above may consider eval with hand surgeon to see if injection would be possible.       Follow Up     Return in about 4 months (around 5/31/2025) for chronic conditions f/u - 30 mins please .

## 2025-02-04 ENCOUNTER — TREATMENT (OUTPATIENT)
Dept: PHYSICAL THERAPY | Facility: CLINIC | Age: 85
End: 2025-02-04
Payer: MEDICARE

## 2025-02-04 DIAGNOSIS — Z98.890 HISTORY OF LUMBAR LAMINECTOMY: ICD-10-CM

## 2025-02-04 DIAGNOSIS — R29.898 BILATERAL LEG WEAKNESS: ICD-10-CM

## 2025-02-04 DIAGNOSIS — Z74.09 MOBILITY IMPAIRED: Primary | ICD-10-CM

## 2025-02-04 DIAGNOSIS — M54.50 LOW BACK PAIN, UNSPECIFIED BACK PAIN LATERALITY, UNSPECIFIED CHRONICITY, UNSPECIFIED WHETHER SCIATICA PRESENT: ICD-10-CM

## 2025-02-04 PROCEDURE — 97530 THERAPEUTIC ACTIVITIES: CPT | Performed by: PHYSICAL THERAPIST

## 2025-02-04 PROCEDURE — 97112 NEUROMUSCULAR REEDUCATION: CPT | Performed by: PHYSICAL THERAPIST

## 2025-02-04 PROCEDURE — 97110 THERAPEUTIC EXERCISES: CPT | Performed by: PHYSICAL THERAPIST

## 2025-02-04 NOTE — PROGRESS NOTES
Physical Therapy Daily Treatment Note      Patient: Campbell Alex   : 1940  Referring practitioner: Rocky Lozoya DO  Date of Initial Visit: Type: THERAPY  Noted: 2024  Today's Date: 2025  Patient seen for 68 sessions       Visit Diagnoses:    ICD-10-CM ICD-9-CM   1. Mobility impaired  Z74.09 799.89   2. History of lumbar laminectomy  Z98.890 V45.89   3. Low back pain, unspecified back pain laterality, unspecified chronicity, unspecified whether sciatica present  M54.50 724.2   4. Bilateral leg weakness  R29.898 729.89       Subjective No new complaints vs last visit. HEP going well.     Objective   See Exercise, Manual, and Modality Logs for complete treatment.       Assessment/Plan Tolerated visit well.      Timed:         Manual Therapy:         mins  80476;     Therapeutic Exercise:    15     mins  07536;     Neuromuscular Agustina:    15    mins  89389;    Therapeutic Activity:          mins  27583;     Gait Training:      10     mins  24081;     Ultrasound:          mins  15693;    Ionto                                   mins   53524  Self Care                            mins   34881      Un-Timed:  Electrical Stimulation:         mins  84287 ( );  Dry Needling          mins self-pay  Traction          mins 17915  Canalith Repos       mins 23977    Timed Treatment:   40   mins   Total Treatment:     40   mins      Daniel Joyner PT, PT, DPT, OCS  [place] license: [id number]

## 2025-02-04 NOTE — PROGRESS NOTES
HEMATOLOGY ONCOLOGY OUTPATIENT FOLLOWUP       Patient name: Campbell Alex  : 1940  MRN: [id number]  Primary Care Physician: Rocky Lozoya DO  Referring Physician: Rocky Lozoya, *  Reason For Consult: anemia      History of Present Illness:  Patient is a 85 y.o. male who was referred for anemia.  Patient has had Hemoccult positive stool no other signs of bleeding he does complain of ongoing fatigue.  He has medical history of renal insufficiency, coronary artery disease, CHF among other comorbid conditions    24 Hb 11, hct 33.2, MCV 90.5, platelets 188, WBC 5.24 SPEP was unremarkable with no monoclonal protein.  Patient has been on oral iron    2024 iron saturation 17, TIBC 226, ferritin of 311, normal haptoglobin, folate, reticulocyte count, normal LDH normal vitamin B12 levels    3/2024 - venofer 300 x2  2024 - EGD/colonoscopy - hiatal hernia, diverticulosis, colon polyp.  24 - Hb 11.9, iron sat 16,     Patient has not had any EGD colonoscopy recently    10/30/2024: Patient seen today for follow-up.  He was previously being seen by Dr. Templeton in our practice.  He has underlying history of iron deficiency anemia for unclear etiology.  He had received IV iron supplementation in 2024.    Patient reported feeling well at this time.  Denied any acute complaints.  Reported that he is planned for a PillCam study at [facility] to further evaluate for suspected GI bleed/AVMs.  He is currently on oral iron supplementation, reported good compliance and tolerance.  Also taking aspirin and Eliquis for history of heart disease and A-fib.    Subjective:  2025: Patient seen today for routine follow-up.  He reports overall doing well.  He does have stable fatigue and weakness.  He continues to follow with physical therapy.  Reviewed laboratory exams that show stable anemia.  Normal B12, folate and iron levels reported.  He does report upcoming  appointment with gastroenterology due to constipation and GI symptoms.      Past Medical History:   Diagnosis Date    Allergic 1970    Anemia n0t sure    Anxiety     Arthritis     Benign prostatic hyperplasia 1980    Cancer     Cataract 1990    CHF (congestive heart failure) stint    Cholelithiasis 1958    Coronary artery disease     Erectile dysfunction 2020    HL (hearing loss) 1995    Hyperlipidemia     Hypertension     Osteopenia 1980    Prostatism     Renal insufficiency 1940    Suspected COVID-19 virus infection 09/07/2021    Urinary tract infection     Visual impairment 1948       Past Surgical History:   Procedure Laterality Date    ANKLE SURGERY      CARDIAC CATHETERIZATION  2020    COLONOSCOPY      CORONARY STENT PLACEMENT      EYE SURGERY  1960    NOSE SURGERY      ROTATOR CUFF REPAIR      SKIN CANCER EXCISION      L shoulder    SPINAL FUSION  01/2024    SPINE SURGERY      UMBILICAL HERNIA REPAIR      UMBILICAL HERNIA REPAIR           Current Outpatient Medications:     acetaminophen (Tylenol) 325 MG tablet, Take 2 tablets by mouth Every 4 (Four) Hours As Needed for Fever or Mild Pain. Indications: Fever, Pain, Disp: , Rfl:     apixaban (ELIQUIS) 5 MG tablet tablet, Take 1 tablet by mouth 2 (Two) Times a Day. Indications: history of DVT/PE, Disp: , Rfl:     aspirin (aspirin) 81 MG EC tablet, Take 1 tablet by mouth Daily. Indications: Disease involving Lipid Deposits in the Arteries, Disp: , Rfl:     atorvastatin (LIPITOR) 20 MG tablet, Take 1 tablet by mouth Every Night. Indications: High Amount of Fats in the Blood, Disp: , Rfl:     azelastine (ASTELIN) 0.1 % nasal spray, 2 sprays into the nostril(s) as directed by provider 2 (Two) Times a Day. Use in each nostril as directed  Indications: Perennial Allergic Rhinitis (Patient taking differently: Administer 2 sprays into the nostril(s) as directed by provider 2 (Two) Times a Day. Use in each nostril as directed), Disp: 30 mL, Rfl: 3    Calcium Carbonate  1500 (600 Ca) MG tablet, Take 1 tablet by mouth Daily. Indications: Low Amount of Calcium in the Blood, Disp: , Rfl:     FERROUS SULFATE PO, EVERY OTHER DAY, Disp: , Rfl:     Fiber powder, Take 1 dose by mouth Daily As Needed (constipation). Indications: constipation, Disp: , Rfl:     Fiber, Corn Dextrin, powder, , Disp: , Rfl:     isosorbide mononitrate (IMDUR) 30 MG 24 hr tablet, Take 1 tablet by mouth Daily. Indications: Stable Angina Pectoris, Disp: , Rfl:     metoprolol tartrate (LOPRESSOR) 25 MG tablet, Take 1 tablet by mouth 2 (Two) Times a Day. Indications: High Blood Pressure Disorder, Disp: 180 tablet, Rfl: 3    pantoprazole (PROTONIX) 40 MG EC tablet, Take 1 tablet by mouth Daily. Indications: Gastroesophageal Reflux Disease, Disp: 90 tablet, Rfl: 1    tamsulosin (FLOMAX) 0.4 MG capsule 24 hr capsule, One bid  Indications: Benign Enlargement of Prostate, Disp: 60 capsule, Rfl: 3    Allergies   Allergen Reactions    Codeine GI Intolerance    Iodine Hives    Nabumetone Unknown - High Severity    Propoxyphene Hives    Zoloft [Sertraline] GI Intolerance       Family History   Problem Relation Age of Onset    Arthritis Mother     Cancer Mother         Kj RAEMILEE    Hypertension Brother     Hyperlipidemia Brother     Kidney disease Brother         KJ    Diabetes Brother        Cancer-related family history includes Cancer in his mother.      Social History     Tobacco Use    Smoking status: Never     Passive exposure: Past    Smokeless tobacco: Never    Tobacco comments:     1-2/day   Vaping Use    Vaping status: Never Used   Substance Use Topics    Alcohol use: Never     Comment: socially    Drug use: Never     Social History     Social History Narrative    Not on file       ROS:   Review of Systems   Constitutional:  Positive for fatigue. Negative for fever.   HENT:  Negative for congestion and nosebleeds.    Eyes:  Negative for pain.   Respiratory:  Negative for cough and shortness of breath.   "  Cardiovascular:  Negative for chest pain.   Gastrointestinal:  Negative for abdominal pain, blood in stool, diarrhea, nausea and vomiting.   Endocrine: Negative for cold intolerance and heat intolerance.   Genitourinary:  Negative for difficulty urinating.   Musculoskeletal:  Negative for arthralgias.   Skin:  Negative for rash.   Neurological:  Negative for dizziness and headaches.   Hematological:  Does not bruise/bleed easily.   Psychiatric/Behavioral:  Negative for behavioral problems.          Objective:    Vital Signs:  Vitals:    02/05/25 1055   BP: 159/91   Pulse: 67   Temp: 97.7 °F (36.5 °C)   SpO2: 96%   Weight: 86.2 kg (190 lb)   Height: 185.4 cm (72.99\")   PainSc: 0-No pain           Body mass index is 25.07 kg/m².    ECOG  (1) Restricted in physically strenuous activity, ambulatory and able to do work of light nature     Physical Exam:   Physical Exam  Constitutional:       Appearance: Normal appearance.   HENT:      Head: Normocephalic and atraumatic.   Eyes:      Pupils: Pupils are equal, round, and reactive to light.   Cardiovascular:      Rate and Rhythm: Normal rate and regular rhythm.      Pulses: Normal pulses.      Heart sounds: No murmur heard.  Pulmonary:      Effort: Pulmonary effort is normal.      Breath sounds: Normal breath sounds.   Abdominal:      General: There is no distension.      Palpations: Abdomen is soft. There is no mass.      Tenderness: There is no abdominal tenderness.   Musculoskeletal:         General: Normal range of motion.      Cervical back: Normal range of motion and neck supple.   Skin:     General: Skin is warm.   Neurological:      General: No focal deficit present.      Mental Status: He is alert.   Psychiatric:         Mood and Affect: Mood normal.         Lab Results - Last 18 Months   Lab Units 01/30/25  1301 10/23/24  1418 06/18/24  0818   WBC 10*3/mm3 6.63 6.28 8.45   HEMOGLOBIN g/dL 11.7* 11.8* 11.9*   HEMATOCRIT % 35.6* 35.7* 36.9*   PLATELETS 10*3/mm3 " "188 186 329   MCV fL 95.2 93.5 91.8     Lab Results - Last 18 Months   Lab Units 01/30/25  1301 12/29/23  1548 11/21/23  1804 11/09/23  1220   SODIUM mmol/L 137 135* 136 134*   POTASSIUM mmol/L 4.4 3.7 4.9 4.6   CHLORIDE mmol/L 101 101 100 98   CO2 mmol/L 28.2 24.0 24.0 26.5   BUN mg/dL 26* 19 22 18   CREATININE mg/dL 1.10 0.80 0.78 0.69*   CALCIUM mg/dL 9.4 9.0 10.0 8.8   BILIRUBIN mg/dL 0.3  --  0.4 0.3   ALK PHOS U/L 52  --  99 113   ALT (SGPT) U/L 9  --  10 12   AST (SGOT) U/L 22  --  22 15   GLUCOSE mg/dL 126* 118* 111* 129*       Lab Results   Component Value Date    GLUCOSE 126 (H) 01/30/2025    BUN 26 (H) 01/30/2025    CREATININE 1.10 01/30/2025    EGFRIFNONA 73 12/17/2021    BCR 23.6 01/30/2025    K 4.4 01/30/2025    CO2 28.2 01/30/2025    CALCIUM 9.4 01/30/2025    PROTENTOTREF 6.0 11/09/2023    ALBUMIN 4.4 01/30/2025    LABIL2 1.1 11/09/2023    AST 22 01/30/2025    ALT 9 01/30/2025       Lab Results - Last 18 Months   Lab Units 12/29/23  1548   INR  1.05   APTT seconds 31.3*       Lab Results   Component Value Date    IRON 71 01/30/2025    TIBC 308 01/30/2025    FERRITIN 51.60 01/30/2025       Lab Results   Component Value Date    FOLATE >20.00 01/30/2025       No results found for: \"OCCULTBLD\"    Lab Results   Component Value Date    RETICCTPCT 1.16 01/30/2025     Lab Results   Component Value Date    ZINTXMRP76 874 01/30/2025     No results found for: \"SPEP\", \"UPEP\"  LDH   Date Value Ref Range Status   02/20/2024 180 135 - 225 U/L Final     Lab Results   Component Value Date    SEDRATE 9 12/17/2021     Lab Results   Component Value Date    HAPTOGLOBIN 140 02/20/2024     Lab Results   Component Value Date    PTT 31.3 (H) 12/29/2023    INR 1.05 12/29/2023     No results found for: \"\"  No results found for: \"CEA\"  No components found for: \"CA-19-9\"  Lab Results   Component Value Date    PSA 1.180 01/20/2020     Lab Results   Component Value Date    SEDRATE 9 12/17/2021          Assessment & Plan "     Patient is a 85-year-old male with normocytic anemia, iron deficiency    Normocytic anemia  Patient has been on oral iron continues to have mildly low iron saturation ferritin is normal  Iron deficiency is playing a role in his anemia I have recommended to infusions of Venofer 300 mg given the fact that he has been on oral iron and his iron saturation still seems to be slightly low  His retic count does not seem to be appropriately elevated based on his hemoglobin so there is likely some underlying bone marrow depression as well  He does not have hemolysis based on normal haptoglobin, LDH and reticulocyte count  Patient received 3 doses of IV Venofer in February 2024.  -Repeat CBC showed persistent normocytic anemia with hemoglobin 11.7 grams per deciliter.  Iron profile not consistent with iron deficiency at this time.  -With ongoing constipation advised patient to hold oral iron supplementation to see if any improvement.  If no changes in symptoms can restart oral iron every other day.  -Reviewed the need for bone marrow biopsy vs monitoring. Given that hb is stable at this point no bone marrow biopsy.  Reviewed with patient    3-month follow-up with Dr. Mohan, sooner if condition indicates      Thank you very much for providing the opportunity to participate in this patient’s care. Please do not hesitate to call if there are any other questions.    Time spent on encounter including record review, history taking, exam, discussion, counseling and documentation at: 30 minutes

## 2025-02-05 ENCOUNTER — OFFICE VISIT (OUTPATIENT)
Dept: ONCOLOGY | Facility: CLINIC | Age: 85
End: 2025-02-05
Payer: MEDICARE

## 2025-02-05 VITALS
HEIGHT: 73 IN | SYSTOLIC BLOOD PRESSURE: 159 MMHG | BODY MASS INDEX: 25.18 KG/M2 | DIASTOLIC BLOOD PRESSURE: 91 MMHG | HEART RATE: 67 BPM | TEMPERATURE: 97.7 F | OXYGEN SATURATION: 96 % | WEIGHT: 190 LBS

## 2025-02-05 DIAGNOSIS — D50.9 IRON DEFICIENCY ANEMIA, UNSPECIFIED IRON DEFICIENCY ANEMIA TYPE: Primary | ICD-10-CM

## 2025-02-05 RX ORDER — BIOTIN 1 MG
TABLET ORAL
COMMUNITY

## 2025-02-06 ENCOUNTER — TREATMENT (OUTPATIENT)
Dept: PHYSICAL THERAPY | Facility: CLINIC | Age: 85
End: 2025-02-06
Payer: MEDICARE

## 2025-02-06 ENCOUNTER — TELEPHONE (OUTPATIENT)
Dept: FAMILY MEDICINE CLINIC | Facility: CLINIC | Age: 85
End: 2025-02-06
Payer: MEDICARE

## 2025-02-06 DIAGNOSIS — M19.042 PRIMARY OSTEOARTHRITIS OF BOTH HANDS: Primary | ICD-10-CM

## 2025-02-06 DIAGNOSIS — Z74.09 MOBILITY IMPAIRED: Primary | ICD-10-CM

## 2025-02-06 DIAGNOSIS — M54.50 LOW BACK PAIN, UNSPECIFIED BACK PAIN LATERALITY, UNSPECIFIED CHRONICITY, UNSPECIFIED WHETHER SCIATICA PRESENT: ICD-10-CM

## 2025-02-06 DIAGNOSIS — Z98.890 HISTORY OF LUMBAR LAMINECTOMY: ICD-10-CM

## 2025-02-06 DIAGNOSIS — R29.898 BILATERAL LEG WEAKNESS: ICD-10-CM

## 2025-02-06 DIAGNOSIS — M19.041 PRIMARY OSTEOARTHRITIS OF BOTH HANDS: Primary | ICD-10-CM

## 2025-02-06 PROCEDURE — 97530 THERAPEUTIC ACTIVITIES: CPT | Performed by: PHYSICAL THERAPIST

## 2025-02-06 PROCEDURE — 97110 THERAPEUTIC EXERCISES: CPT | Performed by: PHYSICAL THERAPIST

## 2025-02-06 PROCEDURE — 97112 NEUROMUSCULAR REEDUCATION: CPT | Performed by: PHYSICAL THERAPIST

## 2025-02-06 NOTE — PROGRESS NOTES
Physical Therapy Daily Treatment Note      Patient: Campbell Alex   : 1940  Referring practitioner: Rocky Lozoya DO  Date of Initial Visit: Type: THERAPY  Noted: 2024  Today's Date: 2025  Patient seen for 69 sessions       Visit Diagnoses:    ICD-10-CM ICD-9-CM   1. Mobility impaired  Z74.09 799.89   2. History of lumbar laminectomy  Z98.890 V45.89   3. Low back pain, unspecified back pain laterality, unspecified chronicity, unspecified whether sciatica present  M54.50 724.2   4. Bilateral leg weakness  R29.898 729.89       Subjective No new complaints. HEP going well.    Objective   See Exercise, Manual, and Modality Logs for complete treatment.       Assessment/Plan Fatigued post visit. Needs assist prn to maintain balance while being challenged.      Timed:         Manual Therapy:         mins  39636;     Therapeutic Exercise:    15     mins  32652;     Neuromuscular Agustina:    15    mins  52322;    Therapeutic Activity:     10     mins  78260;     Gait Training:           mins  93272;     Ultrasound:          mins  69662;    Ionto                                   mins   44412  Self Care                            mins   95777      Un-Timed:  Electrical Stimulation:         mins  66619 ( );  Dry Needling          mins self-pay  Traction          mins 95454  Canalith Repos         mins 62059    Timed Treatment:   40   mins   Total Treatment:     40   mins      Daniel Joyner PT, PT, DPT, OCS  [place] license: [id number]

## 2025-02-06 NOTE — TELEPHONE ENCOUNTER
Caller: nash soot    Relationship to patient: Emergency Contact    Best call back number: [phone]     Patient is needing:   PATIENTS WOULD LIKE A REFERRAL TO SOMEONE WHO CAN GIVE HIM SHOTS IN HIS HAND DUE TO THE ARTHRITIS PAIN.     STATES THIS WAS DISCUSSED AT HIS APPT ON 1.31.25    PLEASE CALL TO DISCUSS.

## 2025-02-07 ENCOUNTER — TELEPHONE (OUTPATIENT)
Dept: GASTROENTEROLOGY | Facility: CLINIC | Age: 85
End: 2025-02-07
Payer: MEDICARE

## 2025-02-07 NOTE — TELEPHONE ENCOUNTER
I called and spoke with patient and his wife about 2/12 appointment with Mally. Pt was previously seen by another GI a hawa Alva. I advised since they were seen by him in 6/2024 then we can't seen him per our office protocol. They were upset because Dr. Alva had done and EGD, CN, and capsule study and still no answer on why his having incontinence as well as they was not communicating with the patient and the office kept on cancelling the appointments. The spoke with PCP and the pcp suggested going to another GI and sent a referral here. Pt wants answers on for his constipation and the fecal incontinence since it is hard to deal with and he can barely go anywhere.     Can patient still be seen or does he still need to cancel appointment?

## 2025-02-07 NOTE — TELEPHONE ENCOUNTER
From: Kaylynn FRANCISCO  To: Ling Pritchardmelissamerlyn  Sent: 8/19/2021 11:45 AM CDT  Subject: Medication refill    Hi Caty,    We did receive the refill request for the buspirone. I wanted to check in with you to see how you are doing since the Seroquel was prescribed to you in addition to this medication. Please give us an update and we will update Dr. Hall as well.    Thank you!    HARVINDER Escamilla   Name: nash soto      Relationship: Emergency Contact      Best Callback Number:   Telephone Information:   Mobile [phone]           HUB PROVIDED THE RELAY MESSAGE FROM THE OFFICE      PATIENT: VOICED UNDERSTANDING AND HAS NO FURTHER QUESTIONS AT THIS TIME    ADDITIONAL INFORMATION:

## 2025-02-07 NOTE — TELEPHONE ENCOUNTER
HUB to share. LM to return call. I sent in a referral for Dr. Rodriguez (hand specialty) at [facility] office in [place].  They should call him and getting him scheduled.

## 2025-02-10 ENCOUNTER — TELEPHONE (OUTPATIENT)
Dept: FAMILY MEDICINE CLINIC | Facility: CLINIC | Age: 85
End: 2025-02-10

## 2025-02-10 NOTE — TELEPHONE ENCOUNTER
"  Hub staff attempted to follow warm transfer process and was unsuccessful     Caller: Campbell Alex R \"Zuleyma\"    Relationship to patient: Self    Best call back number: [phone]    Patient is needing: PT STATES THAT HE HAS NOT HEARD BACK FROM ANYONE REGARDING THE DECISION ON THE ABILITY FOR HIM TO BE SEEN OR NOT. PLEASE CALL ASAP TO ADV AS APPT IS VINCE FOR 2/12. PT IS UNSURE WHAT TO DO WITH APPT REMINDER OPTIONS AS HE WAS ADV HE COULD NOT BE SEEN.         "

## 2025-02-10 NOTE — TELEPHONE ENCOUNTER
"  Caller: Campbell Alex \"Zuleyma\"    Relationship: Self    Best call back number: [phone]     What is the best time to reach you: ANY    Who are you requesting to speak with (clinical staff, provider,  specific staff member): PCP        What was the call regarding: PATIENT CALLED TO LET DR RENTERIA KNOW THAT HE COULD SEE THE GASTRO DR OR HAVE THE TESTING DONE BECAUSE IT HAD NOT BEEN LONG ENOUGH SINCE HIS PAST APPOINTMENT/TESTING. GASTRO HAS TOLD HIM TO COME BACK TO HIS PCP FOR TREATMENT. PLEASE CALL TO CLARIFY AND ADVISE    Is it okay if the provider responds through MyChart: PHONE CALL    "

## 2025-02-11 ENCOUNTER — TREATMENT (OUTPATIENT)
Dept: PHYSICAL THERAPY | Facility: CLINIC | Age: 85
End: 2025-02-11
Payer: MEDICARE

## 2025-02-11 DIAGNOSIS — Z98.890 HISTORY OF LUMBAR LAMINECTOMY: ICD-10-CM

## 2025-02-11 DIAGNOSIS — R29.898 BILATERAL LEG WEAKNESS: ICD-10-CM

## 2025-02-11 DIAGNOSIS — M54.50 LOW BACK PAIN, UNSPECIFIED BACK PAIN LATERALITY, UNSPECIFIED CHRONICITY, UNSPECIFIED WHETHER SCIATICA PRESENT: ICD-10-CM

## 2025-02-11 DIAGNOSIS — Z74.09 MOBILITY IMPAIRED: Primary | ICD-10-CM

## 2025-02-11 PROCEDURE — 97530 THERAPEUTIC ACTIVITIES: CPT | Performed by: PHYSICAL THERAPIST

## 2025-02-11 PROCEDURE — 97110 THERAPEUTIC EXERCISES: CPT | Performed by: PHYSICAL THERAPIST

## 2025-02-11 PROCEDURE — 97112 NEUROMUSCULAR REEDUCATION: CPT | Performed by: PHYSICAL THERAPIST

## 2025-02-11 NOTE — PROGRESS NOTES
Re-Assessment / Re-Certification        Patient: Campbell Alex   : 1940  Diagnosis/ICD-10 Code:  Mobility impaired [Z74.09]  Referring practitioner: Rocky Lozoya DO  Date of Initial Visit: Type: THERAPY  Noted: 2024  Today's Date: 2025  Patient seen for 70 sessions      Subjective:     Subjective Questionnaire: Oswestry: 20%  LEFS 75%  Clinical Progress: Unchanged  Home Program Compliance: Yes  Treatment has included: therapeutic exercise, neuromuscular re-education, therapeutic activity, and gait training    Subjective Pt continues to rate worst LBP at 0/10. Still primarily limited by LE weakness and limited endurance. Pt denies any recent falls and is still able to climb 10 steps independently and descend 5 step independently. Pt is still using a rolling wx for mobility. Pt feels his mobility has stabilized with therapy.  Objective   Strength/Myotome Testing      Lumbar      Right   Normal strength     Left Hip   Planes of Motion   Flexion: 4-     Left Knee   Flexion: 5  Extension: 5     Left Ankle/Foot   Dorsiflexion: 5  Plantar flexion: 5  Great toe extension: 5     Timed up and go: 18.0 seconds  (24 seconds at IE), uses walker and UE assist with transfer  Five times sit to stand 20.2 seconds, without UE assist on one foam pad  Mod A x1 required for standing balance activities in order to be completed safely    Assessment/Plan  Pt is making progress re: his ability to descend stairs and with his performance measures. Recommend continuing with PT evaluation and traetment to maintain mobility and mitigate fall risk. Skilled therapy is required in order to safely work on balance and standing activities.     Short term goals, 1 week: Tolerate HEP progression. met  Voice compliance with activity modification. met  Report improvement in symptoms. met     Long term goals, 6 weeks: Improve ZAK score to 30%.met  Improve LEFS to 70%. met  Improve timed up and go to 20 seconds.  progressing  Improve five times sit to stand to 17 seconds with UE assist and no foam pads. Met  New LTG: Improve five times sit to stand to 17 seconds with one foam pad and no UE assist. progressing  4+/5 strength or better throughout to allow safe ambulation and stair climbing.. progressing  Symptoms to be centralized. met  Independent with HEP. Progressing    Continue POC BIW for 5 weeks, primarily working on strength, activity tolerance, and functional mobility, as well as balance, standing tolerance, and stair climbing.  30 visits per POC, 90 day certification period     Daniel Joyner, PT, DPT, OCS  [place] license: [id number]  Physical Therapist      Timed:         Manual Therapy:         mins  09414;     Therapeutic Exercise:    15     mins  73741;     Neuromuscular Agustina:    15   mins  96706;    Therapeutic Activity:     15    mins  99566;     Gait Training:           mins  98127;     Ultrasound:          mins  22379;    Ionto                                   mins   59121  Self Care                            mins   01918    Un-Timed:  Electrical Stimulation:         mins  27468 ( );  Traction          mins 43742  Low Eval          Mins  95516  Mod Eval          Mins  76219  High Eval                            Mins  12083  Re-Eval                               mins  94570      Timed Treatment:   45  mins   Total Treatment:    45  mins    Certification Period: 12/5/2024 to 3/5/25  I certify that the therapy services are furnished while this patient is under my care.  The services outlined above are required for this patient and will be reviewed every 90 days.                PHYSICIAN: _____________________________                          Rocky Lozoya,

## 2025-02-13 ENCOUNTER — TREATMENT (OUTPATIENT)
Dept: PHYSICAL THERAPY | Facility: CLINIC | Age: 85
End: 2025-02-13
Payer: MEDICARE

## 2025-02-13 DIAGNOSIS — Z98.890 HISTORY OF LUMBAR LAMINECTOMY: ICD-10-CM

## 2025-02-13 DIAGNOSIS — Z74.09 MOBILITY IMPAIRED: Primary | ICD-10-CM

## 2025-02-13 DIAGNOSIS — R29.898 BILATERAL LEG WEAKNESS: ICD-10-CM

## 2025-02-13 DIAGNOSIS — M54.50 LOW BACK PAIN, UNSPECIFIED BACK PAIN LATERALITY, UNSPECIFIED CHRONICITY, UNSPECIFIED WHETHER SCIATICA PRESENT: ICD-10-CM

## 2025-02-13 NOTE — PROGRESS NOTES
Physical Therapy Daily Progress Note      Patient: Campbell Alex   : 1940  Diagnosis/ICD-10 Code:  Mobility impaired [Z74.09]  Referring practitioner: Rocky Lozoya DO  Date of Initial Visit: Type: THERAPY  Noted: 2024  Today's Date: 2025  Patient seen for 71 sessions             Subjective No significant changes to report.  He needs to leave a little early so he can take his wife somewhere.    Objective   See Exercise, Manual, and Modality Logs for complete treatment.       Assessment/Plan  Fatigue in legs noted throughout session when he is performing standing exercises.  Standing balance exercises need SBA/CGA at times.    Progress per Plan of Care           Timed:             Therapeutic Exercise:    15     mins  59743;     Neuromuscular Agustina:    15    mins  58227;    Therapeutic Activity:     8     mins  28109;         Timed Treatment:   38   mins   Total Treatment:     38   mins        Sergey Lakhani PTA  Physical Therapist Assistant

## 2025-02-20 ENCOUNTER — TREATMENT (OUTPATIENT)
Dept: PHYSICAL THERAPY | Facility: CLINIC | Age: 85
End: 2025-02-20
Payer: MEDICARE

## 2025-02-20 DIAGNOSIS — Z74.09 MOBILITY IMPAIRED: Primary | ICD-10-CM

## 2025-02-20 DIAGNOSIS — M54.50 LOW BACK PAIN, UNSPECIFIED BACK PAIN LATERALITY, UNSPECIFIED CHRONICITY, UNSPECIFIED WHETHER SCIATICA PRESENT: ICD-10-CM

## 2025-02-20 DIAGNOSIS — Z98.890 HISTORY OF LUMBAR LAMINECTOMY: ICD-10-CM

## 2025-02-20 DIAGNOSIS — R29.898 BILATERAL LEG WEAKNESS: ICD-10-CM

## 2025-02-20 NOTE — PROGRESS NOTES
Physical Therapy Daily Progress Note      Patient: Campbell Alex   : 1940  Diagnosis/ICD-10 Code:  Mobility impaired [Z74.09]  Referring practitioner: Rocky Lozoya DO  Date of Initial Visit: Type: THERAPY  Noted: 2024  Today's Date: 2025  Patient seen for 72 sessions             Subjective Pt states he was having great difficulty being able to look up and reach up to fix something high up in his window.    Objective   See Exercise, Manual, and Modality Logs for complete treatment.       Assessment/Plan  Good effort with exercises today.  Therapist instructed him multiple times to try improving his upper body ROM (improving his thoracic extension) when he stands up for sit to stand exercise and with other standing exercises.  He needed cues for him to  his knees/feet when performing step ups, so as not to trip himself.    Progress per Plan of Care           Timed:           Therapeutic Exercise:    15     mins  22107;     Neuromuscular Agustina:    15    mins  04518;    Therapeutic Activity:     8     mins  27525;           Timed Treatment:   38   mins   Total Treatment:     38   mins        Sergey Lakhani PTA  Physical Therapist Assistant

## 2025-02-21 ENCOUNTER — TREATMENT (OUTPATIENT)
Dept: PHYSICAL THERAPY | Facility: CLINIC | Age: 85
End: 2025-02-21
Payer: MEDICARE

## 2025-02-21 DIAGNOSIS — R29.898 BILATERAL LEG WEAKNESS: ICD-10-CM

## 2025-02-21 DIAGNOSIS — M54.50 LOW BACK PAIN, UNSPECIFIED BACK PAIN LATERALITY, UNSPECIFIED CHRONICITY, UNSPECIFIED WHETHER SCIATICA PRESENT: ICD-10-CM

## 2025-02-21 DIAGNOSIS — Z98.890 HISTORY OF LUMBAR LAMINECTOMY: ICD-10-CM

## 2025-02-21 DIAGNOSIS — Z74.09 MOBILITY IMPAIRED: Primary | ICD-10-CM

## 2025-02-21 NOTE — PROGRESS NOTES
Physical Therapy Daily Progress Note      Patient: Campbell Alex   : 1940  Diagnosis/ICD-10 Code:  Mobility impaired [Z74.09]  Referring practitioner: Rocky Lozoya DO  Date of Initial Visit: Type: THERAPY  Noted: 2024  Today's Date: 2025  Patient seen for 73 sessions             Subjective No significant change since yesterday's visit.  Pt needs to leave early today in order to get vehicle to his wife so that she can come to therapy.    Objective   See Exercise, Manual, and Modality Logs for complete treatment.       Assessment/Plan  Pt needed a few cues to maintain more upright posture when pushing Tank and when using rolling walker.    Progress per Plan of Care           Timed:           Therapeutic Exercise:    15     mins  56045;     Neuromuscular Agustina:    10    mins  80227;    Therapeutic Activity:     5     mins  51019;         Timed Treatment:   30   mins   Total Treatment:     30   mins        Sergey Lakhani PTA  Physical Therapist Assistant

## 2025-02-25 ENCOUNTER — TREATMENT (OUTPATIENT)
Dept: PHYSICAL THERAPY | Facility: CLINIC | Age: 85
End: 2025-02-25
Payer: MEDICARE

## 2025-02-25 DIAGNOSIS — Z98.890 HISTORY OF LUMBAR LAMINECTOMY: ICD-10-CM

## 2025-02-25 DIAGNOSIS — Z74.09 MOBILITY IMPAIRED: Primary | ICD-10-CM

## 2025-02-25 DIAGNOSIS — M54.50 LOW BACK PAIN, UNSPECIFIED BACK PAIN LATERALITY, UNSPECIFIED CHRONICITY, UNSPECIFIED WHETHER SCIATICA PRESENT: ICD-10-CM

## 2025-02-25 DIAGNOSIS — R29.898 BILATERAL LEG WEAKNESS: ICD-10-CM

## 2025-02-25 NOTE — PROGRESS NOTES
Physical Therapy Daily Progress Note      Patient: Campbell Alex   : 1940  Diagnosis/ICD-10 Code:  Mobility impaired [Z74.09]  Referring practitioner: Rocky Lozoya DO  Date of Initial Visit: Type: THERAPY  Noted: 2024  Today's Date: 2025  Patient seen for 74 sessions             Subjective Pt reports the shoes he wore last visit seemed to rub and aggravate his achilles when he was pushing the Tank in the clinic.  He wore different shoes today but does not want to push the Tank today.    Objective   See Exercise, Manual, and Modality Logs for complete treatment.       Assessment/Plan  Standing balance is improving as was his standing posture when cued.  Pt was only able to stay for 30 minute session again today.    Progress per Plan of Care           Timed:             Therapeutic Exercise:    15     mins  98551;     Neuromuscular Agustina:    10    mins  56212;      Timed Treatment:   30   mins   Total Treatment:     30   mins        Sergey Lakhani PTA  Physical Therapist Assistant

## 2025-02-27 ENCOUNTER — TREATMENT (OUTPATIENT)
Dept: PHYSICAL THERAPY | Facility: CLINIC | Age: 85
End: 2025-02-27
Payer: MEDICARE

## 2025-02-27 DIAGNOSIS — Z74.09 MOBILITY IMPAIRED: Primary | ICD-10-CM

## 2025-02-27 DIAGNOSIS — Z98.890 HISTORY OF LUMBAR LAMINECTOMY: ICD-10-CM

## 2025-02-27 DIAGNOSIS — M54.50 LOW BACK PAIN, UNSPECIFIED BACK PAIN LATERALITY, UNSPECIFIED CHRONICITY, UNSPECIFIED WHETHER SCIATICA PRESENT: ICD-10-CM

## 2025-02-27 DIAGNOSIS — R29.898 BILATERAL LEG WEAKNESS: ICD-10-CM

## 2025-02-27 PROCEDURE — 97110 THERAPEUTIC EXERCISES: CPT | Performed by: PHYSICAL THERAPIST

## 2025-02-27 PROCEDURE — 97112 NEUROMUSCULAR REEDUCATION: CPT | Performed by: PHYSICAL THERAPIST

## 2025-02-27 NOTE — PROGRESS NOTES
Physical Therapy Daily Progress Note      Patient: Campbell Alex   : 1940  Diagnosis/ICD-10 Code:  Mobility impaired [Z74.09]  Referring practitioner: Rocky Lozoya DO  Date of Initial Visit: Type: THERAPY  Noted: 2024  Today's Date: 2025  Patient seen for 75 sessions             Subjective Doing fairly well today.  Pt reports he needs to leave in 30 minutes to  his wife.    Objective   See Exercise, Manual, and Modality Logs for complete treatment.       Assessment/Plan  Pt was fatigued with extended Nu step time.  Tolerated other exercises well.    Progress per Plan of Care           Timed:           Therapeutic Exercise:    20     mins  27292;     Neuromuscular Agustina:    10    mins  29887;        Timed Treatment:   30   mins   Total Treatment:     30   mins        Sergey Lakhani PTA  Physical Therapist Assistant

## 2025-03-02 NOTE — PROGRESS NOTES
Encounter Date:03/07/2025    Last seen-6/25/2024      Patient ID: Campbell Alex is a 85 y.o. male.    Chief Complaint:  Status post stent  Atrial fibrillation  Hypertension  Dyslipidemia  Anticoagulation management    History of present illness  Since I have last seen, the patient has been without any chest discomfort ,shortness of breath, palpitations, dizziness or syncope.  Denies having any headache ,abdominal pain ,nausea, vomiting , diarrhea constipation, loss of weight or loss of appetite.  Denies having any excessive bruising ,hematuria or blood in the stool.    Review of all systems negative except as indicated.    Reviewed ROS.    Assessment and plan  ///////////////////  History  ===========   - History of atrial fibrillation  Has converted to sinus rhythm.  Patient is in sinus rhythm now.     -Anticoagulation-on Eliquis      -status post right coronary artery stent placement (2005 ).      Cardiac catheterization 03/10/2017 revealed normal left ventricular function.  Normal left main coronary artery. 50% lad distal to diagonal branch.  Ostial diagonal branch has 80-90% disease (moderate size vessel) RCA has 30-40% plaquing.  RCA stent was   patent.  One of the PDA branches had 70% disease at its ostium.     Echocardiogram 8/26/2023      Structurally and functionally normal cardiac valves except for mild tricuspid regurgitation.  No evidence for pulmonary hypertension is present..  Left ventricular size and contractility is normal with ejection fraction of 60%.  Stress Cardiolite test is negative for myocardial ischemia 08/01/2016.     -palpitations improved.  Holter monitor 01/18/2016 showed occasional premature atrial and ventricular contractions.     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       -allergy to darvon codeine Relafen and IVP dye  ===============   Plan  =============   History of atrial fibrillation  Has converted to sinus rhythm.-8/25/2023  EKG 9/12/2023-sinus rhythm  EKG 6/25/2024-sinus rhythm.  EKG 3/7/2025-sinus rhythm     Anticoagulation status was reviewed.  Continue Eliquis.  Observe for toxic effects.    Status post stent to RCA 2005.  Patient is not having any angina pectoris or congestive heart failure.     Hypertension-well-controlled  130/70.    Dyslipidemia-continue atorvastatin.     Medications were reviewed and updated.    Follow-up in the office in 6 months.    Further plan will depend on patient's progress.    Reviewed and updated 3/7/2025.  /////////////////////////////             Diagnosis Plan   1. Status post coronary artery stent placement        2. Essential hypertension        3. Coronary artery disease involving native coronary artery of native heart without angina pectoris        4. Mixed hyperlipidemia        LAB RESULTS (LAST 7 DAYS)    CBC        BMP        CMP         BNP        TROPONIN        CoAg        Creatinine Clearance  CrCl cannot be calculated (Patient's most recent lab result is older than the maximum 30 days allowed.).    ABG        Radiology  No radiology results for the last day                The following portions of the patient's history were reviewed and updated as appropriate: allergies, current medications, past family history, past medical history, past social history, past surgical history, and problem list.    Review of Systems   Constitutional: Negative for malaise/fatigue.   Cardiovascular:  Negative for chest pain, leg swelling, palpitations and syncope.   Respiratory:  Negative for shortness of breath.    Skin:  Negative for rash.   Gastrointestinal:  Negative for nausea and vomiting.   Neurological:  Negative for dizziness, light-headedness and numbness.   All other systems reviewed and are  negative.      Current Outpatient Medications:     acetaminophen (Tylenol) 325 MG tablet, Take 2 tablets by mouth Every 4 (Four) Hours As Needed for Fever or Mild Pain. Indications: Fever, Pain, Disp: , Rfl:     apixaban (ELIQUIS) 5 MG tablet tablet, Take 1 tablet by mouth 2 (Two) Times a Day. Indications: history of DVT/PE, Disp: , Rfl:     aspirin (aspirin) 81 MG EC tablet, Take 1 tablet by mouth Daily. Indications: Disease involving Lipid Deposits in the Arteries, Disp: , Rfl:     atorvastatin (LIPITOR) 20 MG tablet, Take 1 tablet by mouth Every Night. Indications: High Amount of Fats in the Blood, Disp: , Rfl:     azelastine (ASTELIN) 0.1 % nasal spray, 2 sprays into the nostril(s) as directed by provider 2 (Two) Times a Day. Use in each nostril as directed  Indications: Perennial Allergic Rhinitis (Patient taking differently: Administer 2 sprays into the nostril(s) as directed by provider 2 (Two) Times a Day. Use in each nostril as directed), Disp: 30 mL, Rfl: 3    Calcium Carbonate 1500 (600 Ca) MG tablet, Take 1 tablet by mouth Daily. Indications: Low Amount of Calcium in the Blood, Disp: , Rfl:     Fiber powder, Take 1 dose by mouth Daily As Needed (constipation). Indications: constipation, Disp: , Rfl:     Fiber, Corn Dextrin, powder, , Disp: , Rfl:     isosorbide mononitrate (IMDUR) 30 MG 24 hr tablet, Take 1 tablet by mouth Daily. Indications: Stable Angina Pectoris, Disp: , Rfl:     metoprolol tartrate (LOPRESSOR) 25 MG tablet, Take 1 tablet by mouth 2 (Two) Times a Day. Indications: High Blood Pressure Disorder, Disp: 180 tablet, Rfl: 3    pantoprazole (PROTONIX) 40 MG EC tablet, Take 1 tablet by mouth Daily. Indications: Gastroesophageal Reflux Disease, Disp: 90 tablet, Rfl: 1    tamsulosin (FLOMAX) 0.4 MG capsule 24 hr capsule, One bid  Indications: Benign Enlargement of Prostate, Disp: 60 capsule, Rfl: 3    Allergies   Allergen Reactions    Codeine GI Intolerance    Iodine Hives    Nabumetone Unknown  - High Severity    Propoxyphene Hives    Zoloft [Sertraline] GI Intolerance       Family History   Problem Relation Age of Onset    Arthritis Mother     Cancer Mother         Kj VICTORIA    Hypertension Brother     Hyperlipidemia Brother     Kidney disease Brother         KJ    Diabetes Brother        Past Surgical History:   Procedure Laterality Date    ANKLE SURGERY      CARDIAC CATHETERIZATION  2020    COLONOSCOPY      CORONARY STENT PLACEMENT      EYE SURGERY  1960    NOSE SURGERY      ROTATOR CUFF REPAIR      SKIN CANCER EXCISION      L shoulder    SPINAL FUSION  01/2024    SPINE SURGERY      UMBILICAL HERNIA REPAIR      UMBILICAL HERNIA REPAIR         Past Medical History:   Diagnosis Date    Allergic 1970    Anemia n0t sure    Anxiety     Arthritis     Benign prostatic hyperplasia 1980    Cancer     Cataract 1990    CHF (congestive heart failure) stint    Cholelithiasis 1958    Coronary artery disease     Erectile dysfunction 2020    HL (hearing loss) 1995    Hyperlipidemia     Hypertension     Osteopenia 1980    Prostatism     Renal insufficiency 1940    Suspected COVID-19 virus infection 09/07/2021    Urinary tract infection     Visual impairment 1948       Family History   Problem Relation Age of Onset    Arthritis Mother     Cancer Mother         Kj VICTORIA    Hypertension Brother     Hyperlipidemia Brother     Kidney disease Brother         KJ    Diabetes Brother        Social History     Socioeconomic History    Marital status:    Tobacco Use    Smoking status: Never     Passive exposure: Past    Smokeless tobacco: Never    Tobacco comments:     1-2/day   Vaping Use    Vaping status: Never Used   Substance and Sexual Activity    Alcohol use: Never     Comment: socially    Drug use: Never    Sexual activity: Not Currently     Partners: Female     Birth control/protection: None           ECG 12 Lead    Date/Time: 3/7/2025 11:29 AM  Performed by: Vilma Staples MD    Authorized by: Bel  "MD Vilma  Comparison: compared with previous ECG   Similar to previous ECG  Comparison to previous ECG: Normal sinus rhythm normal ECG 68/min normal axis normal intervals no ectopy no significant change from previous EKG.        Objective:       Physical Exam    /70 (BP Location: Right arm, Patient Position: Sitting, Cuff Size: Adult)   Pulse 68   Ht 185.4 cm (73\")   Wt 87.1 kg (192 lb)   SpO2 95%   BMI 25.33 kg/m²   The patient is alert, oriented and in no distress.    Vital signs as noted above.     Head and neck revealed no carotid bruits or jugular venous distension.  No thyromegaly or lymphadenopathy is present.     Lungs clear.  No wheezing.  Breath sounds are normal bilaterally.     Heart normal first and second heart sounds.  No murmur..  No pericardial rub is present.  No gallop is present.     Abdomen soft and nontender.  No organomegaly is present.     Extremities revealed good peripheral pulses without any pedal edema.     Skin warm and dry.     Musculoskeletal system is grossly normal.     CNS grossly normal.      Reviewed and updated.          "

## 2025-03-04 ENCOUNTER — TREATMENT (OUTPATIENT)
Dept: PHYSICAL THERAPY | Facility: CLINIC | Age: 85
End: 2025-03-04
Payer: MEDICARE

## 2025-03-04 DIAGNOSIS — Z74.09 MOBILITY IMPAIRED: Primary | ICD-10-CM

## 2025-03-04 DIAGNOSIS — Z98.890 HISTORY OF LUMBAR LAMINECTOMY: ICD-10-CM

## 2025-03-04 DIAGNOSIS — M54.50 LOW BACK PAIN, UNSPECIFIED BACK PAIN LATERALITY, UNSPECIFIED CHRONICITY, UNSPECIFIED WHETHER SCIATICA PRESENT: ICD-10-CM

## 2025-03-04 DIAGNOSIS — R29.898 BILATERAL LEG WEAKNESS: ICD-10-CM

## 2025-03-04 PROCEDURE — 97110 THERAPEUTIC EXERCISES: CPT | Performed by: PHYSICAL THERAPIST

## 2025-03-04 PROCEDURE — 97112 NEUROMUSCULAR REEDUCATION: CPT | Performed by: PHYSICAL THERAPIST

## 2025-03-04 NOTE — PROGRESS NOTES
Physical Therapy Daily Progress Note      Patient: Campbell Alex   : 1940  Diagnosis/ICD-10 Code:  Mobility impaired [Z74.09]  Referring practitioner: Rocky Lozoya DO  Date of Initial Visit: Type: THERAPY  Noted: 2024  Today's Date: 3/4/2025  Patient seen for 76 sessions             Subjective Pt is feeling better today, having had a good night sleep.    Objective   See Exercise, Manual, and Modality Logs for complete treatment.       Assessment/Plan  Pt was more stable with standing exercises today, and was able to maintain upright posture for longer period of time.    Progress per Plan of Care           Timed:           Therapeutic Exercise:    25     mins  19202;     Neuromuscular Agustina:    13    mins  15725;        Timed Treatment:   38   mins   Total Treatment:     38   mins        Sergey Lakhani PTA  Physical Therapist Assistant

## 2025-03-06 ENCOUNTER — TREATMENT (OUTPATIENT)
Dept: PHYSICAL THERAPY | Facility: CLINIC | Age: 85
End: 2025-03-06
Payer: MEDICARE

## 2025-03-06 DIAGNOSIS — Z98.890 HISTORY OF LUMBAR LAMINECTOMY: ICD-10-CM

## 2025-03-06 DIAGNOSIS — M54.50 LOW BACK PAIN, UNSPECIFIED BACK PAIN LATERALITY, UNSPECIFIED CHRONICITY, UNSPECIFIED WHETHER SCIATICA PRESENT: ICD-10-CM

## 2025-03-06 DIAGNOSIS — Z74.09 MOBILITY IMPAIRED: Primary | ICD-10-CM

## 2025-03-06 DIAGNOSIS — R29.898 BILATERAL LEG WEAKNESS: ICD-10-CM

## 2025-03-06 PROCEDURE — 97116 GAIT TRAINING THERAPY: CPT | Performed by: PHYSICAL THERAPIST

## 2025-03-06 PROCEDURE — 97110 THERAPEUTIC EXERCISES: CPT | Performed by: PHYSICAL THERAPIST

## 2025-03-06 NOTE — PROGRESS NOTES
Physical Therapy Daily Progress Note      Patient: Campbell Alex   : 1940  Diagnosis/ICD-10 Code:  Mobility impaired [Z74.09]  Referring practitioner: Rcoky Lozoya DO  Date of Initial Visit: Type: THERAPY  Noted: 2024  Today's Date: 3/6/2025  Patient seen for 77 sessions             Subjective Feeling ok today.  No significant changes to report since last visit.    Objective   See Exercise, Manual, and Modality Logs for complete treatment.       Assessment/Plan  Good effort with exercises today.  Pt was able to improve his gait while pushing the Tank with vc's from therapist.  Pt was not picking up his feet as he was pushing it, but after vc's, he had better and safer toe/foot clearance from the floor so as to not potentially trip and fall.    Progress per Plan of Care           Timed:           Therapeutic Exercise:    22     mins  47029;     Gait Trainin     mins  84336;         Timed Treatment:   30   mins   Total Treatment:     30   mins        Sergey Lakhani PTA  Physical Therapist Assistant

## 2025-03-07 ENCOUNTER — OFFICE VISIT (OUTPATIENT)
Dept: CARDIOLOGY | Facility: CLINIC | Age: 85
End: 2025-03-07
Payer: MEDICARE

## 2025-03-07 VITALS
HEART RATE: 68 BPM | HEIGHT: 73 IN | WEIGHT: 192 LBS | OXYGEN SATURATION: 95 % | BODY MASS INDEX: 25.45 KG/M2 | SYSTOLIC BLOOD PRESSURE: 132 MMHG | DIASTOLIC BLOOD PRESSURE: 70 MMHG

## 2025-03-07 DIAGNOSIS — I10 ESSENTIAL HYPERTENSION: ICD-10-CM

## 2025-03-07 DIAGNOSIS — E78.2 MIXED HYPERLIPIDEMIA: ICD-10-CM

## 2025-03-07 DIAGNOSIS — Z95.5 STATUS POST CORONARY ARTERY STENT PLACEMENT: Primary | ICD-10-CM

## 2025-03-07 DIAGNOSIS — I25.10 CORONARY ARTERY DISEASE INVOLVING NATIVE CORONARY ARTERY OF NATIVE HEART WITHOUT ANGINA PECTORIS: ICD-10-CM

## 2025-03-11 ENCOUNTER — TREATMENT (OUTPATIENT)
Dept: PHYSICAL THERAPY | Facility: CLINIC | Age: 85
End: 2025-03-11
Payer: MEDICARE

## 2025-03-11 DIAGNOSIS — Z98.890 HISTORY OF LUMBAR LAMINECTOMY: ICD-10-CM

## 2025-03-11 DIAGNOSIS — M54.50 LOW BACK PAIN, UNSPECIFIED BACK PAIN LATERALITY, UNSPECIFIED CHRONICITY, UNSPECIFIED WHETHER SCIATICA PRESENT: ICD-10-CM

## 2025-03-11 DIAGNOSIS — R29.898 BILATERAL LEG WEAKNESS: ICD-10-CM

## 2025-03-11 DIAGNOSIS — Z74.09 MOBILITY IMPAIRED: Primary | ICD-10-CM

## 2025-03-11 PROCEDURE — 97112 NEUROMUSCULAR REEDUCATION: CPT | Performed by: PHYSICAL THERAPIST

## 2025-03-11 PROCEDURE — 97110 THERAPEUTIC EXERCISES: CPT | Performed by: PHYSICAL THERAPIST

## 2025-03-11 NOTE — PROGRESS NOTES
Physical Therapy Daily Progress Note      Patient: Campbell Alex   : 1940  Diagnosis/ICD-10 Code:  Mobility impaired [Z74.09]  Referring practitioner: Rocky Lozoya DO  Date of Initial Visit: Type: THERAPY  Noted: 2024  Today's Date: 3/11/2025  Patient seen for 78 sessions             Subjective Pt feels that he needs to improve his left leg strength with kicking out to his left side.    Objective   See Exercise, Manual, and Modality Logs for complete treatment.       Assessment/Plan  Standing posture still presents with fwd lean.  Pt needs cues to correct this and to  his feet when walking, especially when pushing the Tank in clinic.    Progress per Plan of Care           Timed:             Therapeutic Exercise:    30     mins  86114;     Neuromuscular Agustina:    8    mins  99322;        Timed Treatment:   38   mins   Total Treatment:     38   mins        Sergey Lakhani PTA  Physical Therapist Assistant

## 2025-03-13 ENCOUNTER — TREATMENT (OUTPATIENT)
Dept: PHYSICAL THERAPY | Facility: CLINIC | Age: 85
End: 2025-03-13
Payer: MEDICARE

## 2025-03-13 DIAGNOSIS — R29.898 BILATERAL LEG WEAKNESS: ICD-10-CM

## 2025-03-13 DIAGNOSIS — Z98.890 HISTORY OF LUMBAR LAMINECTOMY: ICD-10-CM

## 2025-03-13 DIAGNOSIS — Z74.09 MOBILITY IMPAIRED: Primary | ICD-10-CM

## 2025-03-13 DIAGNOSIS — M54.50 LOW BACK PAIN, UNSPECIFIED BACK PAIN LATERALITY, UNSPECIFIED CHRONICITY, UNSPECIFIED WHETHER SCIATICA PRESENT: ICD-10-CM

## 2025-03-13 PROCEDURE — 97110 THERAPEUTIC EXERCISES: CPT | Performed by: PHYSICAL THERAPIST

## 2025-03-13 PROCEDURE — 97112 NEUROMUSCULAR REEDUCATION: CPT | Performed by: PHYSICAL THERAPIST

## 2025-03-13 NOTE — PROGRESS NOTES
Physical Therapy Daily Treatment Note      Patient: Campbell Alex   : 1940  Referring practitioner: Rocky Lozoya DO  Date of Initial Visit: Type: THERAPY  Noted: 2024  Today's Date: 3/13/2025  Patient seen for 79 sessions       Visit Diagnoses:    ICD-10-CM ICD-9-CM   1. Mobility impaired  Z74.09 799.89   2. History of lumbar laminectomy  Z98.890 V45.89   3. Low back pain, unspecified back pain laterality, unspecified chronicity, unspecified whether sciatica present  M54.50 724.2   4. Bilateral leg weakness  R29.898 729.89       Subjective No new complaints vs last visit. Feels therapy is helping to maintain mobility.    Objective   See Exercise, Manual, and Modality Logs for complete treatment.       Assessment/Plan Fatigued post visit. Pt progressing well.      Timed:         Manual Therapy:         mins  57425;     Therapeutic Exercise:    25     mins  19553;     Neuromuscular Agustina:    15   mins  32690;    Therapeutic Activity:          mins  96519;     Gait Training:           mins  27425;     Ultrasound:          mins  27875;    Ionto                                   mins   22473  Self Care                            mins   94672      Un-Timed:  Electrical Stimulation:         mins  49022 ( );  Dry Needling          mins self-pay  Traction          mins 62879  Canalith Repos         mins 51006    Timed Treatment:   40   mins   Total Treatment:     40   mins      Daniel Joyner, PT, PT, DPT, OCS  [place] license: [id number]

## 2025-03-17 ENCOUNTER — TELEPHONE (OUTPATIENT)
Dept: FAMILY MEDICINE CLINIC | Facility: CLINIC | Age: 85
End: 2025-03-17

## 2025-03-17 NOTE — TELEPHONE ENCOUNTER
"  Caller: Campbell Alex \"Zuleyma\"    Relationship: Self    Best call back number: [phone]     What is the best time to reach you: ANYTIME    Who are you requesting to speak with (clinical staff, provider,  specific staff member): DR RENTERIA    What was the call regarding: PATIENT IS REQUESTING TO KNOW IF JAVIER RENTERIA HAS RECORDS FROM THE PATIENTS SURGERY ON 01- AT [facility] IN [facility].    PATIENT IS REQUESTING TO KNOW IF DR RENTERIA ALSO HAS RECORDS FROM A SURGERY BY DR SIMEON AARON HIS, UROLOGIST TOWARDS THE END OF 2024.    PATIENT STATED LONG TERM CARE WILL BE REACHING OUT, AND HE NEEDS TO MAKE SURE DR RENTERIA HAS THESE RECORDS.    PATIENT IS REQUESTING TO KNOW IF HE MUST BE SEEN BY DEXTER BEFORE RECORDS ARE SENT TO LONG TERM CARE    Is it okay if the provider responds through [employer]: NO    PLEASE CALL TO DISCUSS AND ADVISE.  "

## 2025-03-18 ENCOUNTER — TREATMENT (OUTPATIENT)
Dept: PHYSICAL THERAPY | Facility: CLINIC | Age: 85
End: 2025-03-18
Payer: MEDICARE

## 2025-03-18 DIAGNOSIS — Z74.09 MOBILITY IMPAIRED: Primary | ICD-10-CM

## 2025-03-18 DIAGNOSIS — Z98.890 HISTORY OF LUMBAR LAMINECTOMY: ICD-10-CM

## 2025-03-18 DIAGNOSIS — M54.50 LOW BACK PAIN, UNSPECIFIED BACK PAIN LATERALITY, UNSPECIFIED CHRONICITY, UNSPECIFIED WHETHER SCIATICA PRESENT: ICD-10-CM

## 2025-03-18 DIAGNOSIS — R29.898 BILATERAL LEG WEAKNESS: ICD-10-CM

## 2025-03-18 PROCEDURE — 97112 NEUROMUSCULAR REEDUCATION: CPT | Performed by: PHYSICAL THERAPIST

## 2025-03-18 PROCEDURE — 97110 THERAPEUTIC EXERCISES: CPT | Performed by: PHYSICAL THERAPIST

## 2025-03-18 NOTE — PROGRESS NOTES
Physical Therapy Daily Treatment Note      Patient: Campbell Alex   : 1940  Referring practitioner: Rocky Lozoya DO  Date of Initial Visit: Type: THERAPY  Noted: 2024  Today's Date: 3/18/2025  Patient seen for 80 sessions       Visit Diagnoses:    ICD-10-CM ICD-9-CM   1. Mobility impaired  Z74.09 799.89   2. History of lumbar laminectomy  Z98.890 V45.89   3. Low back pain, unspecified back pain laterality, unspecified chronicity, unspecified whether sciatica present  M54.50 724.2   4. Bilateral leg weakness  R29.898 729.89       Subjective Under the weather on , unknown factors. No new complaints today.    Objective   See Exercise, Manual, and Modality Logs for complete treatment.       Assessment/Plan Good response to rx, reassess next visit.      Timed:         Manual Therapy:         mins  75881;     Therapeutic Exercise:    25     mins  62836;     Neuromuscular Agustina:    15    mins  83627;    Therapeutic Activity:          mins  55494;     Gait Training:           mins  54147;     Ultrasound:          mins  12281;    Ionto                                   mins   77787  Self Care                            mins   50996      Un-Timed:  Electrical Stimulation:         mins  06559 ( );  Dry Needling          mins self-pay  Traction          mins 17305  Canalith Repos         mins 73497    Timed Treatment:   40   mins   Total Treatment:     40   mins      Daniel Joyner PT, PT, DPT, OCS  [place] license: [id number]

## 2025-03-20 ENCOUNTER — TREATMENT (OUTPATIENT)
Dept: PHYSICAL THERAPY | Facility: CLINIC | Age: 85
End: 2025-03-20
Payer: MEDICARE

## 2025-03-20 DIAGNOSIS — R29.898 BILATERAL LEG WEAKNESS: ICD-10-CM

## 2025-03-20 DIAGNOSIS — M54.50 LOW BACK PAIN, UNSPECIFIED BACK PAIN LATERALITY, UNSPECIFIED CHRONICITY, UNSPECIFIED WHETHER SCIATICA PRESENT: ICD-10-CM

## 2025-03-20 DIAGNOSIS — Z74.09 MOBILITY IMPAIRED: Primary | ICD-10-CM

## 2025-03-20 DIAGNOSIS — Z98.890 HISTORY OF LUMBAR LAMINECTOMY: ICD-10-CM

## 2025-03-20 PROCEDURE — 97116 GAIT TRAINING THERAPY: CPT | Performed by: PHYSICAL THERAPIST

## 2025-03-20 PROCEDURE — 97112 NEUROMUSCULAR REEDUCATION: CPT | Performed by: PHYSICAL THERAPIST

## 2025-03-20 PROCEDURE — 97110 THERAPEUTIC EXERCISES: CPT | Performed by: PHYSICAL THERAPIST

## 2025-03-20 NOTE — PROGRESS NOTES
Re-Assessment / Re-Certification        Patient: Campbell Alex   : 1940  Diagnosis/ICD-10 Code:  Mobility impaired [Z74.09]  Referring practitioner: Rocky Lozoya DO  Date of Initial Visit: Type: THERAPY  Noted: 2024  Today's Date: 3/20/2025  Patient seen for 81 sessions      Subjective:     Subjective Questionnaire: Oswestry: 20%  LEFS 75%  Clinical Progress: Unchanged  Home Program Compliance: Yes  Treatment has included: therapeutic exercise, neuromuscular re-education, therapeutic activity, and gait training    Subjective Still primarily limited by LE weakness and limited endurance. Pt denies any recent falls and is able to go on short walks. Pt is still using a rolling wx for mobility. Pt feels his mobility has stabilized with therapy.  Objective   Strength/Myotome Testing      Lumbar      Right   Normal strength     Left Hip   Planes of Motion   Flexion: 4-     Left Knee   Flexion: 5  Extension: 5     Left Ankle/Foot   Dorsiflexion: 5  Plantar flexion: 5  Great toe extension: 5     Timed up and go: 17.5 seconds  (24 seconds at IE), uses walker and UE assist with transfer  Five times sit to stand 16.6 seconds, without UE assist on one foam pad  Mod A x1 required for standing balance activities in order to be completed safely    Assessment/Plan  Pt is making progress re: his ability to descend stairs and with his performance measures. Recommend continuing with PT evaluation and traetment to maintain mobility and mitigate fall risk. Skilled therapy is required in order to safely work on balance and standing activities.     Short term goals, 1 week: Tolerate HEP progression. met  Voice compliance with activity modification. met  Report improvement in symptoms. met     Long term goals, 6 weeks: Improve ZAK score to 30%.met  Improve LEFS to 70%. met  Improve timed up and go to 20 seconds. met  Improve five times sit to stand to 17 seconds with one foam pad and no UE assist. progressing  4+/5  strength or better throughout to allow safe ambulation and stair climbing.. progressing  Symptoms to be centralized. met  Independent with HEP. Progressing    Continue POC BIW for 5 weeks, primarily working on strength, activity tolerance, and functional mobility, as well as balance, standing tolerance, and stair climbing.  30 visits per POC, 90 day certification period     Daniel Joyner, PT, DPT, OCS  [place] license: [id number]  Physical Therapist      Timed:         Manual Therapy:         mins  88188;     Therapeutic Exercise:    15     mins  61813;     Neuromuscular Agustina:    15   mins  21243;    Therapeutic Activity:     15    mins  13908;     Gait Training:           mins  33123;     Ultrasound:          mins  85928;    Ionto                                   mins   26728  Self Care                            mins   04312    Un-Timed:  Electrical Stimulation:         mins  33391 ( );  Traction          mins 04259  Low Eval          Mins  21236  Mod Eval          Mins  26641  High Eval                            Mins  72495  Re-Eval                               mins  67041      Timed Treatment:   45  mins   Total Treatment:    45  mins    Certification Period: 12/5/2024 to 3/5/25  I certify that the therapy services are furnished while this patient is under my care.  The services outlined above are required for this patient and will be reviewed every 90 days.                PHYSICIAN: _____________________________                          Rocky Lozoya DO

## 2025-03-25 ENCOUNTER — TREATMENT (OUTPATIENT)
Dept: PHYSICAL THERAPY | Facility: CLINIC | Age: 85
End: 2025-03-25
Payer: MEDICARE

## 2025-03-25 DIAGNOSIS — Z98.890 HISTORY OF LUMBAR LAMINECTOMY: ICD-10-CM

## 2025-03-25 DIAGNOSIS — Z74.09 MOBILITY IMPAIRED: Primary | ICD-10-CM

## 2025-03-25 DIAGNOSIS — M54.50 LOW BACK PAIN, UNSPECIFIED BACK PAIN LATERALITY, UNSPECIFIED CHRONICITY, UNSPECIFIED WHETHER SCIATICA PRESENT: ICD-10-CM

## 2025-03-25 DIAGNOSIS — R29.898 BILATERAL LEG WEAKNESS: ICD-10-CM

## 2025-03-25 PROCEDURE — 97530 THERAPEUTIC ACTIVITIES: CPT | Performed by: PHYSICAL THERAPIST

## 2025-03-25 PROCEDURE — 97112 NEUROMUSCULAR REEDUCATION: CPT | Performed by: PHYSICAL THERAPIST

## 2025-03-25 PROCEDURE — 97110 THERAPEUTIC EXERCISES: CPT | Performed by: PHYSICAL THERAPIST

## 2025-03-25 NOTE — PROGRESS NOTES
Physical Therapy Daily Treatment Note      Patient: Campbell Alex   : 1940  Referring practitioner: Rocky Lozoya DO  Date of Initial Visit: Type: THERAPY  Noted: 2024  Today's Date: 3/25/2025  Patient seen for 82 sessions       Visit Diagnoses:    ICD-10-CM ICD-9-CM   1. Mobility impaired  Z74.09 799.89   2. History of lumbar laminectomy  Z98.890 V45.89   3. Low back pain, unspecified back pain laterality, unspecified chronicity, unspecified whether sciatica present  M54.50 724.2   4. Bilateral leg weakness  R29.898 729.89       Subjective Able to take trash out this weekend using rolling wx.    Objective   See Exercise, Manual, and Modality Logs for complete treatment.       Assessment/Plan Good response to therapy, fatigued post visit.      Timed:         Manual Therapy:         mins  16831;     Therapeutic Exercise:    15     mins  48986;     Neuromuscular Agustina:    15    mins  75811;    Therapeutic Activity:     10     mins  40669;     Gait Training:           mins  34205;     Ultrasound:          mins  09459;    Ionto                                   mins   45457  Self Care                            mins   14787      Un-Timed:  Electrical Stimulation:         mins  25390 ( );  Dry Needling          mins self-pay  Traction          mins 52188  Canalith Repos         mins 90723    Timed Treatment:   40   mins   Total Treatment:     40   mins      Daniel Joyner PT, PT, DPT, OCS  [place] license: [id number]

## 2025-03-27 ENCOUNTER — TREATMENT (OUTPATIENT)
Dept: PHYSICAL THERAPY | Facility: CLINIC | Age: 85
End: 2025-03-27
Payer: MEDICARE

## 2025-03-27 DIAGNOSIS — M54.50 LOW BACK PAIN, UNSPECIFIED BACK PAIN LATERALITY, UNSPECIFIED CHRONICITY, UNSPECIFIED WHETHER SCIATICA PRESENT: ICD-10-CM

## 2025-03-27 DIAGNOSIS — Z74.09 MOBILITY IMPAIRED: Primary | ICD-10-CM

## 2025-03-27 DIAGNOSIS — Z98.890 HISTORY OF LUMBAR LAMINECTOMY: ICD-10-CM

## 2025-03-27 DIAGNOSIS — R29.898 BILATERAL LEG WEAKNESS: ICD-10-CM

## 2025-03-27 PROCEDURE — 97110 THERAPEUTIC EXERCISES: CPT | Performed by: PHYSICAL THERAPIST

## 2025-03-27 PROCEDURE — 97112 NEUROMUSCULAR REEDUCATION: CPT | Performed by: PHYSICAL THERAPIST

## 2025-03-27 PROCEDURE — 97116 GAIT TRAINING THERAPY: CPT | Performed by: PHYSICAL THERAPIST

## 2025-03-27 NOTE — PROGRESS NOTES
Physical Therapy Daily Treatment Note      Patient: Campbell Alex   : 1940  Referring practitioner: Rocky Lozoya DO  Date of Initial Visit: Type: THERAPY  Noted: 2024  Today's Date: 3/27/2025  Patient seen for 83 sessions       Visit Diagnoses:    ICD-10-CM ICD-9-CM   1. Mobility impaired  Z74.09 799.89   2. History of lumbar laminectomy  Z98.890 V45.89   3. Low back pain, unspecified back pain laterality, unspecified chronicity, unspecified whether sciatica present  M54.50 724.2   4. Bilateral leg weakness  R29.898 729.89       Subjective No new complaints vs last visit.    Objective   See Exercise, Manual, and Modality Logs for complete treatment.       Assessment/Plan Fatigued post visit, tolerated treatment well.       Timed:         Manual Therapy:         mins  56071;     Therapeutic Exercise:    15     mins  99510;     Neuromuscular Agustina:    15    mins  67490;    Therapeutic Activity:     10     mins  80422;     Gait Training:           mins  34021;     Ultrasound:          mins  29286;    Ionto                                   mins   43950  Self Care                            mins   37480      Un-Timed:  Electrical Stimulation:         mins  29220 ( );  Dry Needling          mins self-pay  Traction          mins 78845  Canalith Repos         mins 34625    Timed Treatment:   40   mins   Total Treatment:     40   mins      Daniel Joyner, PT, PT, DPT, OCS  [place] license: [id number]

## 2025-04-01 ENCOUNTER — TREATMENT (OUTPATIENT)
Dept: PHYSICAL THERAPY | Facility: CLINIC | Age: 85
End: 2025-04-01
Payer: MEDICARE

## 2025-04-01 DIAGNOSIS — Z98.890 HISTORY OF LUMBAR LAMINECTOMY: ICD-10-CM

## 2025-04-01 DIAGNOSIS — R29.898 BILATERAL LEG WEAKNESS: ICD-10-CM

## 2025-04-01 DIAGNOSIS — M54.50 LOW BACK PAIN, UNSPECIFIED BACK PAIN LATERALITY, UNSPECIFIED CHRONICITY, UNSPECIFIED WHETHER SCIATICA PRESENT: ICD-10-CM

## 2025-04-01 DIAGNOSIS — Z74.09 MOBILITY IMPAIRED: Primary | ICD-10-CM

## 2025-04-01 PROCEDURE — 97110 THERAPEUTIC EXERCISES: CPT | Performed by: PHYSICAL THERAPIST

## 2025-04-01 PROCEDURE — 97112 NEUROMUSCULAR REEDUCATION: CPT | Performed by: PHYSICAL THERAPIST

## 2025-04-01 NOTE — PROGRESS NOTES
Physical Therapy Daily Treatment Note      Patient: Campbell Alex   : 1940  Referring practitioner: Rocky Lozoya DO  Date of Initial Visit: Type: THERAPY  Noted: 2024  Today's Date: 2025  Patient seen for 84 sessions       Visit Diagnoses:    ICD-10-CM ICD-9-CM   1. Mobility impaired  Z74.09 799.89   2. History of lumbar laminectomy  Z98.890 V45.89   3. Bilateral leg weakness  R29.898 729.89   4. Low back pain, unspecified back pain laterality, unspecified chronicity, unspecified whether sciatica present  M54.50 724.2       Subjective Pt reports feeling under the weather, relates this to not sleeping well last night. Was able to go on a short walk in his neighborhood yesterday.    Objective   See Exercise, Manual, and Modality Logs for complete treatment.       Assessment/Plan Progressing well.      Timed:         Manual Therapy:         mins  21586;     Therapeutic Exercise:    10     mins  05983;     Neuromuscular Agustina:    30    mins  68844;    Therapeutic Activity:          mins  42980;     Gait Training:           mins  71281;     Ultrasound:          mins  08234;    Ionto                                   mins   05621  Self Care                            mins   70684      Un-Timed:  Electrical Stimulation:         mins  80590 ( );  Dry Needling          mins self-pay  Traction          mins 53188  Canalith Repos         mins 04798    Timed Treatment:   40   mins   Total Treatment:     40   mins      Daniel Joyner PT, PT, DPT, OCS  [place] license: [id number]

## 2025-04-02 ENCOUNTER — LAB (OUTPATIENT)
Dept: FAMILY MEDICINE CLINIC | Facility: CLINIC | Age: 85
End: 2025-04-02
Payer: MEDICARE

## 2025-04-02 ENCOUNTER — OFFICE VISIT (OUTPATIENT)
Dept: FAMILY MEDICINE CLINIC | Facility: CLINIC | Age: 85
End: 2025-04-02
Payer: MEDICARE

## 2025-04-02 VITALS
HEART RATE: 86 BPM | HEIGHT: 73 IN | OXYGEN SATURATION: 98 % | BODY MASS INDEX: 26.24 KG/M2 | SYSTOLIC BLOOD PRESSURE: 132 MMHG | WEIGHT: 198 LBS | DIASTOLIC BLOOD PRESSURE: 78 MMHG

## 2025-04-02 DIAGNOSIS — R10.13 DYSPEPSIA: ICD-10-CM

## 2025-04-02 DIAGNOSIS — N30.80 ACUTE BACTERIAL SIMPLE CYSTITIS: Primary | ICD-10-CM

## 2025-04-02 DIAGNOSIS — R39.198 DIFFICULTY URINATING: ICD-10-CM

## 2025-04-02 DIAGNOSIS — R15.9 INCONTINENCE OF FECES, UNSPECIFIED FECAL INCONTINENCE TYPE: ICD-10-CM

## 2025-04-02 DIAGNOSIS — B96.89 ACUTE BACTERIAL SIMPLE CYSTITIS: Primary | ICD-10-CM

## 2025-04-02 LAB
BILIRUB BLD-MCNC: NEGATIVE MG/DL
CLARITY, POC: ABNORMAL
COLOR UR: YELLOW
GLUCOSE UR STRIP-MCNC: NEGATIVE MG/DL
KETONES UR QL: NEGATIVE
LEUKOCYTE EST, POC: ABNORMAL
NITRITE UR-MCNC: NEGATIVE MG/ML
PH UR: 7 [PH] (ref 5–8)
PROT UR STRIP-MCNC: ABNORMAL MG/DL
RBC # UR STRIP: ABNORMAL /UL
SP GR UR: 1.02 (ref 1–1.03)
UROBILINOGEN UR QL: ABNORMAL

## 2025-04-02 PROCEDURE — 81003 URINALYSIS AUTO W/O SCOPE: CPT | Performed by: STUDENT IN AN ORGANIZED HEALTH CARE EDUCATION/TRAINING PROGRAM

## 2025-04-02 PROCEDURE — 99214 OFFICE O/P EST MOD 30 MIN: CPT | Performed by: STUDENT IN AN ORGANIZED HEALTH CARE EDUCATION/TRAINING PROGRAM

## 2025-04-02 PROCEDURE — 1160F RVW MEDS BY RX/DR IN RCRD: CPT | Performed by: STUDENT IN AN ORGANIZED HEALTH CARE EDUCATION/TRAINING PROGRAM

## 2025-04-02 PROCEDURE — 87086 URINE CULTURE/COLONY COUNT: CPT | Performed by: STUDENT IN AN ORGANIZED HEALTH CARE EDUCATION/TRAINING PROGRAM

## 2025-04-02 PROCEDURE — 1159F MED LIST DOCD IN RCRD: CPT | Performed by: STUDENT IN AN ORGANIZED HEALTH CARE EDUCATION/TRAINING PROGRAM

## 2025-04-02 PROCEDURE — 1126F AMNT PAIN NOTED NONE PRSNT: CPT | Performed by: STUDENT IN AN ORGANIZED HEALTH CARE EDUCATION/TRAINING PROGRAM

## 2025-04-02 RX ORDER — NITROFURANTOIN 25; 75 MG/1; MG/1
100 CAPSULE ORAL 2 TIMES DAILY
Qty: 14 CAPSULE | Refills: 0 | Status: SHIPPED | OUTPATIENT
Start: 2025-04-02 | End: 2025-04-09

## 2025-04-02 RX ORDER — PANTOPRAZOLE SODIUM 40 MG/1
40 TABLET, DELAYED RELEASE ORAL DAILY
Qty: 30 TABLET | Refills: 0 | Status: SHIPPED | OUTPATIENT
Start: 2025-04-02

## 2025-04-02 NOTE — PROGRESS NOTES
"Chief Complaint  Chief Complaint   Patient presents with    Difficulty Urinating     Also has an odor    Abdominal Pain     All around , after he eats. belching       Subjective        Campbell Alex is a 85 y.o. male who presents to [facility].  History of Present Illness    Zuleyma is an 85-year-old male here for multiple concerns.    Cloudy urine  Patient reports cloudy, foul-smelling urine for the last 2-3 weeks  Has also had some dysuria at the end of his stream  Denies having similar symptoms in the past  Denies recent fever, back pain      Hiccups  Patient states that he gets large, uncomfortable hiccups after eating at times  Also sometimes has these when going to bed  Hiccup loud after eating, sometimes when going to bed      Fecal incontinence   Chronic problem  States that he has talked with GI as well as his PCP about this  Typically only an issue when stools are very loose        Objective   /78   Pulse 86   Ht 185.4 cm (73\")   Wt 89.8 kg (198 lb)   SpO2 98%   BMI 26.12 kg/m²     Estimated body mass index is 26.12 kg/m² as calculated from the following:    Height as of this encounter: 185.4 cm (73\").    Weight as of this encounter: 89.8 kg (198 lb).     Physical Exam   GEN: In no acute distress, non toxic appearing  HEENT: EOMI. Mucous membranes moist. Oropharynx without erythema or exudate.   CV: Regular rate and rhythm, no murmurs. No extremity edema.   RESP: Lungs clear to auscultation bilaterally.  No signs of respiratory distress on room air.  SKIN: No rashes  MSK: No joint erythema, deformity, or effusion.  No CVA tenderness.  NEURO: Alert and appropriate. CN 2-12 intact grossly.       PHQ-2 Depression Screening  Little interest or pleasure in doing things? Not at all   Feeling down, depressed, or hopeless? Several days   PHQ-2 Total Score 1         Result Review :              Assessment and Plan     Diagnoses and all orders for this visit:    1. Acute " bacterial simple cystitis (Primary)  Patient with cloudy, foul-smelling urine for 2-3 weeks and intermittent dysuria  Point-of-care urine with trace blood, large leukocytes  Will treat empirically with Macrobid x 7 days    Urine sent for culture    Given the blood present in urinalysis, recommend that patient have repeat urinalysis in 2-3 weeks to make sure hematuria has resolved.    -     Urine Culture - Urine, Urine, Clean Catch  -     POCT urinalysis dipstick, multipro      3. Dyspepsia  Large, uncomfortable hiccups occurring after eating and going to bed  Will treat empirically with pantoprazole to see if this helps his symptoms    4. Incontinence of feces  Chronic, uncontrolled condition  Recommend increasing fiber to help make stools a little more firm as his incontinence tends to be only with loose stools..  Patient states he has had issues with constipation in the past so we will need to be cautious with adding too much fiber.    Other orders  -     pantoprazole (PROTONIX) 40 MG EC tablet; Take 1 tablet by mouth Daily. Indications: Gastroesophageal Reflux Disease  Dispense: 30 tablet; Refill: 0  -     nitrofurantoin, macrocrystal-monohydrate, (Macrobid) 100 MG capsule; Take 1 capsule by mouth 2 (Two) Times a Day for 7 days.  Dispense: 14 capsule; Refill: 0            Follow Up     No follow-ups on file.

## 2025-04-04 LAB — BACTERIA SPEC AEROBE CULT: NORMAL

## 2025-04-10 ENCOUNTER — OFFICE VISIT (OUTPATIENT)
Dept: FAMILY MEDICINE CLINIC | Facility: CLINIC | Age: 85
End: 2025-04-10
Payer: MEDICARE

## 2025-04-10 VITALS
HEIGHT: 73 IN | SYSTOLIC BLOOD PRESSURE: 108 MMHG | BODY MASS INDEX: 24.68 KG/M2 | HEART RATE: 79 BPM | WEIGHT: 186.2 LBS | DIASTOLIC BLOOD PRESSURE: 58 MMHG | OXYGEN SATURATION: 97 %

## 2025-04-10 DIAGNOSIS — R82.90 BAD ODOR OF URINE: Primary | ICD-10-CM

## 2025-04-10 PROCEDURE — 99213 OFFICE O/P EST LOW 20 MIN: CPT | Performed by: NURSE PRACTITIONER

## 2025-04-10 PROCEDURE — 1160F RVW MEDS BY RX/DR IN RCRD: CPT | Performed by: NURSE PRACTITIONER

## 2025-04-10 PROCEDURE — 1126F AMNT PAIN NOTED NONE PRSNT: CPT | Performed by: NURSE PRACTITIONER

## 2025-04-10 PROCEDURE — 1159F MED LIST DOCD IN RCRD: CPT | Performed by: NURSE PRACTITIONER

## 2025-04-10 RX ORDER — NICOTINE 14MG/24HR
1 PATCH, TRANSDERMAL 24 HOURS TRANSDERMAL 2 TIMES DAILY
Qty: 60 CAPSULE | Refills: 0 | Status: SHIPPED | OUTPATIENT
Start: 2025-04-10

## 2025-04-10 NOTE — PROGRESS NOTES
"Chief Complaint  Primary Care Follow-Up (Cloudy urine. No other symptoms) and Nose Bleed (Last night for 2-3 hours)    Subjective          Campbell Alex presents to [facility]  History of Present Illness    Is here today with c/o urinary symptoms    Was seen recently (4/2/25) by his PCP, Dr. Ayala for similar complaints, was prescribed a 7-day course of macrobid  U/a was positive for leukocytes, culture was negative    He tells me that he initially thought the problem was resolving, however the odor has returned and it is cloudy again as well  He is not having any dysuria    He endorses that he does sometimes have a difficult time initiating the urine stream for about the past 5 weeks    He is taking flomax BID    He does get up multiple time nightly to urinate    He has had a procedure for enlarged prostate, he sees urologist, Dr. Mcmahan    Review of Systems   Constitutional:  Positive for activity change and fatigue.        Is feeling tired, had to cancel his PT twice this week    No fever, but did have a fever before taking the antibiotics   HENT:  Positive for rhinorrhea.    Respiratory: Negative.  Negative for shortness of breath.    Cardiovascular: Negative.  Negative for chest pain.   Gastrointestinal:  Positive for diarrhea.        Has some diarrhea, takes miralax and fiber daily because without it he will have constipation   Genitourinary:  Positive for frequency. Negative for dysuria, penile discharge and penile pain.        Urinary frequency, sometimes will not be able to go when he feels the urge   Allergic/Immunologic: Positive for environmental allergies.        Has been having seasonal allergy symptoms with runyy nose and headache   Neurological:  Positive for headaches.       Objective   Vital Signs:  /58   Pulse 79   Ht 185.4 cm (73\")   Wt 84.5 kg (186 lb 3.2 oz)   SpO2 97%   BMI 24.57 kg/m²     BP Readings from Last 3 Encounters:   04/10/25 108/58 "   04/02/25 132/78   03/07/25 132/70        Wt Readings from Last 3 Encounters:   04/10/25 84.5 kg (186 lb 3.2 oz)   04/02/25 89.8 kg (198 lb)   03/07/25 87.1 kg (192 lb)        BMI is within normal parameters. No other follow-up for BMI required.      Physical Exam  Constitutional:       Appearance: Normal appearance.   Neck:      Vascular: No carotid bruit.   Cardiovascular:      Rate and Rhythm: Normal rate and regular rhythm.      Heart sounds: Normal heart sounds.   Pulmonary:      Effort: Pulmonary effort is normal.      Breath sounds: Normal breath sounds.   Abdominal:      General: Bowel sounds are normal.      Palpations: Abdomen is soft.      Tenderness: There is no abdominal tenderness. There is no right CVA tenderness or left CVA tenderness.   Skin:     General: Skin is warm.   Neurological:      Mental Status: He is alert and oriented to person, place, and time.        Result Review :     Common labs          6/18/2024    08:18 10/23/2024    14:18 1/30/2025    13:01   Common Labs   Glucose   126    BUN   26    Creatinine   1.10    Sodium   137    Potassium   4.4    Chloride   101    Calcium   9.4    Albumin   4.4    Total Bilirubin   0.3    Alkaline Phosphatase   52    AST (SGOT)   22    ALT (SGPT)   9    WBC 8.45  6.28  6.63    Hemoglobin 11.9  11.8  11.7    Hematocrit 36.9  35.7  35.6    Platelets 329  186  188      UA          4/2/2025    11:14   Urinalysis   Ketones, UA Negative    Leukocytes, UA Large (3+)      Urine Culture          4/2/2025    11:40   Urine Culture   Urine Culture 25,000 CFU/mL Normal Urogenital [place]                Assessment and Plan    Diagnoses and all orders for this visit:    1. Bad odor of urine (Primary)  -     Urinalysis With Microscopic - Urine, Clean Catch; Future  -     Urine Culture - Urine, Urine, Clean Catch; Future  -     Saccharomyces boulardii (Probiotic) 250 MG capsule; Take 250 mg by mouth 2 (Two) Times a Day.  Dispense: 60 capsule; Refill: 0    Advised to call  his urologist, Dr. Mcmahan, regarding his persistent urinary complaints    Recommended a daily probiotic for his GI complaints, and to keep his scheduled appointment with his PCP,  which is scheduled for 4/23/25         Follow Up   Return for Next scheduled follow up.  Patient was given instructions and counseling regarding his condition or for health maintenance advice. Please see specific information pulled into the AVS if appropriate.

## 2025-04-11 PROCEDURE — 87086 URINE CULTURE/COLONY COUNT: CPT | Performed by: NURSE PRACTITIONER

## 2025-04-11 PROCEDURE — 81001 URINALYSIS AUTO W/SCOPE: CPT | Performed by: NURSE PRACTITIONER

## 2025-04-11 RX ORDER — AZELASTINE 1 MG/ML
2 SPRAY, METERED NASAL 2 TIMES DAILY
Qty: 30 ML | Refills: 3 | Status: SHIPPED | OUTPATIENT
Start: 2025-04-11

## 2025-04-11 NOTE — TELEPHONE ENCOUNTER
"Caller: Campbell Alex \"Zuleyma\"    Relationship: Self    Best call back number: [phone]    Requested Prescriptions:   Requested Prescriptions     Pending Prescriptions Disp Refills    azelastine (ASTELIN) 0.1 % nasal spray 30 mL 3     Sig: Administer 2 sprays into the nostril(s) as directed by provider 2 (Two) Times a Day. Use in each nostril as directed  Indications: Perennial Allergic Rhinitis        Pharmacy where request should be sent: [facility] [id number]  [place] [address] - [phone] [facility] [phone] [facility]     Last office visit with prescribing clinician: 1/31/2025   Last telemedicine visit with prescribing clinician: Visit date not found   Next office visit with prescribing clinician: 4/23/2025    Does the patient have less than a 3 day supply:  [x] Yes  [] No    Would you like a call back once the refill request has been completed: [] Yes [x] No    If the office needs to give you a call back, can they leave a voicemail: [] Yes [x] No    Raul Spring Rep   04/11/25 10:00 EDT           "

## 2025-04-23 ENCOUNTER — OFFICE VISIT (OUTPATIENT)
Dept: FAMILY MEDICINE CLINIC | Facility: CLINIC | Age: 85
End: 2025-04-23
Payer: MEDICARE

## 2025-04-23 VITALS
HEART RATE: 77 BPM | BODY MASS INDEX: 24.57 KG/M2 | HEIGHT: 73 IN | SYSTOLIC BLOOD PRESSURE: 114 MMHG | OXYGEN SATURATION: 94 % | RESPIRATION RATE: 18 BRPM | DIASTOLIC BLOOD PRESSURE: 64 MMHG | WEIGHT: 185.4 LBS

## 2025-04-23 DIAGNOSIS — J30.89 NON-SEASONAL ALLERGIC RHINITIS DUE TO OTHER ALLERGIC TRIGGER: Primary | ICD-10-CM

## 2025-04-23 DIAGNOSIS — B96.89 ACUTE BACTERIAL SIMPLE CYSTITIS: ICD-10-CM

## 2025-04-23 DIAGNOSIS — N30.80 ACUTE BACTERIAL SIMPLE CYSTITIS: ICD-10-CM

## 2025-04-23 DIAGNOSIS — M85.80 OSTEOPENIA, UNSPECIFIED LOCATION: ICD-10-CM

## 2025-04-23 DIAGNOSIS — D50.9 IRON DEFICIENCY ANEMIA, UNSPECIFIED IRON DEFICIENCY ANEMIA TYPE: ICD-10-CM

## 2025-04-23 DIAGNOSIS — R63.4 UNINTENTIONAL WEIGHT LOSS: ICD-10-CM

## 2025-04-23 NOTE — PROGRESS NOTES
"Chief Complaint  Chief Complaint   Patient presents with    Follow-up     3 week follow up     Subjective        Campbell Alex is a 85 y.o. male who presents to [facility].    History of Present Illness    History of Present Illness  The patient is an 85-year-old male presenting for follow-up.    Chief complaint: Headaches, runny nose, sinus pain attributed to allergies.  Reports occasional confusion during allergy-related headaches. Using Azelastine and Flonase nasal sprays. Limited outdoor activities and significant bed rest over the past two weeks likely related to UTI.     Daily 2400 mg calcium for the past year per surgeon's recommendation before spinal surgery.  He is wondering if this is too much.    Iron supplements discontinued by hematology.  Has follow-up next week for reevaluation.    Currently treating UTI with Augmentin, last dose tonight. Follow-up with urologist on Monday. Weight loss of approximately 5 pounds during UTI treatment, appetite improving.    No physical therapy this month due to  weakness.    Objective   /64   Pulse 77   Resp 18   Ht 185.4 cm (73\")   Wt 84.1 kg (185 lb 6.4 oz)   SpO2 94%   BMI 24.46 kg/m²     Estimated body mass index is 24.46 kg/m² as calculated from the following:    Height as of this encounter: 185.4 cm (73\").    Weight as of this encounter: 84.1 kg (185 lb 6.4 oz).     Physical Exam   GEN: In no acute distress, non toxic appearing  HEENT: Pupils equal and reactive to light, sclera clear. Mucous membranes moist. Oropharynx without erythema or exudate. No cervical or submandibular lymphadenopathy.    CV: Regular rate and rhythm, no murmurs  RESP: Lungs clear to auscultation anteriorly and posteriorly in all lung fields bilaterally.  NEURO: AAO to person, place, and time. CN 2-12 intact grossly.   PSYCH: Affect normal, insight fair     Result Review :              Assessment and Plan     Diagnoses and all orders for this " visit:    1. Non-seasonal allergic rhinitis due to other allergic trigger (Primary)    2. Acute bacterial simple cystitis    3. Hematuria, unspecified type    4. Dyspepsia    5. Incontinence of feces, unspecified fecal incontinence type          Assessment & Plan  Allergic rhinitis  - Symptoms: headaches, runny nose, congestion possibly related to allergies or sinus congestion  - Exam: no wheezing, good air movement  - Plan: continue nasal sprays (Azelastine, Flonase), start cetirizine (Zyrtec) 10 mg at night  - Advise hydration and limit driving until symptoms improve    Urinary tract infection (UTI)  - Currently on Augmentin, last dose tonight, symptoms improved  - Exam: weakness, confusion, possibly related to UTI  - Plan: follow-up with urologist on Monday  - Continue monitoring    Hypocalcemia  - Taking 2400 mg calcium daily per orthopedic surgeon's recommendation  - Plan: reduce calcium intake to 1200 mg daily    BECKIE  - F/u w/ hematology next week     Weight loss  - Lost approximately 5 pounds, likely due to decreased appetite from UTI  - Appetite improving  - Plan: monitor weight and nutritional intake      Follow Up   Pending above    Patient or patient representative verbalized consent for the use of Ambient Listening during the visit with  Rocky Lozoya DO for chart documentation. 4/23/2025  15:05 EDT

## 2025-04-25 PROCEDURE — 88305 TISSUE EXAM BY PATHOLOGIST: CPT | Performed by: OTOLARYNGOLOGY

## 2025-04-28 ENCOUNTER — LAB REQUISITION (OUTPATIENT)
Dept: LAB | Facility: HOSPITAL | Age: 85
End: 2025-04-28
Payer: MEDICARE

## 2025-04-28 DIAGNOSIS — D48.2 NEOPLASM OF UNCERTAIN BEHAVIOR OF PERIPHERAL NERVES AND AUTONOMIC NERVOUS SYSTEM: ICD-10-CM

## 2025-04-29 ENCOUNTER — TREATMENT (OUTPATIENT)
Dept: PHYSICAL THERAPY | Facility: CLINIC | Age: 85
End: 2025-04-29
Payer: MEDICARE

## 2025-04-29 DIAGNOSIS — R29.898 BILATERAL LEG WEAKNESS: ICD-10-CM

## 2025-04-29 DIAGNOSIS — M54.50 LOW BACK PAIN, UNSPECIFIED BACK PAIN LATERALITY, UNSPECIFIED CHRONICITY, UNSPECIFIED WHETHER SCIATICA PRESENT: ICD-10-CM

## 2025-04-29 DIAGNOSIS — Z98.890 HISTORY OF LUMBAR LAMINECTOMY: ICD-10-CM

## 2025-04-29 DIAGNOSIS — Z74.09 MOBILITY IMPAIRED: Primary | ICD-10-CM

## 2025-04-29 LAB
LAB AP CASE REPORT: NORMAL
PATH REPORT.FINAL DX SPEC: NORMAL
PATH REPORT.GROSS SPEC: NORMAL

## 2025-04-29 PROCEDURE — 97164 PT RE-EVAL EST PLAN CARE: CPT | Performed by: PHYSICAL THERAPIST

## 2025-04-29 PROCEDURE — 97110 THERAPEUTIC EXERCISES: CPT | Performed by: PHYSICAL THERAPIST

## 2025-04-29 PROCEDURE — 97530 THERAPEUTIC ACTIVITIES: CPT | Performed by: PHYSICAL THERAPIST

## 2025-04-29 PROCEDURE — 97112 NEUROMUSCULAR REEDUCATION: CPT | Performed by: PHYSICAL THERAPIST

## 2025-04-29 NOTE — PROGRESS NOTES
Re-Assessment / Re-Certification        Patient: Campbell Alex   : 1940  Diagnosis/ICD-10 Code:  Mobility impaired [Z74.09]  Referring practitioner: Rocky Lozoya DO  Date of Initial Visit: Type: THERAPY  Noted: 2024  Today's Date: 2025  Patient seen for 85 sessions      Subjective:     Subjective Questionnaire: Oswestry: 40%  LEFS 56%  Clinical Progress: Regression  Home Program Compliance: Yes  Treatment has included: therapeutic exercise, neuromuscular re-education, therapeutic activity, and gait training    Subjective Pt reports significant regression in his mobility and activity tolerance vs his last visit here on 25. Pt relates this to having a UTI. Reports his UTI has subsided but continues to be limited by weakness.  Objective   Strength/Myotome Testing      Lumbar      Right   Normal strength     Left Hip   Planes of Motion   Flexion: 3     Left Knee   Flexion: 4  Extension: 4     Left Ankle/Foot   Dorsiflexion: 4  Plantar flexion: 4  Great toe extension: 4     Timed up and go: 23.5 seconds  (24 seconds at IE), uses walker and UE assist with transfer  Five times sit to stand 21 seconds, without UE assist on two foam pads  Mod A x1 required for standing balance activities in order to be completed safely    Assessment/Plan  Pt is with a significant regression in activity tolerance, function, and strength due to recent UTI with subsequent status change. Recommend resuming PT POC to address his impairments.     Short term goals, 1 week: Tolerate HEP progression. met  Voice compliance with activity modification. met  Report improvement in symptoms. met     Long term goals, 6 weeks: Improve ZAK score to 30%.not met  Improve LEFS to 70%. Not met  Improve timed up and go to 20 seconds. Not met  Improve five times sit to stand to 17 seconds with one foam pad and no UE assist. Not met  4+/5 strength or better throughout to allow safe ambulation and stair climbing. Not met  Symptoms to be  centralized. met  Independent with HEP. Progressing    Resume POC BIW for 5 weeks, primarily working on strength, activity tolerance, and functional mobility, as well as balance, standing tolerance, and stair climbing.  30 visits per POC, 90 day certification period     Daniel Joyner, PT, DPT, OCS  [place] license: [id number]  Physical Therapist      Timed:         Manual Therapy:         mins  18234;     Therapeutic Exercise:    15     mins  58885;     Neuromuscular Agustina:    15   mins  06360;    Therapeutic Activity:     13    mins  34860;     Gait Training:           mins  49334;     Ultrasound:          mins  13147;    Ionto                                   mins   40881  Self Care                            mins   32196    Un-Timed:  Electrical Stimulation:         mins  03643 ( );  Traction          mins 91241  Low Eval          Mins  17131  Mod Eval          Mins  01603  High Eval                            Mins  27070  Re-Eval                           15    mins  05434      Timed Treatment:   38  mins   Total Treatment:    53  mins    Certification Period: 12/5/2024 to 3/5/25  I certify that the therapy services are furnished while this patient is under my care.  The services outlined above are required for this patient and will be reviewed every 90 days.                PHYSICIAN: _____________________________                          Rocky Lozoya DO apy

## 2025-04-30 ENCOUNTER — LAB (OUTPATIENT)
Dept: LAB | Facility: HOSPITAL | Age: 85
End: 2025-04-30
Payer: MEDICARE

## 2025-04-30 DIAGNOSIS — D50.9 IRON DEFICIENCY ANEMIA, UNSPECIFIED IRON DEFICIENCY ANEMIA TYPE: ICD-10-CM

## 2025-04-30 DIAGNOSIS — K90.9 MALABSORPTION OF IRON: ICD-10-CM

## 2025-04-30 LAB
ALBUMIN SERPL-MCNC: 4.1 G/DL (ref 3.5–5.2)
ALBUMIN/GLOB SERPL: 1.4 G/DL
ALP SERPL-CCNC: 68 U/L (ref 39–117)
ALT SERPL W P-5'-P-CCNC: 10 U/L (ref 1–41)
ANION GAP SERPL CALCULATED.3IONS-SCNC: 8.6 MMOL/L (ref 5–15)
AST SERPL-CCNC: 24 U/L (ref 1–40)
BASOPHILS # BLD AUTO: 0.05 10*3/MM3 (ref 0–0.2)
BASOPHILS NFR BLD AUTO: 0.6 % (ref 0–1.5)
BILIRUB SERPL-MCNC: 0.3 MG/DL (ref 0–1.2)
BUN SERPL-MCNC: 22 MG/DL (ref 8–23)
BUN/CREAT SERPL: 23.4 (ref 7–25)
CALCIUM SPEC-SCNC: 9.5 MG/DL (ref 8.6–10.5)
CHLORIDE SERPL-SCNC: 100 MMOL/L (ref 98–107)
CO2 SERPL-SCNC: 27.4 MMOL/L (ref 22–29)
CREAT SERPL-MCNC: 0.94 MG/DL (ref 0.76–1.27)
DEPRECATED RDW RBC AUTO: 44.7 FL (ref 37–54)
EGFRCR SERPLBLD CKD-EPI 2021: 78.5 ML/MIN/1.73
EOSINOPHIL # BLD AUTO: 0.71 10*3/MM3 (ref 0–0.4)
EOSINOPHIL NFR BLD AUTO: 8.9 % (ref 0.3–6.2)
ERYTHROCYTE [DISTWIDTH] IN BLOOD BY AUTOMATED COUNT: 13.7 % (ref 12.3–15.4)
FERRITIN SERPL-MCNC: 58.4 NG/ML (ref 30–400)
FOLATE SERPL-MCNC: >20 NG/ML (ref 4.78–24.2)
GLOBULIN UR ELPH-MCNC: 2.9 GM/DL
GLUCOSE SERPL-MCNC: 132 MG/DL (ref 65–99)
HCT VFR BLD AUTO: 33.1 % (ref 37.5–51)
HGB BLD-MCNC: 11 G/DL (ref 13–17.7)
IRON 24H UR-MRATE: 50 MCG/DL (ref 59–158)
IRON SATN MFR SERPL: 18 % (ref 20–50)
LYMPHOCYTES # BLD AUTO: 1.61 10*3/MM3 (ref 0.7–3.1)
LYMPHOCYTES NFR BLD AUTO: 20.2 % (ref 19.6–45.3)
MCH RBC QN AUTO: 30.9 PG (ref 26.6–33)
MCHC RBC AUTO-ENTMCNC: 33.2 G/DL (ref 31.5–35.7)
MCV RBC AUTO: 93 FL (ref 79–97)
MONOCYTES # BLD AUTO: 0.76 10*3/MM3 (ref 0.1–0.9)
MONOCYTES NFR BLD AUTO: 9.5 % (ref 5–12)
NEUTROPHILS NFR BLD AUTO: 4.85 10*3/MM3 (ref 1.7–7)
NEUTROPHILS NFR BLD AUTO: 60.8 % (ref 42.7–76)
PLATELET # BLD AUTO: 283 10*3/MM3 (ref 140–450)
PMV BLD AUTO: 9.4 FL (ref 6–12)
POTASSIUM SERPL-SCNC: 4.4 MMOL/L (ref 3.5–5.2)
PROT SERPL-MCNC: 7 G/DL (ref 6–8.5)
RBC # BLD AUTO: 3.56 10*6/MM3 (ref 4.14–5.8)
SODIUM SERPL-SCNC: 136 MMOL/L (ref 136–145)
TIBC SERPL-MCNC: 280 MCG/DL (ref 298–536)
TRANSFERRIN SERPL-MCNC: 188 MG/DL (ref 200–360)
VIT B12 BLD-MCNC: 1466 PG/ML (ref 211–946)
WBC NRBC COR # BLD AUTO: 7.98 10*3/MM3 (ref 3.4–10.8)

## 2025-04-30 PROCEDURE — 80053 COMPREHEN METABOLIC PANEL: CPT | Performed by: STUDENT IN AN ORGANIZED HEALTH CARE EDUCATION/TRAINING PROGRAM

## 2025-04-30 PROCEDURE — 85025 COMPLETE CBC W/AUTO DIFF WBC: CPT

## 2025-04-30 PROCEDURE — 82607 VITAMIN B-12: CPT | Performed by: STUDENT IN AN ORGANIZED HEALTH CARE EDUCATION/TRAINING PROGRAM

## 2025-04-30 PROCEDURE — 36415 COLL VENOUS BLD VENIPUNCTURE: CPT

## 2025-04-30 PROCEDURE — 83540 ASSAY OF IRON: CPT | Performed by: STUDENT IN AN ORGANIZED HEALTH CARE EDUCATION/TRAINING PROGRAM

## 2025-04-30 PROCEDURE — 82728 ASSAY OF FERRITIN: CPT | Performed by: STUDENT IN AN ORGANIZED HEALTH CARE EDUCATION/TRAINING PROGRAM

## 2025-04-30 PROCEDURE — 84466 ASSAY OF TRANSFERRIN: CPT | Performed by: STUDENT IN AN ORGANIZED HEALTH CARE EDUCATION/TRAINING PROGRAM

## 2025-04-30 PROCEDURE — 82746 ASSAY OF FOLIC ACID SERUM: CPT | Performed by: STUDENT IN AN ORGANIZED HEALTH CARE EDUCATION/TRAINING PROGRAM

## 2025-05-01 ENCOUNTER — TREATMENT (OUTPATIENT)
Dept: PHYSICAL THERAPY | Facility: CLINIC | Age: 85
End: 2025-05-01
Payer: MEDICARE

## 2025-05-01 DIAGNOSIS — R29.898 BILATERAL LEG WEAKNESS: ICD-10-CM

## 2025-05-01 DIAGNOSIS — M54.50 LOW BACK PAIN, UNSPECIFIED BACK PAIN LATERALITY, UNSPECIFIED CHRONICITY, UNSPECIFIED WHETHER SCIATICA PRESENT: ICD-10-CM

## 2025-05-01 DIAGNOSIS — Z98.890 HISTORY OF LUMBAR LAMINECTOMY: ICD-10-CM

## 2025-05-01 DIAGNOSIS — Z74.09 MOBILITY IMPAIRED: Primary | ICD-10-CM

## 2025-05-01 PROCEDURE — 97110 THERAPEUTIC EXERCISES: CPT | Performed by: PHYSICAL THERAPIST

## 2025-05-01 PROCEDURE — 97530 THERAPEUTIC ACTIVITIES: CPT | Performed by: PHYSICAL THERAPIST

## 2025-05-01 NOTE — PROGRESS NOTES
Physical Therapy Daily Progress Note      Patient: Campbell Alex   : 1940  Diagnosis/ICD-10 Code:  Mobility impaired [Z74.09]  Referring practitioner: Rocky Lozoya DO  Date of Initial Visit: Type: THERAPY  Noted: 2024  Today's Date: 2025  Patient seen for 86 sessions             Subjective Pt says he isn't feeling as well today.  His BP is a little elevated today.    Objective   See Exercise, Manual, and Modality Logs for complete treatment.       Assessment/Plan  Pt struggled with exercises today due to feeling a little ill.    Progress per Plan of Care           Timed:           Therapeutic Exercise:    15     mins  09881;     Therapeutic Activity:     15     mins  70426;         Timed Treatment:   30   mins   Total Treatment:     30   mins        Sergey Lakhani PTA  Physical Therapist Assistant       The patient is a 78y Female complaining of lacerations.

## 2025-05-05 NOTE — PROGRESS NOTES
HEMATOLOGY ONCOLOGY OUTPATIENT FOLLOWUP       Patient name: Campbell Alex  : 1940  MRN: [id number]  Primary Care Physician: Rocky Lozoya DO  Referring Physician: Rocky Lozoya, *  Reason For Consult: anemia      History of Present Illness:  Patient is a 85 y.o. male who was referred for anemia.  Patient has had Hemoccult positive stool no other signs of bleeding he does complain of ongoing fatigue.  He has medical history of renal insufficiency, coronary artery disease, CHF among other comorbid conditions    24 Hb 11, hct 33.2, MCV 90.5, platelets 188, WBC 5.24 SPEP was unremarkable with no monoclonal protein.  Patient has been on oral iron    2024 iron saturation 17, TIBC 226, ferritin of 311, normal haptoglobin, folate, reticulocyte count, normal LDH normal vitamin B12 levels    3/2024 - venofer 300 x2  2024 - EGD/colonoscopy - hiatal hernia, diverticulosis, colon polyp.  24 - Hb 11.9, iron sat 16,     Patient has not had any EGD colonoscopy recently    10/30/2024: Patient seen today for follow-up.  He was previously being seen by Dr. Templeton in our practice.  He has underlying history of iron deficiency anemia for unclear etiology.  He had received IV iron supplementation in 2024.    Patient reported feeling well at this time.  Denied any acute complaints.  Reported that he is planned for a PillCam study at [facility] to further evaluate for suspected GI bleed/AVMs.  He is currently on oral iron supplementation, reported good compliance and tolerance.  Also taking aspirin and Eliquis for history of heart disease and A-fib.    2025: Patient seen today for routine follow-up.  He reports overall doing well.  He does have stable fatigue and weakness.  He continues to follow with physical therapy.  Reviewed laboratory exams that show stable anemia.  Normal B12, folate and iron levels reported.  He does report upcoming appointment  with gastroenterology due to constipation and GI symptoms.      Subjective:  5/7/25: Patient seen today for follow-up visit.  Continues to be clinically stable.  Reported having some weakness and limited mobility due to history of spinal surgery as above.  Continues to be on Eliquis without any bleeding issues.  Noted to have a UTI about a month ago associated with mild hematuria but this has resolved.  He is currently off of oral iron supplementation.      Past Medical History:   Diagnosis Date    Allergic 1970    Anemia n0t sure    Anxiety     Arthritis     Benign prostatic hyperplasia 1980    Callus can't remember    Cancer     Cataract 1990    CHF (congestive heart failure) stint    Cholelithiasis 1958    Chronic constipation     Coronary artery disease     Difficulty walking see above    corrected    Erectile dysfunction 2020    HL (hearing loss) 1995    Hyperlipidemia     Hypertension     Injury of back     Osteopenia 1980    Plantar fasciitis 1955    corrected    Prostatism     Renal insufficiency 1940    Stress fracture tendon to left big toe not connected    Suspected COVID-19 virus infection 09/07/2021    Urinary tract infection     Visual impairment 1948       Past Surgical History:   Procedure Laterality Date    ANKLE SURGERY      CARDIAC CATHETERIZATION  2020    COLONOSCOPY      CORONARY STENT PLACEMENT      EYE SURGERY  1960    FOOT SURGERY  2015    NOSE SURGERY      ROTATOR CUFF REPAIR      SKIN CANCER EXCISION      L shoulder    SPINAL FUSION  01/2024    SPINE SURGERY      TOENAIL EXCISION  present    UMBILICAL HERNIA REPAIR      UMBILICAL HERNIA REPAIR           Current Outpatient Medications:     acetaminophen (Tylenol) 325 MG tablet, Take 2 tablets by mouth Every 4 (Four) Hours As Needed for Fever or Mild Pain. Indications: Fever, Pain, Disp: , Rfl:     apixaban (ELIQUIS) 5 MG tablet tablet, Take 1 tablet by mouth 2 (Two) Times a Day. Indications: history of DVT/PE, Disp: , Rfl:     aspirin  (aspirin) 81 MG EC tablet, Take 1 tablet by mouth Daily. Indications: Disease involving Lipid Deposits in the Arteries, Disp: , Rfl:     atorvastatin (LIPITOR) 20 MG tablet, Take 1 tablet by mouth Every Night. Indications: High Amount of Fats in the Blood, Disp: , Rfl:     azelastine (ASTELIN) 0.1 % nasal spray, Administer 2 sprays into the nostril(s) as directed by provider 2 (Two) Times a Day. Use in each nostril as directed  Indications: Perennial Allergic Rhinitis, Disp: 30 mL, Rfl: 3    Calcium Carbonate 1500 (600 Ca) MG tablet, Take 1 tablet by mouth Daily. Indications: Low Amount of Calcium in the Blood, Disp: , Rfl:     Fiber powder, Take 1 dose by mouth Daily As Needed (constipation). Indications: constipation, Disp: , Rfl:     Fiber, Corn Dextrin, powder, , Disp: , Rfl:     isosorbide mononitrate (IMDUR) 30 MG 24 hr tablet, Take 1 tablet by mouth Daily. Indications: Stable Angina Pectoris, Disp: , Rfl:     metoprolol tartrate (LOPRESSOR) 25 MG tablet, Take 1 tablet by mouth 2 (Two) Times a Day. Indications: High Blood Pressure Disorder, Disp: 180 tablet, Rfl: 3    nitrofurantoin, macrocrystal-monohydrate, (Macrobid) 100 MG capsule, Take 1 capsule by mouth 2 (Two) Times a Day., Disp: 14 capsule, Rfl: 0    pantoprazole (PROTONIX) 40 MG EC tablet, Take 1 tablet by mouth Daily. Indications: Gastroesophageal Reflux Disease, Disp: 30 tablet, Rfl: 0    Saccharomyces boulardii (Probiotic) 250 MG capsule, Take 250 mg by mouth 2 (Two) Times a Day., Disp: 60 capsule, Rfl: 0    tamsulosin (FLOMAX) 0.4 MG capsule 24 hr capsule, One bid  Indications: Benign Enlargement of Prostate, Disp: 60 capsule, Rfl: 3    Allergies   Allergen Reactions    Codeine GI Intolerance    Iodine Hives    Nabumetone Unknown - High Severity    Propoxyphene Hives    Zoloft [Sertraline] GI Intolerance       Family History   Problem Relation Age of Onset    Arthritis Mother     Cancer Mother         Kj RAEMILEE    Hypertension Brother      Hyperlipidemia Brother     Kidney disease Brother         QUITA    Dislocations Brother         hip    Diabetes Brother        Cancer-related family history includes Cancer in his mother.      Social History     Tobacco Use    Smoking status: Never     Passive exposure: Past    Smokeless tobacco: Never    Tobacco comments:     1-2/day   Vaping Use    Vaping status: Never Used   Substance Use Topics    Alcohol use: Never     Comment: socially    Drug use: Never     Social History     Social History Narrative    Not on file       ROS:   Review of Systems   Constitutional:  Positive for fatigue. Negative for fever.   HENT:  Negative for congestion and nosebleeds.    Eyes:  Negative for pain.   Respiratory:  Negative for cough and shortness of breath.    Cardiovascular:  Negative for chest pain.   Gastrointestinal:  Negative for abdominal pain, blood in stool, diarrhea, nausea and vomiting.   Endocrine: Negative for cold intolerance and heat intolerance.   Genitourinary:  Negative for difficulty urinating.   Musculoskeletal:  Negative for arthralgias.   Skin:  Negative for rash.   Neurological:  Negative for dizziness and headaches.   Hematological:  Does not bruise/bleed easily.   Psychiatric/Behavioral:  Negative for behavioral problems.          Objective:    Vital Signs:  There were no vitals filed for this visit.          There is no height or weight on file to calculate BMI.    ECOG  (1) Restricted in physically strenuous activity, ambulatory and able to do work of light nature     Physical Exam:   Physical Exam  Constitutional:       Appearance: Normal appearance.   HENT:      Head: Normocephalic and atraumatic.   Eyes:      Pupils: Pupils are equal, round, and reactive to light.   Cardiovascular:      Rate and Rhythm: Normal rate and regular rhythm.      Pulses: Normal pulses.      Heart sounds: No murmur heard.  Pulmonary:      Effort: Pulmonary effort is normal.      Breath sounds: Normal breath sounds.  "  Abdominal:      General: There is no distension.      Palpations: Abdomen is soft. There is no mass.      Tenderness: There is no abdominal tenderness.   Musculoskeletal:         General: Normal range of motion.      Cervical back: Normal range of motion and neck supple.   Skin:     General: Skin is warm.   Neurological:      General: No focal deficit present.      Mental Status: He is alert.   Psychiatric:         Mood and Affect: Mood normal.         Lab Results - Last 18 Months   Lab Units 04/30/25  1054 01/30/25  1301 10/23/24  1418   WBC 10*3/mm3 7.98 6.63 6.28   HEMOGLOBIN g/dL 11.0* 11.7* 11.8*   HEMATOCRIT % 33.1* 35.6* 35.7*   PLATELETS 10*3/mm3 283 188 186   MCV fL 93.0 95.2 93.5     Lab Results - Last 18 Months   Lab Units 04/30/25  1054 01/30/25  1301 12/29/23  1548 11/21/23  1804   SODIUM mmol/L 136 137 135* 136   POTASSIUM mmol/L 4.4 4.4 3.7 4.9   CHLORIDE mmol/L 100 101 101 100   CO2 mmol/L 27.4 28.2 24.0 24.0   BUN mg/dL 22 26* 19 22   CREATININE mg/dL 0.94 1.10 0.80 0.78   CALCIUM mg/dL 9.5 9.4 9.0 10.0   BILIRUBIN mg/dL 0.3 0.3  --  0.4   ALK PHOS U/L 68 52  --  99   ALT (SGPT) U/L 10 9  --  10   AST (SGOT) U/L 24 22  --  22   GLUCOSE mg/dL 132* 126* 118* 111*       Lab Results   Component Value Date    GLUCOSE 132 (H) 04/30/2025    BUN 22 04/30/2025    CREATININE 0.94 04/30/2025    EGFRIFNONA 73 12/17/2021    BCR 23.4 04/30/2025    K 4.4 04/30/2025    CO2 27.4 04/30/2025    CALCIUM 9.5 04/30/2025    ALBUMIN 4.1 04/30/2025    AST 24 04/30/2025    ALT 10 04/30/2025       Lab Results - Last 18 Months   Lab Units 12/29/23  1548   INR  1.05   APTT seconds 31.3*       Lab Results   Component Value Date    IRON 50 (L) 04/30/2025    TIBC 280 (L) 04/30/2025    FERRITIN 58.40 04/30/2025       Lab Results   Component Value Date    FOLATE >20.00 04/30/2025       No results found for: \"OCCULTBLD\"    Lab Results   Component Value Date    RETICCTPCT 1.16 01/30/2025     Lab Results   Component Value Date    " "[id number] 1,466 (H) 04/30/2025     No results found for: \"SPEP\", \"UPEP\"  LDH   Date Value Ref Range Status   02/20/2024 180 135 - 225 U/L Final     Lab Results   Component Value Date    SEDRATE 9 12/17/2021     Lab Results   Component Value Date    HAPTOGLOBIN 140 02/20/2024     Lab Results   Component Value Date    PTT 31.3 (H) 12/29/2023    INR 1.05 12/29/2023     No results found for: \"\"  No results found for: \"CEA\"  No components found for: \"CA-19-9\"  Lab Results   Component Value Date    PSA 1.180 01/20/2020     Lab Results   Component Value Date    SEDRATE 9 12/17/2021          Assessment & Plan     Patient is a 85-year-old male with normocytic anemia, iron deficiency    Normocytic anemia  Patient has been on oral iron continues to have mildly low iron saturation ferritin is normal  Iron deficiency is playing a role in his anemia I have recommended to infusions of Venofer 300 mg given the fact that he has been on oral iron and his iron saturation still seems to be slightly low  His retic count does not seem to be appropriately elevated based on his hemoglobin so there is likely some underlying bone marrow depression as well  He does not have hemolysis based on normal haptoglobin, LDH and reticulocyte count  Patient received 3 doses of IV Venofer in February 2024.  -Repeat CBC showed persistent normocytic anemia with hemoglobin 11.7 grams per deciliter.  Iron profile not consistent with iron deficiency at this time.  -With ongoing constipation advised patient to hold oral iron supplementation to see if any improvement.  -Repeat labs reviewed, Hb is 11.0, down from 11.7. S ferritin 58 and TSAT 18%. resume oral iron every other day.   Given that hb is stable at this point no bone marrow biopsy.  Reviewed with patient    4-month follow-up with labs, sooner if condition indicates      Thank you very much for providing the opportunity to participate in this patient’s care. Please do not hesitate to call if " there are any other questions.

## 2025-05-06 ENCOUNTER — TREATMENT (OUTPATIENT)
Dept: PHYSICAL THERAPY | Facility: CLINIC | Age: 85
End: 2025-05-06
Payer: MEDICARE

## 2025-05-06 DIAGNOSIS — Z74.09 MOBILITY IMPAIRED: Primary | ICD-10-CM

## 2025-05-06 DIAGNOSIS — Z98.890 HISTORY OF LUMBAR LAMINECTOMY: ICD-10-CM

## 2025-05-06 DIAGNOSIS — M54.50 LOW BACK PAIN, UNSPECIFIED BACK PAIN LATERALITY, UNSPECIFIED CHRONICITY, UNSPECIFIED WHETHER SCIATICA PRESENT: ICD-10-CM

## 2025-05-06 DIAGNOSIS — R29.898 BILATERAL LEG WEAKNESS: ICD-10-CM

## 2025-05-06 PROCEDURE — 97110 THERAPEUTIC EXERCISES: CPT | Performed by: PHYSICAL THERAPIST

## 2025-05-06 PROCEDURE — 97112 NEUROMUSCULAR REEDUCATION: CPT | Performed by: PHYSICAL THERAPIST

## 2025-05-06 NOTE — PROGRESS NOTES
Physical Therapy Daily Progress Note      Patient: Campbell Alex   : 1940  Diagnosis/ICD-10 Code:  Mobility impaired [Z74.09]  Referring practitioner: Rocky Lozoya DO  Date of Initial Visit: Type: THERAPY  Noted: 2024  Today's Date: 2025  Patient seen for 87 sessions             Subjective Pt is feeling stronger the past few days.    Objective   See Exercise, Manual, and Modality Logs for complete treatment.       Assessment/Plan  Pt had better tolerance with exercises today than he did last visit.  Progress strengthening, upright posture and balance.    Progress per Plan of Care           Timed:             Therapeutic Exercise:    10     mins  50844;     Neuromuscular Agustina:    28    mins  99141;        Timed Treatment:   38   mins   Total Treatment:     38   mins        Sergey Lakhani PTA  Physical Therapist Assistant

## 2025-05-07 ENCOUNTER — OFFICE VISIT (OUTPATIENT)
Dept: ONCOLOGY | Facility: CLINIC | Age: 85
End: 2025-05-07
Payer: MEDICARE

## 2025-05-07 VITALS
WEIGHT: 186.2 LBS | SYSTOLIC BLOOD PRESSURE: 112 MMHG | BODY MASS INDEX: 24.68 KG/M2 | HEIGHT: 73 IN | HEART RATE: 66 BPM | OXYGEN SATURATION: 98 % | DIASTOLIC BLOOD PRESSURE: 72 MMHG

## 2025-05-07 DIAGNOSIS — D64.89 ANEMIA DUE TO OTHER CAUSE, NOT CLASSIFIED: ICD-10-CM

## 2025-05-07 DIAGNOSIS — D50.9 IRON DEFICIENCY ANEMIA, UNSPECIFIED IRON DEFICIENCY ANEMIA TYPE: Primary | ICD-10-CM

## 2025-05-07 DIAGNOSIS — K90.9 MALABSORPTION OF IRON: ICD-10-CM

## 2025-05-07 DIAGNOSIS — R19.5 POSITIVE OCCULT STOOL BLOOD TEST: ICD-10-CM

## 2025-05-07 PROCEDURE — 99214 OFFICE O/P EST MOD 30 MIN: CPT | Performed by: STUDENT IN AN ORGANIZED HEALTH CARE EDUCATION/TRAINING PROGRAM

## 2025-05-07 PROCEDURE — 1126F AMNT PAIN NOTED NONE PRSNT: CPT | Performed by: STUDENT IN AN ORGANIZED HEALTH CARE EDUCATION/TRAINING PROGRAM

## 2025-05-07 RX ORDER — CLONIDINE HYDROCHLORIDE 0.2 MG/1
TABLET ORAL
COMMUNITY

## 2025-05-08 ENCOUNTER — TREATMENT (OUTPATIENT)
Dept: PHYSICAL THERAPY | Facility: CLINIC | Age: 85
End: 2025-05-08
Payer: MEDICARE

## 2025-05-08 DIAGNOSIS — Z98.890 HISTORY OF LUMBAR LAMINECTOMY: ICD-10-CM

## 2025-05-08 DIAGNOSIS — R29.898 BILATERAL LEG WEAKNESS: ICD-10-CM

## 2025-05-08 DIAGNOSIS — Z74.09 MOBILITY IMPAIRED: Primary | ICD-10-CM

## 2025-05-08 DIAGNOSIS — M54.50 LOW BACK PAIN, UNSPECIFIED BACK PAIN LATERALITY, UNSPECIFIED CHRONICITY, UNSPECIFIED WHETHER SCIATICA PRESENT: ICD-10-CM

## 2025-05-08 PROCEDURE — 97530 THERAPEUTIC ACTIVITIES: CPT | Performed by: PHYSICAL THERAPIST

## 2025-05-08 PROCEDURE — 97110 THERAPEUTIC EXERCISES: CPT | Performed by: PHYSICAL THERAPIST

## 2025-05-08 PROCEDURE — 97112 NEUROMUSCULAR REEDUCATION: CPT | Performed by: PHYSICAL THERAPIST

## 2025-05-08 NOTE — PROGRESS NOTES
Physical Therapy Daily Treatment Note      Patient: Campbell Alex   : 1940  Referring practitioner: Rocky Lozoya DO  Date of Initial Visit: Type: THERAPY  Noted: 2024  Today's Date: 2025  Patient seen for 88 sessions       Visit Diagnoses:    ICD-10-CM ICD-9-CM   1. Mobility impaired  Z74.09 799.89   2. History of lumbar laminectomy  Z98.890 V45.89   3. Bilateral leg weakness  R29.898 729.89   4. Low back pain, unspecified back pain laterality, unspecified chronicity, unspecified whether sciatica present  M54.50 724.2       Subjective Feeling stronger vs last visit and last week. HEP going well.     Objective   See Exercise, Manual, and Modality Logs for complete treatment.       Assessment/Plan Fatigued post visit..       Timed:         Manual Therapy:         mins  42026;     Therapeutic Exercise:    15     mins  99354;     Neuromuscular Agustina:    15    mins  80929;    Therapeutic Activity:     10     mins  75804;     Gait Training:           mins  04300;     Ultrasound:          mins  23069;    Ionto                                   mins   06289  Self Care                            mins   17529      Un-Timed:  Electrical Stimulation:         mins  96095 ( );  Dry Needling          mins self-pay  Traction          mins 20444  Canalith Repos         mins 66842    Timed Treatment:   40   mins   Total Treatment:     40   mins      Daniel Joyner PT, PT, DPT, OCS  [place] license: [id number]

## 2025-05-13 ENCOUNTER — TREATMENT (OUTPATIENT)
Dept: PHYSICAL THERAPY | Facility: CLINIC | Age: 85
End: 2025-05-13
Payer: MEDICARE

## 2025-05-13 DIAGNOSIS — Z74.09 MOBILITY IMPAIRED: Primary | ICD-10-CM

## 2025-05-13 DIAGNOSIS — R29.898 BILATERAL LEG WEAKNESS: ICD-10-CM

## 2025-05-13 DIAGNOSIS — Z98.890 HISTORY OF LUMBAR LAMINECTOMY: ICD-10-CM

## 2025-05-13 DIAGNOSIS — M54.50 LOW BACK PAIN, UNSPECIFIED BACK PAIN LATERALITY, UNSPECIFIED CHRONICITY, UNSPECIFIED WHETHER SCIATICA PRESENT: ICD-10-CM

## 2025-05-13 PROCEDURE — 97530 THERAPEUTIC ACTIVITIES: CPT | Performed by: PHYSICAL THERAPIST

## 2025-05-13 PROCEDURE — 97110 THERAPEUTIC EXERCISES: CPT | Performed by: PHYSICAL THERAPIST

## 2025-05-13 PROCEDURE — 97112 NEUROMUSCULAR REEDUCATION: CPT | Performed by: PHYSICAL THERAPIST

## 2025-05-13 NOTE — PROGRESS NOTES
Physical Therapy Daily Treatment Note      Patient: Campbell Alex   : 1940  Referring practitioner: Rocky Lozoya DO  Date of Initial Visit: Type: THERAPY  Noted: 2024  Today's Date: 2025  Patient seen for 89 sessions       Visit Diagnoses:    ICD-10-CM ICD-9-CM   1. Mobility impaired  Z74.09 799.89   2. History of lumbar laminectomy  Z98.890 V45.89   3. Bilateral leg weakness  R29.898 729.89   4. Low back pain, unspecified back pain laterality, unspecified chronicity, unspecified whether sciatica present  M54.50 724.2       Subjective No new complaints vs last visit. HEP going well. Having difficulty with stairs.    Objective   See Exercise, Manual, and Modality Logs for complete treatment.       Assessment/Plan Fatigued post visit.       Timed:         Manual Therapy:         mins  65560;     Therapeutic Exercise:    15     mins  58113;     Neuromuscular Agustina:    15    mins  62499;    Therapeutic Activity:     10     mins  38350;     Gait Training:           mins  55518;     Ultrasound:          mins  49743;    Ionto                                   mins   13770  Self Care                            mins   43163      Un-Timed:  Electrical Stimulation:         mins  84178 ( );  Dry Needling          mins self-pay  Traction          mins 75456  Canalith Repos         mins 39691    Timed Treatment:   40   mins   Total Treatment:     40   mins      Daniel Joyner PT, PT, DPT, OCS  [place] license: [id number]

## 2025-05-15 ENCOUNTER — TREATMENT (OUTPATIENT)
Dept: PHYSICAL THERAPY | Facility: CLINIC | Age: 85
End: 2025-05-15
Payer: MEDICARE

## 2025-05-15 DIAGNOSIS — Z98.890 HISTORY OF LUMBAR LAMINECTOMY: ICD-10-CM

## 2025-05-15 DIAGNOSIS — M54.50 LOW BACK PAIN, UNSPECIFIED BACK PAIN LATERALITY, UNSPECIFIED CHRONICITY, UNSPECIFIED WHETHER SCIATICA PRESENT: ICD-10-CM

## 2025-05-15 DIAGNOSIS — Z74.09 MOBILITY IMPAIRED: Primary | ICD-10-CM

## 2025-05-15 DIAGNOSIS — R29.898 BILATERAL LEG WEAKNESS: ICD-10-CM

## 2025-05-15 PROCEDURE — 97112 NEUROMUSCULAR REEDUCATION: CPT | Performed by: PHYSICAL THERAPIST

## 2025-05-15 PROCEDURE — 97530 THERAPEUTIC ACTIVITIES: CPT | Performed by: PHYSICAL THERAPIST

## 2025-05-15 PROCEDURE — 97110 THERAPEUTIC EXERCISES: CPT | Performed by: PHYSICAL THERAPIST

## 2025-05-15 NOTE — PROGRESS NOTES
Physical Therapy Daily Treatment Note      Patient: Campbell Alex   : 1940  Referring practitioner: Rocky Lozoya DO  Date of Initial Visit: Type: THERAPY  Noted: 2024  Today's Date: 5/15/2025  Patient seen for 90 sessions       Visit Diagnoses:    ICD-10-CM ICD-9-CM   1. Mobility impaired  Z74.09 799.89   2. History of lumbar laminectomy  Z98.890 V45.89   3. Bilateral leg weakness  R29.898 729.89   4. Low back pain, unspecified back pain laterality, unspecified chronicity, unspecified whether sciatica present  M54.50 724.2       Subjective Pt able to go on a short walk in his neighborhood using rolling wx, which went well. Continues to be limited with standing activities. HEP going well.    Objective   See Exercise, Manual, and Modality Logs for complete treatment.       Assessment/Plan Difficulty with stance phase that is long in duration. Tolerated visit well but fatigued relatively easily.      Timed:         Manual Therapy:         mins  46677;     Therapeutic Exercise:    15     mins  70396;     Neuromuscular Agustina:    15    mins  03341;    Therapeutic Activity:     10     mins  43283;     Gait Training:           mins  69948;     Ultrasound:          mins  33590;    Ionto                                   mins   58486  Self Care                            mins   10446      Un-Timed:  Electrical Stimulation:         mins  26663 ( );  Dry Needling          mins self-pay  Traction          mins 76075  Canalith Repos         mins 82249    Timed Treatment:   40   mins   Total Treatment:     40   mins      Daniel Joyner, PT, PT, DPT, OCS  [place] license: [id number]

## 2025-05-20 ENCOUNTER — TREATMENT (OUTPATIENT)
Dept: PHYSICAL THERAPY | Facility: CLINIC | Age: 85
End: 2025-05-20
Payer: MEDICARE

## 2025-05-20 DIAGNOSIS — M54.50 LOW BACK PAIN, UNSPECIFIED BACK PAIN LATERALITY, UNSPECIFIED CHRONICITY, UNSPECIFIED WHETHER SCIATICA PRESENT: ICD-10-CM

## 2025-05-20 DIAGNOSIS — R29.898 BILATERAL LEG WEAKNESS: Primary | ICD-10-CM

## 2025-05-20 DIAGNOSIS — Z98.890 HISTORY OF LUMBAR LAMINECTOMY: ICD-10-CM

## 2025-05-20 DIAGNOSIS — Z74.09 MOBILITY IMPAIRED: ICD-10-CM

## 2025-05-20 PROCEDURE — 97110 THERAPEUTIC EXERCISES: CPT | Performed by: PHYSICAL THERAPIST

## 2025-05-20 PROCEDURE — 97530 THERAPEUTIC ACTIVITIES: CPT | Performed by: PHYSICAL THERAPIST

## 2025-05-20 PROCEDURE — 97112 NEUROMUSCULAR REEDUCATION: CPT | Performed by: PHYSICAL THERAPIST

## 2025-05-20 NOTE — PROGRESS NOTES
Physical Therapy Daily Treatment Note      Patient: Campbell Alex   : 1940  Referring practitioner: Rocky Lozoya DO  Date of Initial Visit: Type: THERAPY  Noted: 2024  Today's Date: 2025  Patient seen for 91 sessions       Visit Diagnoses:    ICD-10-CM ICD-9-CM   1. Bilateral leg weakness  R29.898 729.89   2. History of lumbar laminectomy  Z98.890 V45.89   3. Mobility impaired  Z74.09 799.89   4. Low back pain, unspecified back pain laterality, unspecified chronicity, unspecified whether sciatica present  M54.50 724.2       Subjective No new complaints  vs last visit. Pt reports elbow and hand pain when using his rolling wx and with transfers. Pt would like to address these impairments.    Objective   See Exercise, Manual, and Modality Logs for complete treatment.       Assessment/Plan Tolerating progression well. Reassess next visit for UE impairments that may be related to pt's current mobility impairments.      Timed:         Manual Therapy:         mins  60355;     Therapeutic Exercise:    15     mins  96873;     Neuromuscular Agustina:    15    mins  22092;    Therapeutic Activity:     10     mins  36865;     Gait Training:           mins  32270;     Ultrasound:          mins  30382;    Ionto                                   mins   51607  Self Care                            mins   38617      Un-Timed:  Electrical Stimulation:         mins  76134 ( );  Dry Needling          mins self-pay  Traction          mins 61879  Canalith Repos         mins 02063    Timed Treatment:   40   mins   Total Treatment:     40   mins      Daniel Joyner PT, PT, DPT, OCS  [place] license: [id number]

## 2025-05-22 ENCOUNTER — TREATMENT (OUTPATIENT)
Dept: PHYSICAL THERAPY | Facility: CLINIC | Age: 85
End: 2025-05-22
Payer: MEDICARE

## 2025-05-22 DIAGNOSIS — R29.898 BILATERAL LEG WEAKNESS: Primary | ICD-10-CM

## 2025-05-22 DIAGNOSIS — M79.641 BILATERAL HAND PAIN: ICD-10-CM

## 2025-05-22 DIAGNOSIS — M54.50 LOW BACK PAIN, UNSPECIFIED BACK PAIN LATERALITY, UNSPECIFIED CHRONICITY, UNSPECIFIED WHETHER SCIATICA PRESENT: ICD-10-CM

## 2025-05-22 DIAGNOSIS — M79.642 BILATERAL HAND PAIN: ICD-10-CM

## 2025-05-22 DIAGNOSIS — Z74.09 MOBILITY IMPAIRED: ICD-10-CM

## 2025-05-22 DIAGNOSIS — Z98.890 HISTORY OF LUMBAR LAMINECTOMY: ICD-10-CM

## 2025-05-22 PROCEDURE — 97110 THERAPEUTIC EXERCISES: CPT | Performed by: PHYSICAL THERAPIST

## 2025-05-22 PROCEDURE — 97164 PT RE-EVAL EST PLAN CARE: CPT | Performed by: PHYSICAL THERAPIST

## 2025-05-22 PROCEDURE — 97140 MANUAL THERAPY 1/> REGIONS: CPT | Performed by: PHYSICAL THERAPIST

## 2025-05-22 NOTE — PROGRESS NOTES
Physical Therapy Re Certification Of Plan of Care  Patient: Campbell Alex   : 1940  Diagnosis/ICD-10 Code:  Bilateral leg weakness [R29.898]  Referring practitioner: Rocky Lozoya DO  Date of Initial Visit: Type: THERAPY  Noted: 2024  Today's Date: 2025  Patient seen for 92 sessions         Visit Diagnoses:    ICD-10-CM ICD-9-CM   1. Bilateral leg weakness  R29.898 729.89   2. History of lumbar laminectomy  Z98.890 V45.89   3. Mobility impaired  Z74.09 799.89   4. Low back pain, unspecified back pain laterality, unspecified chronicity, unspecified whether sciatica present  M54.50 724.2   5. Bilateral hand pain  M79.641 729.5    M79.642        Subjective Questionnaire: QuickDASH: 50%  Home Program Compliance: Yes  Treatment has included: therapeutic exercise, neuromuscular re-education, manual therapy, therapeutic activity, and gait training      Subjective Pt reports L wrist and hand pain and R  hand numbness along the medial right hand. Pt relates this to using his rolling walker. Primary L wrist pain is global but he does have pain into the medial hand. Symptoms worsen with weightbearing on the walker, using a banister, tight grasping. Symptoms improve with Acetaminophen. Pt had a ortho consult last week and pt reports they recommended therapy. Pt referred for a NCS but this is not scheduled. Notes prior dx psoriatic arthritis but is not seeing a rheumatologist.         Objective          Strength/Myotome Testing     Left Wrist/Hand   Wrist extension: 3  Wrist flexion: 3  Radial deviation: 3  Ulnar deviation: 3     (2nd hand position)   Left  strength (2nd hand position) 35 lbs    Right Wrist/Hand   Wrist extension: 3  Wrist flexion: 3  Radial deviation: 3  Ulnar deviation: 3     (2nd hand position)   Right  strength (2nd hand position) 30 lbs    Tests   Cervical     Left   Positive ULTT3.     Right   Positive ULTT3.     Left Elbow   Positive Tinel's sign (cubital tunnel).      Right Elbow   Positive Tinel's sign (cubital tunnel).       Unable to close his hand bilaterally.  Global IP nodules noted.  PROM testing tolerance is very limited, pt with c/o pain.  Shoulder flexion AROM ~120 deg      Assessment & Plan       Assessment  Impairments: abnormal or restricted ROM, activity intolerance, impaired physical strength, lacks appropriate home exercise program, pain with function and weight-bearing intolerance   Functional limitations: carrying objects, lifting, sleeping, pulling, pushing, uncomfortable because of pain and unable to perform repetitive tasks   Assessment details: 86 yo male with c/o bilateral hand and wrist pain. Pt currently being seen here for evaluation and treatment of lumbar impairments and impaired mobility. Pt is with new impairments as per above that he believes is related to using his rolling wx. Recommend continuing with PT evaluation and treatment to address prior impairments and these new impairment findings.  Prognosis: good    Goals  Plan Goals: Short term goals, 1 week: Tolerate HEP progression.  Voice compliance with activity modification.  Report improvement in symptoms.    New LTGs, 6 weeks: Improve quick DASH score to 35%.  AROM to be at or near wfl to allow reaching, lifting, and grasping activities.  Tolerate UE WB activities on rolling wx for 10 min or more.  4-/5 strength throughout to allow reaching and lifting activities.  Independent with HEP.    Plan  Therapy options: will be seen for skilled therapy services  Other planned modality interventions: modalities prn  Planned therapy interventions: flexibility, functional ROM exercises, home exercise program, manual therapy, neuromuscular re-education, strengthening, stretching and therapeutic activities  Frequency: 2x week  Duration in weeks: 6  Treatment plan discussed with: patient            Timed:         Manual Therapy:    22     mins  73748;     Therapeutic Exercise:    8     mins  51904;      Neuromuscular Agustina:        mins  98853;    Therapeutic Activity:          mins  88001;     Gait Training:           mins  22211;     Ultrasound:          mins  41333;    Ionto                                   mins   72011  Self Care                            mins   49534    Un-Timed:  Electrical Stimulation:         mins  72493 ( );  Dry Needling          mins self-pay  Traction          mins 74492  Re-Eval                           15    mins  47555  Canalith Repos         mins 13308    Timed Treatment:   30   mins   Total Treatment:     45   mins          PT: Daniel Joyner PT , DPT, OCS  [place] license: [id number]    Electronically signed by Daniel Joyner PT, 05/22/25, 10:31 AM EDT    Certification Period: 5/22/2025 thru 8/19/2025  I certify that the therapy services are furnished while this patient is under my care.  The services outlined above are required by this patient, and will be reviewed every 90 days.         Physician Signature:__________________________________________________    PHYSICIAN: Rocky Lozoya DO  NPI: [id number]                                      DATE:  :     Please sign and return via fax to [email or web address] . Thank you, [facility]

## 2025-05-27 ENCOUNTER — TREATMENT (OUTPATIENT)
Dept: PHYSICAL THERAPY | Facility: CLINIC | Age: 85
End: 2025-05-27
Payer: MEDICARE

## 2025-05-27 DIAGNOSIS — M79.642 BILATERAL HAND PAIN: ICD-10-CM

## 2025-05-27 DIAGNOSIS — R29.898 BILATERAL LEG WEAKNESS: Primary | ICD-10-CM

## 2025-05-27 DIAGNOSIS — Z74.09 MOBILITY IMPAIRED: ICD-10-CM

## 2025-05-27 DIAGNOSIS — M54.50 LOW BACK PAIN, UNSPECIFIED BACK PAIN LATERALITY, UNSPECIFIED CHRONICITY, UNSPECIFIED WHETHER SCIATICA PRESENT: ICD-10-CM

## 2025-05-27 DIAGNOSIS — Z98.890 HISTORY OF LUMBAR LAMINECTOMY: ICD-10-CM

## 2025-05-27 DIAGNOSIS — M79.641 BILATERAL HAND PAIN: ICD-10-CM

## 2025-05-27 PROCEDURE — 97112 NEUROMUSCULAR REEDUCATION: CPT | Performed by: PHYSICAL THERAPIST

## 2025-05-27 PROCEDURE — 97530 THERAPEUTIC ACTIVITIES: CPT | Performed by: PHYSICAL THERAPIST

## 2025-05-27 PROCEDURE — 97110 THERAPEUTIC EXERCISES: CPT | Performed by: PHYSICAL THERAPIST

## 2025-05-27 NOTE — PROGRESS NOTES
Physical Therapy Daily Treatment Note      Patient: Campbell Alex   : 1940  Referring practitioner: Rocky Lozoya DO  Date of Initial Visit: Type: THERAPY  Noted: 2024  Today's Date: 2025  Patient seen for 93 sessions       Visit Diagnoses:    ICD-10-CM ICD-9-CM   1. Bilateral leg weakness  R29.898 729.89   2. History of lumbar laminectomy  Z98.890 V45.89   3. Mobility impaired  Z74.09 799.89   4. Low back pain, unspecified back pain laterality, unspecified chronicity, unspecified whether sciatica present  M54.50 724.2   5. Bilateral hand pain  M79.641 729.5    M79.642        Subjective Shoulder and UE mobility improving. HEP going well.     Objective   See Exercise, Manual, and Modality Logs for complete treatment.       Assessment/Plan Good response to treatment today. Fatigued post visit.      Timed:         Manual Therapy:         mins  19633;     Therapeutic Exercise:    15     mins  82657;     Neuromuscular Agustina:    15    mins  91436;    Therapeutic Activity:     10     mins  94392;     Gait Training:           mins  39321;     Ultrasound:          mins  33795;    Ionto                                   mins   86665  Self Care                            mins   35662      Un-Timed:  Electrical Stimulation:         mins  81948 ( );  Dry Needling          mins self-pay  Traction          mins 32930  Canalith Repos         mins 13667    Timed Treatment:   40   mins   Total Treatment:     40   mins      Daniel Joyner, PT, PT, DPT, OCS  [place] license: [id number]

## 2025-05-28 ENCOUNTER — TELEPHONE (OUTPATIENT)
Dept: CARDIOLOGY | Facility: CLINIC | Age: 85
End: 2025-05-28
Payer: MEDICARE

## 2025-05-28 NOTE — TELEPHONE ENCOUNTER
FACILITY: [facility]  DR: AZALEA BAHENA   PHONE: [phone]  FAX: [phone]  PROCEDURE: SKIN CANCER EXCISION FROM SCALP  SCHEDULED: 6/19/2025  MEDS TO HOLD: ELIQUIS - 2 DAYS     Office Visit with Vilma Bowling MD (03/07/2025)       LETTER ON DR. BOWLING'S DESK FOR REVIEW

## 2025-06-03 ENCOUNTER — TREATMENT (OUTPATIENT)
Dept: PHYSICAL THERAPY | Facility: CLINIC | Age: 85
End: 2025-06-03
Payer: MEDICARE

## 2025-06-03 DIAGNOSIS — M79.641 BILATERAL HAND PAIN: ICD-10-CM

## 2025-06-03 DIAGNOSIS — Z74.09 MOBILITY IMPAIRED: ICD-10-CM

## 2025-06-03 DIAGNOSIS — M79.642 BILATERAL HAND PAIN: ICD-10-CM

## 2025-06-03 DIAGNOSIS — R29.898 BILATERAL LEG WEAKNESS: Primary | ICD-10-CM

## 2025-06-03 DIAGNOSIS — M54.50 LOW BACK PAIN, UNSPECIFIED BACK PAIN LATERALITY, UNSPECIFIED CHRONICITY, UNSPECIFIED WHETHER SCIATICA PRESENT: ICD-10-CM

## 2025-06-03 DIAGNOSIS — Z98.890 HISTORY OF LUMBAR LAMINECTOMY: ICD-10-CM

## 2025-06-03 PROCEDURE — 97112 NEUROMUSCULAR REEDUCATION: CPT | Performed by: PHYSICAL THERAPIST

## 2025-06-03 PROCEDURE — 97110 THERAPEUTIC EXERCISES: CPT | Performed by: PHYSICAL THERAPIST

## 2025-06-03 PROCEDURE — 97530 THERAPEUTIC ACTIVITIES: CPT | Performed by: PHYSICAL THERAPIST

## 2025-06-03 NOTE — PROGRESS NOTES
Physical Therapy Daily Treatment Note      Patient: Campbell Alex   : 1940  Referring practitioner: Rocky Lozoya DO  Date of Initial Visit: Type: THERAPY  Noted: 2024  Today's Date: 6/3/2025  Patient seen for 94 sessions       Visit Diagnoses:    ICD-10-CM ICD-9-CM   1. Bilateral leg weakness  R29.898 729.89   2. History of lumbar laminectomy  Z98.890 V45.89   3. Mobility impaired  Z74.09 799.89   4. Low back pain, unspecified back pain laterality, unspecified chronicity, unspecified whether sciatica present  M54.50 724.2   5. Bilateral hand pain  M79.641 729.5    M79.642        Subjective Had difficulty cleaning his grill over the weekend, which required prolonged standing. Feels UE symptoms are improving other than hand pain.    Objective   See Exercise, Manual, and Modality Logs for complete treatment.       Assessment/Plan Good treatment tolerance. Fatigued post visit.      Timed:         Manual Therapy:         mins  96189;     Therapeutic Exercise:    15     mins  43145;     Neuromuscular Agustina:    15    mins  80081;    Therapeutic Activity:     10     mins  16653;     Gait Training:           mins  28320;     Ultrasound:          mins  27797;    Ionto                                   mins   00514  Self Care                            mins   69941      Un-Timed:  Electrical Stimulation:         mins  66659 ( );  Dry Needling          mins self-pay  Traction          mins 06016  Canalith Repos         mins 92218    Timed Treatment:   40   mins   Total Treatment:     40   mins      Daniel Joyner, PT, PT, DPT, OCS  [place] license: [id number]

## 2025-06-12 ENCOUNTER — TREATMENT (OUTPATIENT)
Dept: PHYSICAL THERAPY | Facility: CLINIC | Age: 85
End: 2025-06-12
Payer: MEDICARE

## 2025-06-12 DIAGNOSIS — M79.642 BILATERAL HAND PAIN: ICD-10-CM

## 2025-06-12 DIAGNOSIS — R29.898 BILATERAL LEG WEAKNESS: Primary | ICD-10-CM

## 2025-06-12 DIAGNOSIS — Z74.09 MOBILITY IMPAIRED: ICD-10-CM

## 2025-06-12 DIAGNOSIS — Z98.890 HISTORY OF LUMBAR LAMINECTOMY: ICD-10-CM

## 2025-06-12 DIAGNOSIS — M54.50 LOW BACK PAIN, UNSPECIFIED BACK PAIN LATERALITY, UNSPECIFIED CHRONICITY, UNSPECIFIED WHETHER SCIATICA PRESENT: ICD-10-CM

## 2025-06-12 DIAGNOSIS — M79.641 BILATERAL HAND PAIN: ICD-10-CM

## 2025-06-12 PROCEDURE — 97110 THERAPEUTIC EXERCISES: CPT | Performed by: PHYSICAL THERAPIST

## 2025-06-12 PROCEDURE — 97112 NEUROMUSCULAR REEDUCATION: CPT | Performed by: PHYSICAL THERAPIST

## 2025-06-12 PROCEDURE — 97530 THERAPEUTIC ACTIVITIES: CPT | Performed by: PHYSICAL THERAPIST

## 2025-06-12 NOTE — PROGRESS NOTES
Physical Therapy Daily Treatment Note      Patient: Campbell Alex   : 1940  Referring practitioner: Rocky Lozoya DO  Date of Initial Visit: Type: THERAPY  Noted: 2024  Today's Date: 2025  Patient seen for 95 sessions       Visit Diagnoses:    ICD-10-CM ICD-9-CM   1. Bilateral leg weakness  R29.898 729.89   2. History of lumbar laminectomy  Z98.890 V45.89   3. Mobility impaired  Z74.09 799.89   4. Low back pain, unspecified back pain laterality, unspecified chronicity, unspecified whether sciatica present  M54.50 724.2   5. Bilateral hand pain  M79.641 729.5    M79.642        Subjective Reports GI illness over the last week, feels he is weaker vs last visit. HEP going well.     Objective   See Exercise, Manual, and Modality Logs for complete treatment.       Assessment/Plan Tolerated visit well, will resume POC.      Timed:         Manual Therapy:         mins  38115;     Therapeutic Exercise:    15     mins  10172;     Neuromuscular Agustina:    15    mins  39752;    Therapeutic Activity:       10   mins  97809;     Gait Training:         mins  57998;     Ultrasound:          mins  12919;    Ionto                                   mins   02313  Self Care                            mins   36846      Un-Timed:  Electrical Stimulation:         mins  44398 ( );  Dry Needling          mins self-pay  Traction          mins 06767  Canalith Repos         mins 36966    Timed Treatment:   40   mins   Total Treatment:     40   mins      Daniel Joyner, PT, PT, DPT, OCS  [place] license: [id number]

## 2025-06-13 ENCOUNTER — OFFICE VISIT (OUTPATIENT)
Dept: FAMILY MEDICINE CLINIC | Facility: CLINIC | Age: 85
End: 2025-06-13
Payer: MEDICARE

## 2025-06-13 VITALS
BODY MASS INDEX: 24.84 KG/M2 | OXYGEN SATURATION: 97 % | DIASTOLIC BLOOD PRESSURE: 67 MMHG | HEART RATE: 79 BPM | RESPIRATION RATE: 18 BRPM | WEIGHT: 187.4 LBS | SYSTOLIC BLOOD PRESSURE: 131 MMHG | HEIGHT: 73 IN

## 2025-06-13 DIAGNOSIS — N32.81 OVERACTIVE BLADDER: ICD-10-CM

## 2025-06-13 DIAGNOSIS — I48.0 PAROXYSMAL ATRIAL FIBRILLATION: ICD-10-CM

## 2025-06-13 DIAGNOSIS — R35.1 NOCTURIA: ICD-10-CM

## 2025-06-13 DIAGNOSIS — E78.2 MIXED HYPERLIPIDEMIA: ICD-10-CM

## 2025-06-13 DIAGNOSIS — K21.9 GASTROESOPHAGEAL REFLUX DISEASE WITHOUT ESOPHAGITIS: ICD-10-CM

## 2025-06-13 DIAGNOSIS — I10 PRIMARY HYPERTENSION: Primary | ICD-10-CM

## 2025-06-13 RX ORDER — VIBEGRON 75 MG/1
75 TABLET, FILM COATED ORAL DAILY
Qty: 30 TABLET | Refills: 5 | Status: SHIPPED | OUTPATIENT
Start: 2025-06-13

## 2025-06-13 NOTE — PROGRESS NOTES
"Chief Complaint  Chief Complaint   Patient presents with    Follow-up     4 month follow up       Subjective        Campbell Alex is a 85 y.o. male who presents to [facility].  History of Present Illness    History of Present Illness  85-year-old male here for 4-month follow-up of chronic conditions: hypertension, hyperlipidemia, paroxysmal atrial fibrillation, GERD.    Severe gastrointestinal illness last week with stomach rolling and diarrhea. Son also contracted illness. Voice not fully recovered. Taking probiotics daily, considering twice daily.    Feeling weak post-illness but participated in physical therapy yesterday. No recent falls or near-falls.    Disrupted sleep due to nocturia (5-6 times/night). Hydrates with water at dinner and before bed to prevent dry mouth. History of UroLift procedure. Daytime urinary frequency less than nighttime, urgency upon standing. Diagnosed with underactive bladder and incomplete bladder emptying. Previously tried Gemtesa for overactive bladder, discontinued due to cost. Currently on hydrochlorothiazide 25 mg, taken late morning.    Intermittent hiccups, disruptive outside meal times, alleviated by drinking water. Upper endoscopy showed no bleeding. History of hiatal hernia, mindful of diet.    History of chest trauma from youth football fall, occasional positional pain, resolves with movement.    Resumed iron supplementation due to low iron levels.    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 Follow-up with orthopedic doctor next week.    Objective   /67   Pulse 79   Resp 18   Ht 185.4 cm (73\")   Wt 85 kg (187 lb 6.4 oz)   SpO2 97%   BMI 24.72 kg/m²     Estimated body mass index is 24.72 kg/m² as calculated from the following:    Height as of this encounter: 185.4 cm (73\").    Weight as of this encounter: 85 kg (187 lb 6.4 oz).     Physical Exam   GEN: In no acute distress, non toxic appearing  HEENT: " Pupils equal and reactive to light, sclera clear. Mucous membranes moist.   CV: Regular rate and rhythm, no murmurs, 2+ peripheral pulses  RESP: Lungs clear to auscultation anteriorly and posteriorly in all lung fields bilaterally.  NEURO: AAO to person, place, and time. CN 2-12 intact grossly.   PSYCH: Affect normal, insight fair      Result Review :              Assessment and Plan     Diagnoses and all orders for this visit:    1. Primary hypertension (Primary)    2. Mixed hyperlipidemia    3. Paroxysmal atrial fibrillation    4. Gastroesophageal reflux disease without esophagitis    5. Overactive bladder  -     Vibegron (Gemtesa) 75 MG tablet; Take 1 tablet by mouth Daily.  Dispense: 30 tablet; Refill: 5    6. Nocturia  -     Vibegron (Gemtesa) 75 MG tablet; Take 1 tablet by mouth Daily.  Dispense: 30 tablet; Refill: 5          Assessment & Plan  Hypertension  - BP within normal range today  - Increased weakness post-illness  - No recent falls or near falls  - Continue metoprolol tartrate 25 mg b.i.d.    Hyperlipidemia  - On atorvastatin 20 mg daily  - Weight stable  - Continue current regimen    Paroxysmal atrial fibrillation  - HR within normal limits  - No new symptoms  - Continue Eliquis 5 mg b.i.d. and metoprolol tartrate 25 mg b.i.d.    GERD  - Intermittent hiccups potentially related to reflux  - No new findings on recent upper scope  - Continue Protonix 40 mg daily    Urinary frequency  - Nocturia (5-6 times/night)  - On hydrochlorothiazide 25 mg daily per urology  - Continue flomax 0.4 mg daily  - Prescription for Gemtesa 75 mg daily given today      Follow Up     Return in about 4 months (around 10/13/2025) for Medicare Wellness.    Patient or patient representative verbalized consent for the use of Ambient Listening during the visit with  Rocky Lozoya DO for chart documentation. 6/13/2025  09:25 EDT

## 2025-06-17 ENCOUNTER — TREATMENT (OUTPATIENT)
Dept: PHYSICAL THERAPY | Facility: CLINIC | Age: 85
End: 2025-06-17
Payer: MEDICARE

## 2025-06-17 DIAGNOSIS — M79.641 BILATERAL HAND PAIN: ICD-10-CM

## 2025-06-17 DIAGNOSIS — M79.642 BILATERAL HAND PAIN: ICD-10-CM

## 2025-06-17 DIAGNOSIS — R29.898 BILATERAL LEG WEAKNESS: Primary | ICD-10-CM

## 2025-06-17 DIAGNOSIS — Z98.890 HISTORY OF LUMBAR LAMINECTOMY: ICD-10-CM

## 2025-06-17 DIAGNOSIS — Z74.09 MOBILITY IMPAIRED: ICD-10-CM

## 2025-06-17 DIAGNOSIS — M54.50 LOW BACK PAIN, UNSPECIFIED BACK PAIN LATERALITY, UNSPECIFIED CHRONICITY, UNSPECIFIED WHETHER SCIATICA PRESENT: ICD-10-CM

## 2025-06-17 PROCEDURE — 97140 MANUAL THERAPY 1/> REGIONS: CPT | Performed by: PHYSICAL THERAPIST

## 2025-06-17 PROCEDURE — 97110 THERAPEUTIC EXERCISES: CPT | Performed by: PHYSICAL THERAPIST

## 2025-06-17 NOTE — PROGRESS NOTES
Physical Therapy Daily Treatment Note      Patient: Campbell Alex   : 1940  Referring practitioner: Rocky Lozoya DO  Date of Initial Visit: Type: THERAPY  Noted: 2024  Today's Date: 2025  Patient seen for 96 sessions       Visit Diagnoses:    ICD-10-CM ICD-9-CM   1. Bilateral leg weakness  R29.898 729.89   2. History of lumbar laminectomy  Z98.890 V45.89   3. Mobility impaired  Z74.09 799.89   4. Bilateral hand pain  M79.641 729.5    M79.642    5. Low back pain, unspecified back pain laterality, unspecified chronicity, unspecified whether sciatica present  M54.50 724.2       Subjective Primarily limited by hand pain today. HEP going well.    Objective   See Exercise, Manual, and Modality Logs for complete treatment.       Assessment/Plan Good response to STM today.      Timed:         Manual Therapy:    30     mins  09446;     Therapeutic Exercise:    8     mins  83995;     Neuromuscular Agustina:        mins  41726;    Therapeutic Activity:          mins  51829;     Gait Training:           mins  71142;     Ultrasound:          mins  37885;    Ionto                                   mins   74424  Self Care                            mins   03918      Un-Timed:  Electrical Stimulation:         mins  68472 ( );  Dry Needling          mins self-pay  Traction          mins 87114  Canalith Repos         mins 37721    Timed Treatment:   38   mins   Total Treatment:     38   mins      Daniel Joyner PT, PT, DPT, OCS  [place] license: [id number]

## 2025-06-19 ENCOUNTER — TREATMENT (OUTPATIENT)
Dept: PHYSICAL THERAPY | Facility: CLINIC | Age: 85
End: 2025-06-19
Payer: MEDICARE

## 2025-06-19 DIAGNOSIS — M79.642 BILATERAL HAND PAIN: ICD-10-CM

## 2025-06-19 DIAGNOSIS — R29.898 BILATERAL LEG WEAKNESS: Primary | ICD-10-CM

## 2025-06-19 DIAGNOSIS — Z98.890 HISTORY OF LUMBAR LAMINECTOMY: ICD-10-CM

## 2025-06-19 DIAGNOSIS — M79.641 BILATERAL HAND PAIN: ICD-10-CM

## 2025-06-19 DIAGNOSIS — Z74.09 MOBILITY IMPAIRED: ICD-10-CM

## 2025-06-19 DIAGNOSIS — M54.50 LOW BACK PAIN, UNSPECIFIED BACK PAIN LATERALITY, UNSPECIFIED CHRONICITY, UNSPECIFIED WHETHER SCIATICA PRESENT: ICD-10-CM

## 2025-06-19 PROCEDURE — 88305 TISSUE EXAM BY PATHOLOGIST: CPT | Performed by: OTOLARYNGOLOGY

## 2025-06-19 NOTE — PROGRESS NOTES
Physical Therapy Daily Treatment Note      Patient: Campbell Alex   : 1940  Referring practitioner: Rocky Lozoya DO  Date of Initial Visit: Type: THERAPY  Noted: 2024  Today's Date: 2025  Patient seen for 97 sessions       Visit Diagnoses:    ICD-10-CM ICD-9-CM   1. Bilateral leg weakness  R29.898 729.89   2. History of lumbar laminectomy  Z98.890 V45.89   3. Mobility impaired  Z74.09 799.89   4. Low back pain, unspecified back pain laterality, unspecified chronicity, unspecified whether sciatica present  M54.50 724.2   5. Bilateral hand pain  M79.641 729.5    M79.642        Subjective Less UE pain after last visit. HEP going well.    Objective   See Exercise, Manual, and Modality Logs for complete treatment.       Assessment/Plan Good response to treatment today. Pain improved post visit.      Timed:         Manual Therapy:    23     mins  85312;     Therapeutic Exercise:    15     mins  37914;     Neuromuscular Agustina:        mins  85248;    Therapeutic Activity:          mins  93204;     Gait Training:           mins  89217;     Ultrasound:          mins  22018;    Ionto                                   mins   22784  Self Care                            mins   12597      Un-Timed:  Electrical Stimulation:         mins  45760 ( );  Dry Needling          mins self-pay  Traction          mins 82081  Canalith Repos         mins 92363    Timed Treatment:   38   mins   Total Treatment:     38   mins      Daniel Joyner, PT, PT, DPT, OCS  [place] license: [id number]

## 2025-06-24 ENCOUNTER — LAB REQUISITION (OUTPATIENT)
Dept: LAB | Facility: HOSPITAL | Age: 85
End: 2025-06-24
Payer: MEDICARE

## 2025-06-24 ENCOUNTER — TREATMENT (OUTPATIENT)
Dept: PHYSICAL THERAPY | Facility: CLINIC | Age: 85
End: 2025-06-24
Payer: MEDICARE

## 2025-06-24 DIAGNOSIS — Z98.890 HISTORY OF LUMBAR LAMINECTOMY: ICD-10-CM

## 2025-06-24 DIAGNOSIS — R29.898 BILATERAL LEG WEAKNESS: Primary | ICD-10-CM

## 2025-06-24 DIAGNOSIS — C44.41 BASAL CELL CARCINOMA OF SKIN OF SCALP AND NECK: ICD-10-CM

## 2025-06-24 DIAGNOSIS — M54.50 LOW BACK PAIN, UNSPECIFIED BACK PAIN LATERALITY, UNSPECIFIED CHRONICITY, UNSPECIFIED WHETHER SCIATICA PRESENT: ICD-10-CM

## 2025-06-24 DIAGNOSIS — M79.642 BILATERAL HAND PAIN: ICD-10-CM

## 2025-06-24 DIAGNOSIS — Z74.09 MOBILITY IMPAIRED: ICD-10-CM

## 2025-06-24 DIAGNOSIS — M79.641 BILATERAL HAND PAIN: ICD-10-CM

## 2025-06-24 PROCEDURE — 97110 THERAPEUTIC EXERCISES: CPT | Performed by: PHYSICAL THERAPIST

## 2025-06-24 PROCEDURE — 97140 MANUAL THERAPY 1/> REGIONS: CPT | Performed by: PHYSICAL THERAPIST

## 2025-06-24 NOTE — PROGRESS NOTES
Physical Therapy Daily Treatment Note      Patient: Campbell Alex   : 1940  Referring practitioner: Rcoky Lozoya DO  Date of Initial Visit: Type: THERAPY  Noted: 2024  Today's Date: 2025  Patient seen for 98 sessions       Visit Diagnoses:    ICD-10-CM ICD-9-CM   1. Bilateral leg weakness  R29.898 729.89   2. History of lumbar laminectomy  Z98.890 V45.89   3. Mobility impaired  Z74.09 799.89   4. Low back pain, unspecified back pain laterality, unspecified chronicity, unspecified whether sciatica present  M54.50 724.2   5. Bilateral hand pain  M79.641 729.5    M79.642        Subjective Pt feels UE symptoms improved after last visit but returned with time. HEP going well.    Objective   See Exercise, Manual, and Modality Logs for complete treatment.       Assessment/Plan Good response to treatment today.      Timed:         Manual Therapy:    25     mins  18108;     Therapeutic Exercise:    13     mins  55204;     Neuromuscular Agustina:        mins  43828;    Therapeutic Activity:          mins  99285;     Gait Training:           mins  96611;     Ultrasound:          mins  81717;    Ionto                                   mins   88734  Self Care                            mins   06672      Un-Timed:  Electrical Stimulation:         mins  15443 ( );  Dry Needling          mins self-pay  Traction          mins 64556  Canalith Repos         mins 46689    Timed Treatment:   38   mins   Total Treatment:     38   mins      Daniel Joyner, PT, PT, DPT, OCS  [place] license: [id number]

## 2025-06-25 LAB
LAB AP CASE REPORT: NORMAL
PATH REPORT.FINAL DX SPEC: NORMAL
PATH REPORT.GROSS SPEC: NORMAL

## 2025-06-26 ENCOUNTER — TREATMENT (OUTPATIENT)
Dept: PHYSICAL THERAPY | Facility: CLINIC | Age: 85
End: 2025-06-26
Payer: MEDICARE

## 2025-06-26 DIAGNOSIS — Z98.890 HISTORY OF LUMBAR LAMINECTOMY: ICD-10-CM

## 2025-06-26 DIAGNOSIS — Z74.09 MOBILITY IMPAIRED: ICD-10-CM

## 2025-06-26 DIAGNOSIS — R29.898 BILATERAL LEG WEAKNESS: Primary | ICD-10-CM

## 2025-06-26 DIAGNOSIS — M54.50 LOW BACK PAIN, UNSPECIFIED BACK PAIN LATERALITY, UNSPECIFIED CHRONICITY, UNSPECIFIED WHETHER SCIATICA PRESENT: ICD-10-CM

## 2025-06-26 DIAGNOSIS — M79.641 BILATERAL HAND PAIN: ICD-10-CM

## 2025-06-26 DIAGNOSIS — M79.642 BILATERAL HAND PAIN: ICD-10-CM

## 2025-06-26 NOTE — PROGRESS NOTES
Physical Therapy Daily Treatment Note      Patient: Campbell Alex   : 1940  Referring practitioner: Rocky Lozoya DO  Date of Initial Visit: Type: THERAPY  Noted: 2024  Today's Date: 2025  Patient seen for 99 sessions       Visit Diagnoses:    ICD-10-CM ICD-9-CM   1. Bilateral leg weakness  R29.898 729.89   2. History of lumbar laminectomy  Z98.890 V45.89   3. Mobility impaired  Z74.09 799.89   4. Low back pain, unspecified back pain laterality, unspecified chronicity, unspecified whether sciatica present  M54.50 724.2   5. Bilateral hand pain  M79.641 729.5    M79.642        Subjective Pt had a hand injection, feels this has helped. HEP going well.    Objective   See Exercise, Manual, and Modality Logs for complete treatment.       Assessment/Plan Good response to treatment today. LEs fatigued post visit.      Timed:         Manual Therapy:         mins  15426;     Therapeutic Exercise:    25     mins  73828;     Neuromuscular Agustina:    15    mins  62629;    Therapeutic Activity:          mins  90115;     Gait Training:           mins  47503;     Ultrasound:          mins  75330;    Ionto                                   mins   41741  Self Care                            mins   90299      Un-Timed:  Electrical Stimulation:         mins  00044 ( );  Dry Needling          mins self-pay  Traction          mins 28403  Canalith Repos         mins 80026    Timed Treatment:   40   mins   Total Treatment:     40   mins      Daniel Joyner, PT, PT, DPT, OCS  [place] license: [id number]

## 2025-07-01 ENCOUNTER — TREATMENT (OUTPATIENT)
Dept: PHYSICAL THERAPY | Facility: CLINIC | Age: 85
End: 2025-07-01
Payer: MEDICARE

## 2025-07-01 DIAGNOSIS — M79.642 BILATERAL HAND PAIN: ICD-10-CM

## 2025-07-01 DIAGNOSIS — R29.898 BILATERAL LEG WEAKNESS: Primary | ICD-10-CM

## 2025-07-01 DIAGNOSIS — Z74.09 MOBILITY IMPAIRED: ICD-10-CM

## 2025-07-01 DIAGNOSIS — M54.50 LOW BACK PAIN, UNSPECIFIED BACK PAIN LATERALITY, UNSPECIFIED CHRONICITY, UNSPECIFIED WHETHER SCIATICA PRESENT: ICD-10-CM

## 2025-07-01 DIAGNOSIS — Z98.890 HISTORY OF LUMBAR LAMINECTOMY: ICD-10-CM

## 2025-07-01 DIAGNOSIS — M79.641 BILATERAL HAND PAIN: ICD-10-CM

## 2025-07-01 PROCEDURE — 97110 THERAPEUTIC EXERCISES: CPT | Performed by: PHYSICAL THERAPIST

## 2025-07-01 PROCEDURE — 97112 NEUROMUSCULAR REEDUCATION: CPT | Performed by: PHYSICAL THERAPIST

## 2025-07-01 NOTE — PROGRESS NOTES
Physical Therapy Daily Treatment Note      Patient: Campbell Alex   : 1940  Referring practitioner: Rocky Lozoya DO  Date of Initial Visit: Type: THERAPY  Noted: 2024  Today's Date: 2025  Patient seen for 100 sessions       Visit Diagnoses:    ICD-10-CM ICD-9-CM   1. Bilateral leg weakness  R29.898 729.89   2. History of lumbar laminectomy  Z98.890 V45.89   3. Mobility impaired  Z74.09 799.89   4. Low back pain, unspecified back pain laterality, unspecified chronicity, unspecified whether sciatica present  M54.50 724.2   5. Bilateral hand pain  M79.641 729.5    M79.642        Subjective Hand pain improving. No new complaints.    Objective   See Exercise, Manual, and Modality Logs for complete treatment.       Assessment/Plan Good response to treatment today.      Timed:         Manual Therapy:         mins  24896;     Therapeutic Exercise:    30     mins  82675;     Neuromuscular Agustina:    10    mins  38243;    Therapeutic Activity:          mins  13007;     Gait Training:           mins  41835;     Ultrasound:          mins  04347;    Ionto                                   mins   85067  Self Care                            mins   87877      Un-Timed:  Electrical Stimulation:         mins  83061 ( );  Dry Needling          mins self-pay  Traction          mins 32708  Canalith Repos         mins 14337    Timed Treatment:   40   mins   Total Treatment:     40   mins      Daniel Joyner PT, PT, DPT, OCS  [place] license: [id number]

## 2025-07-03 ENCOUNTER — TREATMENT (OUTPATIENT)
Dept: PHYSICAL THERAPY | Facility: CLINIC | Age: 85
End: 2025-07-03
Payer: MEDICARE

## 2025-07-03 DIAGNOSIS — Z98.890 HISTORY OF LUMBAR LAMINECTOMY: ICD-10-CM

## 2025-07-03 DIAGNOSIS — R29.898 BILATERAL LEG WEAKNESS: Primary | ICD-10-CM

## 2025-07-03 DIAGNOSIS — M79.641 BILATERAL HAND PAIN: ICD-10-CM

## 2025-07-03 DIAGNOSIS — Z74.09 MOBILITY IMPAIRED: ICD-10-CM

## 2025-07-03 DIAGNOSIS — M54.50 LOW BACK PAIN, UNSPECIFIED BACK PAIN LATERALITY, UNSPECIFIED CHRONICITY, UNSPECIFIED WHETHER SCIATICA PRESENT: ICD-10-CM

## 2025-07-03 DIAGNOSIS — M79.642 BILATERAL HAND PAIN: ICD-10-CM

## 2025-07-03 NOTE — PROGRESS NOTES
Physical Therapy Daily Treatment Note      Patient: Campbell Alex   : 1940  Referring practitioner: Rocky Lozoya DO  Date of Initial Visit: Type: THERAPY  Noted: 2024  Today's Date: 7/3/2025  Patient seen for 101 sessions       Visit Diagnoses:    ICD-10-CM ICD-9-CM   1. Bilateral leg weakness  R29.898 729.89   2. History of lumbar laminectomy  Z98.890 V45.89   3. Mobility impaired  Z74.09 799.89   4. Low back pain, unspecified back pain laterality, unspecified chronicity, unspecified whether sciatica present  M54.50 724.2   5. Bilateral hand pain  M79.641 729.5    M79.642        Subjective Hand and UE pain somewhat exacerbated today. Using straight cane more frequently vs rolling wx. HEP going well.    Objective   See Exercise, Manual, and Modality Logs for complete treatment.       Assessment/Plan UE symptoms improved post visit. Good response to rx.      Timed:         Manual Therapy:    25     mins  27763;     Therapeutic Exercise:    15     mins  94866;     Neuromuscular Agustina:        mins  57387;    Therapeutic Activity:          mins  83314;     Gait Training:           mins  94298;     Ultrasound:          mins  57146;    Ionto                                   mins   19257  Self Care                            mins   18692      Un-Timed:  Electrical Stimulation:         mins  12171 ( );  Dry Needling          mins self-pay  Traction          mins 32107  Canalith Repos         mins 45576    Timed Treatment:   40   mins   Total Treatment:     40   mins      Daniel Joyner PT, PT, DPT, OCS  [place] license: [id number]

## 2025-07-15 ENCOUNTER — TREATMENT (OUTPATIENT)
Dept: PHYSICAL THERAPY | Facility: CLINIC | Age: 85
End: 2025-07-15
Payer: MEDICARE

## 2025-07-15 DIAGNOSIS — M54.50 LOW BACK PAIN, UNSPECIFIED BACK PAIN LATERALITY, UNSPECIFIED CHRONICITY, UNSPECIFIED WHETHER SCIATICA PRESENT: ICD-10-CM

## 2025-07-15 DIAGNOSIS — Z74.09 MOBILITY IMPAIRED: ICD-10-CM

## 2025-07-15 DIAGNOSIS — M79.641 BILATERAL HAND PAIN: ICD-10-CM

## 2025-07-15 DIAGNOSIS — Z98.890 HISTORY OF LUMBAR LAMINECTOMY: ICD-10-CM

## 2025-07-15 DIAGNOSIS — M79.642 BILATERAL HAND PAIN: ICD-10-CM

## 2025-07-15 DIAGNOSIS — R29.898 BILATERAL LEG WEAKNESS: Primary | ICD-10-CM

## 2025-07-15 NOTE — PROGRESS NOTES
Physical Therapy Daily Treatment Note      Patient: Campbell Alex   : 1940  Referring practitioner: Rocky Lozoya DO  Date of Initial Visit: Type: THERAPY  Noted: 2024  Today's Date: 7/15/2025  Patient seen for 102 sessions       Visit Diagnoses:    ICD-10-CM ICD-9-CM   1. Bilateral leg weakness  R29.898 729.89   2. History of lumbar laminectomy  Z98.890 V45.89   3. Mobility impaired  Z74.09 799.89   4. Low back pain, unspecified back pain laterality, unspecified chronicity, unspecified whether sciatica present  M54.50 724.2   5. Bilateral hand pain  M79.641 729.5    M79.642        Subjective More hand pain with no recent treatment. Pt feels therapy is helping to maintain UE function.    Objective   See Exercise, Manual, and Modality Logs for complete treatment.       Assessment/Plan Pain symptoms improved post visit. Good respons to treatment.      Timed:         Manual Therapy:    15     mins  96554;     Therapeutic Exercise:    25     mins  46780;     Neuromuscular Agustina:        mins  78941;    Therapeutic Activity:          mins  40377;     Gait Training:           mins  57767;     Ultrasound:          mins  44281;    Ionto                                   mins   44258  Self Care                            mins   78555      Un-Timed:  Electrical Stimulation:         mins  48153 ( );  Dry Needling          mins self-pay  Traction          mins 98603  Canalith Repos         mins 04914    Timed Treatment:   40   mins   Total Treatment:     40   mins      Daniel Joyner PT, PT, DPT, OCS  [place] license: [id number]

## 2025-07-17 ENCOUNTER — TREATMENT (OUTPATIENT)
Dept: PHYSICAL THERAPY | Facility: CLINIC | Age: 85
End: 2025-07-17
Payer: MEDICARE

## 2025-07-17 DIAGNOSIS — M54.50 LOW BACK PAIN, UNSPECIFIED BACK PAIN LATERALITY, UNSPECIFIED CHRONICITY, UNSPECIFIED WHETHER SCIATICA PRESENT: ICD-10-CM

## 2025-07-17 DIAGNOSIS — M79.641 BILATERAL HAND PAIN: ICD-10-CM

## 2025-07-17 DIAGNOSIS — R29.898 BILATERAL LEG WEAKNESS: Primary | ICD-10-CM

## 2025-07-17 DIAGNOSIS — M79.642 BILATERAL HAND PAIN: ICD-10-CM

## 2025-07-17 DIAGNOSIS — Z74.09 MOBILITY IMPAIRED: ICD-10-CM

## 2025-07-17 DIAGNOSIS — Z98.890 HISTORY OF LUMBAR LAMINECTOMY: ICD-10-CM

## 2025-07-17 NOTE — PROGRESS NOTES
Physical Therapy Daily Treatment Note      Patient: Campbell Alex   : 1940  Referring practitioner: Rocky Lozoya DO  Date of Initial Visit: Type: THERAPY  Noted: 2024  Today's Date: 2025  Patient seen for 103 sessions       Visit Diagnoses:    ICD-10-CM ICD-9-CM   1. Bilateral leg weakness  R29.898 729.89   2. History of lumbar laminectomy  Z98.890 V45.89   3. Mobility impaired  Z74.09 799.89   4. Low back pain, unspecified back pain laterality, unspecified chronicity, unspecified whether sciatica present  M54.50 724.2   5. Bilateral hand pain  M79.641 729.5    M79.642        Subjective Hand pain improved after last visit. Mobility remains limited.    Objective   See Exercise, Manual, and Modality Logs for complete treatment.       Assessment/Plan LEs fatigued post visit. Good response to treatment.      Timed:         Manual Therapy:         mins  79146;     Therapeutic Exercise:    15     mins  81288;     Neuromuscular Agustina:    15    mins  71069;    Therapeutic Activity:     15    mins  51167;     Gait Training:           mins  09251;     Ultrasound:          mins  32394;    Ionto                                   mins   02806  Self Care                            mins   79420      Un-Timed:  Electrical Stimulation:         mins  49967 ( );  Dry Needling          mins self-pay  Traction          mins 35198  Canalith Repos         mins 34601    Timed Treatment:   45   mins   Total Treatment:     45   mins      Daniel Joyner, PT, PT, DPT, OCS  [place] license: [id number]

## 2025-07-22 ENCOUNTER — TREATMENT (OUTPATIENT)
Dept: PHYSICAL THERAPY | Facility: CLINIC | Age: 85
End: 2025-07-22
Payer: MEDICARE

## 2025-07-22 DIAGNOSIS — R29.898 BILATERAL LEG WEAKNESS: Primary | ICD-10-CM

## 2025-07-22 DIAGNOSIS — Z74.09 MOBILITY IMPAIRED: ICD-10-CM

## 2025-07-22 DIAGNOSIS — M79.641 BILATERAL HAND PAIN: ICD-10-CM

## 2025-07-22 DIAGNOSIS — M79.642 BILATERAL HAND PAIN: ICD-10-CM

## 2025-07-22 DIAGNOSIS — M54.50 LOW BACK PAIN, UNSPECIFIED BACK PAIN LATERALITY, UNSPECIFIED CHRONICITY, UNSPECIFIED WHETHER SCIATICA PRESENT: ICD-10-CM

## 2025-07-22 DIAGNOSIS — Z98.890 HISTORY OF LUMBAR LAMINECTOMY: ICD-10-CM

## 2025-07-22 NOTE — PROGRESS NOTES
Physical Therapy Daily Treatment Note      Patient: Campbell Alex   : 1940  Referring practitioner: Rocky Lozoya DO  Date of Initial Visit: Type: THERAPY  Noted: 2024  Today's Date: 2025  Patient seen for 104 sessions       Visit Diagnoses:    ICD-10-CM ICD-9-CM   1. Bilateral leg weakness  R29.898 729.89   2. History of lumbar laminectomy  Z98.890 V45.89   3. Bilateral hand pain  M79.641 729.5    M79.642    4. Low back pain, unspecified back pain laterality, unspecified chronicity, unspecified whether sciatica present  M54.50 724.2   5. Mobility impaired  Z74.09 799.89       Subjective Exacerbation in hand pain after cutting some wood. HEP going well.     Objective   See Exercise, Manual, and Modality Logs for complete treatment.       Assessment/Plan Less pain post visit.       Timed:         Manual Therapy:    25     mins  38743;     Therapeutic Exercise:    15     mins  70578;     Neuromuscular Agustina:        mins  74273;    Therapeutic Activity:          mins  93384;     Gait Training:           mins  77622;     Ultrasound:          mins  94037;    Ionto                                   mins   57268  Self Care                            mins   01195      Un-Timed:  Electrical Stimulation:         mins  01222 ( );  Dry Needling          mins self-pay  Traction          mins 78969  Canalith Repos         mins 10968    Timed Treatment:   40   mins   Total Treatment:     40   mins      Daniel Joyner PT, PT, DPT, OCS  [place] license: [id number]

## 2025-07-24 ENCOUNTER — TREATMENT (OUTPATIENT)
Dept: PHYSICAL THERAPY | Facility: CLINIC | Age: 85
End: 2025-07-24
Payer: MEDICARE

## 2025-07-24 DIAGNOSIS — M79.642 BILATERAL HAND PAIN: ICD-10-CM

## 2025-07-24 DIAGNOSIS — R29.898 BILATERAL LEG WEAKNESS: Primary | ICD-10-CM

## 2025-07-24 DIAGNOSIS — M54.50 LOW BACK PAIN, UNSPECIFIED BACK PAIN LATERALITY, UNSPECIFIED CHRONICITY, UNSPECIFIED WHETHER SCIATICA PRESENT: ICD-10-CM

## 2025-07-24 DIAGNOSIS — Z98.890 HISTORY OF LUMBAR LAMINECTOMY: ICD-10-CM

## 2025-07-24 DIAGNOSIS — M79.641 BILATERAL HAND PAIN: ICD-10-CM

## 2025-07-24 DIAGNOSIS — Z74.09 MOBILITY IMPAIRED: ICD-10-CM

## 2025-07-24 NOTE — PROGRESS NOTES
Physical Therapy Daily Treatment Note      Patient: Campbell Alex   : 1940  Referring practitioner: Rocky Lozoya DO  Date of Initial Visit: Type: THERAPY  Noted: 2024  Today's Date: 2025  Patient seen for 105 sessions       Visit Diagnoses:    ICD-10-CM ICD-9-CM   1. Bilateral leg weakness  R29.898 729.89   2. History of lumbar laminectomy  Z98.890 V45.89   3. Bilateral hand pain  M79.641 729.5    M79.642    4. Mobility impaired  Z74.09 799.89   5. Low back pain, unspecified back pain laterality, unspecified chronicity, unspecified whether sciatica present  M54.50 724.2       Subjective Primarily limited by hand pain today. HEP going well.    Objective   See Exercise, Manual, and Modality Logs for complete treatment.       Assessment/Plan Good response to STM today.      Timed:         Manual Therapy:    30     mins  04338;     Therapeutic Exercise:    8     mins  19616;     Neuromuscular Agustina:        mins  65736;    Therapeutic Activity:          mins  57069;     Gait Training:           mins  49250;     Ultrasound:          mins  98111;    Ionto                                   mins   30814  Self Care                            mins   02205      Un-Timed:  Electrical Stimulation:         mins  14515 ( );  Dry Needling          mins self-pay  Traction          mins 27403  Canalith Repos         mins 56941    Timed Treatment:   38   mins   Total Treatment:     38   mins      Daniel Joyner PT, PT, DPT, OCS  [place] license: [id number]

## 2025-07-29 ENCOUNTER — TREATMENT (OUTPATIENT)
Dept: PHYSICAL THERAPY | Facility: CLINIC | Age: 85
End: 2025-07-29
Payer: MEDICARE

## 2025-07-29 DIAGNOSIS — M79.642 BILATERAL HAND PAIN: Primary | ICD-10-CM

## 2025-07-29 DIAGNOSIS — R29.898 BILATERAL LEG WEAKNESS: ICD-10-CM

## 2025-07-29 DIAGNOSIS — M54.50 LOW BACK PAIN, UNSPECIFIED BACK PAIN LATERALITY, UNSPECIFIED CHRONICITY, UNSPECIFIED WHETHER SCIATICA PRESENT: ICD-10-CM

## 2025-07-29 DIAGNOSIS — Z98.890 HISTORY OF LUMBAR LAMINECTOMY: ICD-10-CM

## 2025-07-29 DIAGNOSIS — M79.641 BILATERAL HAND PAIN: Primary | ICD-10-CM

## 2025-07-29 DIAGNOSIS — Z74.09 MOBILITY IMPAIRED: ICD-10-CM

## 2025-07-29 NOTE — PROGRESS NOTES
Physical Therapy Daily Treatment Note      Patient: Campbell Alex   : 1940  Referring practitioner: Rocky Lozoya DO  Date of Initial Visit: Type: THERAPY  Noted: 2024  Today's Date: 2025  Patient seen for 106 sessions       Visit Diagnoses:    ICD-10-CM ICD-9-CM   1. Bilateral hand pain  M79.641 729.5    M79.642    2. History of lumbar laminectomy  Z98.890 V45.89   3. Mobility impaired  Z74.09 799.89   4. Low back pain, unspecified back pain laterality, unspecified chronicity, unspecified whether sciatica present  M54.50 724.2   5. Bilateral leg weakness  R29.898 729.89       Subjective Hand pain improved after last visit. Mobility remains limited. Wrist brace helps with pain.    Objective   See Exercise, Manual, and Modality Logs for complete treatment.       Assessment/Plan LEs fatigued post visit. Good response to treatment.      Timed:         Manual Therapy:       25  mins  63023;     Therapeutic Exercise:    15     mins  40157;     Neuromuscular Agustina:        mins  76973;    Therapeutic Activity:         mins  90896;     Gait Training:           mins  78461;     Ultrasound:          mins  50448;    Ionto                                   mins   77650  Self Care                            mins   70271      Un-Timed:  Electrical Stimulation:         mins  46056 ( );  Dry Needling          mins self-pay  Traction          mins 29820  Canalith Repos         mins 19376    Timed Treatment:   40   mins   Total Treatment:     40   mins      Daniel Joyner, PT, PT, DPT, OCS  [place] license: [id number]

## 2025-07-31 ENCOUNTER — TREATMENT (OUTPATIENT)
Dept: PHYSICAL THERAPY | Facility: CLINIC | Age: 85
End: 2025-07-31
Payer: MEDICARE

## 2025-07-31 DIAGNOSIS — R29.898 BILATERAL LEG WEAKNESS: ICD-10-CM

## 2025-07-31 DIAGNOSIS — M79.641 BILATERAL HAND PAIN: ICD-10-CM

## 2025-07-31 DIAGNOSIS — Z98.890 HISTORY OF LUMBAR LAMINECTOMY: ICD-10-CM

## 2025-07-31 DIAGNOSIS — M79.642 BILATERAL HAND PAIN: ICD-10-CM

## 2025-07-31 DIAGNOSIS — M54.50 LOW BACK PAIN, UNSPECIFIED BACK PAIN LATERALITY, UNSPECIFIED CHRONICITY, UNSPECIFIED WHETHER SCIATICA PRESENT: ICD-10-CM

## 2025-07-31 DIAGNOSIS — Z74.09 MOBILITY IMPAIRED: Primary | ICD-10-CM

## 2025-07-31 NOTE — PROGRESS NOTES
Physical Therapy Re Certification Of Plan of Care  Patient: Campbell Alex   : 1940  Diagnosis/ICD-10 Code:  Mobility impaired [Z74.09]  Referring practitioner: Rocky Lozoya DO  Date of Initial Visit: Type: THERAPY  Noted: 2024  Today's Date: 2025  Patient seen for 107 sessions         Visit Diagnoses:    ICD-10-CM ICD-9-CM   1. Mobility impaired  Z74.09 799.89   2. History of lumbar laminectomy  Z98.890 V45.89   3. Bilateral leg weakness  R29.898 729.89   4. Low back pain, unspecified back pain laterality, unspecified chronicity, unspecified whether sciatica present  M54.50 724.2   5. Bilateral hand pain  M79.641 729.5    M79.642        Subjective Questionnaire: QuickDASH: 48%  Oswestry 40%  LEFS 60%  Home Program Compliance: Yes  Treatment has included: therapeutic exercise, neuromuscular re-education, manual therapy, therapeutic activity, and gait training      Subjective Pt reports improvement in hand pain but continues to have difficulty with grasping and lifting activities. Symptoms are exacerbated with AD use but improve with rx. HEP going well.         Objective          Strength/Myotome Testing     Left Wrist/Hand   Wrist extension: 3  Wrist flexion: 3  Radial deviation: 3  Ulnar deviation: 3     (2nd hand position)   Left  strength (2nd hand position) 35 lbs    Right Wrist/Hand   Wrist extension: 3  Wrist flexion: 3  Radial deviation: 3  Ulnar deviation: 3     (2nd hand position)   Right  strength (2nd hand position) 30 lbs    Lumbar     Right   Normal strength    Left Hip   Planes of Motion   Flexion: 3+    Left Knee   Flexion: 4  Extension: 4    Left Ankle/Foot   Dorsiflexion: 4  Great toe flexion: 4  Great toe extension: 4    Tests   Cervical     Left   Positive ULTT3.     Right   Positive ULTT3.     Left Elbow   Positive Tinel's sign (cubital tunnel).     Right Elbow   Positive Tinel's sign (cubital tunnel).      Timed up and go: 23.2 seconds  (24 seconds at  IE), uses walker and UE assist with transfer  Five times sit to stand 20 seconds, without UE assist on two foam pads  Mod A x1 required for standing balance activities in order to be completed safely    Assessment/Plan  Pt is having relief and short term improvement in symptoms management and symptoms post visit. His outcome measures have slowly improved, as has LE strength and performance measures. Recommend continuing with PT evaluation and treatment to address his impairments and mitigate fall risk.    Short term goals, 1 week: Tolerate HEP progression.  Voice compliance with activity modification.  Report improvement in symptoms.    New LTGs, 6 weeks: Improve quick DASH score to 35%. progressing  AROM to be at or near wfl to allow reaching, lifting, and grasping activities.  Tolerate UE WB activities on rolling wx for 10 min or more.  4-/5 strength throughout to allow reaching and lifting activities.  Improve ZAK score to 30%.not met  Improve LEFS to 70%. Not met  Improve timed up and go to 20 seconds. Not met  Improve five times sit to stand to 17 seconds with one foam pad and no UE assist. Not met  4+/5 strength or better throughout to allow safe ambulation and stair climbing. Not met  Symptoms to be centralized. met  Independent with HEP. Progressing    Continue BIW for 5 weeks  30 visits per POC, 90 day certification period   Timed:         Manual Therapy:       mins  47974;     Therapeutic Exercise:    30     mins  28738;     Neuromuscular Agustina:       8 mins  90478;    Therapeutic Activity:          mins  35386;     Gait Training:           mins  11423;     Ultrasound:          mins  46760;    Ionto                                   mins   83247  Self Care                            mins   75374    Un-Timed:  Electrical Stimulation:         mins  93942 ( );  Dry Needling          mins self-pay  Traction          mins 41798  Re-Eval                             mins  03476  Canalith Repos         mins  41307    Timed Treatment:   38  mins   Total Treatment:     38   mins          PT: Daniel Joyner PT , DPT, OCS  [place] license: [id number]    Electronically signed by Daniel Joyner PT, 05/22/25, 10:31 AM EDT    Certification Period: 7/31/2025 thru 10/28/2025  I certify that the therapy services are furnished while this patient is under my care.  The services outlined above are required by this patient, and will be reviewed every 90 days.         Physician Signature:__________________________________________________    PHYSICIAN: Rocky Lozoya DO  NPI: [id number]                                      DATE:  :     Please sign and return via fax to [email or web address] . Thank you, [facility]

## 2025-08-05 ENCOUNTER — TREATMENT (OUTPATIENT)
Dept: PHYSICAL THERAPY | Facility: CLINIC | Age: 85
End: 2025-08-05
Payer: MEDICARE

## 2025-08-05 DIAGNOSIS — M79.641 BILATERAL HAND PAIN: ICD-10-CM

## 2025-08-05 DIAGNOSIS — Z74.09 MOBILITY IMPAIRED: Primary | ICD-10-CM

## 2025-08-05 DIAGNOSIS — R29.898 BILATERAL LEG WEAKNESS: ICD-10-CM

## 2025-08-05 DIAGNOSIS — Z98.890 HISTORY OF LUMBAR LAMINECTOMY: ICD-10-CM

## 2025-08-05 DIAGNOSIS — M79.642 BILATERAL HAND PAIN: ICD-10-CM

## 2025-08-05 DIAGNOSIS — M54.50 LOW BACK PAIN, UNSPECIFIED BACK PAIN LATERALITY, UNSPECIFIED CHRONICITY, UNSPECIFIED WHETHER SCIATICA PRESENT: ICD-10-CM

## 2025-08-05 PROCEDURE — 97140 MANUAL THERAPY 1/> REGIONS: CPT | Performed by: PHYSICAL THERAPIST

## 2025-08-05 PROCEDURE — 97110 THERAPEUTIC EXERCISES: CPT | Performed by: PHYSICAL THERAPIST

## 2025-08-12 ENCOUNTER — TREATMENT (OUTPATIENT)
Dept: PHYSICAL THERAPY | Facility: CLINIC | Age: 85
End: 2025-08-12
Payer: MEDICARE

## 2025-08-12 DIAGNOSIS — M79.641 BILATERAL HAND PAIN: ICD-10-CM

## 2025-08-12 DIAGNOSIS — Z74.09 MOBILITY IMPAIRED: Primary | ICD-10-CM

## 2025-08-12 DIAGNOSIS — M54.50 LOW BACK PAIN, UNSPECIFIED BACK PAIN LATERALITY, UNSPECIFIED CHRONICITY, UNSPECIFIED WHETHER SCIATICA PRESENT: ICD-10-CM

## 2025-08-12 DIAGNOSIS — R29.898 BILATERAL LEG WEAKNESS: ICD-10-CM

## 2025-08-12 DIAGNOSIS — M79.642 BILATERAL HAND PAIN: ICD-10-CM

## 2025-08-12 DIAGNOSIS — Z98.890 HISTORY OF LUMBAR LAMINECTOMY: ICD-10-CM

## 2025-08-14 ENCOUNTER — TREATMENT (OUTPATIENT)
Dept: PHYSICAL THERAPY | Facility: CLINIC | Age: 85
End: 2025-08-14
Payer: MEDICARE

## 2025-08-14 DIAGNOSIS — M79.641 BILATERAL HAND PAIN: ICD-10-CM

## 2025-08-14 DIAGNOSIS — M54.50 LOW BACK PAIN, UNSPECIFIED BACK PAIN LATERALITY, UNSPECIFIED CHRONICITY, UNSPECIFIED WHETHER SCIATICA PRESENT: ICD-10-CM

## 2025-08-14 DIAGNOSIS — M79.642 BILATERAL HAND PAIN: ICD-10-CM

## 2025-08-14 DIAGNOSIS — Z74.09 MOBILITY IMPAIRED: Primary | ICD-10-CM

## 2025-08-14 DIAGNOSIS — R29.898 BILATERAL LEG WEAKNESS: ICD-10-CM

## 2025-08-14 DIAGNOSIS — Z98.890 HISTORY OF LUMBAR LAMINECTOMY: ICD-10-CM

## 2025-08-19 ENCOUNTER — TREATMENT (OUTPATIENT)
Dept: PHYSICAL THERAPY | Facility: CLINIC | Age: 85
End: 2025-08-19
Payer: MEDICARE

## 2025-08-19 DIAGNOSIS — M54.50 LOW BACK PAIN, UNSPECIFIED BACK PAIN LATERALITY, UNSPECIFIED CHRONICITY, UNSPECIFIED WHETHER SCIATICA PRESENT: ICD-10-CM

## 2025-08-19 DIAGNOSIS — M79.642 BILATERAL HAND PAIN: ICD-10-CM

## 2025-08-19 DIAGNOSIS — Z74.09 MOBILITY IMPAIRED: Primary | ICD-10-CM

## 2025-08-19 DIAGNOSIS — R29.898 BILATERAL LEG WEAKNESS: ICD-10-CM

## 2025-08-19 DIAGNOSIS — Z98.890 HISTORY OF LUMBAR LAMINECTOMY: ICD-10-CM

## 2025-08-19 DIAGNOSIS — M79.641 BILATERAL HAND PAIN: ICD-10-CM

## 2025-08-19 PROCEDURE — 97110 THERAPEUTIC EXERCISES: CPT | Performed by: PHYSICAL THERAPIST

## 2025-08-19 PROCEDURE — 97140 MANUAL THERAPY 1/> REGIONS: CPT | Performed by: PHYSICAL THERAPIST

## 2025-08-21 ENCOUNTER — TREATMENT (OUTPATIENT)
Dept: PHYSICAL THERAPY | Facility: CLINIC | Age: 85
End: 2025-08-21
Payer: MEDICARE

## 2025-08-21 DIAGNOSIS — M79.642 BILATERAL HAND PAIN: ICD-10-CM

## 2025-08-21 DIAGNOSIS — M54.50 LOW BACK PAIN, UNSPECIFIED BACK PAIN LATERALITY, UNSPECIFIED CHRONICITY, UNSPECIFIED WHETHER SCIATICA PRESENT: ICD-10-CM

## 2025-08-21 DIAGNOSIS — R29.898 BILATERAL LEG WEAKNESS: ICD-10-CM

## 2025-08-21 DIAGNOSIS — Z74.09 MOBILITY IMPAIRED: Primary | ICD-10-CM

## 2025-08-21 DIAGNOSIS — Z98.890 HISTORY OF LUMBAR LAMINECTOMY: ICD-10-CM

## 2025-08-21 DIAGNOSIS — M79.641 BILATERAL HAND PAIN: ICD-10-CM

## 2025-08-25 ENCOUNTER — TELEPHONE (OUTPATIENT)
Dept: FAMILY MEDICINE CLINIC | Facility: CLINIC | Age: 85
End: 2025-08-25
Payer: MEDICARE

## 2025-08-26 ENCOUNTER — TREATMENT (OUTPATIENT)
Dept: PHYSICAL THERAPY | Facility: CLINIC | Age: 85
End: 2025-08-26
Payer: MEDICARE

## 2025-08-26 DIAGNOSIS — R29.898 BILATERAL LEG WEAKNESS: ICD-10-CM

## 2025-08-26 DIAGNOSIS — Z74.09 MOBILITY IMPAIRED: Primary | ICD-10-CM

## 2025-08-26 DIAGNOSIS — M79.642 BILATERAL HAND PAIN: ICD-10-CM

## 2025-08-26 DIAGNOSIS — M79.641 BILATERAL HAND PAIN: ICD-10-CM

## 2025-08-26 DIAGNOSIS — Z98.890 HISTORY OF LUMBAR LAMINECTOMY: ICD-10-CM

## 2025-08-26 DIAGNOSIS — M54.50 LOW BACK PAIN, UNSPECIFIED BACK PAIN LATERALITY, UNSPECIFIED CHRONICITY, UNSPECIFIED WHETHER SCIATICA PRESENT: ICD-10-CM

## 2025-08-28 ENCOUNTER — TREATMENT (OUTPATIENT)
Dept: PHYSICAL THERAPY | Facility: CLINIC | Age: 85
End: 2025-08-28
Payer: MEDICARE

## 2025-08-28 DIAGNOSIS — R29.898 BILATERAL LEG WEAKNESS: ICD-10-CM

## 2025-08-28 DIAGNOSIS — Z74.09 MOBILITY IMPAIRED: Primary | ICD-10-CM

## 2025-08-28 DIAGNOSIS — M54.50 LOW BACK PAIN, UNSPECIFIED BACK PAIN LATERALITY, UNSPECIFIED CHRONICITY, UNSPECIFIED WHETHER SCIATICA PRESENT: ICD-10-CM

## 2025-08-28 DIAGNOSIS — M79.642 BILATERAL HAND PAIN: ICD-10-CM

## 2025-08-28 DIAGNOSIS — M79.641 BILATERAL HAND PAIN: ICD-10-CM

## 2025-08-28 DIAGNOSIS — Z98.890 HISTORY OF LUMBAR LAMINECTOMY: ICD-10-CM
